# Patient Record
Sex: MALE | Race: WHITE | NOT HISPANIC OR LATINO | Employment: PART TIME | URBAN - METROPOLITAN AREA
[De-identification: names, ages, dates, MRNs, and addresses within clinical notes are randomized per-mention and may not be internally consistent; named-entity substitution may affect disease eponyms.]

---

## 2017-04-27 ENCOUNTER — OFFICE VISIT (OUTPATIENT)
Dept: FAMILY MEDICINE | Facility: CLINIC | Age: 27
End: 2017-04-27
Payer: COMMERCIAL

## 2017-04-27 VITALS
HEART RATE: 118 BPM | BODY MASS INDEX: 32.21 KG/M2 | HEIGHT: 74 IN | OXYGEN SATURATION: 95 % | SYSTOLIC BLOOD PRESSURE: 131 MMHG | WEIGHT: 251 LBS | DIASTOLIC BLOOD PRESSURE: 81 MMHG | TEMPERATURE: 97.7 F

## 2017-04-27 DIAGNOSIS — B35.4 TINEA CORPORIS: ICD-10-CM

## 2017-04-27 DIAGNOSIS — E66.811 OBESITY, CLASS I, BMI 30-34.9: ICD-10-CM

## 2017-04-27 DIAGNOSIS — B35.3 TINEA PEDIS OF BOTH FEET: Primary | ICD-10-CM

## 2017-04-27 PROCEDURE — 99213 OFFICE O/P EST LOW 20 MIN: CPT | Performed by: NURSE PRACTITIONER

## 2017-04-27 RX ORDER — CLOTRIMAZOLE 1 %
CREAM (GRAM) TOPICAL 2 TIMES DAILY
Qty: 30 G | Refills: 1 | Status: SHIPPED | OUTPATIENT
Start: 2017-04-27 | End: 2017-10-23

## 2017-04-27 ASSESSMENT — PAIN SCALES - GENERAL: PAINLEVEL: NO PAIN (0)

## 2017-04-27 NOTE — PATIENT INSTRUCTIONS
"  Ringworm of the Skin    Ringworm is a fungal infection of the skin. Even though it is called \"ringworm,\" a worm doesn't cause it. The cause of ringworm is a fungus that infects the outer layers of the skin. It is also not caused by bed bugs, scabies, or lice. These are totally different.  The medical term for ringworm is tinea. It can affect most parts of your body, although it seems to do better in moist areas of the body and around hair. It can be on almost any part of your body, including:    Arms, hands, legs, chest, feet, and back    Scalp    Beard    Groin    Between toes  Depending on where it is located, sometimes the name changes:    Tinea capitis (scalp)    Tinea cruris (groin)    Tinea corporis (body)    Tinea pedis (feet)  Causes  Ringworm is very common all over the world, including the [place] It can take less than 1 week up to 2 weeks before you develop the infection after being exposed. So, you may not figure out the exact cause.  It is spread through direct contact with:    An infected person or animal    Sometimes from infected soil, or contaminated objects such as towels, clothing, and ruby  Symptoms  At first you might not notice ringworm. Or you may just see a small, red, often raised itchy spot or pimple. Sometimes there may only be 1 spot. At other times there may be several. Ringworm can look slightly different on different parts of the body, but there are some things are always present:    Irregular, round, oval or ring-shaped, which is why it's called ringworm    Clearer or lighter color at the center, since it spreads from the center of the spot outward    Red or inflamed look    Raised    Itchy    Scaly, dry, or flaky  Home care  Follow these tips to help care for yourself at home:    Don't scratch at it and pick it. This can increase the chance of infection and scarring.    If you were prescribed a cream, apply it exactly as directed. Make sure to put the cream not just on the lesion, " but also at 1 or 2 inches on the skin around it. Some antifungal creams are available without a prescription. It may take a week before the fungus starts to go away and it can take 2 to 4 weeks to fully clear. To prevent it from coming back, keep using the medicine until the rash is all gone, or until your doctor tells you to stop.    Medicine by mouth is not always needed. Depending on the location of the infection, your medical situation and how severe the infection is, you may also be given a medicine to take by mouth. If you are given medicine, take it as directed and until your doctor says to stop.    Contagious period: Untreated ringworm of the SKIN is contagious by skin-to-skin contact. Your child may return to school 2 days after treatment has started.  Prevention  To some degree, prevention depends on what part of your body was affected. In general good hygiene helps:    Clean up after you get dirty or sweaty, or after using a locker room.    When possible, don't share ruby and brushes.    Avoid having your skin and feet wet or damp for long periods.    Wear clean, loose-fitting underwear.  Follow-up care  Follow up with your doctor as advised by our staff if the rash does not improve after 10 days of treatment or if the rash spreads to other areas of the body.  When to seek medical advice  Call your health care provider right away if any of these occur:    Redness around the rash gets worse    Fluid drains from the rash    Fever of 100.4 F (38 C) or higher, or as directed by your health care provider    7012-8347 The [employer]. [address] [place] [address], [place] [address]. All rights reserved. This information is not intended as a substitute for professional medical care. Always follow your healthcare professional's instructions.        Athlete s Foot    Athlete s foot (tinea pedis) is caused by a fungal infection in the skin. It affects the skin between the toes, causing fissures (cracks in the  skin). It can also affect the bottom of the foot where it causes dry white scales and peeling of the skin. This infection is more likely to occur when the foot is in hot, sweaty socks and shoes for long periods of time. You may feel itching and burning between your toes.  This infection is treated with skin creams or oral medicine.  Home care  The following are general care guidelines:    It is important to keep the feet dry. Use absorbent cotton socks and change them if they become sweaty. Or, you can wear an open-toe shoe or sandal. Wash the feet at least once a day with soap and water.    Apply the antifungal cream as prescribed. Some antifungal creams are available without a prescription.    It may take a week before the rash starts to improve. It can take about 3 to 4 weeks to completely clear. Continue the medicine until the rash is all gone.    Use over-the-counter antifungal powders or sprays on your feet after exposure to high-risk environments (public showers, gyms, and locker rooms). This can help prevent future infections. Wearing appropriate shoes in these situations can help.  Follow-up care  Follow up with your doctor as recommended by our staff if the rash does not improve after 10 days of treatment, or if the rash continues to spread.  Prevention  The following tips may help prevent athlete's foot:    Don't share shoes or socks with someone who has athlete's foot.    Don't walk barefoot in places where a fungal infection can spread quickly such as locker rooms, showers, and swimming pools.    Change socks regularly.    Alternate shoes to assist in drying.  When to seek medical care  Get prompt medical attention if any of the following occur:    Increasing redness or swelling of the foot    Pus draining from cracks in the skin    Fever of 100.4 F (38 C) or higher, or as directed by your health care provider    Infection comes back soon after treatment    5922-8067 [employer] [address]  [place] [address]. All rights reserved. This information is not intended as a substitute for professional medical care. Always follow your healthcare professional's instructions.

## 2017-04-27 NOTE — MR AVS SNAPSHOT
"              After Visit Summary   4/27/2017    Teddy Shah    MRN: [id number]           Patient Information     Date Of Birth          1990        Visit Information        Provider Department      4/27/2017 2:00 PM Ashanti Dee APRN [facility]        Today's Diagnoses     Tinea pedis of both feet    -  1    Tinea corporis          Care Instructions      Ringworm of the Skin    Ringworm is a fungal infection of the skin. Even though it is called \"ringworm,\" a worm doesn't cause it. The cause of ringworm is a fungus that infects the outer layers of the skin. It is also not caused by bed bugs, scabies, or lice. These are totally different.  The medical term for ringworm is tinea. It can affect most parts of your body, although it seems to do better in moist areas of the body and around hair. It can be on almost any part of your body, including:    Arms, hands, legs, chest, feet, and back    Scalp    Beard    Groin    Between toes  Depending on where it is located, sometimes the name changes:    Tinea capitis (scalp)    Tinea cruris (groin)    Tinea corporis (body)    Tinea pedis (feet)  Causes  Ringworm is very common all over the world, including the [place] It can take less than 1 week up to 2 weeks before you develop the infection after being exposed. So, you may not figure out the exact cause.  It is spread through direct contact with:    An infected person or animal    Sometimes from infected soil, or contaminated objects such as towels, clothing, and ruby  Symptoms  At first you might not notice ringworm. Or you may just see a small, red, often raised itchy spot or pimple. Sometimes there may only be 1 spot. At other times there may be several. Ringworm can look slightly different on different parts of the body, but there are some things are always present:    Irregular, round, oval or ring-shaped, which is why it's called ringworm    Clearer or lighter color at the center, " since it spreads from the center of the spot outward    Red or inflamed look    Raised    Itchy    Scaly, dry, or flaky  Home care  Follow these tips to help care for yourself at home:    Don't scratch at it and pick it. This can increase the chance of infection and scarring.    If you were prescribed a cream, apply it exactly as directed. Make sure to put the cream not just on the lesion, but also at 1 or 2 inches on the skin around it. Some antifungal creams are available without a prescription. It may take a week before the fungus starts to go away and it can take 2 to 4 weeks to fully clear. To prevent it from coming back, keep using the medicine until the rash is all gone, or until your doctor tells you to stop.    Medicine by mouth is not always needed. Depending on the location of the infection, your medical situation and how severe the infection is, you may also be given a medicine to take by mouth. If you are given medicine, take it as directed and until your doctor says to stop.    Contagious period: Untreated ringworm of the SKIN is contagious by skin-to-skin contact. Your child may return to school 2 days after treatment has started.  Prevention  To some degree, prevention depends on what part of your body was affected. In general good hygiene helps:    Clean up after you get dirty or sweaty, or after using a locker room.    When possible, don't share ruby and brushes.    Avoid having your skin and feet wet or damp for long periods.    Wear clean, loose-fitting underwear.  Follow-up care  Follow up with your doctor as advised by our staff if the rash does not improve after 10 days of treatment or if the rash spreads to other areas of the body.  When to seek medical advice  Call your health care provider right away if any of these occur:    Redness around the rash gets worse    Fluid drains from the rash    Fever of 100.4 F (38 C) or higher, or as directed by your health care provider    5879-2609 The  [employer]. [address] [place] [address]. All rights reserved. This information is not intended as a substitute for professional medical care. Always follow your healthcare professional's instructions.        Athlete s Foot    Athlete s foot (tinea pedis) is caused by a fungal infection in the skin. It affects the skin between the toes, causing fissures (cracks in the skin). It can also affect the bottom of the foot where it causes dry white scales and peeling of the skin. This infection is more likely to occur when the foot is in hot, sweaty socks and shoes for long periods of time. You may feel itching and burning between your toes.  This infection is treated with skin creams or oral medicine.  Home care  The following are general care guidelines:    It is important to keep the feet dry. Use absorbent cotton socks and change them if they become sweaty. Or, you can wear an open-toe shoe or sandal. Wash the feet at least once a day with soap and water.    Apply the antifungal cream as prescribed. Some antifungal creams are available without a prescription.    It may take a week before the rash starts to improve. It can take about 3 to 4 weeks to completely clear. Continue the medicine until the rash is all gone.    Use over-the-counter antifungal powders or sprays on your feet after exposure to high-risk environments (public showers, gyms, and locker rooms). This can help prevent future infections. Wearing appropriate shoes in these situations can help.  Follow-up care  Follow up with your doctor as recommended by our staff if the rash does not improve after 10 days of treatment, or if the rash continues to spread.  Prevention  The following tips may help prevent athlete's foot:    Don't share shoes or socks with someone who has athlete's foot.    Don't walk barefoot in places where a fungal infection can spread quickly such as locker rooms, showers, and swimming pools.    Change socks  "regularly.    Alternate shoes to assist in drying.  When to seek medical care  Get prompt medical attention if any of the following occur:    Increasing redness or swelling of the foot    Pus draining from cracks in the skin    Fever of 100.4 F (38 C) or higher, or as directed by your health care provider    Infection comes back soon after treatment    4670-8519 The [employer]. [address] [place] [address]. All rights reserved. This information is not intended as a substitute for professional medical care. Always follow your healthcare professional's instructions.              Follow-ups after your visit        Who to contact     If you have questions or need follow up information about today's clinic visit or your schedule please contact [facility] directly at [phone].  Normal or non-critical lab and imaging results will be communicated to you by MyChart, letter or phone within 4 business days after the clinic has received the results. If you do not hear from us within 7 days, please contact the clinic through MyChart or phone. If you have a critical or abnormal lab result, we will notify you by phone as soon as possible.  Submit refill requests through [employer] or call your pharmacy and they will forward the refill request to us. Please allow 3 business days for your refill to be completed.          Additional Information About Your Visit        [employer] Information     [employer] lets you send messages to your doctor, view your test results, renew your prescriptions, schedule appointments and more. To sign up, go to [email or web address] . Click on \"Log in\" on the left side of the screen, which will take you to the Welcome page. Then click on \"Sign up Now\" on the right side of the page.     You will be asked to enter the access code listed below, as well as some personal information. Please follow the directions to create your username and password.     Your access " "code is: [email or web address]  Expires: 2017  2:35 PM     Your access code will  in 90 days. If you need help or a new code, please call your [facility] or [phone].        Care EveryWhere ID     This is your Care EveryWhere ID. This could be used by other organizations to access your [place] medical records  [id number]        Your Vitals Were     Pulse Temperature Height Pulse Oximetry BMI (Body Mass Index)       118 97.7  F (36.5  C) (Oral) 6' 1.58\" (1.869 m) 95% 32.59 kg/m2        Blood Pressure from Last 3 Encounters:   17 131/81   16 126/80   06/24/15 124/73    Weight from Last 3 Encounters:   17 251 lb (113.9 kg)   16 245 lb (111.1 kg)   06/24/15 245 lb 6.4 oz (111.3 kg)              Today, you had the following     No orders found for display         Today's Medication Changes          These changes are accurate as of: 17  2:37 PM.  If you have any questions, ask your nurse or doctor.               Start taking these medicines.        Dose/Directions    clotrimazole 1 % cream   Commonly known as:  LOTRIMIN   Used for:  Tinea corporis, Tinea pedis of both feet   Started by:  Ashanti Dee APRN CNP        Apply topically 2 times daily   Quantity:  30 g   Refills:  1            Where to get your medicines      Some of these will need a paper prescription and others can be bought over the counter.  Ask your nurse if you have questions.     Bring a paper prescription for each of these medications     clotrimazole 1 % cream                Primary Care Provider Office Phone #    [facility] [phone]       No address on file        Thank you!     Thank you for choosing [facility]  for your care. Our goal is always to provide you with excellent care. Hearing back from our patients is one way we can continue to improve our services. Please take a few minutes to complete the written survey that you may receive in the mail after " your visit with us. Thank you!             Your Updated Medication List - Protect others around you: Learn how to safely use, store and throw away your medicines at www.disposemymeds.org.          This list is accurate as of: 4/27/17  2:37 PM.  Always use your most recent med list.                   Brand Name Dispense Instructions for use    clotrimazole 1 % cream    LOTRIMIN    30 g    Apply topically 2 times daily       hydrocortisone 0.2 % cream    WESTCORT    45 g    Apply twice daily to red areas on the face for 1 week, then weekends only.       IBUPROFEN PO          ketoconazole 2 % cream    NIZORAL    30 g    Apply topically 2 times daily       TYLENOL PO

## 2017-04-27 NOTE — PROGRESS NOTES
"  SUBJECTIVE:                                                    Teddy Shah is a 26 year old male who presents to clinic today for the following health issues:    Rash     Onset: 1-2 weeks ago     Description:   Location: Left arm and both legs   Character: round, blotchy, raised, burning, red  Itching (Pruritis): no     Progression of Symptoms:  worsening and constant    Accompanying Signs & Symptoms:  Fever: no   Body aches or joint pain: no   Sore throat symptoms: no   Recent cold symptoms: YES- 3-4 days ago   History:   Previous similar rash: no     Precipitating factors:   Exposure to similar rash: no   New exposures: None   Recent travel: no     Alleviating factors:  none     Therapies Tried and outcome: none  Patient has 3 dogs, no cats, sister has similar type lesions for the last 3-4 weeks.    Problem list and histories reviewed & adjusted, as indicated.  Additional history: as documented    BP Readings from Last 3 Encounters:   04/27/17 131/81   01/06/16 126/80   06/24/15 124/73    Wt Readings from Last 3 Encounters:   04/27/17 251 lb (113.9 kg)   01/06/16 245 lb (111.1 kg)   06/24/15 245 lb 6.4 oz (111.3 kg)                  Labs reviewed in EPIC    Reviewed and updated as needed this visit by clinical staff  Allergies  Meds       Reviewed and updated as needed this visit by Provider         ROS:  Constitutional, HEENT, cardiovascular, pulmonary, gi and gu systems are negative, except as otherwise noted.    OBJECTIVE:                                                    /81 (BP Location: Left arm, Patient Position: Chair, Cuff Size: Adult Large)  Pulse 118  Temp 97.7  F (36.5  C) (Oral)  Ht 6' 1.58\" (1.869 m)  Wt 251 lb (113.9 kg)  SpO2 95%  BMI 32.59 kg/m2  Body mass index is 32.59 kg/(m^2).  GENERAL: healthy, alert and no distress  NECK: no adenopathy, no asymmetry, masses, or scars and thyroid normal to palpation  RESP: lungs clear to auscultation - no rales, rhonchi or wheezes  CV: " "regular rate and rhythm, normal S1 S2, no S3 or S4, no murmur, click or rub, no peripheral edema and peripheral pulses strong  ABDOMEN: soft, nontender, no hepatosplenomegaly, no masses and bowel sounds normal  MS: no gross musculoskeletal defects noted, no edema  SKIN: multiple erythematous annular lesions with leading edge scaling and central clearing on left arm, both LE and on feet bilaterally consistent with tinea infection, otherwise, no suspicious lesions or rashes  PSYCH: mentation appears normal, affect normal/bright    Diagnostic Test Results:  none      ASSESSMENT/PLAN:                                                        BP Screening:   Last 3 BP Readings:    BP Readings from Last 3 Encounters:   04/27/17 131/81   01/06/16 126/80   06/24/15 124/73       The following was recommended to the patient:  Re-screen BP within a year and recommended lifestyle modifications  BMI:   Estimated body mass index is 32.59 kg/(m^2) as calculated from the following:    Height as of this encounter: 6' 1.58\" (1.869 m).    Weight as of this encounter: 251 lb (113.9 kg).   Weight management plan: Discussed healthy diet and exercise guidelines and patient will follow up in 12 months in clinic to re-evaluate.      1. Tinea pedis of both feet  I ended our visit today by discussing the patient's diagnoses and recommended treatment. Please refer to today's diagnoses and orders for further details. I briefly discussed the pathophysiology of these conditions and outlined their expected course. I discussed the warning symptoms and signs that indicate an atypical course that would need urgent or emergent care. I also discussed self care strategies for symptom relief.    Common side effects of medications prescribed at this visit were discussed with the patient. Severe side effects, including current applicable black box warnings, were discussed. We discussed options for dealing with these possible side effects and allergic " reactions, based on their severity  - clotrimazole (LOTRIMIN) 1 % cream; Apply topically 2 times daily  Dispense: 30 g; Refill: 1    2. Tinea corporis    - clotrimazole (LOTRIMIN) 1 % cream; Apply topically 2 times daily  Dispense: 30 g; Refill: 1    3. Obesity, Class I, BMI 30-34.9  Benefits of weight loss reviewed in detail, encouraged him to cut back on the carbohydrates in the diet, consume more fruits and vegetables, drink plenty of water, avoid fruit juices, sodas, get 150 min moderate exercise/week.  Recheck weight in 6 months.        See Patient Instructions    AB Hinds [facility]

## 2017-04-27 NOTE — NURSING NOTE
"Chief Complaint   Patient presents with     Rash       Initial /81 (BP Location: Left arm, Patient Position: Chair, Cuff Size: Adult Large)  Pulse 118  Temp 97.7  F (36.5  C) (Oral)  Ht 6' 1.58\" (1.869 m)  Wt 251 lb (113.9 kg)  SpO2 95%  BMI 32.59 kg/m2 Estimated body mass index is 32.59 kg/(m^2) as calculated from the following:    Height as of this encounter: 6' 1.58\" (1.869 m).    Weight as of this encounter: 251 lb (113.9 kg).  Medication Reconciliation: complete   Mell Fortune      "

## 2017-05-12 ENCOUNTER — OFFICE VISIT (OUTPATIENT)
Dept: FAMILY MEDICINE | Facility: CLINIC | Age: 27
End: 2017-05-12
Payer: COMMERCIAL

## 2017-05-12 VITALS
WEIGHT: 249.8 LBS | SYSTOLIC BLOOD PRESSURE: 137 MMHG | HEART RATE: 100 BPM | DIASTOLIC BLOOD PRESSURE: 83 MMHG | BODY MASS INDEX: 32.44 KG/M2 | OXYGEN SATURATION: 96 % | TEMPERATURE: 97.2 F

## 2017-05-12 DIAGNOSIS — R23.3 PETECHIAL RASH: Primary | ICD-10-CM

## 2017-05-12 LAB
CRP SERPL-MCNC: <2.9 MG/L (ref 0–8)
DEPRECATED S PYO AG THROAT QL EIA: NORMAL
ERYTHROCYTE [DISTWIDTH] IN BLOOD BY AUTOMATED COUNT: 13.4 % (ref 10–15)
ERYTHROCYTE [SEDIMENTATION RATE] IN BLOOD BY WESTERGREN METHOD: 2 MM/H (ref 0–15)
HCT VFR BLD AUTO: 49.5 % (ref 40–53)
HETEROPH AB SER QL: NEGATIVE
HGB BLD-MCNC: 17 G/DL (ref 13.3–17.7)
MCH RBC QN AUTO: 29.4 PG (ref 26.5–33)
MCHC RBC AUTO-ENTMCNC: 34.3 G/DL (ref 31.5–36.5)
MCV RBC AUTO: 86 FL (ref 78–100)
MICRO REPORT STATUS: NORMAL
PLATELET # BLD AUTO: 243 10E9/L (ref 150–450)
RBC # BLD AUTO: 5.79 10E12/L (ref 4.4–5.9)
SPECIMEN SOURCE: NORMAL
WBC # BLD AUTO: 7.6 10E9/L (ref 4–11)

## 2017-05-12 PROCEDURE — 85027 COMPLETE CBC AUTOMATED: CPT | Performed by: PHYSICIAN ASSISTANT

## 2017-05-12 PROCEDURE — 86308 HETEROPHILE ANTIBODY SCREEN: CPT | Performed by: PHYSICIAN ASSISTANT

## 2017-05-12 PROCEDURE — 36415 COLL VENOUS BLD VENIPUNCTURE: CPT | Performed by: PHYSICIAN ASSISTANT

## 2017-05-12 PROCEDURE — 87081 CULTURE SCREEN ONLY: CPT | Performed by: PHYSICIAN ASSISTANT

## 2017-05-12 PROCEDURE — 87880 STREP A ASSAY W/OPTIC: CPT | Performed by: PHYSICIAN ASSISTANT

## 2017-05-12 PROCEDURE — 99213 OFFICE O/P EST LOW 20 MIN: CPT | Performed by: PHYSICIAN ASSISTANT

## 2017-05-12 PROCEDURE — 86140 C-REACTIVE PROTEIN: CPT | Performed by: PHYSICIAN ASSISTANT

## 2017-05-12 PROCEDURE — 85652 RBC SED RATE AUTOMATED: CPT | Performed by: PHYSICIAN ASSISTANT

## 2017-05-12 RX ORDER — TRIAMCINOLONE ACETONIDE 1 MG/G
CREAM TOPICAL
Qty: 80 G | Refills: 0 | Status: SHIPPED | OUTPATIENT
Start: 2017-05-12 | End: 2020-09-23

## 2017-05-12 NOTE — PATIENT INSTRUCTIONS
Labs are pending, you will be called on Monday with results  Apply Triamcinolone cream twice a day to the itchy areas.

## 2017-05-12 NOTE — MR AVS SNAPSHOT
After Visit Summary   5/12/2017    Teddy Shah    MRN: [id number]           Patient Information     Date Of Birth          1990        Visit Information        Provider Department      5/12/2017 4:40 PM Coleen Worley PA-C [facility]        Today's Diagnoses     Petechial rash    -  1      Care Instructions    Labs are pending, you will be called on Monday with results  Apply Triamcinolone cream twice a day to the itchy areas.             Follow-ups after your visit        Additional Services     DERMATOLOGY REFERRAL       Your provider has referred you to: [facility]: Dermatology Clinic [facility] [phone]   [email or web address]/  [facility]: [facility] [phone]   [email or web address]  [facility]: [facility] [phone]  [email or web address]  N: [facility] [place] [phone]   [email or web address]    Please be aware that coverage of these services is subject to the terms and limitations of your health insurance plan.  Call member services at your health plan with any benefit or coverage questions.      Please bring the following with you to your appointment:    (1) Any X-Rays, CTs or MRIs which have been performed.  Contact the facility where they were done to arrange for  prior to your scheduled appointment.    (2) List of current medications  (3) This referral request   (4) Any documents/labs given to you for this referral                  Who to contact     If you have questions or need follow up information about today's clinic visit or your schedule please contact [facility] directly at [phone].  Normal or non-critical lab and imaging results will be communicated to you by MyChart, letter or phone within 4 business days after the  "clinic has received the results. If you do not hear from us within 7 days, please contact the clinic through [employer] or phone. If you have a critical or abnormal lab result, we will notify you by phone as soon as possible.  Submit refill requests through [employer] or call your pharmacy and they will forward the refill request to us. Please allow 3 business days for your refill to be completed.          Additional Information About Your Visit        AskemharAnatole Information     [employer] lets you send messages to your doctor, view your test results, renew your prescriptions, schedule appointments and more. To sign up, go to [email or web address] . Click on \"Log in\" on the left side of the screen, which will take you to the Welcome page. Then click on \"Sign up Now\" on the right side of the page.     You will be asked to enter the access code listed below, as well as some personal information. Please follow the directions to create your username and password.     Your access code is: [email or web address]  Expires: 2017  2:35 PM     Your access code will  in 90 days. If you need help or a new code, please call your [facility] clinic or [phone].        Care EveryWhere ID     This is your Care EveryWhere ID. This could be used by other organizations to access your [facility] medical records  [id number]        Your Vitals Were     Pulse Temperature Pulse Oximetry BMI (Body Mass Index)          100 97.2  F (36.2  C) (Oral) 96% 32.44 kg/m2         Blood Pressure from Last 3 Encounters:   17 137/83   17 131/81   16 126/80    Weight from Last 3 Encounters:   17 249 lb 12.8 oz (113.3 kg)   17 251 lb (113.9 kg)   16 245 lb (111.1 kg)              We Performed the Following     CBC with platelets     CRP, inflammation     DERMATOLOGY REFERRAL     ESR: Erythrocyte sedimentation rate     Mononucleosis screen     Strep, Rapid Screen          Today's Medication Changes          These changes are " accurate as of: 5/12/17  5:26 PM.  If you have any questions, ask your nurse or doctor.               Start taking these medicines.        Dose/Directions    triamcinolone 0.1 % cream   Commonly known as:  KENALOG   Used for:  Petechial rash   Started by:  Coleen Worley PA-C        Apply sparingly to affected area twice a day  as needed   Quantity:  80 g   Refills:  0            Where to get your medicines      These medications were sent to [id number] - [place], [place] - [address] [facility] [address], [facility] [address]     Phone:  [phone]     triamcinolone 0.1 % cream                Primary Care Provider Office Phone #    [facility] [phone]       No address on file        Thank you!     Thank you for choosing [facility]  for your care. Our goal is always to provide you with excellent care. Hearing back from our patients is one way we can continue to improve our services. Please take a few minutes to complete the written survey that you may receive in the mail after your visit with us. Thank you!             Your Updated Medication List - Protect others around you: Learn how to safely use, store and throw away your medicines at www.disposemymeds.org.          This list is accurate as of: 5/12/17  5:26 PM.  Always use your most recent med list.                   Brand Name Dispense Instructions for use    clotrimazole 1 % cream    LOTRIMIN    30 g    Apply topically 2 times daily       hydrocortisone 0.2 % cream    WESTCORT    45 g    Apply twice daily to red areas on the face for 1 week, then weekends only.       IBUPROFEN PO      Reported on 5/12/2017       ketoconazole 2 % cream    NIZORAL    30 g    Apply topically 2 times daily       triamcinolone 0.1 % cream    KENALOG    80 g    Apply sparingly to affected area twice a day  as needed       TYLENOL PO      Reported on 5/12/2017

## 2017-05-12 NOTE — NURSING NOTE
"Chief Complaint   Patient presents with     Derm Problem     is spreading/getting worse       Initial /83 (Cuff Size: Adult Large)  Pulse 100  Temp 97.2  F (36.2  C) (Oral)  Wt 113.3 kg (249 lb 12.8 oz)  SpO2 96%  BMI 32.44 kg/m2 Estimated body mass index is 32.44 kg/(m^2) as calculated from the following:    Height as of 4/27/17: 1.869 m (6' 1.58\").    Weight as of this encounter: 113.3 kg (249 lb 12.8 oz).  Medication Reconciliation:   Dorina Bond, [facility]  May 12, 2017 4:48 PM        "

## 2017-05-12 NOTE — PROGRESS NOTES
SUBJECTIVE:                                                    Teddy hSah is a 26 year old male who presents to clinic today for the following health issues:      Rash      Duration: about a month ago    Description  Location: arms, legs, feet, thighs, behind knees  Itching: no    Intensity:  mild    Accompanying signs and symptoms: None    History (similar episodes/previous evaluation): yes-was told athletes foot and that is what he is being treated for--rash has spread/gotten worse    Precipitating or alleviating factors:  New exposures:  None  Recent travel: no      Therapies tried and outcome: Lotrimin cream 1 %     Problem list and histories reviewed & adjusted, as indicated.  Additional history: as documented    Patient Active Problem List   Diagnosis     Nasal obstruction     Deviated nasal septum     CARDIOVASCULAR SCREENING; LDL GOAL LESS THAN 160     Obesity, Class I, BMI 30-34.9     Past Surgical History:   Procedure Laterality Date     ADENOIDECTOMY       ENDOSCOPY      EGD     EXTRACTION(S) DENTAL      wisdom     PE TUBES      multiple     SEPTOPLASTY, TURBINOPLASTY, COMBINED Bilateral 6/19/2015    Procedure: COMBINED SEPTOPLASTY, TURBINOPLASTY;  Surgeon: Ally Finley MD;  Location:  OR       Social History   Substance Use Topics     Smoking status: Never Smoker     Smokeless tobacco: Never Used     Alcohol use No     Family History   Problem Relation Age of Onset     Anesthesia Reaction Mother      Anesthesia Reaction Paternal Grandmother      CEREBROVASCULAR DISEASE Paternal Grandmother      Coronary Artery Disease Paternal Grandmother      heart attack?     CEREBROVASCULAR DISEASE Father      Hypertension Father      ?     Coronary Artery Disease Maternal Grandfather      heart attack     Prostate Cancer Maternal Grandfather      Hypertension Maternal Grandmother      Lymphoma Maternal Grandmother      Bleeding Disorder No family hx of          Current Outpatient Prescriptions   Medication  Sig Dispense Refill     triamcinolone (KENALOG) 0.1 % cream Apply sparingly to affected area twice a day  as needed 80 g 0     clotrimazole (LOTRIMIN) 1 % cream Apply topically 2 times daily 30 g 1     ketoconazole (NIZORAL) 2 % cream Apply topically 2 times daily (Patient not taking: Reported on 5/12/2017) 30 g 8     hydrocortisone (WESTCORT) 0.2 % cream Apply twice daily to red areas on the face for 1 week, then weekends only. (Patient not taking: Reported on 5/12/2017) 45 g 3     IBUPROFEN PO Reported on 5/12/2017       Acetaminophen (TYLENOL PO) Reported on 5/12/2017       Allergies   Allergen Reactions     Latex Itching     Erythromycin Rash       Reviewed and updated as needed this visit by clinical staff  Tobacco  Allergies  Meds  Med Hx  Surg Hx  Fam Hx  Soc Hx      Reviewed and updated as needed this visit by Provider         ROS:  Constitutional, HEENT, cardiovascular, pulmonary, gi and gu systems are negative, except as otherwise noted.    OBJECTIVE:                                                    /83 (Cuff Size: Adult Large)  Pulse 100  Temp 97.2  F (36.2  C) (Oral)  Wt 249 lb 12.8 oz (113.3 kg)  SpO2 96%  BMI 32.44 kg/m2  Body mass index is 32.44 kg/(m^2).  GENERAL: healthy, alert and no distress  NECK: no adenopathy, no asymmetry, masses, or scars and thyroid normal to palpation  MS: no gross musculoskeletal defects noted, no edema  SKIN: patches of slightly palpable petechial lesions on the lower leg, lower arms and feet.    Diagnostic Test Results:  No results found for this or any previous visit (from the past 24 hour(s)).     ASSESSMENT/PLAN:                                                        ICD-10-CM    1. Petechial rash R23.3 CBC with platelets     Strep, Rapid Screen     Mononucleosis screen     CRP, inflammation     ESR: Erythrocyte sedimentation rate     triamcinolone (KENALOG) 0.1 % cream     DERMATOLOGY REFERRAL     Possible due to viral or bacterial infection vs  idiopathic.  Labs are pending, see orders.   Patient will use Triamcinolone on the areas that are itchy.   Patient will be called on Monday with the results.  It was discussed that if labs are normal and rash does not get better with Triamcinolone in 2 weeks, patient will see dermatology for a biopsy and further evaluation.       Coleen Worley PA-C  [facility]

## 2017-05-13 LAB
BACTERIA SPEC CULT: NORMAL
MICRO REPORT STATUS: NORMAL
SPECIMEN SOURCE: NORMAL

## 2017-10-13 ENCOUNTER — OFFICE VISIT (OUTPATIENT)
Dept: FAMILY MEDICINE | Facility: CLINIC | Age: 27
End: 2017-10-13
Payer: COMMERCIAL

## 2017-10-13 VITALS
DIASTOLIC BLOOD PRESSURE: 87 MMHG | OXYGEN SATURATION: 94 % | BODY MASS INDEX: 33.26 KG/M2 | HEART RATE: 93 BPM | TEMPERATURE: 96.3 F | WEIGHT: 259.2 LBS | SYSTOLIC BLOOD PRESSURE: 138 MMHG | HEIGHT: 74 IN

## 2017-10-13 DIAGNOSIS — Z23 NEED FOR PROPHYLACTIC VACCINATION AND INOCULATION AGAINST INFLUENZA: Primary | ICD-10-CM

## 2017-10-13 DIAGNOSIS — E66.811 OBESITY, CLASS I, BMI 30-34.9: ICD-10-CM

## 2017-10-13 DIAGNOSIS — F41.9 ANXIETY: ICD-10-CM

## 2017-10-13 DIAGNOSIS — F84.9 PERVASIVE DEVELOPMENTAL DISORDER: ICD-10-CM

## 2017-10-13 DIAGNOSIS — Z13.6 CARDIOVASCULAR SCREENING; LDL GOAL LESS THAN 160: ICD-10-CM

## 2017-10-13 DIAGNOSIS — F33.1 MODERATE EPISODE OF RECURRENT MAJOR DEPRESSIVE DISORDER (H): ICD-10-CM

## 2017-10-13 LAB
GLUCOSE SERPL-MCNC: 137 MG/DL (ref 70–99)
LDLC SERPL DIRECT ASSAY-MCNC: 122 MG/DL
TSH SERPL DL<=0.005 MIU/L-ACNC: 0.83 MU/L (ref 0.4–4)

## 2017-10-13 PROCEDURE — 90686 IIV4 VACC NO PRSV 0.5 ML IM: CPT | Performed by: NURSE PRACTITIONER

## 2017-10-13 PROCEDURE — 83721 ASSAY OF BLOOD LIPOPROTEIN: CPT | Performed by: NURSE PRACTITIONER

## 2017-10-13 PROCEDURE — 36415 COLL VENOUS BLD VENIPUNCTURE: CPT | Performed by: NURSE PRACTITIONER

## 2017-10-13 PROCEDURE — 84443 ASSAY THYROID STIM HORMONE: CPT | Performed by: NURSE PRACTITIONER

## 2017-10-13 PROCEDURE — 90471 IMMUNIZATION ADMIN: CPT | Performed by: NURSE PRACTITIONER

## 2017-10-13 PROCEDURE — 99214 OFFICE O/P EST MOD 30 MIN: CPT | Mod: 25 | Performed by: NURSE PRACTITIONER

## 2017-10-13 PROCEDURE — 82947 ASSAY GLUCOSE BLOOD QUANT: CPT | Performed by: NURSE PRACTITIONER

## 2017-10-13 ASSESSMENT — ANXIETY QUESTIONNAIRES
2. NOT BEING ABLE TO STOP OR CONTROL WORRYING: NEARLY EVERY DAY
IF YOU CHECKED OFF ANY PROBLEMS ON THIS QUESTIONNAIRE, HOW DIFFICULT HAVE THESE PROBLEMS MADE IT FOR YOU TO DO YOUR WORK, TAKE CARE OF THINGS AT HOME, OR GET ALONG WITH OTHER PEOPLE: VERY DIFFICULT
1. FEELING NERVOUS, ANXIOUS, OR ON EDGE: NEARLY EVERY DAY
7. FEELING AFRAID AS IF SOMETHING AWFUL MIGHT HAPPEN: NEARLY EVERY DAY
3. WORRYING TOO MUCH ABOUT DIFFERENT THINGS: NEARLY EVERY DAY
5. BEING SO RESTLESS THAT IT IS HARD TO SIT STILL: MORE THAN HALF THE DAYS
6. BECOMING EASILY ANNOYED OR IRRITABLE: MORE THAN HALF THE DAYS
GAD7 TOTAL SCORE: 19

## 2017-10-13 ASSESSMENT — PATIENT HEALTH QUESTIONNAIRE - PHQ9
SUM OF ALL RESPONSES TO PHQ QUESTIONS 1-9: 12
5. POOR APPETITE OR OVEREATING: NEARLY EVERY DAY

## 2017-10-13 NOTE — PROGRESS NOTES
SUBJECTIVE:   Teddy Shah is a 26 year old male who presents to clinic today for the following health issues:      Abnormal Mood Symptoms  Onset: Entire life worsened within the last year    Description:   Depression: YES  Anxiety: YES    Accompanying Signs & Symptoms:  Still participating in activities that you used to enjoy: no  Fatigue: no  Irritability: YES  Difficulty concentrating: YES  Changes in appetite: no  Problems with sleep: no  Heart racing/beating fast : no  Thoughts of hurting yourself or others: none    History:   Recent stress: YES- political stress   Prior depression hospitalization: Yes- in [employer] (suicidal ideation)  Family history of depression: YES  Family history of anxiety: YES    Precipitating factors:   Alcohol/drug use: no    Alleviating factors:      Therapies Tried and outcome: None    Shana reports lifelong issues with depression and anxiety, states he was diagnosed with autism in the 3rd or 4th grade, had IEP all through school. He was admitted for suicidal ideation in [employer] but denies SI/HI now. His depression has worsened since 2016.  He has never been on SSRI but is currently in counseling and he comes in today at the request of his counselor for possible SSRI therapy.      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  He is easily overwhelmed, is more irritable than usual.    Problem list and histories reviewed & adjusted, as indicated.  Additional history: as documented    Patient Active Problem List   Diagnosis     Nasal obstruction     Deviated nasal septum     CARDIOVASCULAR SCREENING; LDL GOAL LESS THAN 160     Obesity, Class I, BMI 30-34.9     Pervasive developmental disorder     Moderate episode of recurrent major depressive disorder (H)     Anxiety     Past Surgical History:   Procedure Laterality Date     ADENOIDECTOMY       ENDOSCOPY      EGD     EXTRACTION(S) DENTAL      wisdom  "    PE TUBES      multiple     SEPTOPLASTY, TURBINOPLASTY, COMBINED Bilateral 6/19/2015    Procedure: COMBINED SEPTOPLASTY, TURBINOPLASTY;  Surgeon: Ally Finley MD;  Location:  OR       Social History   Substance Use Topics     Smoking status: Never Smoker     Smokeless tobacco: Never Used     Alcohol use No     Family History   Problem Relation Age of Onset     Anesthesia Reaction Mother      Anesthesia Reaction Paternal Grandmother      CEREBROVASCULAR DISEASE Paternal Grandmother      Coronary Artery Disease Paternal Grandmother      heart attack?     CEREBROVASCULAR DISEASE Father      Hypertension Father      ?     Coronary Artery Disease Maternal Grandfather      heart attack     Prostate Cancer Maternal Grandfather      Hypertension Maternal Grandmother      Lymphoma Maternal Grandmother      Bleeding Disorder No family hx of          BP Readings from Last 3 Encounters:   10/13/17 138/87   05/12/17 137/83   04/27/17 131/81    Wt Readings from Last 3 Encounters:   10/13/17 259 lb 3.2 oz (117.6 kg)   05/12/17 249 lb 12.8 oz (113.3 kg)   04/27/17 251 lb (113.9 kg)                  Labs reviewed in EPIC        Reviewed and updated as needed this visit by clinical staff  Tobacco  Allergies  Meds  Problems  Med Hx  Surg Hx  Fam Hx  Soc Hx        Reviewed and updated as needed this visit by Provider  Allergies  Meds  Problems         ROS:  Constitutional, HEENT, cardiovascular, pulmonary, gi and gu systems are negative, except as otherwise noted.      OBJECTIVE:   /87 (BP Location: Left arm, Patient Position: Sitting, Cuff Size: Adult Regular)  Pulse 93  Temp 96.3  F (35.7  C) (Oral)  Ht 6' 1.58\" (1.869 m)  Wt 259 lb 3.2 oz (117.6 kg)  SpO2 94%  BMI 33.66 kg/m2  Body mass index is 33.66 kg/(m^2).  GENERAL: healthy, alert and no distress  EYES: Eyes grossly normal to inspection, PERRL and conjunctivae and sclerae normal  HENT: ear canals and TM's normal, nose and mouth without ulcers or " "lesions  NECK: no adenopathy, no asymmetry, masses, or scars and thyroid normal to palpation  RESP: lungs clear to auscultation - no rales, rhonchi or wheezes  CV: regular rate and rhythm, normal S1 S2, no S3 or S4, no murmur, click or rub, no peripheral edema and peripheral pulses strong  ABDOMEN: soft, nontender, no hepatosplenomegaly, no masses and bowel sounds normal  MS: no gross musculoskeletal defects noted, no edema  SKIN: no suspicious lesions or rashes  NEURO: Normal strength and tone, mentation intact and speech normal  PSYCH: mentation appears normal, anxious, judgement and insight intact and appearance well groomed  LYMPH: normal ant/post cervical, supraclavicular nodes    Diagnostic Test Results:  No results found for this or any previous visit (from the past 24 hour(s)).    ASSESSMENT/PLAN:         BP Screening:   Last 3 BP Readings:    BP Readings from Last 3 Encounters:   10/13/17 138/87   05/12/17 137/83   04/27/17 131/81       The following was recommended to the patient:  Re-screen BP within a year and recommended lifestyle modifications  BMI:   Estimated body mass index is 33.66 kg/(m^2) as calculated from the following:    Height as of this encounter: 6' 1.58\" (1.869 m).    Weight as of this encounter: 259 lb 3.2 oz (117.6 kg).   Weight management plan: Discussed healthy diet and exercise guidelines and patient will follow up in 6 months in clinic to re-evaluate.        1. Pervasive developmental disorder  Patient to have his counselor send me updated psychiatric diagnoses list.  Patient may be eligible for assistance from [facility] as indicated.     2. Moderate episode of recurrent major depressive disorder (H)  Checking labs and starting zoloft.  Discussed need for gradual increase of SSRI dose over time, titrating to effect.  Reviewed potential for initial side effects (such as headache, GI symptoms, and dry mouth) that will likely subside after a week or so, but that therapeutic " effects will likely take 1-2 weeks - so it's important to stick with medication for at least a month to adequately gauge effect.  Notify me of any significant side effects.  Discussed that treatment with SSRI medications requires a minimum commitment of 9-12 months; shorter courses are associated with rebound symptoms.  Discussed potential long-term side effects including sexual side effects. Return to clinic 3 weeks for medication check.    - TSH with free T4 reflex  - Glucose  - sertraline (ZOLOFT) 50 MG tablet; Take 1 tablet (50 mg) by mouth daily  Dispense: 30 tablet; Refill: 1    3. Anxiety  Starting Zoloft but will likely need higher dose to get at his anxiety symptoms, continue with counseling.  - TSH with free T4 reflex  - Glucose  - sertraline (ZOLOFT) 50 MG tablet; Take 1 tablet (50 mg) by mouth daily  Dispense: 30 tablet; Refill: 1    4. CARDIOVASCULAR SCREENING; LDL GOAL LESS THAN 160    - LDL cholesterol direct    5. Obesity, Class I, BMI 30-34.9  Benefits of weight loss reviewed in detail, encouraged him to cut back on the carbohydrates in the diet, consume more fruits and vegetables, drink plenty of water, avoid fruit juices, sodas, get 150 min moderate exercise/week.  Recheck weight in 6 months.      6. Need for prophylactic vaccination and inoculation against influenza    - FLU VAC, SPLIT VIRUS IM > 3 YO (QUADRIVALENT) [07016]  - Vaccine Administration, Initial [43985]  Approximately 30 minutes were spent face-to-face with patient and greater than 50% of that time was spent on counseling and coordination of care.     See Patient Instructions    AB Hinds [facility]

## 2017-10-13 NOTE — PATIENT INSTRUCTIONS
Based on your medical history and these are the current health maintenance or preventive care services that you are due for (some may have been done at this visit)  Health Maintenance Due   Topic Date Due     INFLUENZA VACCINE (SYSTEM ASSIGNED)  09/01/2017         At [facility], we strive to deliver an exceptional experience to you, every time we see you.    If you receive a survey in the mail, please send us back your thoughts. We really do value your feedback.    Your care team's suggested websites for health information:  Www.[employer].org : Up to date and easily searchable information on multiple topics.  Www.medlineplus.gov : medication info, interactive tutorials, watch real surgeries online  Www.familydoctor.org : good info from the Academy of Family Physicians  Www.cdc.gov : public health info, travel advisories, epidemics (H1N1)  Www.aap.org : children's health info, normal development, vaccinations  Www.health.[facility].[place].us : [place] dept of health, public health issues in [place], N1N1    How to contact your care team:   Team Denise/Spirit [phone]         Pharmacy [phone]    Dr. Alva, Alta Worley PA-C, Dr. Cole, Mimi BERGER CNP, Sydni Daly PA-C, Dr. Bryant, and AB Borja CNP    Team RN: Radha      Clinic hours  M-Th 7 am-7 pm   Fri 7 am-5 pm.   Urgent care M-F 11 am-9 pm,   Sat/Sun 9 am-5 pm.  Pharmacy M-Th 8 am-8 pm Fri 8 am-6 pm  Sat/Sun 9 am-5 pm.     All password changes, disabled accounts, or ID changes in [employer]/MyHealth will be done by our Access Services Department.    If you need help with your account or password, call: [phone]. Clinic staff no longer has the ability to change passwords.     Depression: Tips to Help Yourself  As your healthcare providers help treat your depression, you can also help yourself. Keep in mind that your illness affects you emotionally, physically, mentally, and socially. So full recovery will take  time. Take care of your body and your soul, and be patient with yourself as you get better.    Self-care    Educate yourself. Read about treatment and medicine options. If you have the energy, attend local conferences or support groups. Keep a list of useful websites and helpful books and use them as needed. This illness is not your fault. Don t blame yourself for your depression.    Manage early symptoms. If you notice symptoms returning, experience triggers, or identify other factors that may lead to a depressive episode, get help as soon as possible. Ask trusted friends and family to monitor your behavior and let you know if they see anything of concern.    Work with your provider. Find a provider you can trust. Communicate honestly with that person and share information on your treatment for depression and your reaction to medicines.    Be prepared for a crisis. Know what to do if you experience a crisis. Keep the phone number of a crisis hotline and know the location of your community's urgent care centers and the closest emergency department.    Hold off on big decisions. Depression can cloud your judgment. So wait until you feel better before making major life decisions, such as changing jobs, moving, or getting  or .    Be patient. Recovering from depression is a process. Don t be discouraged if it takes some time to feel better.    Keep it simple. Depression saps your energy and concentration. So you won t be able to do all the things you used to do. Set small goals and do what you can.    Be with others. Don t isolate yourself you ll only feel worse. Try to be with other people. And take part in fun activities when you can. Go to a movie, ballgame, Hoah[employer] service, or social event. Talk openly with people you can trust. And accept help when it s offered.  Take care of your body  People with depression often lose the desire to take care of themselves. That only makes their problems worse.  During treatment and afterward, make a point to:    Exercise. It s a great way to take care of your body. And studies have shown that exercise helps fight depression.    Avoid drugs and alcohol. These may ease the pain in the short term. But they ll only make your problems worse in the long run.    Get relief from stress. Ask your healthcare provider for relaxation exercises and techniques to help relieve stress.    Eat right. A balanced and healthy diet helps keep your body healthy.  Date Last Reviewed: 1/1/2017 2000-2017 [employer]. [address] [place] [address], [place] [address]. All rights reserved. This information is not intended as a substitute for professional medical care. Always follow your healthcare professional's instructions.        Understanding Anxiety Disorders  Almost everyone gets nervous now and then. It s normal to have knots in your stomach before a test, or for your heart to race on a first date. But an anxiety disorder is much more than a case of nerves. In fact, its symptoms may be overwhelming. But treatment can relieve many of these symptoms. Talking to your healthcare provider is the first step.    What are anxiety disorders?  An anxiety disorder causes intense feelings of panic and fear. These feelings may arise for no apparent reason. And they tend to recur again and again. They may prevent you from coping with life and cause you great distress. As a result, you may avoid anything that triggers your fear. In extreme cases, you may never leave the house. Anxiety disorders may cause other symptoms, such as:    Obsessive thoughts you can t control    Constant nightmares or painful thoughts of the past    Nausea, sweating, and muscle tension    Trouble sleeping or concentrating  What causes anxiety disorders?  Anxiety disorders tend to run in families. For some people, childhood abuse or neglect may play a role. For others, stressful life events or trauma may trigger anxiety  disorders. Anxiety can trigger low self-esteem and poor coping skills.  Common anxiety disorders    Panic disorder. This causes an intense fear of being in danger.    Phobias. These are extreme fears of certain objects, places, or events.    Obsessive-compulsive disorder. This causes you to have unwanted thoughts and urges. You also may perform certain actions over and over.    Posttraumatic stress disorder. This occurs in people who have survived a terrible ordeal. It can cause nightmares and flashbacks about the event.    Generalized anxiety disorder. This causes constant worry that can greatly disrupt your life.   Getting better  You may believe that nothing can help you. Or, you might fear what others may think. But most anxiety symptoms can be eased. Having an anxiety disorder is nothing to be ashamed of. Most people do best with treatment that combines medicine and therapy. These aren t cures. But they can help you live a healthier life.  Date Last Reviewed: 2/1/2017 2000-2017 [employer]. [address] [place] [address], [place] [address]. All rights reserved. This information is not intended as a substitute for professional medical care. Always follow your healthcare professional's instructions.

## 2017-10-13 NOTE — PROGRESS NOTES
Injectable Influenza Immunization Documentation    1.  Is the person to be vaccinated sick today?   No    2. Does the person to be vaccinated have an allergy to a component   of the vaccine?   No    3. Has the person to be vaccinated ever had a serious reaction   to influenza vaccine in the past?   No    4. Has the person to be vaccinated ever had Guillain-Barré syndrome?   No    Form completed by Teri Lin MA

## 2017-10-13 NOTE — MR AVS SNAPSHOT
After Visit Summary   10/13/2017    Teddy Shah    MRN: [id number]           Patient Information     Date Of Birth          1990        Visit Information        Provider Department      10/13/2017 1:20 PM Ashanti Dee APRN CNP [facility]        Today's Diagnoses     Need for prophylactic vaccination and inoculation against influenza    -  1    Pervasive developmental disorder        Moderate episode of recurrent major depressive disorder (H)        Anxiety        CARDIOVASCULAR SCREENING; LDL GOAL LESS THAN 160        Obesity, Class I, BMI 30-34.9          Care Instructions    Based on your medical history and these are the current health maintenance or preventive care services that you are due for (some may have been done at this visit)  Health Maintenance Due   Topic Date Due     INFLUENZA VACCINE (SYSTEM ASSIGNED)  09/01/2017         At [facility], we strive to deliver an exceptional experience to you, every time we see you.    If you receive a survey in the mail, please send us back your thoughts. We really do value your feedback.    Your care team's suggested websites for health information:  Www.[facility].org : Up to date and easily searchable information on multiple topics.  Www.medlineplus.gov : medication info, interactive tutorials, watch real surgeries online  Www.familydoctor.org : good info from the Academy of Family Physicians  Www.cdc.gov : public health info, travel advisories, epidemics (H1N1)  Www.aap.org : children's health info, normal development, vaccinations  Www.health.state.[place].us : [place] dept of health, public health issues in [place], N1N1    How to contact your care team:   Team Denise/Spirit [phone]         Pharmacy [phone]    Dr. Alva, Alta Worley PA-C, Mimi Jurado CNP, Sydni Daly PA-C, Dr. Bryant, and AB Borja CNP    Team RN: Radha Perera hours  M-Th 7 am-7 pm    Fri 7 am-5 pm.   Urgent care M-F 11 am-9 pm,   Sat/Sun 9 am-5 pm.  Pharmacy M-Th 8 am-8 pm Fri 8 am-6 pm  Sat/Sun 9 am-5 pm.     All password changes, disabled accounts, or ID changes in [employer]/MyHealth will be done by our Access Services Department.    If you need help with your account or password, call: [phone]. Clinic staff no longer has the ability to change passwords.     Depression: Tips to Help Yourself  As your healthcare providers help treat your depression, you can also help yourself. Keep in mind that your illness affects you emotionally, physically, mentally, and socially. So full recovery will take time. Take care of your body and your soul, and be patient with yourself as you get better.    Self-care    Educate yourself. Read about treatment and medicine options. If you have the energy, attend local conferences or support groups. Keep a list of useful websites and helpful books and use them as needed. This illness is not your fault. Don t blame yourself for your depression.    Manage early symptoms. If you notice symptoms returning, experience triggers, or identify other factors that may lead to a depressive episode, get help as soon as possible. Ask trusted friends and family to monitor your behavior and let you know if they see anything of concern.    Work with your provider. Find a provider you can trust. Communicate honestly with that person and share information on your treatment for depression and your reaction to medicines.    Be prepared for a crisis. Know what to do if you experience a crisis. Keep the phone number of a crisis hotline and know the location of your community's urgent care centers and the closest emergency department.    Hold off on big decisions. Depression can cloud your judgment. So wait until you feel better before making major life decisions, such as changing jobs, moving, or getting  or .    Be patient. Recovering from depression is a process.  Don t be discouraged if it takes some time to feel better.    Keep it simple. Depression saps your energy and concentration. So you won t be able to do all the things you used to do. Set small goals and do what you can.    Be with others. Don t isolate yourself--you ll only feel worse. Try to be with other people. And take part in fun activities when you can. Go to a movie, ballgame, [employer] service, or social event. Talk openly with people you can trust. And accept help when it s offered.  Take care of your body  People with depression often lose the desire to take care of themselves. That only makes their problems worse. During treatment and afterward, make a point to:    Exercise. It s a great way to take care of your body. And studies have shown that exercise helps fight depression.    Avoid drugs and alcohol. These may ease the pain in the short term. But they ll only make your problems worse in the long run.    Get relief from stress. Ask your healthcare provider for relaxation exercises and techniques to help relieve stress.    Eat right. A balanced and healthy diet helps keep your body healthy.  Date Last Reviewed: 1/1/2017 2000-2017 [employer]. [address] [place] [address]. All rights reserved. This information is not intended as a substitute for professional medical care. Always follow your healthcare professional's instructions.        Understanding Anxiety Disorders  Almost everyone gets nervous now and then. It s normal to have knots in your stomach before a test, or for your heart to race on a first date. But an anxiety disorder is much more than a case of nerves. In fact, its symptoms may be overwhelming. But treatment can relieve many of these symptoms. Talking to your healthcare provider is the first step.    What are anxiety disorders?  An anxiety disorder causes intense feelings of panic and fear. These feelings may arise for no apparent reason. And they tend to  recur again and again. They may prevent you from coping with life and cause you great distress. As a result, you may avoid anything that triggers your fear. In extreme cases, you may never leave the house. Anxiety disorders may cause other symptoms, such as:    Obsessive thoughts you can t control    Constant nightmares or painful thoughts of the past    Nausea, sweating, and muscle tension    Trouble sleeping or concentrating  What causes anxiety disorders?  Anxiety disorders tend to run in families. For some people, childhood abuse or neglect may play a role. For others, stressful life events or trauma may trigger anxiety disorders. Anxiety can trigger low self-esteem and poor coping skills.  Common anxiety disorders    Panic disorder. This causes an intense fear of being in danger.    Phobias. These are extreme fears of certain objects, places, or events.    Obsessive-compulsive disorder. This causes you to have unwanted thoughts and urges. You also may perform certain actions over and over.    Posttraumatic stress disorder. This occurs in people who have survived a terrible ordeal. It can cause nightmares and flashbacks about the event.    Generalized anxiety disorder. This causes constant worry that can greatly disrupt your life.   Getting better  You may believe that nothing can help you. Or, you might fear what others may think. But most anxiety symptoms can be eased. Having an anxiety disorder is nothing to be ashamed of. Most people do best with treatment that combines medicine and therapy. These aren t cures. But they can help you live a healthier life.  Date Last Reviewed: 2/1/2017 2000-2017 [employer]. [address] [place] [address], [place] [address]. All rights reserved. This information is not intended as a substitute for professional medical care. Always follow your healthcare professional's instructions.                Follow-ups after your visit        Who to contact     If you have  "questions or need follow up information about today's clinic visit or your schedule please contact [facility]] [facility] directly at [phone].  Normal or non-critical lab and imaging results will be communicated to you by [employer], letter or phone within 4 business days after the clinic has received the results. If you do not hear from us within 7 days, please contact the clinic through [employer] or phone. If you have a critical or abnormal lab result, we will notify you by phone as soon as possible.  Submit refill requests through [employer] or call your pharmacy and they will forward the refill request to us. Please allow 3 business days for your refill to be completed.          Additional Information About Your Visit        u[employer]omehar[employer]tail Information     [employer] gives you secure access to your electronic health record. If you see a primary care provider, you can also send messages to your care team and make appointments. If you have questions, please call your primary care clinic.  If you do not have a primary care provider, please call [phone] and they will assist you.        Care EveryWhere ID     This is your Care EveryWhere ID. This could be used by other organizations to access your [place] medical records  [id number]        Your Vitals Were     Pulse Temperature Height Pulse Oximetry BMI (Body Mass Index)       93 96.3  F (35.7  C) (Oral) 6' 1.58\" (1.869 m) 94% 33.66 kg/m2        Blood Pressure from Last 3 Encounters:   10/13/17 138/87   05/12/17 137/83   04/27/17 131/81    Weight from Last 3 Encounters:   10/13/17 259 lb 3.2 oz (117.6 kg)   05/12/17 249 lb 12.8 oz (113.3 kg)   04/27/17 251 lb (113.9 kg)              We Performed the Following     Glucose     LDL cholesterol direct     TSH with free T4 reflex          Today's Medication Changes          These changes are accurate as of: 10/13/17  1:53 PM.  If you have any questions, ask your nurse or doctor.               Start taking these " medicines.        Dose/Directions    sertraline 50 MG tablet   Commonly known as:  ZOLOFT   Used for:  Moderate episode of recurrent major depressive disorder (H), Anxiety   Started by:  Ashanti Dee APRN CNP        Dose:  50 mg   Take 1 tablet (50 mg) by mouth daily   Quantity:  30 tablet   Refills:  1            Where to get your medicines      These medications were sent to [id number] - [facility], [place] - [address] AT [facility] [address] [facility] [address]     Phone:  [phone]     sertraline 50 MG tablet                Primary Care Provider Office Phone # Fax #    AB Sifuentes [phone]       [facility] [address]  [facility] [address]        Equal Access to Services     ABBEY [facility] HospitalBELINDA : Hadii magda ku hadasho Soomaali, waaxda luqadaha, qaybta kaalmada adeegyada, waxay idiin haymily raoaradenny lozano . So [facility] [phone].    ATENCIÓN: Si habla español, tiene a thomas disposición servicios gratuitos de asistencia lingüística. Llame al [phone].    We comply with applicable federal civil rights laws and [place] laws. We do not discriminate on the basis of race, color, national origin, age, disability, sex, sexual orientation, or gender identity.            Thank you!     Thank you for choosing [facility]  for your care. Our goal is always to provide you with excellent care. Hearing back from our patients is one way we can continue to improve our services. Please take a few minutes to complete the written survey that you may receive in the mail after your visit with us. Thank you!             Your Updated Medication List - Protect others around you: Learn how to safely use, store and throw away your medicines at www.disposemymeds.org.          This list is accurate as of: 10/13/17  1:53 PM.  Always use your most recent med list.                   Brand Name Dispense Instructions for use  Diagnosis    clotrimazole 1 % cream    LOTRIMIN    30 g    Apply topically 2 times daily    Tinea corporis, Tinea pedis of both feet       hydrocortisone 0.2 % cream    WESTCORT    45 g    Apply twice daily to red areas on the face for 1 week, then weekends only.    Dermatitis, seborrheic       IBUPROFEN PO      Reported on 5/12/2017        ketoconazole 2 % cream    NIZORAL    30 g    Apply topically 2 times daily    Dermatitis, seborrheic       sertraline 50 MG tablet    ZOLOFT    30 tablet    Take 1 tablet (50 mg) by mouth daily    Moderate episode of recurrent major depressive disorder (H), Anxiety       triamcinolone 0.1 % cream    KENALOG    80 g    Apply sparingly to affected area twice a day  as needed    Petechial rash       TYLENOL PO      Reported on 5/12/2017

## 2017-10-13 NOTE — NURSING NOTE
"Chief Complaint   Patient presents with     Anxiety     Depression       Initial /87 (BP Location: Left arm, Patient Position: Sitting, Cuff Size: Adult Regular)  Pulse 93  Temp 96.3  F (35.7  C) (Oral)  Ht 6' 1.58\" (1.869 m)  Wt 259 lb 3.2 oz (117.6 kg)  SpO2 94%  BMI 33.66 kg/m2 Estimated body mass index is 33.66 kg/(m^2) as calculated from the following:    Height as of this encounter: 6' 1.58\" (1.869 m).    Weight as of this encounter: 259 lb 3.2 oz (117.6 kg).  Medication Reconciliation: complete   Teri Lin MA      "

## 2017-10-14 ASSESSMENT — ANXIETY QUESTIONNAIRES: GAD7 TOTAL SCORE: 19

## 2017-10-18 ENCOUNTER — MYC MEDICAL ADVICE (OUTPATIENT)
Dept: FAMILY MEDICINE | Facility: CLINIC | Age: 27
End: 2017-10-18

## 2017-10-19 NOTE — TELEPHONE ENCOUNTER
Seen on 10/13/17- do you want an evisit or ok to try a different antidepressant.  Routing to provider to review and advise.  Tiffany Araiza RN.

## 2017-10-23 ENCOUNTER — OFFICE VISIT (OUTPATIENT)
Dept: FAMILY MEDICINE | Facility: CLINIC | Age: 27
End: 2017-10-23
Payer: COMMERCIAL

## 2017-10-23 VITALS
SYSTOLIC BLOOD PRESSURE: 132 MMHG | WEIGHT: 252.8 LBS | BODY MASS INDEX: 32.44 KG/M2 | HEIGHT: 74 IN | OXYGEN SATURATION: 96 % | TEMPERATURE: 97 F | DIASTOLIC BLOOD PRESSURE: 78 MMHG | HEART RATE: 82 BPM

## 2017-10-23 DIAGNOSIS — F33.1 MODERATE EPISODE OF RECURRENT MAJOR DEPRESSIVE DISORDER (H): Primary | ICD-10-CM

## 2017-10-23 DIAGNOSIS — E66.811 OBESITY, CLASS I, BMI 30-34.9: ICD-10-CM

## 2017-10-23 DIAGNOSIS — F41.9 ANXIETY: ICD-10-CM

## 2017-10-23 DIAGNOSIS — H92.02 EAR PAIN, LEFT: ICD-10-CM

## 2017-10-23 PROCEDURE — 99213 OFFICE O/P EST LOW 20 MIN: CPT | Performed by: NURSE PRACTITIONER

## 2017-10-23 NOTE — NURSING NOTE
"Chief Complaint   Patient presents with     Medication Change       Initial /78 (BP Location: Left arm, Patient Position: Sitting, Cuff Size: Adult Regular)  Pulse 82  Temp 97  F (36.1  C) (Oral)  Ht 6' 1.58\" (1.869 m)  Wt 252 lb 12.8 oz (114.7 kg)  SpO2 96%  BMI 32.83 kg/m2 Estimated body mass index is 32.83 kg/(m^2) as calculated from the following:    Height as of this encounter: 6' 1.58\" (1.869 m).    Weight as of this encounter: 252 lb 12.8 oz (114.7 kg).  Medication Reconciliation: complete   Teri Lin MA      "

## 2017-10-23 NOTE — PATIENT INSTRUCTIONS
Based on your medical history and these are the current health maintenance or preventive care services that you are due for (some may have been done at this visit)  Health Maintenance Due   Topic Date Due     DEPRESSION ACTION PLAN Q1 YR  12/20/2008         At [facility], we strive to deliver an exceptional experience to you, every time we see you.    If you receive a survey in the mail, please send us back your thoughts. We really do value your feedback.    Your care team's suggested websites for health information:  Www.[employer].org : Up to date and easily searchable information on multiple topics.  Www.medlineplus.gov : medication info, interactive tutorials, watch real surgeries online  Www.familydoctor.org : good info from the Academy of Family Physicians  Www.cdc.gov : public health info, travel advisories, epidemics (H1N1)  Www.aap.org : children's health info, normal development, vaccinations  Www.health.[facility].[place].[email or web address] : [place] dept of health, public health issues in [place], N1N1    How to contact your care team:   Team [facility] [phone]         Pharmacy [phone]    Dr. Alva, Alta Worley PA-C, Dr. Cole, Mimi BERGER CNP, Sydni Daly PA-C, Dr. Bryant, and AB Borja CNP    Team RN: Radha      Clinic hours  M-Th 7 am-7 pm   Fri 7 am-5 pm.   Urgent care M-F 11 am-9 pm,   Sat/Sun 9 am-5 pm.  Pharmacy M-Th 8 am-8 pm Fri 8 am-6 pm  Sat/Sun 9 am-5 pm.     All password changes, disabled accounts, or ID changes in [employer] will be done by our Access Services Department.    If you need help with your account or password, call: [phone]. Clinic staff no longer has the ability to change passwords.     Patient Education    Sertraline Hydrochloride Oral solution    Sertraline Hydrochloride Oral tablet  Sertraline Hydrochloride Oral tablet  What is this medicine?  SERTRALINE (SER tra gosia) is used to treat depression. It may also be used to treat  obsessive compulsive disorder, panic disorder, post-trauma stress, premenstrual dysphoric disorder (PMDD) or social anxiety.  This medicine may be used for other purposes; ask your health care provider or pharmacist if you have questions.  What should I tell my health care provider before I take this medicine?  They need to know if you have any of these conditions:    bipolar disorder or a family history of bipolar disorder    diabetes    glaucoma    heart disease    high blood pressure    history of irregular heartbeat    history of low levels of calcium, magnesium, or potassium in the blood    if you often drink alcohol    liver disease    receiving electroconvulsive therapy    seizures    suicidal thoughts, plans, or attempt; a previous suicide attempt by you or a family member    thyroid disease    an unusual or allergic reaction to sertraline, other medicines, foods, dyes, or preservatives    pregnant or trying to get pregnant    breast-feeding  How should I use this medicine?  Take this medicine by mouth with a glass of water. Follow the directions on the prescription label. You can take it with or without food. Take your medicine at regular intervals. Do not take your medicine more often than directed. Do not stop taking this medicine suddenly except upon the advice of your doctor. Stopping this medicine too quickly may cause serious side effects or your condition may worsen.  A special MedGuide will be given to you by the pharmacist with each prescription and refill. Be sure to read this information carefully each time.  Talk to your pediatrician regarding the use of this medicine in children. While this drug may be prescribed for children as young as 7 years for selected conditions, precautions do apply.  Overdosage: If you think you have taken too much of this medicine contact a poison control center or emergency room at once.  NOTE: This medicine is only for you. Do not share this medicine with  others.  What if I miss a dose?  If you miss a dose, take it as soon as you can. If it is almost time for your next dose, take only that dose. Do not take double or extra doses.  What may interact with this medicine?  Do not take this medicine with any of the following medications:    certain medicines for fungal infections like fluconazole, itraconazole, ketoconazole, posaconazole, voriconazole    cisapride    disulfiram    dofetilide    linezolid    MAOIs like Carbex, Eldepryl, Marplan, Nardil, and Parnate    metronidazole    methylene blue (injected into a vein)    pimozide    thioridazine    ziprasidone  This medicine may also interact with the following medications:    alcohol    aspirin and aspirin-like medicines    certain medicines for depression, anxiety, or psychotic disturbances    certain medicines for irregular heart beat like flecainide, propafenone    certain medicines for migraine headaches like almotriptan, eletriptan, frovatriptan, naratriptan, rizatriptan, sumatriptan, zolmitriptan    certain medicines for sleep    certain medicines for seizures like carbamazepine, valproic acid, phenytoin    certain medicines that treat or prevent blood clots like warfarin, enoxaparin, dalteparin    cimetidine    digoxin    diuretics    fentanyl    furazolidone    isoniazid    lithium    NSAIDs, medicines for pain and inflammation, like ibuprofen or naproxen    other medicines that prolong the QT interval (cause an abnormal heart rhythm)    procarbazine    rasagiline    supplements like Marceline's wort, kava kava, valerian    tolbutamide    tramadol    tryptophan  This list may not describe all possible interactions. Give your health care provider a list of all the medicines, herbs, non-prescription drugs, or dietary supplements you use. Also tell them if you smoke, drink alcohol, or use illegal drugs. Some items may interact with your medicine.  What should I watch for while using this medicine?  Tell your  doctor if your symptoms do not get better or if they get worse. Visit your doctor or health care professional for regular checks on your progress. Because it may take several weeks to see the full effects of this medicine, it is important to continue your treatment as prescribed by your doctor.  Patients and their families should watch out for new or worsening thoughts of suicide or depression. Also watch out for sudden changes in feelings such as feeling anxious, agitated, panicky, irritable, hostile, aggressive, impulsive, severely restless, overly excited and hyperactive, or not being able to sleep. If this happens, especially at the beginning of treatment or after a change in dose, call your health care professional.  You may get drowsy or dizzy. Do not drive, use machinery, or do anything that needs mental alertness until you know how this medicine affects you. Do not stand or sit up quickly, especially if you are an older patient. This reduces the risk of dizzy or fainting spells. Alcohol may interfere with the effect of this medicine. Avoid alcoholic drinks.  Your mouth may get dry. Chewing sugarless gum or sucking hard candy, and drinking plenty of water may help. Contact your doctor if the problem does not go away or is severe.  What side effects may I notice from receiving this medicine?  Side effects that you should report to your doctor or health care professional as soon as possible:    allergic reactions like skin rash, itching or hives, swelling of the face, lips, or tongue    black or bloody stools, blood in the urine or vomit    fast, irregular heartbeat    feeling faint or lightheaded, falls    hallucination, loss of contact with reality    seizures    suicidal thoughts or other mood changes    unusual bleeding or bruising    unusually weak or tired    vomiting  Side effects that usually do not require medical attention (report to your doctor or health care professional if they continue or are  bothersome):    change in appetite    change in sex drive or performance    diarrhea    increased sweating    indigestion, nausea    tremors  This list may not describe all possible side effects. Call your doctor for medical advice about side effects. You may report side effects to FDA at 7-745-TEK-7280.  Where should I keep my medicine?  Keep out of the reach of children.  Store at room temperature between 15 and 30 degrees C (59 and 86 degrees F). Throw away any unused medicine after the expiration date.  NOTE:This sheet is a summary. It may not cover all possible information. If you have questions about this medicine, talk to your doctor, pharmacist, or health care provider. Copyright  2016 [employer]

## 2017-10-23 NOTE — PROGRESS NOTES
SUBJECTIVE:   Teddy Shha is a 26 year old male who presents to clinic today for the following health issues:      Medication Followup of sertraline (ZOLOFT) 50 MG tablet    Taking Medication as prescribed: NO-took 1 tablet and discontinued     Side Effects:  Muscle weakness, vertigo, nausea, diarrhea, sexual side effects (difficulty achieving orgasm)     Medication Helping Symptoms:  yes   Patient continues with counseling but doesn't feel that this is enough. He is open to trying a lower dose of the Zoloft.      Patient also complains of left ear pain for the last 2 days, no URI symptoms, no recent travel(flying or snorkeling), no fever, chills, no ear discharge.    Problem list and histories reviewed & adjusted, as indicated.  Additional history: as documented    Patient Active Problem List   Diagnosis     Nasal obstruction     Deviated nasal septum     CARDIOVASCULAR SCREENING; LDL GOAL LESS THAN 160     Obesity, Class I, BMI 30-34.9     Pervasive developmental disorder     Moderate episode of recurrent major depressive disorder (H)     Anxiety     Past Surgical History:   Procedure Laterality Date     ADENOIDECTOMY       ENDOSCOPY      EGD     EXTRACTION(S) DENTAL      wisdom     PE TUBES      multiple     SEPTOPLASTY, TURBINOPLASTY, COMBINED Bilateral 6/19/2015    Procedure: COMBINED SEPTOPLASTY, TURBINOPLASTY;  Surgeon: Ally Finley MD;  Location:  OR       Social History   Substance Use Topics     Smoking status: Never Smoker     Smokeless tobacco: Never Used     Alcohol use No     Family History   Problem Relation Age of Onset     Anesthesia Reaction Mother      Anesthesia Reaction Paternal Grandmother      CEREBROVASCULAR DISEASE Paternal Grandmother      Coronary Artery Disease Paternal Grandmother      heart attack?     CEREBROVASCULAR DISEASE Father      Hypertension Father      ?     Coronary Artery Disease Maternal Grandfather      heart attack     Prostate Cancer Maternal Grandfather       "Hypertension Maternal Grandmother      Lymphoma Maternal Grandmother      Bleeding Disorder No family hx of          BP Readings from Last 3 Encounters:   10/23/17 132/78   10/13/17 138/87   05/12/17 137/83    Wt Readings from Last 3 Encounters:   10/23/17 252 lb 12.8 oz (114.7 kg)   10/13/17 259 lb 3.2 oz (117.6 kg)   05/12/17 249 lb 12.8 oz (113.3 kg)                  Labs reviewed in EPIC        Reviewed and updated as needed this visit by clinical staff       Reviewed and updated as needed this visit by Provider         ROS:  Constitutional, HEENT, cardiovascular, pulmonary, gi and gu systems are negative, except as otherwise noted.      OBJECTIVE:   /78 (BP Location: Left arm, Patient Position: Sitting, Cuff Size: Adult Regular)  Pulse 82  Temp 97  F (36.1  C) (Oral)  Ht 6' 1.58\" (1.869 m)  Wt 252 lb 12.8 oz (114.7 kg)  SpO2 96%  BMI 32.83 kg/m2  Body mass index is 32.83 kg/(m^2).  GENERAL: healthy, alert and no distress  EYES: Eyes grossly normal to inspection, PERRL and conjunctivae and sclerae normal  HENT: ear canals and TM's normal, nose and mouth without ulcers or lesions  NECK: no adenopathy, no asymmetry, masses, or scars and thyroid normal to palpation  RESP: lungs clear to auscultation - no rales, rhonchi or wheezes  CV: regular rate and rhythm, normal S1 S2, no S3 or S4, no murmur, click or rub, no peripheral edema and peripheral pulses strong  MS: no gross musculoskeletal defects noted, no edema  PSYCH: mentation appears normal, affect normal/bright  LYMPH: normal ant/post cervical, supraclavicular nodes    Diagnostic Test Results:  none     ASSESSMENT/PLAN:       BP Screening:   Last 3 BP Readings:    BP Readings from Last 3 Encounters:   10/23/17 132/78   10/13/17 138/87   05/12/17 137/83       The following was recommended to the patient:  Re-screen BP within a year and recommended lifestyle modifications      1. Moderate episode of recurrent major depressive disorder (H)  Will " decrease dose to 25 mg daily and he is to return to clinic 3 weeks for medication check, reviewed possible side effects/benefits of medication management for depression and anxiety.Continue with counseling.  - sertraline (ZOLOFT) 50 MG tablet; Take 0.5 tablets (25 mg) by mouth daily  Dispense: 30 tablet; Refill: 1    2. Anxiety  Continue with counseling, decreased zoloft to 25 mg daily.  - sertraline (ZOLOFT) 50 MG tablet; Take 0.5 tablets (25 mg) by mouth daily  Dispense: 30 tablet; Refill: 1    3. Obesity, Class I, BMI 30-34.9  Benefits of weight loss reviewed in detail, encouraged him to cut back on the carbohydrates in the diet, consume more fruits and vegetables, drink plenty of water, avoid fruit juices, sodas, get 150 min moderate exercise/week.  Recheck weight in 6 months.Referring to Nutrition for help with portion sizes, weight loss, heart healthy diet.  - NUTRITION REFERRAL    4. Ear pain, left  Normal TM/canal. Return to clinic if not improved, new, or worsening symptoms.       See Patient Instructions    AB Hinds [facility]

## 2017-10-23 NOTE — MR AVS SNAPSHOT
After Visit Summary   10/23/2017    Teddy Shah    MRN: [id number]           Patient Information     Date Of Birth          1990        Visit Information        Provider Department      10/23/2017 12:20 PM Ashanti Dee APRN CNP [facility]        Today's Diagnoses     Moderate episode of recurrent major depressive disorder (H)    -  1    Anxiety        Obesity, Class I, BMI 30-34.9        Ear pain, left          Care Instructions    Based on your medical history and these are the current health maintenance or preventive care services that you are due for (some may have been done at this visit)  Health Maintenance Due   Topic Date Due     DEPRESSION ACTION PLAN Q1 YR  12/20/2008         At [facility], we strive to deliver an exceptional experience to you, every time we see you.    If you receive a survey in the mail, please send us back your thoughts. We really do value your feedback.    Your care team's suggested websites for health information:  Www.Gunosy.Taggable : Up to date and easily searchable information on multiple topics.  Www.medlineplus.gov : medication info, interactive tutorials, watch real surgeries online  Www.familydoctor.org : good info from the Academy of Family Physicians  Www.cdc.gov : public health info, travel advisories, epidemics (H1N1)  Www.aap.org : children's health info, normal development, vaccinations  Www.health.[facility].[place].us : [place] dept of health, public health issues in [place], N1N1    How to contact your care team:   Team Denise/Spirit [phone]         Pharmacy [phone]    Dr. Alva, Alta Worley PA-C, Dr. Cole, Mimi BERGER CNP, Sydni Daly PA-C, Dr. Bryant, and AB Borja CNP    Team RN: Radha      Clinic hours  M-Th 7 am-7 pm   Fri 7 am-5 pm.   Urgent care M-F 11 am-9 pm,   Sat/Sun 9 am-5 pm.  Pharmacy M-Th 8 am-8 pm Fri 8 am-6 pm  Sat/Sun 9 am-5 pm.     All password changes,  disabled accounts, or ID changes in [facility]/MyHealth will be done by our Access Services Department.    If you need help with your account or password, call: [phone]. Clinic staff no longer has the ability to change passwords.     Patient Education    Sertraline Hydrochloride Oral solution    Sertraline Hydrochloride Oral tablet  Sertraline Hydrochloride Oral tablet  What is this medicine?  SERTRALINE (SER tra gosia) is used to treat depression. It may also be used to treat obsessive compulsive disorder, panic disorder, post-trauma stress, premenstrual dysphoric disorder (PMDD) or social anxiety.  This medicine may be used for other purposes; ask your health care provider or pharmacist if you have questions.  What should I tell my health care provider before I take this medicine?  They need to know if you have any of these conditions:    bipolar disorder or a family history of bipolar disorder    diabetes    glaucoma    heart disease    high blood pressure    history of irregular heartbeat    history of low levels of calcium, magnesium, or potassium in the blood    if you often drink alcohol    liver disease    receiving electroconvulsive therapy    seizures    suicidal thoughts, plans, or attempt; a previous suicide attempt by you or a family member    thyroid disease    an unusual or allergic reaction to sertraline, other medicines, foods, dyes, or preservatives    pregnant or trying to get pregnant    breast-feeding  How should I use this medicine?  Take this medicine by mouth with a glass of water. Follow the directions on the prescription label. You can take it with or without food. Take your medicine at regular intervals. Do not take your medicine more often than directed. Do not stop taking this medicine suddenly except upon the advice of your doctor. Stopping this medicine too quickly may cause serious side effects or your condition may worsen.  A special MedGuide will be given to you by the pharmacist  with each prescription and refill. Be sure to read this information carefully each time.  Talk to your pediatrician regarding the use of this medicine in children. While this drug may be prescribed for children as young as 7 years for selected conditions, precautions do apply.  Overdosage: If you think you have taken too much of this medicine contact a poison control center or emergency room at once.  NOTE: This medicine is only for you. Do not share this medicine with others.  What if I miss a dose?  If you miss a dose, take it as soon as you can. If it is almost time for your next dose, take only that dose. Do not take double or extra doses.  What may interact with this medicine?  Do not take this medicine with any of the following medications:    certain medicines for fungal infections like fluconazole, itraconazole, ketoconazole, posaconazole, voriconazole    cisapride    disulfiram    dofetilide    linezolid    MAOIs like Carbex, Eldepryl, Marplan, Nardil, and Parnate    metronidazole    methylene blue (injected into a vein)    pimozide    thioridazine    ziprasidone  This medicine may also interact with the following medications:    alcohol    aspirin and aspirin-like medicines    certain medicines for depression, anxiety, or psychotic disturbances    certain medicines for irregular heart beat like flecainide, propafenone    certain medicines for migraine headaches like almotriptan, eletriptan, frovatriptan, naratriptan, rizatriptan, sumatriptan, zolmitriptan    certain medicines for sleep    certain medicines for seizures like carbamazepine, valproic acid, phenytoin    certain medicines that treat or prevent blood clots like warfarin, enoxaparin, dalteparin    cimetidine    digoxin    diuretics    fentanyl    furazolidone    isoniazid    lithium    NSAIDs, medicines for pain and inflammation, like ibuprofen or naproxen    other medicines that prolong the QT interval (cause an abnormal heart  rhythm)    procarbazine    rasagiline    supplements like Landon's wort, kava kava, valerian    tolbutamide    tramadol    tryptophan  This list may not describe all possible interactions. Give your health care provider a list of all the medicines, herbs, non-prescription drugs, or dietary supplements you use. Also tell them if you smoke, drink alcohol, or use illegal drugs. Some items may interact with your medicine.  What should I watch for while using this medicine?  Tell your doctor if your symptoms do not get better or if they get worse. Visit your doctor or health care professional for regular checks on your progress. Because it may take several weeks to see the full effects of this medicine, it is important to continue your treatment as prescribed by your doctor.  Patients and their families should watch out for new or worsening thoughts of suicide or depression. Also watch out for sudden changes in feelings such as feeling anxious, agitated, panicky, irritable, hostile, aggressive, impulsive, severely restless, overly excited and hyperactive, or not being able to sleep. If this happens, especially at the beginning of treatment or after a change in dose, call your health care professional.  You may get drowsy or dizzy. Do not drive, use machinery, or do anything that needs mental alertness until you know how this medicine affects you. Do not stand or sit up quickly, especially if you are an older patient. This reduces the risk of dizzy or fainting spells. Alcohol may interfere with the effect of this medicine. Avoid alcoholic drinks.  Your mouth may get dry. Chewing sugarless gum or sucking hard candy, and drinking plenty of water may help. Contact your doctor if the problem does not go away or is severe.  What side effects may I notice from receiving this medicine?  Side effects that you should report to your doctor or health care professional as soon as possible:    allergic reactions like skin rash,  itching or hives, swelling of the face, lips, or tongue    black or bloody stools, blood in the urine or vomit    fast, irregular heartbeat    feeling faint or lightheaded, falls    hallucination, loss of contact with reality    seizures    suicidal thoughts or other mood changes    unusual bleeding or bruising    unusually weak or tired    vomiting  Side effects that usually do not require medical attention (report to your doctor or health care professional if they continue or are bothersome):    change in appetite    change in sex drive or performance    diarrhea    increased sweating    indigestion, nausea    tremors  This list may not describe all possible side effects. Call your doctor for medical advice about side effects. You may report side effects to FDA at 2-505-BLD-2403.  Where should I keep my medicine?  Keep out of the reach of children.  Store at room temperature between 15 and 30 degrees C (59 and 86 degrees F). Throw away any unused medicine after the expiration date.  NOTE:This sheet is a summary. It may not cover all possible information. If you have questions about this medicine, talk to your doctor, pharmacist, or health care provider. Copyright  2016 Gold Standard                Follow-ups after your visit        Additional Services     NUTRITION REFERRAL       Your provider has referred you to: [facility]: [facility] [phone]   [email or web address]    Please be aware that coverage of these services is subject to the terms and limitations of your health insurance plan.  Call member services at your health plan with any benefit or coverage questions.      Please bring the following with you to your appointment:    (1) This referral request  (2) Any documents given to you regarding this referral  (3) Any specific questions you have about diet and/or food choices                  Who to contact     If you have questions or need follow up  "information about today's clinic visit or your schedule please contact [facility] directly at [phone].  Normal or non-critical lab and imaging results will be communicated to you by Mogihart, letter or phone within 4 business days after the clinic has received the results. If you do not hear from us within 7 days, please contact the clinic through uAfricat or phone. If you have a critical or abnormal lab result, we will notify you by phone as soon as possible.  Submit refill requests through Zula or call your pharmacy and they will forward the refill request to us. Please allow 3 business days for your refill to be completed.          Additional Information About Your Visit        MogiharBloomspot Information     Zula gives you secure access to your electronic health record. If you see a primary care provider, you can also send messages to your care team and make appointments. If you have questions, please call your primary care clinic.  If you do not have a primary care provider, please call [phone] and they will assist you.        Care EveryWhere ID     This is your Care EveryWhere ID. This could be used by other organizations to access your [facility] medical records  [id number]        Your Vitals Were     Pulse Temperature Height Pulse Oximetry BMI (Body Mass Index)       82 97  F (36.1  C) (Oral) 6' 1.58\" (1.869 m) 96% 32.83 kg/m2        Blood Pressure from Last 3 Encounters:   10/23/17 132/78   10/13/17 138/87   05/12/17 137/83    Weight from Last 3 Encounters:   10/23/17 252 lb 12.8 oz (114.7 kg)   10/13/17 259 lb 3.2 oz (117.6 kg)   05/12/17 249 lb 12.8 oz (113.3 kg)              We Performed the Following     NUTRITION REFERRAL          Today's Medication Changes          These changes are accurate as of: 10/23/17 12:53 PM.  If you have any questions, ask your nurse or doctor.               These medicines have changed or have updated prescriptions.        Dose/Directions    " sertraline 50 MG tablet   Commonly known as:  ZOLOFT   This may have changed:  how much to take   Used for:  Moderate episode of recurrent major depressive disorder (H), Anxiety   Changed by:  Ashanti Dee APRN CNP        Dose:  25 mg   Take 0.5 tablets (25 mg) by mouth daily   Quantity:  30 tablet   Refills:  1            Where to get your medicines      These medications were sent to [id number] - [facility], [place] - [address] AT [facility] [address], [facility] [address]     Phone:  [phone]     sertraline 50 MG tablet                Primary Care Provider Office Phone # Fax #    AB Sifuentes [phone]       [facility] [address]  [facility] [address]        Equal Access to Services     [facility]: Hadii aad ku hadasho Soomaali, waaxda luqadaha, qaybta kaalmada adeegyada, waxay idiin hayaarosemary raoaradenny lozano . So [facility] [phone].    ATENCIÓN: Si habla español, tiene a thomas disposición servicios gratuitos de asistencia lingüística. Wen al [phone].    We comply with applicable federal civil rights laws and [place] laws. We do not discriminate on the basis of race, color, national origin, age, disability, sex, sexual orientation, or gender identity.            Thank you!     Thank you for choosing [facility]  for your care. Our goal is always to provide you with excellent care. Hearing back from our patients is one way we can continue to improve our services. Please take a few minutes to complete the written survey that you may receive in the mail after your visit with us. Thank you!             Your Updated Medication List - Protect others around you: Learn how to safely use, store and throw away your medicines at www.disposemymeds.org.          This list is accurate as of: 10/23/17 12:53 PM.  Always use your most recent med list.                   Brand Name Dispense Instructions  for use Diagnosis    hydrocortisone 0.2 % cream    WESTCORT    45 g    Apply twice daily to red areas on the face for 1 week, then weekends only.    Dermatitis, seborrheic       IBUPROFEN PO      Reported on 5/12/2017        sertraline 50 MG tablet    ZOLOFT    30 tablet    Take 0.5 tablets (25 mg) by mouth daily    Moderate episode of recurrent major depressive disorder (H), Anxiety       triamcinolone 0.1 % cream    KENALOG    80 g    Apply sparingly to affected area twice a day  as needed    Petechial rash       TYLENOL PO      Reported on 5/12/2017

## 2017-10-23 NOTE — LETTER
My Depression Action Plan  Name: Teddy Shah   Date of Birth 1990  Date: 10/23/2017    My doctor: Ashanti Dee   My clinic: [address] [id number] [phone]          GREEN    ZONE   Good Control    What it looks like:     Things are going generally well. You have normal up s and down s. You may even feel depressed from time to time, but bad moods usually last less than a day.   What you need to do:  1. Continue to care for yourself (see self care plan)  2. Check your depression survival kit and update it as needed  3. Follow your physician s recommendations including any medication.  4. Do not stop taking medication unless you consult with your physician first.           YELLOW         ZONE Getting Worse    What it looks like:     Depression is starting to interfere with your life.     It may be hard to get out of bed; you may be starting to isolate yourself from others.    Symptoms of depression are starting to last most all day and this has happened for several days.     You may have suicidal thoughts but they are not constant.   What you need to do:     1. Call your care team, your response to treatment will improve if you keep your care team informed of your progress. Yellow periods are signs an adjustment may need to be made.     2. Continue your self-care, even if you have to fake it!    3. Talk to someone in your support network    4. Open up your depression survival kit           RED    ZONE Medical Alert - Get Help    What it looks like:     Depression is seriously interfering with your life.     You may experience these or other symptoms: You can t get out of bed most days, can t work or engage in other necessary activities, you have trouble taking care of basic hygiene, or basic responsibilities, thoughts of suicide or death that will not go away, self-injurious behavior.     What you need to do:  1. Call your care  team and request a same-day appointment. If they are not available (weekends or after hours) call your local crisis line, emergency room or 911.      Electronically signed by: Ashanti Dee, October 23, 2017    Depression Self Care Plan / Survival Kit    Self-Care for Depression  Here s the deal. Your body and mind are really not as separate as most people think.  What you do and think affects how you feel and how you feel influences what you do and think. This means if you do things that people who feel good do, it will help you feel better.  Sometimes this is all it takes.  There is also a place for medication and therapy depending on how severe your depression is, so be sure to consult with your medical provider and/ or Behavioral Health Consultant if your symptoms are worsening or not improving.     In order to better manage my stress, I will:    Exercise  Get some form of exercise, every day. This will help reduce pain and release endorphins, the  feel good  chemicals in your brain. This is almost as good as taking antidepressants!  This is not the same as joining a gym and then never going! (they count on that by the way ) It can be as simple as just going for a walk or doing some gardening, anything that will get you moving.      Hygiene   Maintain good hygiene (Get out of bed in the morning, Make your bed, Brush your teeth, Take a shower, and Get dressed like you were going to work, even if you are unemployed).  If your clothes don't fit try to get ones that do.    Diet  I will strive to eat foods that are good for me, drink plenty of water, and avoid excessive sugar, caffeine, alcohol, and other mood-altering substances.  Some foods that are helpful in depression are: complex carbohydrates, B vitamins, flaxseed, fish or fish oil, fresh fruits and vegetables.    Psychotherapy  I agree to participate in Individual Therapy (if recommended).    Medication  If prescribed medications, I agree to take them.   Missing doses can result in serious side effects.  I understand that drinking alcohol, or other illicit drug use, may cause potential side effects.  I will not stop my medication abruptly without first discussing it with my provider.    Staying Connected With Others  I will stay in touch with my friends, family members, and my primary care provider/team.    Use your imagination  Be creative.  We all have a creative side; it doesn t matter if it s oil painting, sand castles, or mud pies! This will also kick up the endorphins.    Witness Beauty  (AKA stop and smell the roses) Take a look outside, even in mid-winter. Notice colors, textures. Watch the squirrels and birds.     Service to others  Be of service to others.  There is always someone else in need.  By helping others we can  get out of ourselves  and remember the really important things.  This also provides opportunities for practicing all the other parts of the program.    Humor  Laugh and be silly!  Adjust your TV habits for less news and crime-drama and more comedy.    Control your stress  Try breathing deep, massage therapy, biofeedback, and meditation. Find time to relax each day.     My support system    Clinic Contact:  Phone number:    Contact 1:  Phone number:    Contact 2:  Phone number:    Synagogue/:  Phone number:    Therapist:  Phone number:    Local crisis center:    Phone number:    Other community support:  Phone number:

## 2017-12-11 ENCOUNTER — OFFICE VISIT (OUTPATIENT)
Dept: FAMILY MEDICINE | Facility: CLINIC | Age: 27
End: 2017-12-11
Payer: COMMERCIAL

## 2017-12-11 VITALS
BODY MASS INDEX: 31.08 KG/M2 | TEMPERATURE: 97.1 F | DIASTOLIC BLOOD PRESSURE: 76 MMHG | OXYGEN SATURATION: 96 % | WEIGHT: 242.2 LBS | HEIGHT: 74 IN | SYSTOLIC BLOOD PRESSURE: 129 MMHG | HEART RATE: 82 BPM

## 2017-12-11 DIAGNOSIS — F41.9 ANXIETY: ICD-10-CM

## 2017-12-11 DIAGNOSIS — F33.1 MODERATE EPISODE OF RECURRENT MAJOR DEPRESSIVE DISORDER (H): Primary | ICD-10-CM

## 2017-12-11 DIAGNOSIS — R73.09 ELEVATED GLUCOSE: ICD-10-CM

## 2017-12-11 DIAGNOSIS — E66.811 OBESITY, CLASS I, BMI 30-34.9: ICD-10-CM

## 2017-12-11 PROCEDURE — 99213 OFFICE O/P EST LOW 20 MIN: CPT | Performed by: NURSE PRACTITIONER

## 2017-12-11 NOTE — PATIENT INSTRUCTIONS
At [facility], we strive to deliver an exceptional experience to you, every time we see you.  If you receive a survey in the mail, please send us back your thoughts. We really do value your feedback.    Based on your medical history, these are the current health maintenance/preventive care services that you are due for (some may have been done at this visit.)  There are no preventive care reminders to display for this patient.      Suggested websites for health information:  Www.[facility].org : Up to date and easily searchable information on multiple topics.  Www.medlineplus.gov : medication info, interactive tutorials, watch real surgeries online  Www.familydoctor.org : good info from the Academy of Family Physicians  Www.cdc.gov : public health info, travel advisories, epidemics (H1N1)  Www.aap.org : children's health info, normal development, vaccinations  Www.health.[facility].[place].us : [place] dept of health, public health issues in [place], N1N1    Your care team:                            Family Medicine Internal Medicine   MD Rolando Salvador MD Shantel Branch-Fleming, MD Katya Georgiev PA-C Nam Ho, MD Pediatrics   SHAHANA Smith, BUBBA Giraldo APRSAAD CNP   MD Kalie Wilkins MD Deborah Mielke, MD Kim Thein, APRN [facility]      Clinic hours: Monday - Thursday 7 am-7 pm; Fridays 7 am-5 pm.   Urgent care: Monday - Friday 11 am-9 pm; Saturday and Sunday 9 am-5 pm.  Pharmacy : Monday -Thursday 8 am-8 pm; Friday 8 am-6 pm; Saturday and Sunday 9 am-5 pm.     Clinic: [phone]   Pharmacy: [phone]        Treating Anxiety Disorders with Medicine  An anxiety disorder can make you feel nervous or apprehensive, even without a clear reason. In people age 65 and older, generalized anxiety disorder is one of the most commonly diagnosed anxiety disorders. Many times it occurs with depression. Certain anxiety disorders can cause intense feelings of  fear or panic. You may even have physical symptoms such as a racing heartbeat, sweating, or dizziness. If you have these feelings, you don t have to suffer anymore. Treatment to help you overcome your fears will likely include therapy (also called counseling). Medicine may also be prescribed to help control your symptoms.    Medicines  Certain medicines may be prescribed to help control your symptoms. So you may feel less anxious. You may also feel able to move forward with therapy. At first, medicines and dosages may need to be adjusted to find what works best for you. Try to be patient. Tell your healthcare provider how a medicine makes you feel. This way, you can work together to find the treatment that s best for you. Keep in mind that medicines can have side effects. Talk with your provider about any side effects that are bothering you. Changing the dose or type of medicine may help. Don t stop taking medicine on your own. That can cause symptoms to come back.    Anti-anxiety medicine. This medicine eases symptoms and helps you relax. Your healthcare provider will explain when and how to use it. It may be prescribed for use before situations that make you anxious. You may also be told to take medicine on a regular schedule. Anti-anxiety medicine may make you feel a little sleepy or  out of it.  Don t drive a car or operate machinery while on this medicine, until you know how it affects you.  Caution  Never use alcohol or other drugs with anti-anxiety medicines. This could result in loss of muscular control, sedation, coma, or death. Also, use only the amount of medicine prescribed for you. If you think you may have taken too much, get emergency care right away.     Antidepressant medicine. This kind of medicine is often used to treat anxiety, even if you aren t depressed. An antidepressant helps balance out brain chemicals. This helps keep anxiety under control. This medicine is taken on a schedule. It takes a  few weeks to start working. If you don t notice a change at first, you may just need more time. But if you don t notice results after the first few weeks, tell your provider.  Keep taking medicines as prescribed  Never change your dosage, share or use another person's medicine, or stop taking your medicines without talking to your healthcare provider first. Keep the following in mind:    Some medicines must be taken on a schedule. Make this part of your daily routine. For instance, always take your pill before brushing your teeth. A pillbox can help you remember if you ve taken your medicine each day.    Medicines are often taken for 6 to 12 months. Your healthcare provider will then evaluate whether you need to stay on them. Many people who have also had therapy may no longer need medicine to manage anxiety.    You may need to stop taking medicine slowly to give your body time to adjust. When it s time to stop, your healthcare provider will tell you more. Remember: Never stop taking your medicine without talking to your provider first.    If symptoms return, you may need to start taking medicines again. This isn t your fault. It s just the nature of your anxiety disorder.  Special concerns    Side effects. Medicines may cause side effects. Ask your healthcare provider or pharmacist what you can expect. They may have ideas for avoiding some side effects.    Sexual problems. Some antidepressants can affect your desire for sex or your ability to have an orgasm. A change in dosage or medicine often solves the problem. If you have a sexual side effect that concerns you, tell your healthcare provider.    Addiction. If you ve never had a problem with drugs or alcohol, you may not have a problem with medicines used to treat anxiety disorders. But always discuss the medicines with your healthcare provider before taking them. If you have a history of addiction, you may not be able to use certain medicines used to treat  anxiety disorders.    Medicine interactions. Always check with your pharmacist before using any over-the-counter medicines, including herbal supplements.   Date Last Reviewed: 5/1/2017 2000-2017 [employer]. [address] [place] [address], [place] [address]. All rights reserved. This information is not intended as a substitute for professional medical care. Always follow your healthcare professional's instructions.

## 2017-12-11 NOTE — MR AVS SNAPSHOT
After Visit Summary   12/11/2017    Teddy Shah    MRN: [id number]           Patient Information     Date Of Birth          1990        Visit Information        Provider Department      12/11/2017 12:00 PM Ashanti Dee APRN CNP [facility]        Today's Diagnoses     Moderate episode of recurrent major depressive disorder (H)    -  1    Anxiety        Elevated glucose        Obesity, Class I, BMI 30-34.9          Care Instructions    At [facility], we strive to deliver an exceptional experience to you, every time we see you.  If you receive a survey in the mail, please send us back your thoughts. We really do value your feedback.    Based on your medical history, these are the current health maintenance/preventive care services that you are due for (some may have been done at this visit.)  There are no preventive care reminders to display for this patient.      Suggested websites for health information:  Www.Compufirst : Up to date and easily searchable information on multiple topics.  Www.medlineplus.gov : medication info, interactive tutorials, watch real surgeries online  Www.familydoctor.org : good info from the Academy of Family Physicians  Www.cdc.gov : public health info, travel advisories, epidemics (H1N1)  Www.aap.org : children's health info, normal development, vaccinations  Www.health.state.[place].us : [place] dept of health, public health issues in [place], N1N1    Your care team:                            Family Medicine Internal Medicine   MD Rolando Salvador MD Shantel Branch-Fleming, MD Katya Georgiev PA-C Nam Ho, MD Pediatrics   SHAHANA Smith CNP Amelia Massimini APRN CNP Shaista Malik, MD Bethany Templen, MD Deborah Mielke, MD Kim Thein, APRN CNP      Clinic hours: Monday - Thursday 7 am-7 pm; Fridays 7 am-5 pm.   Urgent care: Monday - Friday 11 am-9 pm; Saturday and Sunday 9 am-5  pm.  Pharmacy : Monday -Thursday 8 am-8 pm; Friday 8 am-6 pm; Saturday and Sunday 9 am-5 pm.     Clinic: [phone]   Pharmacy: [phone]        Treating Anxiety Disorders with Medicine  An anxiety disorder can make you feel nervous or apprehensive, even without a clear reason. In people age 65 and older, generalized anxiety disorder is one of the most commonly diagnosed anxiety disorders. Many times it occurs with depression. Certain anxiety disorders can cause intense feelings of fear or panic. You may even have physical symptoms such as a racing heartbeat, sweating, or dizziness. If you have these feelings, you don t have to suffer anymore. Treatment to help you overcome your fears will likely include therapy (also called counseling). Medicine may also be prescribed to help control your symptoms.    Medicines  Certain medicines may be prescribed to help control your symptoms. So you may feel less anxious. You may also feel able to move forward with therapy. At first, medicines and dosages may need to be adjusted to find what works best for you. Try to be patient. Tell your healthcare provider how a medicine makes you feel. This way, you can work together to find the treatment that s best for you. Keep in mind that medicines can have side effects. Talk with your provider about any side effects that are bothering you. Changing the dose or type of medicine may help. Don t stop taking medicine on your own. That can cause symptoms to come back.    Anti-anxiety medicine. This medicine eases symptoms and helps you relax. Your healthcare provider will explain when and how to use it. It may be prescribed for use before situations that make you anxious. You may also be told to take medicine on a regular schedule. Anti-anxiety medicine may make you feel a little sleepy or  out of it.  Don t drive a car or operate machinery while on this medicine, until you know how it affects you.  Caution  Never use alcohol or  other drugs with anti-anxiety medicines. This could result in loss of muscular control, sedation, coma, or death. Also, use only the amount of medicine prescribed for you. If you think you may have taken too much, get emergency care right away.     Antidepressant medicine. This kind of medicine is often used to treat anxiety, even if you aren t depressed. An antidepressant helps balance out brain chemicals. This helps keep anxiety under control. This medicine is taken on a schedule. It takes a few weeks to start working. If you don t notice a change at first, you may just need more time. But if you don t notice results after the first few weeks, tell your provider.  Keep taking medicines as prescribed  Never change your dosage, share or use another person's medicine, or stop taking your medicines without talking to your healthcare provider first. Keep the following in mind:    Some medicines must be taken on a schedule. Make this part of your daily routine. For instance, always take your pill before brushing your teeth. A pillbox can help you remember if you ve taken your medicine each day.    Medicines are often taken for 6 to 12 months. Your healthcare provider will then evaluate whether you need to stay on them. Many people who have also had therapy may no longer need medicine to manage anxiety.    You may need to stop taking medicine slowly to give your body time to adjust. When it s time to stop, your healthcare provider will tell you more. Remember: Never stop taking your medicine without talking to your provider first.    If symptoms return, you may need to start taking medicines again. This isn t your fault. It s just the nature of your anxiety disorder.  Special concerns    Side effects. Medicines may cause side effects. Ask your healthcare provider or pharmacist what you can expect. They may have ideas for avoiding some side effects.    Sexual problems. Some antidepressants can affect your desire for sex  or your ability to have an orgasm. A change in dosage or medicine often solves the problem. If you have a sexual side effect that concerns you, tell your healthcare provider.    Addiction. If you ve never had a problem with drugs or alcohol, you may not have a problem with medicines used to treat anxiety disorders. But always discuss the medicines with your healthcare provider before taking them. If you have a history of addiction, you may not be able to use certain medicines used to treat anxiety disorders.    Medicine interactions. Always check with your pharmacist before using any over-the-counter medicines, including herbal supplements.   Date Last Reviewed: 5/1/2017 2000-2017 [employer]. [address] [place] [address], [place] [address]. All rights reserved. This information is not intended as a substitute for professional medical care. Always follow your healthcare professional's instructions.                Follow-ups after your visit        Future tests that were ordered for you today     Open Future Orders        Priority Expected Expires Ordered    Glucose Routine  12/11/2018 12/11/2017    JUST IN CASE Routine  12/11/2018 12/11/2017            Who to contact     If you have questions or need follow up information about today's clinic visit or your schedule please contact [facility] directly at [phone].  Normal or non-critical lab and imaging results will be communicated to you by MyChart, letter or phone within 4 business days after the clinic has received the results. If you do not hear from us within 7 days, please contact the clinic through MyChart or phone. If you have a critical or abnormal lab result, we will notify you by phone as soon as possible.  Submit refill requests through [facility] or call your pharmacy and they will forward the refill request to us. Please allow 3 business days for your refill to be completed.          Additional Information About Your  "Visit        [facility] Information     [facility] gives you secure access to your electronic health record. If you see a primary care provider, you can also send messages to your care team and make appointments. If you have questions, please call your primary care clinic.  If you do not have a primary care provider, please call [phone] and they will assist you.        Care EveryWhere ID     This is your Care EveryWhere ID. This could be used by other organizations to access your [facility] medical records  [id number]        Your Vitals Were     Pulse Temperature Height Pulse Oximetry BMI (Body Mass Index)       82 97.1  F (36.2  C) (Oral) 6' 1.58\" (1.869 m) 96% 31.45 kg/m2        Blood Pressure from Last 3 Encounters:   12/11/17 129/76   10/23/17 132/78   10/13/17 138/87    Weight from Last 3 Encounters:   12/11/17 242 lb 3.2 oz (109.9 kg)   10/23/17 252 lb 12.8 oz (114.7 kg)   10/13/17 259 lb 3.2 oz (117.6 kg)                 Today's Medication Changes          These changes are accurate as of: 12/11/17 12:42 PM.  If you have any questions, ask your nurse or doctor.               These medicines have changed or have updated prescriptions.        Dose/Directions    sertraline 50 MG tablet   Commonly known as:  ZOLOFT   This may have changed:  how much to take   Used for:  Moderate episode of recurrent major depressive disorder (H), Anxiety   Changed by:  Ashanti Dee APRN CNP        Dose:  50 mg   Take 1 tablet (50 mg) by mouth daily   Quantity:  30 tablet   Refills:  1            Where to get your medicines      These medications were sent to [id number] - [place] - [address] [facility] [address] [place] [address]     Phone:  [phone]     sertraline 50 MG tablet                Primary Care Provider Office Phone # Fax #    AB Sifuentes -[phone]       [facility] [address]  [place] [address]      "   Equal Access to Services     [facility] : Hadii aad ku hadgilbertogermán Ayeantwon, waaxda luqadaha, qaybta kamaevecarl khan. So [facility] [phone].    ATENCIÓN: Si habla español, tiene a thomas disposición servicios gratuitos de asistencia lingüística. Llame al [phone].    We comply with applicable federal civil rights laws and [place] laws. We do not discriminate on the basis of race, color, national origin, age, disability, sex, sexual orientation, or gender identity.            Thank you!     Thank you for choosing [facility]  for your care. Our goal is always to provide you with excellent care. Hearing back from our patients is one way we can continue to improve our services. Please take a few minutes to complete the written survey that you may receive in the mail after your visit with us. Thank you!             Your Updated Medication List - Protect others around you: Learn how to safely use, store and throw away your medicines at www.disposemymeds.org.          This list is accurate as of: 12/11/17 12:42 PM.  Always use your most recent med list.                   Brand Name Dispense Instructions for use Diagnosis    hydrocortisone 0.2 % cream    WESTCORT    45 g    Apply twice daily to red areas on the face for 1 week, then weekends only.    Dermatitis, seborrheic       IBUPROFEN PO      Reported on 5/12/2017        sertraline 50 MG tablet    ZOLOFT    30 tablet    Take 1 tablet (50 mg) by mouth daily    Moderate episode of recurrent major depressive disorder (H), Anxiety       triamcinolone 0.1 % cream    KENALOG    80 g    Apply sparingly to affected area twice a day  as needed    Petechial rash       TYLENOL PO      Reported on 5/12/2017

## 2017-12-11 NOTE — NURSING NOTE
"Chief Complaint   Patient presents with     Recheck Medication     Depression     Anxiety     Refill Request       Initial /76 (BP Location: Left arm, Patient Position: Sitting, Cuff Size: Adult Regular)  Pulse 82  Temp 97.1  F (36.2  C) (Oral)  Ht 6' 1.58\" (1.869 m)  Wt 242 lb 3.2 oz (109.9 kg)  SpO2 96%  BMI 31.45 kg/m2 Estimated body mass index is 31.45 kg/(m^2) as calculated from the following:    Height as of this encounter: 6' 1.58\" (1.869 m).    Weight as of this encounter: 242 lb 3.2 oz (109.9 kg).  Medication Reconciliation: complete   Teri Lin MA      "

## 2017-12-11 NOTE — PROGRESS NOTES
SUBJECTIVE:   Teddy Shah is a 26 year old male who presents to clinic today for the following health issues:      Depression and Anxiety Follow-Up    Status since last visit: Improved     Other associated symptoms:None    Complicating factors:     Significant life event: Yes-  Holiday stress at work      Current substance abuse: None    PHQ-9 Score and MyChart F/U Questions 10/13/2017   Total Score 12   Q9: Suicide Ideation Not at all     KASSIE-7 SCORE 10/13/2017   Total Score 19     In the past two weeks have you had thoughts of suicide or self-harm?  No.    Do you have concerns about your personal safety or the safety of others?   No    PHQ-9  English  PHQ-9   Any Language  GAD7  Suicide Assessment Five-step Evaluation and Treatment (SAFE-T)      Amount of exercise or physical activity: 4-5 days/week for an average of greater than 60 minutes    Problems taking medications regularly: No    Medication side effects: none    Diet: carbohydrate counting      Problem list and histories reviewed & adjusted, as indicated.  Additional history: as documented    Patient Active Problem List   Diagnosis     Nasal obstruction     Deviated nasal septum     CARDIOVASCULAR SCREENING; LDL GOAL LESS THAN 160     Obesity, Class I, BMI 30-34.9     Pervasive developmental disorder     Moderate episode of recurrent major depressive disorder (H)     Anxiety     Past Surgical History:   Procedure Laterality Date     ADENOIDECTOMY       ENDOSCOPY      EGD     EXTRACTION(S) DENTAL      wisdom     PE TUBES      multiple     SEPTOPLASTY, TURBINOPLASTY, COMBINED Bilateral 6/19/2015    Procedure: COMBINED SEPTOPLASTY, TURBINOPLASTY;  Surgeon: Ally Finley MD;  Location:  OR       Social History   Substance Use Topics     Smoking status: Never Smoker     Smokeless tobacco: Never Used     Alcohol use No     Family History   Problem Relation Age of Onset     Anesthesia Reaction Mother      Anesthesia Reaction Paternal Grandmother       "CEREBROVASCULAR DISEASE Paternal Grandmother      Coronary Artery Disease Paternal Grandmother      heart attack?     CEREBROVASCULAR DISEASE Father      Hypertension Father      ?     Coronary Artery Disease Maternal Grandfather      heart attack     Prostate Cancer Maternal Grandfather      Hypertension Maternal Grandmother      Lymphoma Maternal Grandmother      Bleeding Disorder No family hx of          BP Readings from Last 3 Encounters:   12/11/17 129/76   10/23/17 132/78   10/13/17 138/87    Wt Readings from Last 3 Encounters:   12/11/17 242 lb 3.2 oz (109.9 kg)   10/23/17 252 lb 12.8 oz (114.7 kg)   10/13/17 259 lb 3.2 oz (117.6 kg)                  Labs reviewed in EPIC        Reviewed and updated as needed this visit by clinical staff       Reviewed and updated as needed this visit by Provider         ROS:  Constitutional, HEENT, cardiovascular, pulmonary, gi and gu systems are negative, except as otherwise noted.      OBJECTIVE:   /76 (BP Location: Left arm, Patient Position: Sitting, Cuff Size: Adult Regular)  Pulse 82  Temp 97.1  F (36.2  C) (Oral)  Ht 6' 1.58\" (1.869 m)  Wt 242 lb 3.2 oz (109.9 kg)  SpO2 96%  BMI 31.45 kg/m2  Body mass index is 31.45 kg/(m^2).  GENERAL: healthy, alert and no distress  NECK: no adenopathy, no asymmetry, masses, or scars and thyroid normal to palpation  RESP: lungs clear to auscultation - no rales, rhonchi or wheezes  CV: regular rate and rhythm, normal S1 S2, no S3 or S4, no murmur, click or rub, no peripheral edema and peripheral pulses strong  ABDOMEN: soft, nontender, no hepatosplenomegaly, no masses and bowel sounds normal  MS: no gross musculoskeletal defects noted, no edema  SKIN: no suspicious lesions or rashes  PSYCH: mentation appears normal, affect normal/bright    Diagnostic Test Results:  none     ASSESSMENT/PLAN:       BP Screening:   Last 3 BP Readings:    BP Readings from Last 3 Encounters:   12/11/17 129/76   10/23/17 132/78   10/13/17 138/87 " "      The following was recommended to the patient:  Re-screen BP within a year and recommended lifestyle modifications  BMI:   Estimated body mass index is 31.45 kg/(m^2) as calculated from the following:    Height as of this encounter: 6' 1.58\" (1.869 m).    Weight as of this encounter: 242 lb 3.2 oz (109.9 kg).   Weight management plan: Discussed healthy diet and exercise guidelines and patient will follow up in 12 months in clinic to re-evaluate.        1. Moderate episode of recurrent major depressive disorder (H)  PHQ-9=12/KASSIE-7=13.  Patient is interested in starting zoloft.  Discussed need for gradual increase of SSRI dose over time, titrating to effect.  Reviewed potential for initial side effects (such as headache, GI symptoms, and dry mouth) that will likely subside after a week or so, but that therapeutic effects will likely take 1-2 weeks - so it's important to stick with medication for at least a month to adequately gauge effect.  Notify me of any significant side effects.  Discussed that treatment with SSRI medications requires a minimum commitment of 9-12 months; shorter courses are associated with rebound symptoms.  Discussed potential long-term side effects including sexual side effects. Return to clinic 3 weeks for medication check    - sertraline (ZOLOFT) 50 MG tablet; Take 1 tablet (50 mg) by mouth daily  Dispense: 30 tablet; Refill: 1    2. Anxiety  Patient declines counseling at this time, starting Zoloft.  He understands that we may need to use a higher dose of the Zoloft to get at the anxiety symptoms but we'll increase the dose slowly, return to clinic 3 weeks for medication check  - sertraline (ZOLOFT) 50 MG tablet; Take 1 tablet (50 mg) by mouth daily  Dispense: 30 tablet; Refill: 1    3. Elevated glucose  Rechecking his sugar as his last sugar was 137.  - Glucose; Future  - JUST IN CASE; Future    4. Obesity, Class I, BMI 30-34.9  Benefits of weight loss reviewed in detail, encouraged him " to cut back on the carbohydrates in the diet, consume more fruits and vegetables, drink plenty of water, avoid fruit juices, sodas, get 150 min moderate exercise/week.  Recheck weight in 6 months.        See Patient Instructions    AB Hinds [facility]

## 2017-12-13 ASSESSMENT — PATIENT HEALTH QUESTIONNAIRE - PHQ9
5. POOR APPETITE OR OVEREATING: SEVERAL DAYS
SUM OF ALL RESPONSES TO PHQ QUESTIONS 1-9: 12

## 2017-12-13 ASSESSMENT — ANXIETY QUESTIONNAIRES
5. BEING SO RESTLESS THAT IT IS HARD TO SIT STILL: SEVERAL DAYS
3. WORRYING TOO MUCH ABOUT DIFFERENT THINGS: MORE THAN HALF THE DAYS
GAD7 TOTAL SCORE: 13
2. NOT BEING ABLE TO STOP OR CONTROL WORRYING: NEARLY EVERY DAY
6. BECOMING EASILY ANNOYED OR IRRITABLE: SEVERAL DAYS
7. FEELING AFRAID AS IF SOMETHING AWFUL MIGHT HAPPEN: MORE THAN HALF THE DAYS
1. FEELING NERVOUS, ANXIOUS, OR ON EDGE: NEARLY EVERY DAY
IF YOU CHECKED OFF ANY PROBLEMS ON THIS QUESTIONNAIRE, HOW DIFFICULT HAVE THESE PROBLEMS MADE IT FOR YOU TO DO YOUR WORK, TAKE CARE OF THINGS AT HOME, OR GET ALONG WITH OTHER PEOPLE: SOMEWHAT DIFFICULT

## 2017-12-14 ASSESSMENT — ANXIETY QUESTIONNAIRES: GAD7 TOTAL SCORE: 13

## 2018-01-01 ENCOUNTER — MYC MEDICAL ADVICE (OUTPATIENT)
Dept: FAMILY MEDICINE | Facility: CLINIC | Age: 28
End: 2018-01-01

## 2018-01-03 ENCOUNTER — E-VISIT (OUTPATIENT)
Dept: FAMILY MEDICINE | Facility: CLINIC | Age: 28
End: 2018-01-03
Payer: COMMERCIAL

## 2018-01-03 ENCOUNTER — MYC MEDICAL ADVICE (OUTPATIENT)
Dept: FAMILY MEDICINE | Facility: CLINIC | Age: 28
End: 2018-01-03

## 2018-01-03 DIAGNOSIS — F41.9 ANXIETY: ICD-10-CM

## 2018-01-03 DIAGNOSIS — F33.1 MODERATE EPISODE OF RECURRENT MAJOR DEPRESSIVE DISORDER (H): Primary | ICD-10-CM

## 2018-01-03 DIAGNOSIS — F33.1 MODERATE EPISODE OF RECURRENT MAJOR DEPRESSIVE DISORDER (H): ICD-10-CM

## 2018-01-03 PROCEDURE — 98969 ZZC NONPHYSICIAN ONLINE ASSESSMENT AND MANAGEMENT: CPT | Performed by: NURSE PRACTITIONER

## 2018-01-03 ASSESSMENT — ANXIETY QUESTIONNAIRES
3. WORRYING TOO MUCH ABOUT DIFFERENT THINGS: MORE THAN HALF THE DAYS
GAD7 TOTAL SCORE: 13
GAD7 TOTAL SCORE: 13
4. TROUBLE RELAXING: MORE THAN HALF THE DAYS
7. FEELING AFRAID AS IF SOMETHING AWFUL MIGHT HAPPEN: MORE THAN HALF THE DAYS
7. FEELING AFRAID AS IF SOMETHING AWFUL MIGHT HAPPEN: MORE THAN HALF THE DAYS
GAD7 TOTAL SCORE: 13
6. BECOMING EASILY ANNOYED OR IRRITABLE: MORE THAN HALF THE DAYS
5. BEING SO RESTLESS THAT IT IS HARD TO SIT STILL: SEVERAL DAYS
1. FEELING NERVOUS, ANXIOUS, OR ON EDGE: MORE THAN HALF THE DAYS
2. NOT BEING ABLE TO STOP OR CONTROL WORRYING: MORE THAN HALF THE DAYS

## 2018-01-03 ASSESSMENT — PATIENT HEALTH QUESTIONNAIRE - PHQ9
SUM OF ALL RESPONSES TO PHQ QUESTIONS 1-9: 12
SUM OF ALL RESPONSES TO PHQ QUESTIONS 1-9: 12
10. IF YOU CHECKED OFF ANY PROBLEMS, HOW DIFFICULT HAVE THESE PROBLEMS MADE IT FOR YOU TO DO YOUR WORK, TAKE CARE OF THINGS AT HOME, OR GET ALONG WITH OTHER PEOPLE: SOMEWHAT DIFFICULT

## 2018-01-03 NOTE — TELEPHONE ENCOUNTER
Pls have him schedule E or phone visit as he just started on the zoloft. He was to return to clinic in 2 weeks (now) for refill.  Ashanti BERGER, CNP

## 2018-01-04 ENCOUNTER — MYC REFILL (OUTPATIENT)
Dept: FAMILY MEDICINE | Facility: CLINIC | Age: 28
End: 2018-01-04

## 2018-01-04 DIAGNOSIS — F33.1 MODERATE EPISODE OF RECURRENT MAJOR DEPRESSIVE DISORDER (H): ICD-10-CM

## 2018-01-04 DIAGNOSIS — F41.9 ANXIETY: ICD-10-CM

## 2018-01-04 ASSESSMENT — PATIENT HEALTH QUESTIONNAIRE - PHQ9: SUM OF ALL RESPONSES TO PHQ QUESTIONS 1-9: 12

## 2018-01-04 ASSESSMENT — ANXIETY QUESTIONNAIRES: GAD7 TOTAL SCORE: 13

## 2018-01-04 NOTE — TELEPHONE ENCOUNTER
Called and spoke to the patient and explained that this has been refilled  But an appointment is due. Patient understands. I offered to help schedule  This, patient declined, and states that he will call back to schedule the appointment  After he receives his work schedule for next week.  Tika Covarrubias MA/  [employer] [place]

## 2018-01-04 NOTE — TELEPHONE ENCOUNTER
Patient has staerted this process since 12/29  He answered your questions with the evisit  He is very concerned about a break in medication   And is asking this be filled as soon as possible    Call to venu [place]    Thank you

## 2018-01-04 NOTE — TELEPHONE ENCOUNTER
Rx refilled.  Patient to schedule back within 1 month for recheck.  (He was actually due to follow back now for an appt).  Ashanti BERGER, CNP

## 2018-01-04 NOTE — TELEPHONE ENCOUNTER
Message from MyChart:  Original authorizing provider: AB Hinds CNP would like a refill of the following medications:  sertraline (ZOLOFT) 50 MG tablet [AB Hinds CNP]    Preferred pharmacy: [facility] [id number] [place] [address] - [address] AT [facility] OF [facility] [address]    Comment:

## 2018-01-09 DIAGNOSIS — R73.09 ELEVATED GLUCOSE: ICD-10-CM

## 2018-01-09 LAB — GLUCOSE SERPL-MCNC: 74 MG/DL (ref 70–99)

## 2018-01-09 PROCEDURE — 36415 COLL VENOUS BLD VENIPUNCTURE: CPT | Performed by: NURSE PRACTITIONER

## 2018-01-09 PROCEDURE — 82947 ASSAY GLUCOSE BLOOD QUANT: CPT | Performed by: NURSE PRACTITIONER

## 2018-01-11 ENCOUNTER — OFFICE VISIT (OUTPATIENT)
Dept: FAMILY MEDICINE | Facility: CLINIC | Age: 28
End: 2018-01-11
Payer: COMMERCIAL

## 2018-01-11 VITALS
BODY MASS INDEX: 30.72 KG/M2 | HEIGHT: 74 IN | DIASTOLIC BLOOD PRESSURE: 72 MMHG | TEMPERATURE: 97.2 F | HEART RATE: 75 BPM | SYSTOLIC BLOOD PRESSURE: 122 MMHG | WEIGHT: 239.4 LBS | OXYGEN SATURATION: 96 %

## 2018-01-11 DIAGNOSIS — F33.1 MODERATE EPISODE OF RECURRENT MAJOR DEPRESSIVE DISORDER (H): Primary | ICD-10-CM

## 2018-01-11 DIAGNOSIS — E66.811 OBESITY, CLASS I, BMI 30-34.9: ICD-10-CM

## 2018-01-11 PROCEDURE — 99213 OFFICE O/P EST LOW 20 MIN: CPT | Performed by: NURSE PRACTITIONER

## 2018-01-11 RX ORDER — SERTRALINE HYDROCHLORIDE 100 MG/1
100 TABLET, FILM COATED ORAL DAILY
Qty: 90 TABLET | Refills: 1 | Status: SHIPPED | OUTPATIENT
Start: 2018-01-11 | End: 2018-06-22

## 2018-01-11 ASSESSMENT — PAIN SCALES - GENERAL: PAINLEVEL: NO PAIN (0)

## 2018-01-11 NOTE — PROGRESS NOTES
SUBJECTIVE:   Teddy Shah is a 27 year old male who presents to clinic today for the following health issues:    Depression and Anxiety Follow-Up    Status since last visit: Improved     Other associated symptoms:Dry mouth from the medications    Complicating factors:     Significant life event: No     Current substance abuse: None    PHQ-9 Score and MyChart F/U Questions 10/13/2017 12/13/2017 1/3/2018   Total Score 12 12 12   Q9: Suicide Ideation Not at all Not at all Not at all     KASSIE-7 SCORE 10/13/2017 12/13/2017 1/3/2018   Total Score - - 13 (moderate anxiety)   Total Score 19 13 13     In the past two weeks have you had thoughts of suicide or self-harm?  No.    Do you have concerns about your personal safety or the safety of others?   No3    PHQ-9  English  PHQ-9   Any Language  GAD7  Suicide Assessment Five-step Evaluation and Treatment (SAFE-T)  Patient feels as if he'd benefit from an increase dose of the Zoloft which he's been on since October.  He started school at [employer] this week, is studying Anthropology.      Amount of exercise or physical activity: None    Problems taking medications regularly: No    Medication side effects: none    Diet: regular (no restrictions)    Concern - Moles   Onset: years     Description:   Left arm, back     Intensity: severe    Progression of Symptoms:  constant    Accompanying Signs & Symptoms:  non    Previous history of similar problem:   Yes     Precipitating factors:   Worsened by: none    Alleviating factors:  Improved by: none    Therapies Tried and outcome: none      Problem list and histories reviewed & adjusted, as indicated.  Additional history: as documented    Patient Active Problem List   Diagnosis     Nasal obstruction     Deviated nasal septum     CARDIOVASCULAR SCREENING; LDL GOAL LESS THAN 160     Obesity, Class I, BMI 30-34.9     Pervasive developmental disorder     Moderate episode of recurrent major depressive disorder (H)     Anxiety      Past Surgical History:   Procedure Laterality Date     ADENOIDECTOMY       ENDOSCOPY      EGD     EXTRACTION(S) DENTAL      wisdom     PE TUBES      multiple     SEPTOPLASTY, TURBINOPLASTY, COMBINED Bilateral 6/19/2015    Procedure: COMBINED SEPTOPLASTY, TURBINOPLASTY;  Surgeon: Ally Finley MD;  Location:  OR       Social History   Substance Use Topics     Smoking status: Never Smoker     Smokeless tobacco: Never Used     Alcohol use No     Family History   Problem Relation Age of Onset     Anesthesia Reaction Mother      Anesthesia Reaction Paternal Grandmother      CEREBROVASCULAR DISEASE Paternal Grandmother      Coronary Artery Disease Paternal Grandmother      heart attack?     CEREBROVASCULAR DISEASE Father      Hypertension Father      ?     Coronary Artery Disease Maternal Grandfather      heart attack     Prostate Cancer Maternal Grandfather      Hypertension Maternal Grandmother      Lymphoma Maternal Grandmother      Bleeding Disorder No family hx of          Current Outpatient Prescriptions   Medication Sig Dispense Refill     sertraline (ZOLOFT) 100 MG tablet Take 1 tablet (100 mg) by mouth daily 90 tablet 1     sertraline (ZOLOFT) 50 MG tablet Take 1 tablet (50 mg) by mouth daily 90 tablet 0     triamcinolone (KENALOG) 0.1 % cream Apply sparingly to affected area twice a day  as needed 80 g 0     hydrocortisone (WESTCORT) 0.2 % cream Apply twice daily to red areas on the face for 1 week, then weekends only. 45 g 3     IBUPROFEN PO Reported on 5/12/2017       Acetaminophen (TYLENOL PO) Reported on 5/12/2017       BP Readings from Last 3 Encounters:   01/11/18 122/72   12/11/17 129/76   10/23/17 132/78    Wt Readings from Last 3 Encounters:   01/11/18 239 lb 6.4 oz (108.6 kg)   12/11/17 242 lb 3.2 oz (109.9 kg)   10/23/17 252 lb 12.8 oz (114.7 kg)                  Labs reviewed in EPIC        Reviewed and updated as needed this visit by clinical staff       Reviewed and updated as needed this visit  "by Provider         ROS:  Constitutional, HEENT, cardiovascular, pulmonary, gi and gu systems are negative, except as otherwise noted.      OBJECTIVE:   /72 (BP Location: Left arm, Patient Position: Chair, Cuff Size: Adult Large)  Pulse 75  Temp 97.2  F (36.2  C) (Oral)  Ht 6' 1.58\" (1.869 m)  Wt 239 lb 6.4 oz (108.6 kg)  SpO2 96%  BMI 31.09 kg/m2  Body mass index is 31.09 kg/(m^2).  GENERAL: healthy, alert and no distress  EYES: Eyes grossly normal to inspection, PERRL and conjunctivae and sclerae normal  HENT: ear canals and TM's normal, nose and mouth without ulcers or lesions  NECK: no adenopathy, no asymmetry, masses, or scars and thyroid normal to palpation  RESP: lungs clear to auscultation - no rales, rhonchi or wheezes  CV: regular rate and rhythm, normal S1 S2, no S3 or S4, no murmur, click or rub, no peripheral edema and peripheral pulses strong  ABDOMEN: soft, nontender, no hepatosplenomegaly, no masses and bowel sounds normal  MS: no gross musculoskeletal defects noted, no edema  SKIN: multiple symmetric hyperpigmented macules on arms, back, upper chest, none of which appear concerning for a skin cancer (ABCD's reviewed), no suspicious lesions or rashes  PSYCH: mentation appears normal, affect normal/bright    Diagnostic Test Results:  none     ASSESSMENT/PLAN:       BP Screening:   Last 3 BP Readings:    BP Readings from Last 3 Encounters:   01/11/18 122/72   12/11/17 129/76   10/23/17 132/78       The following was recommended to the patient:  Re-screen BP within a year and recommended lifestyle modifications  BMI:   Estimated body mass index is 31.09 kg/(m^2) as calculated from the following:    Height as of this encounter: 6' 1.58\" (1.869 m).    Weight as of this encounter: 239 lb 6.4 oz (108.6 kg).   Weight management plan: Discussed healthy diet and exercise guidelines and patient will follow up in 12 months in clinic to re-evaluate.        1. Moderate episode of recurrent major " depressive disorder (H)  Increase Zoloft to 100 mg daily, phone or e visit in 3 weeks for medication check, call if concerns.  - sertraline (ZOLOFT) 100 MG tablet; Take 1 tablet (100 mg) by mouth daily  Dispense: 90 tablet; Refill: 1    2. Obesity, Class I, BMI 30-34.9  Benefits of weight loss reviewed in detail, encouraged him to cut back on the carbohydrates in the diet, consume more fruits and vegetables, drink plenty of water, avoid fruit juices, sodas, get 150 min moderate exercise/week.  Recheck weight in 6 months.        See Patient Instructions    AB Hinds [facility]

## 2018-01-11 NOTE — MR AVS SNAPSHOT
After Visit Summary   1/11/2018    Teddy Shah    MRN: [id number]           Patient Information     Date Of Birth          1990        Visit Information        Provider Department      1/11/2018 11:40 AM Ashanti Dee APRN CNP [facility]        Today's Diagnoses     Moderate episode of recurrent major depressive disorder (H)    -  1    Obesity, Class I, BMI 30-34.9           Follow-ups after your visit        Follow-up notes from your care team     Return in about 3 weeks (around 2/1/2018), or if symptoms worsen or fail to improve.      Who to contact     If you have questions or need follow up information about today's clinic visit or your schedule please contact [facility] directly at [phone].  Normal or non-critical lab and imaging results will be communicated to you by MyChart, letter or phone within 4 business days after the clinic has received the results. If you do not hear from us within 7 days, please contact the clinic through MyChart or phone. If you have a critical or abnormal lab result, we will notify you by phone as soon as possible.  Submit refill requests through [employer] or call your pharmacy and they will forward the refill request to us. Please allow 3 business days for your refill to be completed.          Additional Information About Your Visit        MyChart Information     [employer] gives you secure access to your electronic health record. If you see a primary care provider, you can also send messages to your care team and make appointments. If you have questions, please call your primary care clinic.  If you do not have a primary care provider, please call [phone] and they will assist you.        Care EveryWhere ID     This is your Care EveryWhere ID. This could be used by other organizations to access your [facility] medical records  [id number]        Your Vitals Were     Pulse Temperature Height Pulse Oximetry BMI  "(Body Mass Index)       75 97.2  F (36.2  C) (Oral) 6' 1.58\" (1.869 m) 96% 31.09 kg/m2        Blood Pressure from Last 3 Encounters:   01/11/18 122/72   12/11/17 129/76   10/23/17 132/78    Weight from Last 3 Encounters:   01/11/18 239 lb 6.4 oz (108.6 kg)   12/11/17 242 lb 3.2 oz (109.9 kg)   10/23/17 252 lb 12.8 oz (114.7 kg)              Today, you had the following     No orders found for display         Today's Medication Changes          These changes are accurate as of: 1/11/18 12:47 PM.  If you have any questions, ask your nurse or doctor.               These medicines have changed or have updated prescriptions.        Dose/Directions    * sertraline 50 MG tablet   Commonly known as:  ZOLOFT   This may have changed:  Another medication with the same name was added. Make sure you understand how and when to take each.   Used for:  Moderate episode of recurrent major depressive disorder (H), Anxiety   Changed by:  Ashanti Dee APRN CNP        Dose:  50 mg   Take 1 tablet (50 mg) by mouth daily   Quantity:  90 tablet   Refills:  0       * sertraline 100 MG tablet   Commonly known as:  ZOLOFT   This may have changed:  You were already taking a medication with the same name, and this prescription was added. Make sure you understand how and when to take each.   Used for:  Moderate episode of recurrent major depressive disorder (H)   Changed by:  Ashanti Dee APRN CNP        Dose:  100 mg   Take 1 tablet (100 mg) by mouth daily   Quantity:  90 tablet   Refills:  1       * Notice:  This list has 2 medication(s) that are the same as other medications prescribed for you. Read the directions carefully, and ask your doctor or other care provider to review them with you.         Where to get your medicines      These medications were sent to [id number] - [place] - [address] AT [facility] [address] [place] [address]     Phone:  [phone]     " sertraline 100 MG tablet                Primary Care Provider Office Phone # Fax #    AB Sifuentes -[phone]       [facility] [address]  [facility] [address]        Equal Access to Services     CHER MCLEOD : Hadii magda ku hadasho Soomaali, waaxda luqadaha, qaybta kaalmada adeegyada, waxay idiin hayaan adejanette kharash marta luz. So [facility] [phone].    ATENCIÓN: Si habla español, tiene a thomas disposición servicios gratuitos de asistencia lingüística. Llame al [phone].    We comply with applicable federal civil rights laws and [place] laws. We do not discriminate on the basis of race, color, national origin, age, disability, sex, sexual orientation, or gender identity.            Thank you!     Thank you for choosing [facility]  for your care. Our goal is always to provide you with excellent care. Hearing back from our patients is one way we can continue to improve our services. Please take a few minutes to complete the written survey that you may receive in the mail after your visit with us. Thank you!             Your Updated Medication List - Protect others around you: Learn how to safely use, store and throw away your medicines at www.disposemymeds.org.          This list is accurate as of: 1/11/18 12:47 PM.  Always use your most recent med list.                   Brand Name Dispense Instructions for use Diagnosis    hydrocortisone 0.2 % cream    WESTCORT    45 g    Apply twice daily to red areas on the face for 1 week, then weekends only.    Dermatitis, seborrheic       IBUPROFEN PO      Reported on 5/12/2017        * sertraline 50 MG tablet    ZOLOFT    90 tablet    Take 1 tablet (50 mg) by mouth daily    Moderate episode of recurrent major depressive disorder (H), Anxiety       * sertraline 100 MG tablet    ZOLOFT    90 tablet    Take 1 tablet (100 mg) by mouth daily    Moderate episode of recurrent major depressive disorder (H)        triamcinolone 0.1 % cream    KENALOG    80 g    Apply sparingly to affected area twice a day  as needed    Petechial rash       TYLENOL PO      Reported on 5/12/2017        * Notice:  This list has 2 medication(s) that are the same as other medications prescribed for you. Read the directions carefully, and ask your doctor or other care provider to review them with you.

## 2018-03-16 ENCOUNTER — OFFICE VISIT (OUTPATIENT)
Dept: URGENT CARE | Facility: URGENT CARE | Age: 28
End: 2018-03-16
Payer: COMMERCIAL

## 2018-03-16 ENCOUNTER — TELEPHONE (OUTPATIENT)
Dept: URGENT CARE | Facility: URGENT CARE | Age: 28
End: 2018-03-16

## 2018-03-16 VITALS
HEART RATE: 110 BPM | DIASTOLIC BLOOD PRESSURE: 90 MMHG | OXYGEN SATURATION: 95 % | BODY MASS INDEX: 31.3 KG/M2 | RESPIRATION RATE: 20 BRPM | SYSTOLIC BLOOD PRESSURE: 128 MMHG | WEIGHT: 241 LBS

## 2018-03-16 DIAGNOSIS — F41.9 ANXIETY: ICD-10-CM

## 2018-03-16 DIAGNOSIS — R07.9 ACUTE CHEST PAIN: Primary | ICD-10-CM

## 2018-03-16 PROCEDURE — 93000 ELECTROCARDIOGRAM COMPLETE: CPT | Performed by: NURSE PRACTITIONER

## 2018-03-16 PROCEDURE — 99213 OFFICE O/P EST LOW 20 MIN: CPT | Performed by: NURSE PRACTITIONER

## 2018-03-16 ASSESSMENT — ENCOUNTER SYMPTOMS
DIARRHEA: 0
DIAPHORESIS: 0
COUGH: 0
RHINORRHEA: 0
EYES NEGATIVE: 1
CHILLS: 0
FEVER: 0
MUSCULOSKELETAL NEGATIVE: 1
NAUSEA: 0
SORE THROAT: 0
NERVOUS/ANXIOUS: 1
SHORTNESS OF BREATH: 0
NEUROLOGICAL NEGATIVE: 1
VOMITING: 0

## 2018-03-16 NOTE — NURSING NOTE
"RN Triage:     Chief Complaint   Patient presents with     Breathing Problem     hard to breath felt constricted to 50% now down to 7% per patient       Initial /90 (BP Location: Left arm, Patient Position: Chair, Cuff Size: Adult Large)  Pulse 110  Resp 20  Wt 241 lb (109.3 kg)  SpO2 95%  BMI 31.3 kg/m2 Estimated body mass index is 31.3 kg/(m^2) as calculated from the following:    Height as of 1/11/18: 6' 1.58\" (1.869 m).    Weight as of this encounter: 241 lb (109.3 kg).  BP completed using cuff size: large    Assessment:  stable    Triage Dispo: in lab waiting area    Intervention: none    Radha Sheridan, RN    "

## 2018-03-16 NOTE — PROGRESS NOTES
SUBJECTIVE:   Teddy Shah is a 27 year old male presenting with a chief complaint of   Chief Complaint   Patient presents with     Breathing Problem     hard to breath felt constricted to 50% now down to 7% per patient       He is an established patient of [facility].    Symptom Onset: started 2 hours ago.1  ago.  Timing of illness: sudden onset.  Current and Associated symptoms: chest pain and anxiety.  Character: achey.  Severity moderate.  Location: left chest.  Radiation: none.  Associated Symptoms: stress/anxiety.  Exacerbated by: nothing.  Relieved by: nothing.  Cardiac risk factors: none.      Review of Systems   Constitutional: Negative for chills, diaphoresis and fever.   HENT: Negative for congestion, ear pain, rhinorrhea and sore throat.    Eyes: Negative.    Respiratory: Negative for cough and shortness of breath.    Cardiovascular: Positive for chest pain.   Gastrointestinal: Negative for diarrhea, nausea and vomiting.   Genitourinary: Negative.    Musculoskeletal: Negative.    Neurological: Negative.    Psychiatric/Behavioral: The patient is nervous/anxious.        Past Medical History:   Diagnosis Date     Gastro-oesophageal reflux disease      History of anesthesia reaction     Severe N/V     PONV (postoperative nausea and vomiting)      Family History   Problem Relation Age of Onset     Anesthesia Reaction Mother      Anesthesia Reaction Paternal Grandmother      CEREBROVASCULAR DISEASE Paternal Grandmother      Coronary Artery Disease Paternal Grandmother      heart attack?     CEREBROVASCULAR DISEASE Father      Hypertension Father      ?     Coronary Artery Disease Maternal Grandfather      heart attack     Prostate Cancer Maternal Grandfather      Hypertension Maternal Grandmother      Lymphoma Maternal Grandmother      Bleeding Disorder No family hx of      Current Outpatient Prescriptions   Medication Sig Dispense Refill     Acetaminophen (TYLENOL PO) Reported on 5/12/2017       sertraline  "(ZOLOFT) 100 MG tablet Take 1 tablet (100 mg) by mouth daily 90 tablet 1     sertraline (ZOLOFT) 50 MG tablet Take 1 tablet (50 mg) by mouth daily 90 tablet 0     triamcinolone (KENALOG) 0.1 % cream Apply sparingly to affected area twice a day  as needed 80 g 0     hydrocortisone (WESTCORT) 0.2 % cream Apply twice daily to red areas on the face for 1 week, then weekends only. (Patient not taking: Reported on 3/16/2018) 45 g 3     IBUPROFEN PO Reported on 5/12/2017       Social History   Substance Use Topics     Smoking status: Never Smoker     Smokeless tobacco: Never Used     Alcohol use No       OBJECTIVE  /90 (BP Location: Left arm, Patient Position: Chair, Cuff Size: Adult Large)  Pulse 110  Resp 20  Wt 241 lb (109.3 kg)  SpO2 95%  BMI 31.3 kg/m2    Physical Exam   Constitutional: He appears well-developed and well-nourished.   HENT:   Head: Normocephalic and atraumatic.   Right Ear: Tympanic membrane and external ear normal.   Left Ear: Tympanic membrane and external ear normal.   Mouth/Throat: Oropharynx is clear and moist.   Eyes: EOM are normal. Pupils are equal, round, and reactive to light.   Neck: Normal range of motion. Neck supple.   Pulmonary/Chest: Effort normal and breath sounds normal. No respiratory distress.   Lymphadenopathy:     He has no cervical adenopathy.   Neurological: He is alert. No cranial nerve deficit.   Skin: Skin is warm and dry.   Psychiatric: He has a normal mood and affect.   Appears anxious   Nursing note and vitals reviewed.      Labs:  No results found for this or any previous visit (from the past 24 hour(s)).      ASSESSMENT:      ICD-10-CM    1. Acute chest pain R07.9 EKG 12-lead complete w/read - Clinics   2. Anxiety F41.9           PLAN:  I discussed EKG results with the patient, and that it is normal. He denies chest pain.   Teddy then stood up and stated \"we need world peace\".  And I asked why he was saying that, he continued to say we need world peace the " "second time. He then went ahead to tell me that he had an argument this morning with somebody about guns. He says that the other person proposed that we don't need guns in school and Teddy felt that we need teachers to be harmed to protect the children.   He then mentioned  For the third time \" we need world peace\".  I asked eTddy if he had access to guns and he replied \"No no. I asked him if he had feelings of hurting himself or others and he said no.  I then asked him if he had a question for me and he replied  no. 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     7.30 pm I have called Teddy 3 times and could not reach him.   Karen Clark  [facility]  Family Nurse Practitoner    "

## 2018-03-16 NOTE — MR AVS SNAPSHOT
After Visit Summary   3/16/2018    Teddy Shah    MRN: [id number]           Patient Information     Date Of Birth          1990        Visit Information        Provider Department      3/16/2018 11:50 AM Karen Clark NP [facility]        Today's Diagnoses     Acute chest pain    -  1    Anxiety           Follow-ups after your visit        Follow-up notes from your care team     Return if symptoms worsen or fail to improve.      Who to contact     If you have questions or need follow up information about today's clinic visit or your schedule please contact [facility] directly at [phone].  Normal or non-critical lab and imaging results will be communicated to you by [employer], letter or phone within 4 business days after the clinic has received the results. If you do not hear from us within 7 days, please contact the clinic through [employer] or phone. If you have a critical or abnormal lab result, we will notify you by phone as soon as possible.  Submit refill requests through iLost or call your pharmacy and they will forward the refill request to us. Please allow 3 business days for your refill to be completed.          Additional Information About Your Visit        MyChart Information     iLost gives you secure access to your electronic health record. If you see a primary care provider, you can also send messages to your care team and make appointments. If you have questions, please call your primary care clinic.  If you do not have a primary care provider, please call [phone] and they will assist you.        Care EveryWhere ID     This is your Care EveryWhere ID. This could be used by other organizations to access your [place] medical records  [id number]        Your Vitals Were     Pulse Respirations Pulse Oximetry BMI (Body Mass Index)          110 20 95% 31.3 kg/m2         Blood Pressure from Last 3 Encounters:   03/16/18 128/90   01/11/18  122/72   12/11/17 129/76    Weight from Last 3 Encounters:   03/16/18 241 lb (109.3 kg)   01/11/18 239 lb 6.4 oz (108.6 kg)   12/11/17 242 lb 3.2 oz (109.9 kg)              We Performed the Following     EKG 12-lead complete w/read - Clinics        Primary Care Provider Office Phone # Fax #    AB Sifuentes -[phone]       [facility] [address]  [facility] [address]        Equal Access to Services     [facility] HospitalBELINDA : Hadii aad ku hadasho Soomaali, waaxda luqadaha, qaybta kaalmada adeegyada, waxay idiin haymily adejanette lozano . So [facility] [phone].    ATENCIÓN: Si habla español, tiene a thomas disposición servicios gratuitos de asistencia lingüística. Llame al [phone].    We comply with applicable federal civil rights laws and [place] laws. We do not discriminate on the basis of race, color, national origin, age, disability, sex, sexual orientation, or gender identity.            Thank you!     Thank you for choosing [facility]  for your care. Our goal is always to provide you with excellent care. Hearing back from our patients is one way we can continue to improve our services. Please take a few minutes to complete the written survey that you may receive in the mail after your visit with us. Thank you!             Your Updated Medication List - Protect others around you: Learn how to safely use, store and throw away your medicines at www.disposemymeds.org.          This list is accurate as of 3/16/18  7:25 PM.  Always use your most recent med list.                   Brand Name Dispense Instructions for use Diagnosis    hydrocortisone 0.2 % cream    WESTCORT    45 g    Apply twice daily to red areas on the face for 1 week, then weekends only.    Dermatitis, seborrheic       IBUPROFEN PO      Reported on 5/12/2017        * sertraline 50 MG tablet    ZOLOFT    90 tablet    Take 1 tablet (50 mg) by mouth daily    Moderate episode of recurrent major  depressive disorder (H), Anxiety       * sertraline 100 MG tablet    ZOLOFT    90 tablet    Take 1 tablet (100 mg) by mouth daily    Moderate episode of recurrent major depressive disorder (H)       triamcinolone 0.1 % cream    KENALOG    80 g    Apply sparingly to affected area twice a day  as needed    Petechial rash       TYLENOL PO      Reported on 5/12/2017        * Notice:  This list has 2 medication(s) that are the same as other medications prescribed for you. Read the directions carefully, and ask your doctor or other care provider to review them with you.

## 2018-03-17 NOTE — TELEPHONE ENCOUNTER
I saw Teddy today, please read my note about the visit. Can you please call and check how he is doing? Thanks so much  Karen Clark  [facility]-BC  Family Nurse Practitoner

## 2018-03-19 NOTE — TELEPHONE ENCOUNTER
I spoke with Teddy on behalf of Provider's request, Karen Clark. He has no chest pain. He says he is felling much better now. He admits he was quite frazzled at the appointment. He says he has no thoughts of harming himself or others. He seemed up beat and cheerful and a little embarrassed. He reassured me every thing is fine.    Ashanti Haynes PA-C

## 2018-03-21 ENCOUNTER — TELEPHONE (OUTPATIENT)
Dept: FAMILY MEDICINE | Facility: CLINIC | Age: 28
End: 2018-03-21

## 2018-03-21 NOTE — TELEPHONE ENCOUNTER
Panel Management Review      BP Readings from Last 1 Encounters:   03/16/18 128/90    , No results found for: A1C, 1/11/2018  Last Office Visit with this department: 1/11/2018    Fail List measure:     Depression / Dysthymia review    Measure:  Needs PHQ-9 score of 4 or less during index window.  Administer PHQ-9 and if score is 5 or more, send encounter to provider for next steps.    5 - 7 month window range: 3/13/18-5/13/18    PHQ-9 SCORE 10/13/2017 12/13/2017 1/3/2018   Total Score MyChart - - 12 (Moderate depression)   Total Score 12 12 12       If PHQ-9 recheck is 5 or more, route to provider for next steps.    Patient is due for:  PHQ9      Patient is due/failing the following:   PHQ9    Action needed:   Patient needs to do PHQ9.    Type of outreach:    Phone, left message for patient to call back.     Questions for provider review:    None                                                                                                                                    Liz Pickett MA      Chart routed to Care Team .

## 2018-03-26 ENCOUNTER — OFFICE VISIT (OUTPATIENT)
Dept: URGENT CARE | Facility: URGENT CARE | Age: 28
End: 2018-03-26
Payer: COMMERCIAL

## 2018-03-26 VITALS
BODY MASS INDEX: 32 KG/M2 | WEIGHT: 246.4 LBS | HEART RATE: 95 BPM | DIASTOLIC BLOOD PRESSURE: 83 MMHG | TEMPERATURE: 98.4 F | SYSTOLIC BLOOD PRESSURE: 145 MMHG | OXYGEN SATURATION: 95 %

## 2018-03-26 DIAGNOSIS — K59.00 CONSTIPATION, UNSPECIFIED CONSTIPATION TYPE: Primary | ICD-10-CM

## 2018-03-26 PROCEDURE — 99214 OFFICE O/P EST MOD 30 MIN: CPT | Performed by: PHYSICIAN ASSISTANT

## 2018-03-26 RX ORDER — POLYETHYLENE GLYCOL 3350 17 G/17G
1 POWDER, FOR SOLUTION ORAL DAILY
Qty: 510 G | Refills: 1 | Status: SHIPPED | OUTPATIENT
Start: 2018-03-26 | End: 2019-02-11

## 2018-03-26 NOTE — PROGRESS NOTES
SUBJECTIVE:   Teddy Shah is a 27 year old male who presents to clinic today for the following health issues:      Abdominal pain       Duration: 1 day     Description (location/character/radiation): abdominal pain     Intensity:  moderate    Accompanying signs and symptoms: abdominal pain     History (similar episodes/previous evaluation): None    Precipitating or alleviating factors: None    Therapies tried and outcome: None     No BM since Saturday. No fever, nausea, vomiting. No URI symptoms.   No dysuria.    No diarrhea.    Pain 3.5/10    Allergies   Allergen Reactions     Latex Itching     Erythromycin Rash       Past Medical History:   Diagnosis Date     Gastro-oesophageal reflux disease      History of anesthesia reaction     Severe N/V     PONV (postoperative nausea and vomiting)          Current Outpatient Prescriptions on File Prior to Visit:  sertraline (ZOLOFT) 100 MG tablet Take 1 tablet (100 mg) by mouth daily   sertraline (ZOLOFT) 50 MG tablet Take 1 tablet (50 mg) by mouth daily   triamcinolone (KENALOG) 0.1 % cream Apply sparingly to affected area twice a day  as needed   hydrocortisone (WESTCORT) 0.2 % cream Apply twice daily to red areas on the face for 1 week, then weekends only.   IBUPROFEN PO Reported on 5/12/2017   Acetaminophen (TYLENOL PO) Reported on 5/12/2017     No current facility-administered medications on file prior to visit.     Social History   Substance Use Topics     Smoking status: Never Smoker     Smokeless tobacco: Never Used     Alcohol use No       ROS:  General: negative for fever  Resp: negative for chest pain   CV: negative for chest pain  ABD: as above  : negative for dysuria  Neurologic:negative for Headache    OBJECTIVE:  /83 (BP Location: Left arm, Patient Position: Chair, Cuff Size: Adult Regular)  Pulse 95  Temp 98.4  F (36.9  C) (Oral)  Wt 246 lb 6.4 oz (111.8 kg)  SpO2 95%  BMI 32 kg/m2   General:   awake, alert, and cooperative.  NAD.   Head:  Normocephalic, atraumatic.  Eyes: Conjunctiva clear, non icteric.   Heart: Regular rate and rhythm. No murmur.  Lungs: Chest is clear; no wheezes or rales.  ABD: soft, very mild tenderness to palpation left sided abdomen, mostly LLQ. No HSM. No rigidity, guarding or rebound. Bowel sounds intact  Neuro: Alert and oriented - normal speech.     ASSESSMENT:    ICD-10-CM    1. Constipation, unspecified constipation type K59.00 polyethylene glycol (MIRALAX) powder       PLAN: I cannot rule out diverticulitis although unlikely as no fever or diarrhea. He would have to go to the ER for this. That being said pain and tenderness are minimal, could be constipation. Declines abdominal xray. Try 1 cup  Warm prune juice a day and Miralax daily. To ER if pain increases, fever, distension.     He was worried Miralax would interact with his ZOloft, I asked the Pharmacist here and they said it would not be a problem. Advised about symptoms which might herald more serious problems.        Ashanti Haynes PA-C

## 2018-03-26 NOTE — NURSING NOTE
"Chief Complaint   Patient presents with     Abdominal Pain     Patient complains of abdominal pain        Initial /83 (BP Location: Left arm, Patient Position: Chair, Cuff Size: Adult Regular)  Pulse 95  Temp 98.4  F (36.9  C) (Oral)  Wt 246 lb 6.4 oz (111.8 kg)  SpO2 95%  BMI 32 kg/m2 Estimated body mass index is 32 kg/(m^2) as calculated from the following:    Height as of 1/11/18: 6' 1.58\" (1.869 m).    Weight as of this encounter: 246 lb 6.4 oz (111.8 kg).  Medication Reconciliation: complete       Aurora Pickett    "

## 2018-03-26 NOTE — MR AVS SNAPSHOT
After Visit Summary   3/26/2018    Teddy Shah    MRN: [id number]           Patient Information     Date Of Birth          1990        Visit Information        Provider Department      3/26/2018 4:30 PM [facility]        Today's Diagnoses     Constipation, unspecified constipation type    -  1       Follow-ups after your visit        Who to contact     If you have questions or need follow up information about today's clinic visit or your schedule please contact [facility] directly at [phone].  Normal or non-critical lab and imaging results will be communicated to you by eSecure Systemshart, letter or phone within 4 business days after the clinic has received the results. If you do not hear from us within 7 days, please contact the clinic through Sequence Designt or phone. If you have a critical or abnormal lab result, we will notify you by phone as soon as possible.  Submit refill requests through siOPTICA or call your pharmacy and they will forward the refill request to us. Please allow 3 business days for your refill to be completed.          Additional Information About Your Visit        MyChart Information     siOPTICA gives you secure access to your electronic health record. If you see a primary care provider, you can also send messages to your care team and make appointments. If you have questions, please call your primary care clinic.  If you do not have a primary care provider, please call [phone] and they will assist you.        Care EveryWhere ID     This is your Care EveryWhere ID. This could be used by other organizations to access your [place] medical records  [id number]        Your Vitals Were     Pulse Temperature Pulse Oximetry BMI (Body Mass Index)          95 98.4  F (36.9  C) (Oral) 95% 32 kg/m2         Blood Pressure from Last 3 Encounters:   03/26/18 145/83   03/16/18 128/90   01/11/18 122/72    Weight from Last 3  Encounters:   03/26/18 246 lb 6.4 oz (111.8 kg)   03/16/18 241 lb (109.3 kg)   01/11/18 239 lb 6.4 oz (108.6 kg)              Today, you had the following     No orders found for display         Today's Medication Changes          These changes are accurate as of 3/26/18  5:01 PM.  If you have any questions, ask your nurse or doctor.               Start taking these medicines.        Dose/Directions    polyethylene glycol powder   Commonly known as:  MIRALAX   Used for:  Constipation, unspecified constipation type   Started by:  Ashanti Haynes PA-C        Dose:  1 capful   Take 17 g (1 capful) by mouth daily   Quantity:  510 g   Refills:  1            Where to get your medicines      These medications were sent to [id number] - [facility], [place] - [address] AT [facility] [address], [facility] [address]     Phone:  [phone]     polyethylene glycol powder                Primary Care Provider Office Phone # Fax #    AB Sifuentes -[phone]       [facility] [address]  [facility] [address]        Equal Access to Services     CHER MCLEOD AH: Hadii aad ku hadasho Soomaali, waaxda luqadaha, qaybta kaalmada adeegyada, waxay idiin haygarrettn adejanette luz. So [facility] [phone].    ATENCIÓN: Si habla español, tiene a thomas disposición servicios gratuitos de asistencia lingüística. Llame al [phone].    We comply with applicable federal civil rights laws and [place] laws. We do not discriminate on the basis of race, color, national origin, age, disability, sex, sexual orientation, or gender identity.            Thank you!     Thank you for choosing [facility]  for your care. Our goal is always to provide you with excellent care. Hearing back from our patients is one way we can continue to improve our services. Please take a few minutes to complete the written survey that you may receive in the mail  after your visit with us. Thank you!             Your Updated Medication List - Protect others around you: Learn how to safely use, store and throw away your medicines at www.disposemymeds.org.          This list is accurate as of 3/26/18  5:01 PM.  Always use your most recent med list.                   Brand Name Dispense Instructions for use Diagnosis    hydrocortisone 0.2 % cream    WESTCORT    45 g    Apply twice daily to red areas on the face for 1 week, then weekends only.    Dermatitis, seborrheic       IBUPROFEN PO      Reported on 5/12/2017        polyethylene glycol powder    MIRALAX    510 g    Take 17 g (1 capful) by mouth daily    Constipation, unspecified constipation type       * sertraline 50 MG tablet    ZOLOFT    90 tablet    Take 1 tablet (50 mg) by mouth daily    Moderate episode of recurrent major depressive disorder (H), Anxiety       * sertraline 100 MG tablet    ZOLOFT    90 tablet    Take 1 tablet (100 mg) by mouth daily    Moderate episode of recurrent major depressive disorder (H)       triamcinolone 0.1 % cream    KENALOG    80 g    Apply sparingly to affected area twice a day  as needed    Petechial rash       TYLENOL PO      Reported on 5/12/2017        * Notice:  This list has 2 medication(s) that are the same as other medications prescribed for you. Read the directions carefully, and ask your doctor or other care provider to review them with you.

## 2018-03-29 NOTE — TELEPHONE ENCOUNTER
This writer attempted to contact patient on 03/29/18      Reason for call PHQ-9 and left message.      If patient calls back:  Patient contacted by 2nd floor [place] Care Team (MA/TC). Inform patient that someone from the team will contact them, document that pt called and route to care team.         Alfreda Padron MA

## 2018-03-30 NOTE — TELEPHONE ENCOUNTER
Reason for Call:  Other     Detailed comments: Returned call will do panel management on line. No need to call him back.    Call taken on 3/30/2018 at 12:28 PM by Regina Marquis

## 2018-04-12 ENCOUNTER — TELEPHONE (OUTPATIENT)
Dept: FAMILY MEDICINE | Facility: CLINIC | Age: 28
End: 2018-04-12

## 2018-05-30 ENCOUNTER — E-VISIT (OUTPATIENT)
Dept: FAMILY MEDICINE | Facility: CLINIC | Age: 28
End: 2018-05-30
Payer: COMMERCIAL

## 2018-05-30 DIAGNOSIS — H93.13 TINNITUS, BILATERAL: Primary | ICD-10-CM

## 2018-05-30 DIAGNOSIS — R42 VERTIGO: ICD-10-CM

## 2018-05-30 PROCEDURE — 98969 ZZC NONPHYSICIAN ONLINE ASSESSMENT AND MANAGEMENT: CPT | Performed by: NURSE PRACTITIONER

## 2018-06-06 ENCOUNTER — TELEPHONE (OUTPATIENT)
Dept: FAMILY MEDICINE | Facility: CLINIC | Age: 28
End: 2018-06-06

## 2018-06-06 NOTE — TELEPHONE ENCOUNTER
Pls have patient schedule phone or e visit to discuss his depression control.  His symptoms are still not controlled.  Ashanti BERGER, CNP

## 2018-06-06 NOTE — TELEPHONE ENCOUNTER
Panel Management Review      BP Readings from Last 1 Encounters:   03/26/18 145/83    , No results found for: A1C, 5/30/2018  Last Office Visit with this department: 5/30/2018    Fail List measure:     Depression / Dysthymia review    Measure:  Needs PHQ-9 score of 4 or less during index window.  Administer PHQ-9 and if score is 5 or more, send encounter to provider for next steps.    5   7 month window range: 2/13/18-6/13/18    PHQ-9 SCORE 12/13/2017 1/3/2018 6/6/2018   Total Score MyChart - 12 (Moderate depression) -   Total Score 12 12 8       If PHQ-9 recheck is 5 or more, route to provider for next steps.    Patient is due for:  PHQ9      Patient is due/failing the following:   PHQ9    Action needed:   Patient needs to do PHQ9.    Type of outreach:    Phone, spoke to patient.  score was an eight    Questions for provider review:    None                                                                                                                                    Liz Pickett MA      Chart routed to Provider .

## 2018-06-06 NOTE — TELEPHONE ENCOUNTER
This writer attempted to contact patient on 06/06/18      Reason for call phq9 OV and left message.      If patient calls back:   Schedule Office Visit appointment within 2 weeks with PCP, document that pt called and close encounter         Liz Pickett MA

## 2018-06-07 ASSESSMENT — PATIENT HEALTH QUESTIONNAIRE - PHQ9: SUM OF ALL RESPONSES TO PHQ QUESTIONS 1-9: 8

## 2018-06-22 ENCOUNTER — OFFICE VISIT (OUTPATIENT)
Dept: FAMILY MEDICINE | Facility: CLINIC | Age: 28
End: 2018-06-22
Payer: COMMERCIAL

## 2018-06-22 VITALS
SYSTOLIC BLOOD PRESSURE: 109 MMHG | WEIGHT: 247.4 LBS | OXYGEN SATURATION: 97 % | HEART RATE: 81 BPM | DIASTOLIC BLOOD PRESSURE: 72 MMHG | TEMPERATURE: 98.3 F | BODY MASS INDEX: 31.75 KG/M2 | HEIGHT: 74 IN | RESPIRATION RATE: 20 BRPM

## 2018-06-22 DIAGNOSIS — Z11.4 SCREENING FOR HIV (HUMAN IMMUNODEFICIENCY VIRUS): ICD-10-CM

## 2018-06-22 DIAGNOSIS — H91.93 HEARING PROBLEM OF BOTH EARS: ICD-10-CM

## 2018-06-22 DIAGNOSIS — H93.13 TINNITUS, BILATERAL: ICD-10-CM

## 2018-06-22 DIAGNOSIS — Z23 NEED FOR PROPHYLACTIC VACCINATION WITH TETANUS-DIPHTHERIA (TD): ICD-10-CM

## 2018-06-22 DIAGNOSIS — F33.1 MODERATE EPISODE OF RECURRENT MAJOR DEPRESSIVE DISORDER (H): Primary | ICD-10-CM

## 2018-06-22 PROCEDURE — 99213 OFFICE O/P EST LOW 20 MIN: CPT | Performed by: NURSE PRACTITIONER

## 2018-06-22 RX ORDER — SERTRALINE HYDROCHLORIDE 100 MG/1
100 TABLET, FILM COATED ORAL DAILY
Qty: 90 TABLET | Refills: 1 | Status: SHIPPED | OUTPATIENT
Start: 2018-06-22 | End: 2019-01-22

## 2018-06-22 ASSESSMENT — ANXIETY QUESTIONNAIRES
2. NOT BEING ABLE TO STOP OR CONTROL WORRYING: NEARLY EVERY DAY
GAD7 TOTAL SCORE: 17
5. BEING SO RESTLESS THAT IT IS HARD TO SIT STILL: SEVERAL DAYS
7. FEELING AFRAID AS IF SOMETHING AWFUL MIGHT HAPPEN: NEARLY EVERY DAY
3. WORRYING TOO MUCH ABOUT DIFFERENT THINGS: NEARLY EVERY DAY
IF YOU CHECKED OFF ANY PROBLEMS ON THIS QUESTIONNAIRE, HOW DIFFICULT HAVE THESE PROBLEMS MADE IT FOR YOU TO DO YOUR WORK, TAKE CARE OF THINGS AT HOME, OR GET ALONG WITH OTHER PEOPLE: SOMEWHAT DIFFICULT
6. BECOMING EASILY ANNOYED OR IRRITABLE: SEVERAL DAYS
1. FEELING NERVOUS, ANXIOUS, OR ON EDGE: NEARLY EVERY DAY

## 2018-06-22 ASSESSMENT — PATIENT HEALTH QUESTIONNAIRE - PHQ9: 5. POOR APPETITE OR OVEREATING: NEARLY EVERY DAY

## 2018-06-22 ASSESSMENT — PAIN SCALES - GENERAL: PAINLEVEL: NO PAIN (0)

## 2018-06-22 NOTE — MR AVS SNAPSHOT
After Visit Summary   6/22/2018    Teddy Shah    MRN: [id number]           Patient Information     Date Of Birth          1990        Visit Information        Provider Department      6/22/2018 1:20 PM Ashanti Dee APRN [facility]        Today's Diagnoses     Moderate episode of recurrent major depressive disorder (H)    -  1    Hearing problem of both ears        Screening for HIV (human immunodeficiency virus)        Need for prophylactic vaccination with tetanus-diphtheria (TD)          Care Instructions      Your Hearing Evaluation    Your hearing must be tested to find the nature and extent of your hearing loss. Hearing tests show if hearing aids are needed. They also show what sounds you can and can t hear, so hearing aids can be customized for your personal needs. You will likely also be examined to find out if a medical problem has caused your hearing loss.  Testing your hearing  To evaluate your hearing loss, the following tests may be done:    A hearing test shows which tones, sounds, and words you can and can t hear. You wear earphones that are attached to an audiometer (computer) in another room. You will be asked to respond when you hear tones and sounds that come through the earphones.    Word recognition tests show if you can tell the difference between certain words. This helps identify which tones or sounds you are having trouble hearing.    Other tests may be done to learn more about your hearing loss, such as measuring how well your eardrums are working.  Your medical exam  An exam must be done to find out if your hearing loss is caused by a medical problem. During the exam, your ears, nose, and throat are examined. Also, you ll be asked about your health, your hearing, and any family history of hearing loss. Your answers will help the healthcare provider understand the problem.        Your healthcare team  The following healthcare providers may  be involved in the evaluation and treatment of your hearing loss:    An audiologist evaluates your hearing. He or she then uses the results of this evaluation to help select the best hearing aids for you.    An otolaryngologist (ear, nose, and throat healthcare provider) examines you to find out if there is a medical reason for your hearing loss. If there is, he or she may recommend treatment in addition to, or instead of, hearing aids.    A hearing aid specialist can also help you select the hearing aids best suited to your hearing loss.    Your family healthcare provider may also do a medical exam.   Date Last Reviewed: 7/1/2016 2000-2017 [employer]. [address] [place] [address]. All rights reserved. This information is not intended as a substitute for professional medical care. Always follow your healthcare professional's instructions.        Depression: Tips to Help Yourself    As your healthcare providers help treat your depression, you can also help yourself. Keep in mind that your illness affects you emotionally, physically, mentally, and socially. So full recovery will take time. Take care of your body and your soul, and be patient with yourself as you get better.  Self-care    Educate yourself. Read about treatment and medicine options. If you have the energy, attend local conferences or support groups. Keep a list of useful websites and helpful books and use them as needed. This illness is not your fault. Don t blame yourself for your depression.    Manage early symptoms. If you notice symptoms returning, experience triggers, or identify other factors that may lead to a depressive episode, get help as soon as possible. Ask trusted friends and family to monitor your behavior and let you know if they see anything of concern.    Work with your provider. Find a provider you can trust. Communicate honestly with that person and share information on your treatment for depression and  your reaction to medicines.    Be prepared for a crisis. Know what to do if you experience a crisis. Keep the phone number of a crisis hotline and know the location of your community's urgent care centers and the closest emergency department.    Hold off on big decisions. Depression can cloud your judgment. So wait until you feel better before making major life decisions, such as changing jobs, moving, or getting  or .    Be patient. Recovering from depression is a process. Don t be discouraged if it takes some time to feel better.    Keep it simple. Depression saps your energy and concentration. So you won t be able to do all the things you used to do. Set small goals and do what you can.    Be with others. Don t isolate yourself--you ll only feel worse. Try to be with other people. And take part in fun activities when you can. Go to a movie, ballgame, Jainism service, or social event. Talk openly with people you can trust. And accept help when it s offered.  Take care of your body  People with depression often lose the desire to take care of themselves. That only makes their problems worse. During treatment and afterward, make a point to:    Exercise. It s a great way to take care of your body. And studies have shown that exercise helps fight depression.    Avoid drugs and alcohol. These may ease the pain in the short term. But they ll only make your problems worse in the long run.    Get relief from stress. Ask your healthcare provider for relaxation exercises and techniques to help relieve stress.    Eat right. A balanced and healthy diet helps keep your body healthy.  Date Last Reviewed: 1/1/2017 2000-2017 [employer]. [address] [place] [address], [place] [address]. All rights reserved. This information is not intended as a substitute for professional medical care. Always follow your healthcare professional's instructions.                Follow-ups after your visit        Additional  Services     AUDIOLOGY ADULT REFERRAL       Your provider [place] you [place] [facility] [place] [facility] [phone]   [email or web address]    Specialty Testing:  Audiogram w/Tymps and Reflexes (Comprehensive Audiology Evaluation)            OTOLARYNGOLOGY REFERRAL       Your provider has referred you to: [facility]: [facility] [phone]   [email or web address]    Please be aware that coverage of these services is subject to the terms and limitations of your health insurance plan.  Call member services at your health plan with any benefit or coverage questions.      Please bring the following with you to your appointment:    (1) Any X-Rays, CTs or MRIs which have been performed.  Contact the facility where they were done to arrange for  prior to your scheduled appointment.   (2) List of current medications  (3) This referral request   (4) Any documents/labs given to you for this referral                  Future tests that were ordered for you today     Open Future Orders        Priority Expected Expires Ordered    HIV Screening Routine  6/22/2019 6/22/2018            Who to contact     If you have questions or need follow up information about today's clinic visit or your schedule please contact [facility] directly at [phone].  Normal or non-critical lab and imaging results will be communicated to you by MyChart, letter or phone within 4 business days after the clinic has received the results. If you do not hear from us within 7 days, please contact the clinic through MyChart or phone. If you have a critical or abnormal lab result, we will notify you by phone as soon as possible.  Submit refill requests through [employer] or call your pharmacy and they will forward the refill request to us. Please allow 3 business days for your refill to be completed.          Additional  "Information About Your Visit        MyChart Information     [employer] gives you secure access to your electronic health record. If you see a primary care provider, you can also send messages to your care team and make appointments. If you have questions, please call your primary care clinic.  If you do not have a primary care provider, please call [phone] and they will assist you.        Care EveryWhere ID     This is your Care EveryWhere ID. This could be used by other organizations to access your [place] medical records  [id number]        Your Vitals Were     Pulse Temperature Respirations Height Pulse Oximetry BMI (Body Mass Index)    81 98.3  F (36.8  C) (Oral) 20 6' 1.58\" (1.869 m) 97% 32.13 kg/m2       Blood Pressure from Last 3 Encounters:   06/22/18 109/72   03/26/18 145/83   03/16/18 128/90    Weight from Last 3 Encounters:   06/22/18 247 lb 6.4 oz (112.2 kg)   03/26/18 246 lb 6.4 oz (111.8 kg)   03/16/18 241 lb (109.3 kg)              We Performed the Following     AUDIOLOGY ADULT REFERRAL     OTOLARYNGOLOGY REFERRAL          Where to get your medicines      These medications were sent to [facility] - [place], [place] - [address] AT [facility] [address], [facility] [address]     Phone:  [phone]     sertraline 100 MG tablet          Primary Care Provider Office Phone # Fax #    AB Sifuentes -[phone]       [address] [place] [address]        Equal Access to Services     ABBEY MCLEOD AH: Hadii aad ku hadasho Soomaali, waaxda luqadaha, qaybta kaalmada adeegyada, carl luz. So [facility] [phone].    ATENCIÓN: Si habla español, tiene a thomas disposición servicios gratuitos de asistencia lingüística. Llame al [phone].    We comply with applicable federal civil rights laws and [place] laws. We do not discriminate on the basis of race, color, national origin, age, " disability, sex, sexual orientation, or gender identity.            Thank you!     Thank you for choosing [facility]  for your care. Our goal is always to provide you with excellent care. Hearing back from our patients is one way we can continue to improve our services. Please take a few minutes to complete the written survey that you may receive in the mail after your visit with us. Thank you!             Your Updated Medication List - Protect others around you: Learn how to safely use, store and throw away your medicines at www.disposemymeds.org.          This list is accurate as of 6/22/18  2:14 PM.  Always use your most recent med list.                   Brand Name Dispense Instructions for use Diagnosis    hydrocortisone 0.2 % cream    WESTCORT    45 g    Apply twice daily to red areas on the face for 1 week, then weekends only.    Dermatitis, seborrheic       IBUPROFEN PO      Reported on 5/12/2017        polyethylene glycol powder    MIRALAX    510 g    Take 17 g (1 capful) by mouth daily    Constipation, unspecified constipation type       * sertraline 50 MG tablet    ZOLOFT    90 tablet    Take 1 tablet (50 mg) by mouth daily    Moderate episode of recurrent major depressive disorder (H), Anxiety       * sertraline 100 MG tablet    ZOLOFT    90 tablet    Take 1 tablet (100 mg) by mouth daily    Moderate episode of recurrent major depressive disorder (H)       triamcinolone 0.1 % cream    KENALOG    80 g    Apply sparingly to affected area twice a day  as needed    Petechial rash       TYLENOL PO      Reported on 5/12/2017        * Notice:  This list has 2 medication(s) that are the same as other medications prescribed for you. Read the directions carefully, and ask your doctor or other care provider to review them with you.

## 2018-06-22 NOTE — PATIENT INSTRUCTIONS
Your Hearing Evaluation    Your hearing must be tested to find the nature and extent of your hearing loss. Hearing tests show if hearing aids are needed. They also show what sounds you can and can t hear, so hearing aids can be customized for your personal needs. You will likely also be examined to find out if a medical problem has caused your hearing loss.  Testing your hearing  To evaluate your hearing loss, the following tests may be done:    A hearing test shows which tones, sounds, and words you can and can t hear. You wear earphones that are attached to an audiometer (computer) in another room. You will be asked to respond when you hear tones and sounds that come through the earphones.    Word recognition tests show if you can tell the difference between certain words. This helps identify which tones or sounds you are having trouble hearing.    Other tests may be done to learn more about your hearing loss, such as measuring how well your eardrums are working.  Your medical exam  An exam must be done to find out if your hearing loss is caused by a medical problem. During the exam, your ears, nose, and throat are examined. Also, you ll be asked about your health, your hearing, and any family history of hearing loss. Your answers will help the healthcare provider understand the problem.        Your healthcare team  The following healthcare providers may be involved in the evaluation and treatment of your hearing loss:    An audiologist evaluates your hearing. He or she then uses the results of this evaluation to help select the best hearing aids for you.    An otolaryngologist (ear, nose, and throat healthcare provider) examines you to find out if there is a medical reason for your hearing loss. If there is, he or she may recommend treatment in addition to, or instead of, hearing aids.    A hearing aid specialist can also help you select the hearing aids best suited to your hearing loss.    Your family  healthcare provider may also do a medical exam.   Date Last Reviewed: 7/1/2016 2000-2017 The [employer] [address] [facility], [place] [address]. All rights reserved. This information is not intended as a substitute for professional medical care. Always follow your healthcare professional's instructions.        Depression: Tips to Help Yourself    As your healthcare providers help treat your depression, you can also help yourself. Keep in mind that your illness affects you emotionally, physically, mentally, and socially. So full recovery will take time. Take care of your body and your soul, and be patient with yourself as you get better.  Self-care    Educate yourself. Read about treatment and medicine options. If you have the energy, attend local conferences or support groups. Keep a list of useful websites and helpful books and use them as needed. This illness is not your fault. Don t blame yourself for your depression.    Manage early symptoms. If you notice symptoms returning, experience triggers, or identify other factors that may lead to a depressive episode, get help as soon as possible. Ask trusted friends and family to monitor your behavior and let you know if they see anything of concern.    Work with your provider. Find a provider you can trust. Communicate honestly with that person and share information on your treatment for depression and your reaction to medicines.    Be prepared for a crisis. Know what to do if you experience a crisis. Keep the phone number of a crisis hotline and know the location of your community's urgent care centers and the closest emergency department.    Hold off on big decisions. Depression can cloud your judgment. So wait until you feel better before making major life decisions, such as changing jobs, moving, or getting  or .    Be patient. Recovering from depression is a process. Don t be discouraged if it takes some time to feel better.     Keep it simple. Depression saps your energy and concentration. So you won t be able to do all the things you used to do. Set small goals and do what you can.    Be with others. Don t isolate yourself you ll only feel worse. Try to be with other people. And take part in fun activities when you can. Go to a movie, ballgame, Adventism service, or social event. Talk openly with people you can trust. And accept help when it s offered.  Take care of your body  People with depression often lose the desire to take care of themselves. That only makes their problems worse. During treatment and afterward, make a point to:    Exercise. It s a great way to take care of your body. And studies have shown that exercise helps fight depression.    Avoid drugs and alcohol. These may ease the pain in the short term. But they ll only make your problems worse in the long run.    Get relief from stress. Ask your healthcare provider for relaxation exercises and techniques to help relieve stress.    Eat right. A balanced and healthy diet helps keep your body healthy.  Date Last Reviewed: 1/1/2017 2000-2017 [employer]. [address] [place] [address]. All rights reserved. This information is not intended as a substitute for professional medical care. Always follow your healthcare professional's instructions.

## 2018-06-22 NOTE — PROGRESS NOTES
SUBJECTIVE:   Teddy Shah is a 27 year old male who presents to clinic today for the following health issues:      Depression and Anxiety Follow-Up    Status since last visit: Worsened  Work and political stress     Other associated symptoms:Fatigue ( thinking's it's work schedule)    Complicating factors:     Significant life event: No     Current substance abuse: None    PHQ-9 12/13/2017 1/3/2018 6/6/2018   Total Score 12 12 8   Q9: Suicide Ideation Not at all Not at all Not at all     KASSIE-7 SCORE 10/13/2017 12/13/2017 1/3/2018   Total Score - - 13 (moderate anxiety)   Total Score 19 13 13     In the past two weeks have you had thoughts of suicide or self-harm?  No.    Do you have concerns about your personal safety or the safety of others?   No  PHQ-9  English  PHQ-9   Any Language  KASSIE-7  Suicide Assessment Five-step Evaluation and Treatment (SAFE-T)    Amount of exercise or physical activity: None    Problems taking medications regularly: No    Medication side effects: none    Diet: carbohydrate counting, Regular no restrictions      HEARING FREQUENCY    Right Ear:      1000 Hz: RESPONSE- on Level:   20 db    2000 Hz: RESPONSE- on Level:   20 db    4000 Hz: RESPONSE- on Level:   20 db     Left Ear:      4000 Hz: RESPONSE- on Level:   20 db    2000 Hz: RESPONSE- on Level:   20 db    1000 Hz: RESPONSE- on Level:   20 db     500 Hz: RESPONSE- on Level: 25 db    Right Ear:    500 Hz: RESPONSE- on Level: 25 db    Hearing Acuity: REFER    Hearing Assessment: abnormal--refer to audiology      Additional: Referral to Audiology- noticed hearing is going worse. He spends at least 1 hour daily in a cooler where it is quite loud. HX of mother having hearing issues/Meniere's,  Dad has tinnitus.  Patient is concerned that he may have Meniere's although he is asymptomatic presently.  States he had a bad episode of tinnitus (which continues today) and dizziness last year.      Problem list and histories reviewed &  adjusted, as indicated.  Additional history: as documented    Patient Active Problem List   Diagnosis     Nasal obstruction     Deviated nasal septum     CARDIOVASCULAR SCREENING; LDL GOAL LESS THAN 160     Obesity, Class I, BMI 30-34.9     Pervasive developmental disorder     Moderate episode of recurrent major depressive disorder (H)     Anxiety     Past Surgical History:   Procedure Laterality Date     ADENOIDECTOMY       ENDOSCOPY      EGD     EXTRACTION(S) DENTAL      wisdom     PE TUBES      multiple     SEPTOPLASTY, TURBINOPLASTY, COMBINED Bilateral 6/19/2015    Procedure: COMBINED SEPTOPLASTY, TURBINOPLASTY;  Surgeon: Ally Finley MD;  Location:  OR       Social History   Substance Use Topics     Smoking status: Never Smoker     Smokeless tobacco: Never Used     Alcohol use No     Family History   Problem Relation Age of Onset     Anesthesia Reaction Mother      Anesthesia Reaction Paternal Grandmother      Cerebrovascular Disease Paternal Grandmother      Coronary Artery Disease Paternal Grandmother      heart attack?     Cerebrovascular Disease Father      Hypertension Father      ?     Coronary Artery Disease Maternal Grandfather      heart attack     Prostate Cancer Maternal Grandfather      Hypertension Maternal Grandmother      Lymphoma Maternal Grandmother      Bleeding Disorder No family hx of          BP Readings from Last 3 Encounters:   06/22/18 109/72   03/26/18 145/83   03/16/18 128/90    Wt Readings from Last 3 Encounters:   06/22/18 247 lb 6.4 oz (112.2 kg)   03/26/18 246 lb 6.4 oz (111.8 kg)   03/16/18 241 lb (109.3 kg)                  Labs reviewed in EPIC    Reviewed and updated as needed this visit by clinical staff  Allergies       Reviewed and updated as needed this visit by Provider         ROS:  Constitutional, HEENT, cardiovascular, pulmonary, gi and gu systems are negative, except as otherwise noted.    OBJECTIVE:     /72 (BP Location: Left arm, Patient Position: Chair,  "Cuff Size: Adult Large)  Pulse 81  Temp 98.3  F (36.8  C) (Oral)  Resp 20  Ht 6' 1.58\" (1.869 m)  Wt 247 lb 6.4 oz (112.2 kg)  SpO2 97%  BMI 32.13 kg/m2  Body mass index is 32.13 kg/(m^2).  GENERAL: healthy, alert and no distress  EYES: Eyes grossly normal to inspection, PERRL and conjunctivae and sclerae normal, no nystagmus  HENT: ear canals and TM's normal, nose and mouth without ulcers or lesions  NECK: no adenopathy, no asymmetry, masses, or scars and thyroid normal to palpation  RESP: lungs clear to auscultation - no rales, rhonchi or wheezes  CV: regular rate and rhythm, normal S1 S2, no S3 or S4, no murmur, click or rub, no peripheral edema and peripheral pulses strong  ABDOMEN: soft, nontender, no hepatosplenomegaly, no masses and bowel sounds normal  MS: no gross musculoskeletal defects noted, no edema  SKIN: no suspicious lesions or rashes  NEURO: Normal strength and tone, mentation intact and speech normal  PSYCH: mentation appears normal, affect normal/bright  LYMPH: no cervical adenopathy    Diagnostic Test Results:  none     ASSESSMENT/PLAN:         BMI:   Estimated body mass index is 32.13 kg/(m^2) as calculated from the following:    Height as of this encounter: 6' 1.58\" (1.869 m).    Weight as of this encounter: 247 lb 6.4 oz (112.2 kg).   Weight management plan: Discussed healthy diet and exercise guidelines and patient will follow up in 6 months in clinic to re-evaluate.      1. Moderate episode of recurrent major depressive disorder (H)  Continue with 100 mg Zoloft daily.  His PHQ and KASSIE scores have actually worsened but he attributes this to work stress and does not want to change his dose.  He is referred for counseling and will consider this.  - sertraline (ZOLOFT) 100 MG tablet; Take 1 tablet (100 mg) by mouth daily  Dispense: 90 tablet; Refill: 1    2. Hearing problem of both ears  Patient has concern for Meniere's.  Referring for audiogram and ENT evaluation at patient request.  - " AUDIOLOGY ADULT REFERRAL  - OTOLARYNGOLOGY REFERRAL    3. Screening for HIV (human immunodeficiency virus)    - HIV Screening; Future    4. Need for prophylactic vaccination with tetanus-diphtheria (TD)  Patient declined TD today but will get it at next visit.    5.  Tinnitus  Referring to Audiology and ENT.    See Patient Instructions    AB [facility]

## 2018-06-23 ASSESSMENT — ANXIETY QUESTIONNAIRES: GAD7 TOTAL SCORE: 17

## 2018-06-23 ASSESSMENT — PATIENT HEALTH QUESTIONNAIRE - PHQ9: SUM OF ALL RESPONSES TO PHQ QUESTIONS 1-9: 12

## 2018-07-17 ENCOUNTER — OFFICE VISIT (OUTPATIENT)
Dept: OTOLARYNGOLOGY | Facility: CLINIC | Age: 28
End: 2018-07-17
Payer: COMMERCIAL

## 2018-07-17 ENCOUNTER — OFFICE VISIT (OUTPATIENT)
Dept: AUDIOLOGY | Facility: CLINIC | Age: 28
End: 2018-07-17
Payer: COMMERCIAL

## 2018-07-17 VITALS
HEART RATE: 65 BPM | WEIGHT: 247 LBS | DIASTOLIC BLOOD PRESSURE: 71 MMHG | RESPIRATION RATE: 12 BRPM | HEIGHT: 73 IN | OXYGEN SATURATION: 95 % | BODY MASS INDEX: 32.74 KG/M2 | SYSTOLIC BLOOD PRESSURE: 121 MMHG

## 2018-07-17 DIAGNOSIS — R42 VERTIGO: ICD-10-CM

## 2018-07-17 DIAGNOSIS — H93.13 TINNITUS, BILATERAL: Primary | ICD-10-CM

## 2018-07-17 DIAGNOSIS — H93.13 TINNITUS OF BOTH EARS: Primary | ICD-10-CM

## 2018-07-17 PROCEDURE — 92567 TYMPANOMETRY: CPT | Performed by: AUDIOLOGIST

## 2018-07-17 PROCEDURE — 99244 OFF/OP CNSLTJ NEW/EST MOD 40: CPT | Performed by: OTOLARYNGOLOGY

## 2018-07-17 PROCEDURE — 92557 COMPREHENSIVE HEARING TEST: CPT | Performed by: AUDIOLOGIST

## 2018-07-17 PROCEDURE — 99207 ZZC NO CHARGE LOS: CPT | Performed by: AUDIOLOGIST

## 2018-07-17 NOTE — PATIENT INSTRUCTIONS
Scheduling Information  To schedule your CT/MRI scan, please contact [facility] at [phone] OR [facility] at [phone]    To schedule your Surgery, please contact our Specialty Schedulers at [phone]      ENT Clinic Locations Clinic Hours Telephone Number     Sel[facility] Espinoza  [address] [place] [address]   Monday:           1:00pm -- 5:00pm    Friday:              8:00am - 12:00pm   To schedule/reschedule an appointment with   Dr. Salinas,   please contact our   Specialty Scheduling Department at:     [phone]       Selena Mclaughlin  [address] [place] [address] Tuesday:          8:00am -- 2:00pm         Urgent Care Locations Clinic Hours Telephone Numbers     Selena Mclaughlin  [address] [place] [address]     Monday-Friday:     11:00am - 9:00pm    Saturday-Sunday:  9:00am - 5:00pm   [phone]     [facility]  [address] [place] [address]     Monday-Friday:      5:00pm - 9:00pm     Saturday-Sunday:  9:00am - 5:00pm   [phone]

## 2018-07-17 NOTE — LETTER
"    7/17/2018         RE: Teddy Shah  [address]  [facility] [address]        Dear Colleague,    Thank you for referring your patient, Teddy Shah, to the [facility]. Please see a copy of my visit note below.    History of Present Illness - Teddy Shah is a 27 year old male here to see me for the first time due to concerns with hearing loss.  He has seen Dr. Finley previously, and is status post septoplasty by him in June of 2015.  He is here at the consultation of Lindsey Dee.    He tells me that for many years he has had tinnitus in the ears.  Also, he mentions that both his mother and grandmother have been diagnosed with Meniere's.  He had a lot of ear infections as a small child, and three sets of tubes.      He has only had a handful of episodes of vertigo in his life, with the feeling of slight imbalance, and the \"world seems to tilt and shift.\"  He would have a hard time reading or paying attention due to the nausea.  They can last almost an hour.  After that, he has had very brief intermittent episodes.    He does feel that in the last year it is slightly harder to understand people.  He describes it as being able to hear words, but it is not clear and recognizable.  It is especially notable with background noise.    Past Medical History -   Patient Active Problem List   Diagnosis     Nasal obstruction     Deviated nasal septum     CARDIOVASCULAR SCREENING; LDL GOAL LESS THAN 160     Obesity, Class I, BMI 30-34.9     Pervasive developmental disorder     Moderate episode of recurrent major depressive disorder (H)     Anxiety     Tinnitus, bilateral       Current Medications -   Current Outpatient Prescriptions:      Acetaminophen (TYLENOL PO), Reported on 5/12/2017, Disp: , Rfl:      hydrocortisone (WESTCORT) 0.2 % cream, Apply twice daily to red areas on the face for 1 week, then weekends only. (Patient not taking: Reported on 6/22/2018), Disp: 45 g, Rfl: 3     IBUPROFEN PO, " "Reported on 5/12/2017, Disp: , Rfl:      polyethylene glycol (MIRALAX) powder, Take 17 g (1 capful) by mouth daily (Patient not taking: Reported on 6/22/2018), Disp: 510 g, Rfl: 1     sertraline (ZOLOFT) 100 MG tablet, Take 1 tablet (100 mg) by mouth daily, Disp: 90 tablet, Rfl: 1     sertraline (ZOLOFT) 50 MG tablet, Take 1 tablet (50 mg) by mouth daily (Patient not taking: Reported on 6/22/2018), Disp: 90 tablet, Rfl: 0     triamcinolone (KENALOG) 0.1 % cream, Apply sparingly to affected area twice a day  as needed (Patient not taking: Reported on 6/22/2018), Disp: 80 g, Rfl: 0    Allergies -   Allergies   Allergen Reactions     Latex Itching     Erythromycin Rash       Social History -   Social History     Social History     Marital status: Single     Spouse name: N/A     Number of children: N/A     Years of education: N/A     Social History Main Topics     Smoking status: Never Smoker     Smokeless tobacco: Never Used     Alcohol use No     Drug use: No     Sexual activity: No     Other Topics Concern     Not on file     Social History Narrative       Family History -   Family History   Problem Relation Age of Onset     Anesthesia Reaction Mother      Anesthesia Reaction Paternal Grandmother      Cerebrovascular Disease Paternal Grandmother      Coronary Artery Disease Paternal Grandmother      heart attack?     Cerebrovascular Disease Father      Hypertension Father      ?     Coronary Artery Disease Maternal Grandfather      heart attack     Prostate Cancer Maternal Grandfather      Hypertension Maternal Grandmother      Lymphoma Maternal Grandmother      Bleeding Disorder No family hx of        Review of Systems - As per HPI and PMHx, otherwise 10+ system review of the head and neck, and general constitution is negative.    Physical Exam  /71  Pulse 65  Resp 12  Ht 1.854 m (6' 1\")  Wt 112 kg (247 lb)  SpO2 95%  BMI 32.59 kg/m2    General - The patient is well nourished and well developed, and " appears to have good nutritional status.  Alert and oriented to person and place, answers questions and cooperates with examination appropriately.   Head and Face - Normocephalic and atraumatic, with no gross asymmetry noted of the contour of the facial features.  The facial nerve is intact, with strong symmetric movements.  Voice and Breathing - The patient was breathing comfortably without the use of accessory muscles. There was no wheezing, stridor, or stertor.  The patients voice was clear and strong, and had appropriate pitch and quality.  Ears - The tympanic membranes are normal in appearance, bony landmarks are intact.  No retraction, perforation, or masses.  No fluid or purulence was seen in the external canal or the middle ear. No evidence of infection of the middle ear or external canal, cerumen was normal in appearance.  Eyes - Extraocular movements intact, and the pupils were reactive to light.  Sclera were not icteric or injected, conjunctiva were pink and moist.  Mouth - Examination of the oral cavity showed pink, healthy oral mucosa. No lesions or ulcerations noted.  The tongue was mobile and midline, and the dentition were in good condition.    Throat - The walls of the oropharynx were smooth, pink, moist, symmetric, and had no lesions or ulcerations.  The tonsillar pillars and soft palate were symmetric.  The uvula was midline on elevation.    Neck - Normal midline excursion of the laryngotracheal complex during swallowing.  Full range of motion on passive movement.  Palpation of the occipital, submental, submandibular, internal jugular chain, and supraclavicular nodes did not demonstrate any abnormal lymph nodes or masses.  The carotid pulse was palpable bilaterally.  Palpation of the thyroid was soft and smooth, with no nodules or goiter appreciated.  The trachea was mobile and midline.  Nose - External contour is symmetric, no gross deflection or scars.  Nasal mucosa is pink and moist with no  abnormal mucus.  The septum was midline and non-obstructive, turbinates of normal size and position.  No polyps, masses, or purulence noted on examination.  Balance evaluation - The patient was able to stand independently in the exam room without support or assistance.  With eyes closed and feet together with hands at sides, the patient was also stable and did not sway in either direction.  Standing with heel to toe and arms extended was stable and steady.  Rapid head shaking did not elicit dizziness or nystagmus.      Audiologic Studies - An audiogram and tympanogram were performed today as part of the evaluation and personally reviewed. The tympanogram shows a normal Type A curve, with normal canal volume and middle ear pressure.  There is no sign of eustachian tube dysfunction or middle ear effusion.  The audiogram was also normal.  The sensorineural hearing was age-appropriate, with no evidence of conductive hearing loss or significant asymmetry.      A/P - Teddy Shah is a 27 year old male  (H93.13) Tinnitus, bilateral  (primary encounter diagnosis)  (R42) Vertigo    I spent the remainder of today's visit educating the patient about the current theory on the cause of Meniere's Disease and the reasoning behind its treatment.  We discussed the CATS (Caffeine, Alcohol, Tobacco, Salt/Stress) Avoidance Diet, as well as safety measures to be done at home to avoid injury.  Finally, the variability, potential permanent hearing loss, and natural course of Meniere's disease, including 30% incidence of eventual bilateral involvement, was also discussed.    The use of Dyazide as well as Potassium replacement was also discussed as the next step in treatment.  The patient expressed understanding of today's discussion, and will follow up in 1 month.  I plan on doing audiograms every 6 months for the first 2 years, then yearly assuming they remain stable.    The last item discussed was the possible indications for an MRI  scan of the brain and internal auditory canals.  Although the chances of unilateral hearing loss and vertigo eventually being diagnosed as a vestibular schwannoma or other intracranial pathology was quoted as being approximately 1 in 400, consideration of an MRI in the situation of persistent or worsening symptoms is still possible necessary.    However, given the intermittent nature of the vertigo, and relatively normal hearing, I would not recommend medications, and recommend dietary modifications.  I would want to see him in 6 months for a follow up audiogram and to check on his balance.    Again, thank you for allowing me to participate in the care of your patient.        Sincerely,        Brady Salinas MD

## 2018-07-17 NOTE — PROGRESS NOTES
AUDIOLOGY REPORT:    Patient was referred to Audiology from ENT by Brady Salinas MD for a hearing examination.  Patient complains of occasional vertigo, tinnitus, and hearing fluctuations. His mother has Meniere's Disease and he has concerns about whether he might also.    Testing:    Otoscopy:   Otoscopic exam indicates partial obstruction with cerumen bilaterally     Tympanograms:    RIGHT: normal eardrum mobility     LEFT:   normal eardrum mobility    Thresholds:   Pure Tone Thresholds assessed using conventional audiometry with good  reliability from 250-8000 Hz bilaterally using circumaural headphones     RIGHT:  normal hearing sensitivity      LEFT:    normal hearing sensitivity    Speech Reception Threshold:    RIGHT: 10 dB HL    LEFT:   5 dB HL    Word Recognition Score:     RIGHT: 100% at 50 dB HL using NU-6 recorded word list.    LEFT:   100% at 50 dB HL using NU-6 recorded word list.    Discussed results with the patient.     Patient was returned to ENT for follow up.     Brad Licea MA, CCC-A  [place] Licensed Audiologist #[id number]  7/17/2018

## 2018-07-17 NOTE — MR AVS SNAPSHOT
After Visit Summary   7/17/2018    Teddy Shah    MRN: [id number]           Patient Information     Date Of Birth          1990        Visit Information        Provider Department      7/17/2018 10:30 AM Inocencio Licea AuD [facility]        Today's Diagnoses     Tinnitus of both ears    -  1       Follow-ups after your visit        Your next 10 appointments already scheduled     Jul 17, 2018 11:00 AM CDT   New Visit with Brady Salinas MD   [facility] ([facility])    [address] [place] [id number] [phone]              Who to contact     If you have questions or need follow up information about today's clinic visit or your schedule please contact [facility] directly at [phone].  Normal or non-critical lab and imaging results will be communicated to you by MyChart, letter or phone within 4 business days after the clinic has received the results. If you do not hear from us within 7 days, please contact the clinic through MyChart or phone. If you have a critical or abnormal lab result, we will notify you by phone as soon as possible.  Submit refill requests through [employer] or call your pharmacy and they will forward the refill request to us. Please allow 3 business days for your refill to be completed.          Additional Information About Your Visit        MyChart Information     [employer] gives you secure access to your electronic health record. If you see a primary care provider, you can also send messages to your care team and make appointments. If you have questions, please call your primary care clinic.  If you do not have a primary care provider, please call [phone] and they will assist you.        Care EveryWhere ID     This is your Care EveryWhere ID. This could be used by other organizations to access your [facility] medical records  [id number]         Blood Pressure  from Last 3 Encounters:   06/22/18 109/72   03/26/18 145/83   03/16/18 128/90    Weight from Last 3 Encounters:   06/22/18 247 lb 6.4 oz (112.2 kg)   03/26/18 246 lb 6.4 oz (111.8 kg)   03/16/18 241 lb (109.3 kg)              We Performed the Following     AUDIOGRAM/TYMPANOGRAM - INTERFACE     COMPREHENSIVE HEARING TEST     TYMPANOMETRY        Primary Care Provider Office Phone # Fax #    Ashanti Dee AB [phone]       [address] [place] [address]        Equal Access to Services     ABBEY MCLEOD : Hadii aad ku hadasho Soomaali, waaxda luqadaha, qaybta kaalmada adeegyada, waxay nasimain haymily lozano . So [facility] [phone].    ATENCIÓN: Si habla español, tiene a thomas disposición servicios gratuitos de asistencia lingüística. [facility] [phone].    We comply with applicable federal civil rights laws and [place] laws. We do not discriminate on the basis of race, color, national origin, age, disability, sex, sexual orientation, or gender identity.            Thank you!     Thank you for choosing [facility]  for your care. Our goal is always to provide you with excellent care. Hearing back from our patients is one way we can continue to improve our services. Please take a few minutes to complete the written survey that you may receive in the mail after your visit with us. Thank you!             Your Updated Medication List - Protect others around you: Learn how to safely use, store and throw away your medicines at www.disposemymeds.org.          This list is accurate as of 7/17/18 10:37 AM.  Always use your most recent med list.                   Brand Name Dispense Instructions for use Diagnosis    hydrocortisone 0.2 % cream    WESTCORT    45 g    Apply twice daily to red areas on the face for 1 week, then weekends only.    Dermatitis, seborrheic       IBUPROFEN PO      Reported on 5/12/2017        polyethylene glycol powder    MIRALAX     510 g    Take 17 g (1 capful) by mouth daily    Constipation, unspecified constipation type       * sertraline 50 MG tablet    ZOLOFT    90 tablet    Take 1 tablet (50 mg) by mouth daily    Moderate episode of recurrent major depressive disorder (H), Anxiety       * sertraline 100 MG tablet    ZOLOFT    90 tablet    Take 1 tablet (100 mg) by mouth daily    Moderate episode of recurrent major depressive disorder (H)       triamcinolone 0.1 % cream    KENALOG    80 g    Apply sparingly to affected area twice a day  as needed    Petechial rash       TYLENOL PO      Reported on 5/12/2017        * Notice:  This list has 2 medication(s) that are the same as other medications prescribed for you. Read the directions carefully, and ask your doctor or other care provider to review them with you.

## 2018-07-17 NOTE — MR AVS SNAPSHOT
After Visit Summary   7/17/2018    Teddy Shah    MRN: [id number]           Patient Information     Date Of Birth          1990        Visit Information        Provider Department      7/17/2018 11:00 AM Brady Salinas MD [facility]        Today's Diagnoses     Tinnitus, bilateral    -  1    Vertigo          Care Instructions    Scheduling Information  To schedule your CT/MRI scan, please contact [facility] at [phone] OR [facility] at [phone]    To schedule your Surgery, please contact our Specialty Schedulers at [phone]      ENT Clinic Locations Clinic Hours Telephone Number     [facility] Letts  [address] [facility]  [place] [address]   Monday:           1:00pm -- 5:00pm    Friday:              8:00am - 12:00pm   To schedule/reschedule an appointment with   Dr. Salinas,   please contact our   Specialty Scheduling Department at:     [phone]       [facility]  [address] [address] Tuesday:          8:00am -- 2:00pm         Urgent Care Locations Clinic Hours Telephone Numbers     [facility]  [address] [place] [address]     Monday-Friday:     11:00am - 9:00pm    Saturday-Sunday:  9:00am - 5:00pm   [phone]     [facility]  [address] [place] [address]. [place] [address]     Monday-Friday:      5:00pm - 9:00pm     Saturday-Sunday:  9:00am - 5:00pm   [phone]                 Follow-ups after your visit        Your next 10 appointments already scheduled     Jul 17, 2018 11:00 AM CDT   New Visit with Brady Salinas MD   [facility]    [address] [facility] [id number] [phone]              Who to contact     If you have questions or need follow up information about today's clinic visit or your schedule please contact [facility] directly at [phone].  Normal or  "non-critical lab and imaging results will be communicated to you by MyChart, letter or phone within 4 business days after the clinic has received the results. If you do not hear from us within 7 days, please contact the clinic through [employer] or phone. If you have a critical or abnormal lab result, we will notify you by phone as soon as possible.  Submit refill requests through Damballa or call your pharmacy and they will forward the refill request to us. Please allow 3 business days for your refill to be completed.          Additional Information About Your Visit        Damballa Information     [employer] gives you secure access to your electronic health record. If you see a primary care provider, you can also send messages to your care team and make appointments. If you have questions, please call your primary care clinic.  If you do not have a primary care provider, please call [phone] and they will assist you.        Care EveryWhere ID     This is your Care EveryWhere ID. This could be used by other organizations to access your [place] medical records  [id number]        Your Vitals Were     Pulse Respirations Height Pulse Oximetry BMI (Body Mass Index)       65 12 1.854 m (6' 1\") 95% 32.59 kg/m2        Blood Pressure from Last 3 Encounters:   07/17/18 121/71   06/22/18 109/72   03/26/18 145/83    Weight from Last 3 Encounters:   07/17/18 112 kg (247 lb)   06/22/18 112.2 kg (247 lb 6.4 oz)   03/26/18 111.8 kg (246 lb 6.4 oz)              Today, you had the following     No orders found for display       Primary Care Provider Office Phone # Fax #    AB Sifuentes -[phone]       [address] [place], [place] [address]        Equal Access to Services     CHER MCLEOD : Carmelina Montano, keisha zapien, carl garcía. So [facility] [phone].    ATENCIÓN: Si habla español, tiene a thomas disposición servicios " john de asistencia lingüística. [facility] [phone].    We comply with applicable federal civil rights laws and [place] laws. We do not discriminate on the basis of race, color, national origin, age, disability, sex, sexual orientation, or gender identity.            Thank you!     Thank you for choosing [facility]  for your care. Our goal is always to provide you with excellent care. Hearing back from our patients is one way we can continue to improve our services. Please take a few minutes to complete the written survey that you may receive in the mail after your visit with us. Thank you!             Your Updated Medication List - Protect others around you: Learn how to safely use, store and throw away your medicines at www.disposemymeds.org.          This list is accurate as of 7/17/18 10:56 AM.  Always use your most recent med list.                   Brand Name Dispense Instructions for use Diagnosis    hydrocortisone 0.2 % cream    WESTCORT    45 g    Apply twice daily to red areas on the face for 1 week, then weekends only.    Dermatitis, seborrheic       IBUPROFEN PO      Reported on 5/12/2017        polyethylene glycol powder    MIRALAX    510 g    Take 17 g (1 capful) by mouth daily    Constipation, unspecified constipation type       * sertraline 50 MG tablet    ZOLOFT    90 tablet    Take 1 tablet (50 mg) by mouth daily    Moderate episode of recurrent major depressive disorder (H), Anxiety       * sertraline 100 MG tablet    ZOLOFT    90 tablet    Take 1 tablet (100 mg) by mouth daily    Moderate episode of recurrent major depressive disorder (H)       triamcinolone 0.1 % cream    KENALOG    80 g    Apply sparingly to affected area twice a day  as needed    Petechial rash       TYLENOL PO      Reported on 5/12/2017        * Notice:  This list has 2 medication(s) that are the same as other medications prescribed for you. Read the directions carefully, and ask your doctor or  other care provider to review them with you.

## 2018-07-17 NOTE — PROGRESS NOTES
"History of Present Illness - Teddy Shah is a 27 year old male here to see me for the first time due to concerns with hearing loss.  He has seen Dr. Finley previously, and is status post septoplasty by him in June of 2015.  He is here at the consultation of Lindsey Dee.    He tells me that for many years he has had tinnitus in the ears.  Also, he mentions that both his mother and grandmother have been diagnosed with Meniere's.  He had a lot of ear infections as a small child, and three sets of tubes.      He has only had a handful of episodes of vertigo in his life, with the feeling of slight imbalance, and the \"world seems to tilt and shift.\"  He would have a hard time reading or paying attention due to the nausea.  They can last almost an hour.  After that, he has had very brief intermittent episodes.    He does feel that in the last year it is slightly harder to understand people.  He describes it as being able to hear words, but it is not clear and recognizable.  It is especially notable with background noise.    Past Medical History -   Patient Active Problem List   Diagnosis     Nasal obstruction     Deviated nasal septum     CARDIOVASCULAR SCREENING; LDL GOAL LESS THAN 160     Obesity, Class I, BMI 30-34.9     Pervasive developmental disorder     Moderate episode of recurrent major depressive disorder (H)     Anxiety     Tinnitus, bilateral       Current Medications -   Current Outpatient Prescriptions:      Acetaminophen (TYLENOL PO), Reported on 5/12/2017, Disp: , Rfl:      hydrocortisone (WESTCORT) 0.2 % cream, Apply twice daily to red areas on the face for 1 week, then weekends only. (Patient not taking: Reported on 6/22/2018), Disp: 45 g, Rfl: 3     IBUPROFEN PO, Reported on 5/12/2017, Disp: , Rfl:      polyethylene glycol (MIRALAX) powder, Take 17 g (1 capful) by mouth daily (Patient not taking: Reported on 6/22/2018), Disp: 510 g, Rfl: 1     sertraline (ZOLOFT) 100 MG tablet, Take 1 tablet (100 mg) by " "mouth daily, Disp: 90 tablet, Rfl: 1     sertraline (ZOLOFT) 50 MG tablet, Take 1 tablet (50 mg) by mouth daily (Patient not taking: Reported on 6/22/2018), Disp: 90 tablet, Rfl: 0     triamcinolone (KENALOG) 0.1 % cream, Apply sparingly to affected area twice a day  as needed (Patient not taking: Reported on 6/22/2018), Disp: 80 g, Rfl: 0    Allergies -   Allergies   Allergen Reactions     Latex Itching     Erythromycin Rash       Social History -   Social History     Social History     Marital status: Single     Spouse name: N/A     Number of children: N/A     Years of education: N/A     Social History Main Topics     Smoking status: Never Smoker     Smokeless tobacco: Never Used     Alcohol use No     Drug use: No     Sexual activity: No     Other Topics Concern     Not on file     Social History Narrative       Family History -   Family History   Problem Relation Age of Onset     Anesthesia Reaction Mother      Anesthesia Reaction Paternal Grandmother      Cerebrovascular Disease Paternal Grandmother      Coronary Artery Disease Paternal Grandmother      heart attack?     Cerebrovascular Disease Father      Hypertension Father      ?     Coronary Artery Disease Maternal Grandfather      heart attack     Prostate Cancer Maternal Grandfather      Hypertension Maternal Grandmother      Lymphoma Maternal Grandmother      Bleeding Disorder No family hx of        Review of Systems - As per HPI and PMHx, otherwise 10+ system review of the head and neck, and general constitution is negative.    Physical Exam  /71  Pulse 65  Resp 12  Ht 1.854 m (6' 1\")  Wt 112 kg (247 lb)  SpO2 95%  BMI 32.59 kg/m2    General - The patient is well nourished and well developed, and appears to have good nutritional status.  Alert and oriented to person and place, answers questions and cooperates with examination appropriately.   Head and Face - Normocephalic and atraumatic, with no gross asymmetry noted of the contour of the " facial features.  The facial nerve is intact, with strong symmetric movements.  Voice and Breathing - The patient was breathing comfortably without the use of accessory muscles. There was no wheezing, stridor, or stertor.  The patients voice was clear and strong, and had appropriate pitch and quality.  Ears - The tympanic membranes are normal in appearance, bony landmarks are intact.  No retraction, perforation, or masses.  No fluid or purulence was seen in the external canal or the middle ear. No evidence of infection of the middle ear or external canal, cerumen was normal in appearance.  Eyes - Extraocular movements intact, and the pupils were reactive to light.  Sclera were not icteric or injected, conjunctiva were pink and moist.  Mouth - Examination of the oral cavity showed pink, healthy oral mucosa. No lesions or ulcerations noted.  The tongue was mobile and midline, and the dentition were in good condition.    Throat - The walls of the oropharynx were smooth, pink, moist, symmetric, and had no lesions or ulcerations.  The tonsillar pillars and soft palate were symmetric.  The uvula was midline on elevation.    Neck - Normal midline excursion of the laryngotracheal complex during swallowing.  Full range of motion on passive movement.  Palpation of the occipital, submental, submandibular, internal jugular chain, and supraclavicular nodes did not demonstrate any abnormal lymph nodes or masses.  The carotid pulse was palpable bilaterally.  Palpation of the thyroid was soft and smooth, with no nodules or goiter appreciated.  The trachea was mobile and midline.  Nose - External contour is symmetric, no gross deflection or scars.  Nasal mucosa is pink and moist with no abnormal mucus.  The septum was midline and non-obstructive, turbinates of normal size and position.  No polyps, masses, or purulence noted on examination.  Balance evaluation - The patient was able to stand independently in the exam room without  support or assistance.  With eyes closed and feet together with hands at sides, the patient was also stable and did not sway in either direction.  Standing with heel to toe and arms extended was stable and steady.  Rapid head shaking did not elicit dizziness or nystagmus.      Audiologic Studies - An audiogram and tympanogram were performed today as part of the evaluation and personally reviewed. The tympanogram shows a normal Type A curve, with normal canal volume and middle ear pressure.  There is no sign of eustachian tube dysfunction or middle ear effusion.  The audiogram was also normal.  The sensorineural hearing was age-appropriate, with no evidence of conductive hearing loss or significant asymmetry.      A/P - Teddy Shah is a 27 year old male  (H93.13) Tinnitus, bilateral  (primary encounter diagnosis)  (R42) Vertigo    I spent the remainder of today's visit educating the patient about the current theory on the cause of Meniere's Disease and the reasoning behind its treatment.  We discussed the CATS (Caffeine, Alcohol, Tobacco, Salt/Stress) Avoidance Diet, as well as safety measures to be done at home to avoid injury.  Finally, the variability, potential permanent hearing loss, and natural course of Meniere's disease, including 30% incidence of eventual bilateral involvement, was also discussed.    The use of Dyazide as well as Potassium replacement was also discussed as the next step in treatment.  The patient expressed understanding of today's discussion, and will follow up in 1 month.  I plan on doing audiograms every 6 months for the first 2 years, then yearly assuming they remain stable.    The last item discussed was the possible indications for an MRI scan of the brain and internal auditory canals.  Although the chances of unilateral hearing loss and vertigo eventually being diagnosed as a vestibular schwannoma or other intracranial pathology was quoted as being approximately 1 in 400,  consideration of an MRI in the situation of persistent or worsening symptoms is still possible necessary.    However, given the intermittent nature of the vertigo, and relatively normal hearing, I would not recommend medications, and recommend dietary modifications.  I would want to see him in 6 months for a follow up audiogram and to check on his balance.

## 2018-08-06 ENCOUNTER — OFFICE VISIT (OUTPATIENT)
Dept: FAMILY MEDICINE | Facility: CLINIC | Age: 28
End: 2018-08-06
Payer: COMMERCIAL

## 2018-08-06 VITALS
TEMPERATURE: 96.9 F | HEART RATE: 88 BPM | WEIGHT: 250.4 LBS | DIASTOLIC BLOOD PRESSURE: 75 MMHG | SYSTOLIC BLOOD PRESSURE: 111 MMHG | OXYGEN SATURATION: 95 % | BODY MASS INDEX: 33.19 KG/M2 | HEIGHT: 73 IN

## 2018-08-06 DIAGNOSIS — F84.9 PERVASIVE DEVELOPMENTAL DISORDER: ICD-10-CM

## 2018-08-06 DIAGNOSIS — Z00.00 ROUTINE PHYSICAL EXAMINATION: Primary | ICD-10-CM

## 2018-08-06 DIAGNOSIS — Z23 NEED FOR PROPHYLACTIC VACCINATION WITH TETANUS-DIPHTHERIA (TD): ICD-10-CM

## 2018-08-06 DIAGNOSIS — E66.811 OBESITY, CLASS I, BMI 30-34.9: ICD-10-CM

## 2018-08-06 DIAGNOSIS — Z11.4 SCREENING FOR HIV (HUMAN IMMUNODEFICIENCY VIRUS): ICD-10-CM

## 2018-08-06 DIAGNOSIS — F33.1 MODERATE EPISODE OF RECURRENT MAJOR DEPRESSIVE DISORDER (H): ICD-10-CM

## 2018-08-06 DIAGNOSIS — T14.8XXA BONE BRUISE: ICD-10-CM

## 2018-08-06 LAB — GLUCOSE SERPL-MCNC: 95 MG/DL (ref 70–99)

## 2018-08-06 PROCEDURE — 99212 OFFICE O/P EST SF 10 MIN: CPT | Mod: 25 | Performed by: NURSE PRACTITIONER

## 2018-08-06 PROCEDURE — 87389 HIV-1 AG W/HIV-1&-2 AB AG IA: CPT | Performed by: NURSE PRACTITIONER

## 2018-08-06 PROCEDURE — 90714 TD VACC NO PRESV 7 YRS+ IM: CPT | Performed by: NURSE PRACTITIONER

## 2018-08-06 PROCEDURE — 82947 ASSAY GLUCOSE BLOOD QUANT: CPT | Performed by: NURSE PRACTITIONER

## 2018-08-06 PROCEDURE — 90471 IMMUNIZATION ADMIN: CPT | Performed by: NURSE PRACTITIONER

## 2018-08-06 PROCEDURE — 99395 PREV VISIT EST AGE 18-39: CPT | Mod: 25 | Performed by: NURSE PRACTITIONER

## 2018-08-06 PROCEDURE — 36415 COLL VENOUS BLD VENIPUNCTURE: CPT | Performed by: NURSE PRACTITIONER

## 2018-08-06 ASSESSMENT — ANXIETY QUESTIONNAIRES
5. BEING SO RESTLESS THAT IT IS HARD TO SIT STILL: MORE THAN HALF THE DAYS
3. WORRYING TOO MUCH ABOUT DIFFERENT THINGS: NEARLY EVERY DAY
6. BECOMING EASILY ANNOYED OR IRRITABLE: MORE THAN HALF THE DAYS
GAD7 TOTAL SCORE: 19
2. NOT BEING ABLE TO STOP OR CONTROL WORRYING: NEARLY EVERY DAY
1. FEELING NERVOUS, ANXIOUS, OR ON EDGE: NEARLY EVERY DAY
7. FEELING AFRAID AS IF SOMETHING AWFUL MIGHT HAPPEN: NEARLY EVERY DAY

## 2018-08-06 ASSESSMENT — PATIENT HEALTH QUESTIONNAIRE - PHQ9: 5. POOR APPETITE OR OVEREATING: NEARLY EVERY DAY

## 2018-08-06 NOTE — PATIENT INSTRUCTIONS
At [facility], we strive to deliver an exceptional experience to you, every time we see you.  If you receive a survey in the mail, please send us back your thoughts. We really do value your feedback.    Based on your medical history, these are the current health maintenance/preventive care services that you are due for (some may have been done at this visit.)  Health Maintenance Due   Topic Date Due     HIV SCREEN (SYSTEM ASSIGNED)  12/20/2008     TETANUS IMMUNIZATION (SYSTEM ASSIGNED)  03/28/2018       Suggested websites for health information:  Www.[facility].[email or web address] : Up to date and easily searchable information on multiple topics.  Www.medlineplus.gov : medication info, interactive tutorials, watch real surgeries online  Www.familydoctor.org : good info from the Academy of Family Physicians  Www.cdc.gov : public health info, travel advisories, epidemics (H1N1)  Www.aap.org : children's health info, normal development, vaccinations  Www.health.[facility].[place].us : [place] dept of health, public health issues in [place], N1N1    Your care team:                            Family Medicine Internal Medicine   MD Rolando Salvador MD Shantel Branch-Fleming, MD Katya Georgiev PA-C Megan Hill, APRN CNP    Yobani Camacho MD Pediatrics   Jcaob Caicedo, PADANIELA Vizcarra, CNP MD Mimi Guerra APRN CNP   MD Kalie Wilkins MD Deborah Mielke, MD Kim Thein, APRN [employer]      Clinic hours: Monday - Thursday 7 am-7 pm; Fridays 7 am-5 pm.   Urgent care: Monday - Friday 11 am-9 pm; Saturday and Sunday 9 am-5 pm.  Pharmacy : Monday -Thursday 8 am-8 pm; Friday 8 am-6 pm; Saturday and Sunday 9 am-5 pm.     Clinic: [phone]   Pharmacy: [phone]      Preventive Health Recommendations  Male Ages 26 - 39    Yearly exam:             See your health care provider every year in order to  o   Review health changes.   o   Discuss preventive care.    o   Review your medicines if your  doctor has prescribed any.    You should be tested each year for STDs (sexually transmitted diseases), if you re at risk.     After age 35, talk to your provider about cholesterol testing. If you are at risk for heart disease, have your cholesterol tested at least every 5 years.     If you are at risk for diabetes, you should have a diabetes test (fasting glucose).  Shots: Get a flu shot each year. Get a tetanus shot every 10 years.     Nutrition:    Eat at least 5 servings of fruits and vegetables daily.     Eat whole-grain bread, whole-wheat pasta and brown rice instead of white grains and rice.     Get adequate Calcium and Vitamin D.     Lifestyle    Exercise for at least 150 minutes a week (30 minutes a day, 5 days a week). This will help you control your weight and prevent disease.     Limit alcohol to one drink per day.     No smoking.     Wear sunscreen to prevent skin cancer.     See your dentist every six months for an exam and cleaning.

## 2018-08-06 NOTE — NURSING NOTE
Screening Questionnaire for Adult Immunization    Are you sick today?   Yes   Do you have allergies to medications, food, a vaccine component or latex?   Yes   Have you ever had a serious reaction after receiving a vaccination?   Don't Know   Do you have a long-term health problem with heart disease, lung disease, asthma, kidney disease, metabolic disease (e.g. diabetes), anemia, or other blood disorder?   Don't Know   Do you have cancer, leukemia, HIV/AIDS, or any other immune system problem?   No   In the past 3 months, have you taken medications that affect  your immune system, such as prednisone, other steroids, or anticancer drugs; drugs for the treatment of rheumatoid arthritis, Crohn s disease, or psoriasis; or have you had radiation treatments?   No   Have you had a seizure, or a brain or other nervous system problem?   No   During the past year, have you received a transfusion of blood or blood     products, or been given immune (gamma) globulin or antiviral drug?   No   For women: Are you pregnant or is there a chance you could become        pregnant during the next month?   No   Have you received any vaccinations in the past 4 weeks?   No     Immunization questionnaire was positive for at least one answer.  Notified Lindsey Dee.        Per orders of Dr. Dee, injection of TD given by Liz Pickett. Patient instructed to remain in clinic for 15 minutes afterwards, and to report any adverse reaction to me immediately.       Screening performed by Liz Pickett on 8/6/2018 at 12:23 PM.

## 2018-08-06 NOTE — PROGRESS NOTES
SUBJECTIVE:   CC: Teddy Shah is an 27 year old male who presents for preventative health visit.     Healthy Habits:    Do you get at least three servings of calcium containing foods daily (dairy, green leafy vegetables, etc.)? yes    Amount of exercise or daily activities, outside of work: 5-6 day(s) per week    Problems taking medications regularly No    Medication side effects: No    Have you had an eye exam in the past two years? no    Do you see a dentist twice per year? yes    Do you have sleep apnea, excessive snoring or daytime drowsiness?yes     Lump in left  Leg- patient noticed it  After an accident at work- he noted the lump after 2 mild crates banged in to his lower left leg, medial aspect about a month ago.  No visible bruising, no pain.  Depression Followup    Status since last visit: Stable     See PHQ-9 for current symptoms.  Other associated symptoms: None    Complicating factors:   Significant life event:  No   Current substance abuse:  None  Anxiety or Panic symptoms:  No    PHQ-9 1/3/2018 6/6/2018 6/22/2018   Total Score 12 8 12   Q9: Suicide Ideation Not at all Not at all Not at all     In the past two weeks have you had thoughts of suicide or self-harm?  No.    Do you have concerns about your personal safety or the safety of others?   No     PHQ-9=13, KASSIE-7=19 today but patient feels his symptoms are well controlled on current 100 mg Zoloft daily.   PHQ-9  English  PHQ-9   Any Language  Suicide Assessment Five-step Evaluation and Treatment (SAFE-T)    Today's PHQ-2 Score:   PHQ-2 ( 1999 Pfizer) 6/22/2018 4/27/2017   Q1: Little interest or pleasure in doing things 2 0   Q2: Feeling down, depressed or hopeless 2 0   PHQ-2 Score 4 0   work is going well, his relationship with his immediate supervisor is improved    Abuse: Current or Past(Physical, Sexual or Emotional)- No  Do you feel safe in your environment - Yes    Social History   Substance Use Topics     Smoking status: Never Smoker      Smokeless tobacco: Never Used     Alcohol use No      If you drink alcohol do you typically have >3 drinks per day or >7 drinks per week? No                      Last PSA: No results found for: PSA    Reviewed orders with patient. Reviewed health maintenance and updated orders accordingly - Yes  Labs reviewed in EPIC  BP Readings from Last 3 Encounters:   08/06/18 111/75   07/17/18 121/71   06/22/18 109/72    Wt Readings from Last 3 Encounters:   08/06/18 250 lb 6.4 oz (113.6 kg)   07/17/18 247 lb (112 kg)   06/22/18 247 lb 6.4 oz (112.2 kg)                  Patient Active Problem List   Diagnosis     Nasal obstruction     Deviated nasal septum     CARDIOVASCULAR SCREENING; LDL GOAL LESS THAN 160     Obesity, Class I, BMI 30-34.9     Pervasive developmental disorder     Moderate episode of recurrent major depressive disorder (H)     Anxiety     Tinnitus, bilateral     Past Surgical History:   Procedure Laterality Date     ADENOIDECTOMY       ENDOSCOPY      EGD     EXTRACTION(S) DENTAL      wisdom     PE TUBES      multiple     SEPTOPLASTY, TURBINOPLASTY, COMBINED Bilateral 6/19/2015    Procedure: COMBINED SEPTOPLASTY, TURBINOPLASTY;  Surgeon: Ally Finley MD;  Location:  OR       Social History   Substance Use Topics     Smoking status: Never Smoker     Smokeless tobacco: Never Used     Alcohol use No     Family History   Problem Relation Age of Onset     Anesthesia Reaction Mother      Anesthesia Reaction Paternal Grandmother      Cerebrovascular Disease Paternal Grandmother      Coronary Artery Disease Paternal Grandmother      heart attack?     Cerebrovascular Disease Father      Hypertension Father      ?     Coronary Artery Disease Maternal Grandfather      heart attack     Prostate Cancer Maternal Grandfather      Hypertension Maternal Grandmother      Lymphoma Maternal Grandmother      Bleeding Disorder No family hx of            Reviewed and updated as needed this visit by clinical staff  Tobacco   "Allergies  Meds  Med Hx  Surg Hx  Fam Hx  Soc Hx        Reviewed and updated as needed this visit by Provider        Past Medical History:   Diagnosis Date     Gastro-oesophageal reflux disease      History of anesthesia reaction     Severe N/V     PONV (postoperative nausea and vomiting)       Past Surgical History:   Procedure Laterality Date     ADENOIDECTOMY       ENDOSCOPY      EGD     EXTRACTION(S) DENTAL      wisdom     PE TUBES      multiple     SEPTOPLASTY, TURBINOPLASTY, COMBINED Bilateral 6/19/2015    Procedure: COMBINED SEPTOPLASTY, TURBINOPLASTY;  Surgeon: Ally Finley MD;  Location: [facility] OR       ROS:  CONSTITUTIONAL: NEGATIVE for fever, chills, change in weight  INTEGUMENTARY/SKIN: NEGATIVE for worrisome rashes, moles or lesions  EYES: NEGATIVE for vision changes or irritation  ENT: NEGATIVE for ear, mouth and throat problems  RESP: NEGATIVE for significant cough or SOB  CV: NEGATIVE for chest pain, palpitations or peripheral edema  GI: NEGATIVE for nausea, abdominal pain, heartburn, or change in bowel habits   male: negative for dysuria, hematuria, decreased urinary stream, erectile dysfunction, urethral discharge  MUSCULOSKELETAL: NEGATIVE for significant arthralgias or myalgia  NEURO: NEGATIVE for weakness, dizziness or paresthesias  ENDOCRINE: NEGATIVE for temperature intolerance, skin/hair changes  PSYCHIATRIC: NEGATIVE for changes in mood or affect    OBJECTIVE:   /75 (BP Location: Left arm, Patient Position: Chair, Cuff Size: Adult Large)  Pulse 88  Temp 96.9  F (36.1  C) (Tympanic)  Ht 6' 1\" (1.854 m)  Wt 250 lb 6.4 oz (113.6 kg)  SpO2 95%  BMI 33.04 kg/m2  EXAM:  GENERAL: healthy, alert and no distress  EYES: Eyes grossly normal to inspection, PERRL and conjunctivae and sclerae normal  HENT: ear canals and TM's normal, nose and mouth without ulcers or lesions  NECK: no adenopathy, no asymmetry, masses, or scars and thyroid normal to palpation  RESP: lungs clear to " auscultation - no rales, rhonchi or wheezes  CV: regular rate and rhythm, normal S1 S2, no S3 or S4, no murmur, click or rub, no peripheral edema and peripheral pulses strong  ABDOMEN: soft, nontender, no hepatosplenomegaly, no masses and bowel sounds normal   (male): normal male genitalia without lesions or urethral discharge, no hernia  MS: no gross musculoskeletal defects noted, no edema  SKIN: 3mm lump on lower medial left tibia consistent withi bone bruise., otherwise, no suspicious lesions or rashes  NEURO: Normal strength and tone, mentation intact and speech normal  PSYCH: mentation appears normal, affect normal/bright  LYMPH: no cervical, supraclavicular, axillary, or inguinal adenopathy    Diagnostic Test Results:  No results found for this or any previous visit (from the past 24 hour(s)).    ASSESSMENT/PLAN:   1. Routine physical examination    - Glucose    2. Obesity, Class I, BMI 30-34.9  Benefits of weight loss reviewed in detail, encouraged him to cut back on the carbohydrates in the diet, consume more fruits and vegetables, drink plenty of water, avoid fruit juices, sodas, get 150 min moderate exercise/week.  Recheck weight in 6 months.    3. Moderate episode of recurrent major depressive disorder (H)   PHQ-9=13, KASSIE-7=19 today He continues on 100 mg Zoloft daily. Work is going better which helps his outlook. He feels he is doing well on current dose of Zoloft, is not interested in increasing his dose, is not interested in counseling at this time.  No SI/HI.  Reviewed indicatioins for return to clinic/UC visit.    4. Pervasive developmental disorder  Stable.     5. Bone bruise  Reassured patient that this will resolve over time, reviewed indications for return to clinic.    6. Need for prophylactic vaccination with tetanus-diphtheria (TD)    -      ADMIN VACCINE, FIRST  - TD PRESERV FREE, IM (7+ YRS)    6. Screening for HIV (human immunodeficiency virus)    - HIV Screening    COUNSELING:  Reviewed  "preventive health counseling, as reflected in patient instructions       Regular exercise       Healthy diet/nutrition       Vision screening       Immunizations    Vaccinated for: Td             Safe sex practices/STD prevention    BP Readings from Last 1 Encounters:   08/06/18 111/75     Estimated body mass index is 33.04 kg/(m^2) as calculated from the following:    Height as of this encounter: 6' 1\" (1.854 m).    Weight as of this encounter: 250 lb 6.4 oz (113.6 kg).      Weight management plan: Discussed healthy diet and exercise guidelines and patient will follow up in 12 months in clinic to re-evaluate.     reports that he has never smoked. He has never used smokeless tobacco.      Counseling Resources:  ATP IV Guidelines  Pooled Cohorts Equation Calculator  FRAX Risk Assessment  ICSI Preventive Guidelines  Dietary Guidelines for Americans, 2010  USDA's MyPlate  ASA Prophylaxis  Lung CA Screening    [facility]  "

## 2018-08-06 NOTE — MR AVS SNAPSHOT
After Visit Summary   8/6/2018    Teddy Shah    MRN: [id number]           Patient Information     Date Of Birth          1990        Visit Information        Provider Department      8/6/2018 11:20 AM Ashanti Dee APRN CNP [facility]        Today's Diagnoses     Obesity, Class I, BMI 30-34.9    -  1    Routine physical examination        Moderate episode of recurrent major depressive disorder (H)        Pervasive developmental disorder        Need for prophylactic vaccination with tetanus-diphtheria (TD)        Screening for HIV (human immunodeficiency virus)          Care Instructions    At [facility], we strive to deliver an exceptional experience to you, every time we see you.  If you receive a survey in the mail, please send us back your thoughts. We really do value your feedback.    Based on your medical history, these are the current health maintenance/preventive care services that you are due for (some may have been done at this visit.)  Health Maintenance Due   Topic Date Due     HIV SCREEN (SYSTEM ASSIGNED)  12/20/2008     TETANUS IMMUNIZATION (SYSTEM ASSIGNED)  03/28/2018       Suggested websites for health information:  Www.Activation Life.zuuka! : Up to date and easily searchable information on multiple topics.  Www.medlineplus.gov : medication info, interactive tutorials, watch real surgeries online  Www.familydoctor.org : good info from the Academy of Family Physicians  Www.cdc.gov : public health info, travel advisories, epidemics (H1N1)  Www.aap.org : children's health info, normal development, vaccinations  Www.health.state.[place].us : [place] dept of health, public health issues in [place], N1N1    Your care team:                            Family Medicine Internal Medicine   MD Rolando Salvador MD Shantel Branch-Fleming, MD Katya Georgiev PA-C Megan Hill, APRN CNP Nam Ho, MD Pediatrics   SHAHANA Smith CNP  MD Mimi Guerra APRN CNP   MD Kalie Wilkins MD Deborah Mielke, MD Kim Thein, APRN [facility]      Clinic hours: Monday - Thursday 7 am-7 pm; Fridays 7 am-5 pm.   Urgent care: Monday - Friday 11 am-9 pm; Saturday and Sunday 9 am-5 pm.  Pharmacy : Monday -Thursday 8 am-8 pm; Friday 8 am-6 pm; Saturday and Sunday 9 am-5 pm.     Clinic: [phone]   Pharmacy: [phone]      Preventive Health Recommendations  Male Ages 26 - 39    Yearly exam:             See your health care provider every year in order to  o   Review health changes.   o   Discuss preventive care.    o   Review your medicines if your doctor has prescribed any.    You should be tested each year for STDs (sexually transmitted diseases), if you re at risk.     After age 35, talk to your provider about cholesterol testing. If you are at risk for heart disease, have your cholesterol tested at least every 5 years.     If you are at risk for diabetes, you should have a diabetes test (fasting glucose).  Shots: Get a flu shot each year. Get a tetanus shot every 10 years.     Nutrition:    Eat at least 5 servings of fruits and vegetables daily.     Eat whole-grain bread, whole-wheat pasta and brown rice instead of white grains and rice.     Get adequate Calcium and Vitamin D.     Lifestyle    Exercise for at least 150 minutes a week (30 minutes a day, 5 days a week). This will help you control your weight and prevent disease.     Limit alcohol to one drink per day.     No smoking.     Wear sunscreen to prevent skin cancer.     See your dentist every six months for an exam and cleaning.             Follow-ups after your visit        Who to contact     If you have questions or need follow up information about today's clinic visit or your schedule please contact [facility] directly at [phone].  Normal or non-critical lab and imaging results will be communicated to you by MyChart, letter or phone  "within 4 business days after the clinic has received the results. If you do not hear from us within 7 days, please contact the clinic through Camp[employer] or phone. If you have a critical or abnormal lab result, we will notify you by phone as soon as possible.  Submit refill requests through Campanda or call your pharmacy and they will forward the refill request to us. Please allow 3 business days for your refill to be completed.          Additional Information About Your Visit        PharmaINharSkype Information     Campanda gives you secure access to your electronic health record. If you see a primary care provider, you can also send messages to your care team and make appointments. If you have questions, please call your primary care clinic.  If you do not have a primary care provider, please call [phone] and they will assist you.        Care EveryWhere ID     This is your Care EveryWhere ID. This could be used by other organizations to access your [place] medical records  [id number]        Your Vitals Were     Pulse Temperature Height Pulse Oximetry BMI (Body Mass Index)       88 96.9  F (36.1  C) (Tympanic) 6' 1\" (1.854 m) 95% 33.04 kg/m2        Blood Pressure from Last 3 Encounters:   08/06/18 111/75   07/17/18 121/71   06/22/18 109/72    Weight from Last 3 Encounters:   08/06/18 250 lb 6.4 oz (113.6 kg)   07/17/18 247 lb (112 kg)   06/22/18 247 lb 6.4 oz (112.2 kg)              We Performed the Following          ADMIN VACCINE, FIRST     Glucose     HIV Screening     TD PRESERV FREE, IM (7+ YRS)          Today's Medication Changes          These changes are accurate as of 8/6/18 11:45 AM.  If you have any questions, ask your nurse or doctor.               These medicines have changed or have updated prescriptions.        Dose/Directions    sertraline 100 MG tablet   Commonly known as:  ZOLOFT   This may have changed:  Another medication with the same name was removed. Continue taking this medication, and follow the " directions you see here.   Used for:  Moderate episode of recurrent major depressive disorder (H)   Changed by:  Ashanti Dee APRN CNP        Dose:  100 mg   Take 1 tablet (100 mg) by mouth daily   Quantity:  90 tablet   Refills:  1                Primary Care Provider Office Phone # Fax #    AB Sifuentes -[phone]       [address] [place] [address]        Equal Access to Services     [facility] : Hadii aad ku hadasho Soomaali, waaxda luqadaha, qaybta kaalmada adeegyada, waxay idiin haygarrettn adeeg kharadenny lozano . So [facility] [phone].    ATENCIÓN: Si habla español, tiene a thomas disposición servicios gratuitos de asistencia lingüística. [facility] [phone].    We comply with applicable federal civil rights laws and [place] laws. We do not discriminate on the basis of race, color, national origin, age, disability, sex, sexual orientation, or gender identity.            Thank you!     Thank you for choosing [facility]  for your care. Our goal is always to provide you with excellent care. Hearing back from our patients is one way we can continue to improve our services. Please take a few minutes to complete the written survey that you may receive in the mail after your visit with us. Thank you!             Your Updated Medication List - Protect others around you: Learn how to safely use, store and throw away your medicines at www.disposemymeds.org.          This list is accurate as of 8/6/18 11:45 AM.  Always use your most recent med list.                   Brand Name Dispense Instructions for use Diagnosis    hydrocortisone 0.2 % cream    WESTCORT    45 g    Apply twice daily to red areas on the face for 1 week, then weekends only.    Dermatitis, seborrheic       IBUPROFEN PO      Reported on 5/12/2017        polyethylene glycol powder    MIRALAX    510 g    Take 17 g (1 capful) by mouth daily    Constipation, unspecified constipation type        sertraline 100 MG tablet    ZOLOFT    90 tablet    Take 1 tablet (100 mg) by mouth daily    Moderate episode of recurrent major depressive disorder (H)       triamcinolone 0.1 % cream    KENALOG    80 g    Apply sparingly to affected area twice a day  as needed    Petechial rash       TYLENOL PO      Reported on 5/12/2017

## 2018-08-07 ASSESSMENT — ANXIETY QUESTIONNAIRES: GAD7 TOTAL SCORE: 19

## 2018-08-07 ASSESSMENT — PATIENT HEALTH QUESTIONNAIRE - PHQ9: SUM OF ALL RESPONSES TO PHQ QUESTIONS 1-9: 13

## 2018-08-08 LAB — HIV 1+2 AB+HIV1 P24 AG SERPL QL IA: NONREACTIVE

## 2019-01-22 DIAGNOSIS — F33.1 MODERATE EPISODE OF RECURRENT MAJOR DEPRESSIVE DISORDER (H): ICD-10-CM

## 2019-01-22 NOTE — TELEPHONE ENCOUNTER
"Requested Prescriptions   Pending Prescriptions Disp Refills     sertraline (ZOLOFT) 100 MG tablet [Pharmacy Med Name: SERTRALINE 100MG TABLETS] 90 tablet 0      Last Written Prescription Date:  06/22/18  Last Fill Quantity: 90,  # refills: 1   Last Office Visit with [facility], [facility] or [facility] prescribing provider:  08/06/18--Thein   Future Office Visit:    Sig: TAKE 1 TABLET(100 MG) BY MOUTH DAILY    SSRIs Protocol Failed - 1/22/2019  1:47 PM       Failed - PHQ-9 score less than 5 in past 6 months    Please review last PHQ-9 score.          Passed - Medication is active on med list       Passed - Patient is age 18 or older       Passed - Recent (6 mo) or future (30 days) visit within the authorizing provider's specialty    Patient had office visit in the last 6 months or has a visit in the next 30 days with authorizing provider or within the authorizing provider's specialty.  See \"Patient Info\" tab in inbasket, or \"Choose Columns\" in Meds & Orders section of the refill encounter.              "

## 2019-01-28 NOTE — TELEPHONE ENCOUNTER
Routing refill request to provider for review/approval because:  PHQ-9 SCORE 6/6/2018 6/22/2018 8/6/2018   PHQ-9 Total Score Gaudencio - - -   PHQ-9 Total Score 8 12 13

## 2019-01-30 RX ORDER — SERTRALINE HYDROCHLORIDE 100 MG/1
TABLET, FILM COATED ORAL
Qty: 90 TABLET | Refills: 0 | Status: SHIPPED | OUTPATIENT
Start: 2019-01-30 | End: 2019-04-09 | Stop reason: DRUGHIGH

## 2019-01-30 NOTE — TELEPHONE ENCOUNTER
Due for OV in February. (Routing comment)    An appointment has been scheduled for 2/11/19 with shaka Dee.  Tika Covarrubias MA/  For [employer] and Denise

## 2019-02-11 ENCOUNTER — OFFICE VISIT (OUTPATIENT)
Dept: FAMILY MEDICINE | Facility: CLINIC | Age: 29
End: 2019-02-11
Payer: COMMERCIAL

## 2019-02-11 VITALS
DIASTOLIC BLOOD PRESSURE: 85 MMHG | SYSTOLIC BLOOD PRESSURE: 131 MMHG | HEART RATE: 97 BPM | BODY MASS INDEX: 37.14 KG/M2 | OXYGEN SATURATION: 96 % | HEIGHT: 73 IN | TEMPERATURE: 96.2 F | WEIGHT: 280.2 LBS

## 2019-02-11 DIAGNOSIS — F84.9 PERVASIVE DEVELOPMENTAL DISORDER: ICD-10-CM

## 2019-02-11 DIAGNOSIS — E66.811 OBESITY, CLASS I, BMI 30-34.9: ICD-10-CM

## 2019-02-11 DIAGNOSIS — F33.1 MODERATE EPISODE OF RECURRENT MAJOR DEPRESSIVE DISORDER (H): Primary | ICD-10-CM

## 2019-02-11 DIAGNOSIS — Z23 NEED FOR PROPHYLACTIC VACCINATION AND INOCULATION AGAINST INFLUENZA: ICD-10-CM

## 2019-02-11 PROCEDURE — 90686 IIV4 VACC NO PRSV 0.5 ML IM: CPT | Performed by: NURSE PRACTITIONER

## 2019-02-11 PROCEDURE — 90471 IMMUNIZATION ADMIN: CPT | Performed by: NURSE PRACTITIONER

## 2019-02-11 PROCEDURE — 99214 OFFICE O/P EST MOD 30 MIN: CPT | Mod: 25 | Performed by: NURSE PRACTITIONER

## 2019-02-11 RX ORDER — SERTRALINE HYDROCHLORIDE 100 MG/1
150 TABLET, FILM COATED ORAL DAILY
Qty: 135 TABLET | Refills: 1 | Status: SHIPPED | OUTPATIENT
Start: 2019-02-11 | End: 2019-04-07

## 2019-02-11 ASSESSMENT — ANXIETY QUESTIONNAIRES
1. FEELING NERVOUS, ANXIOUS, OR ON EDGE: NEARLY EVERY DAY
IF YOU CHECKED OFF ANY PROBLEMS ON THIS QUESTIONNAIRE, HOW DIFFICULT HAVE THESE PROBLEMS MADE IT FOR YOU TO DO YOUR WORK, TAKE CARE OF THINGS AT HOME, OR GET ALONG WITH OTHER PEOPLE: SOMEWHAT DIFFICULT
GAD7 TOTAL SCORE: 18
3. WORRYING TOO MUCH ABOUT DIFFERENT THINGS: NEARLY EVERY DAY
5. BEING SO RESTLESS THAT IT IS HARD TO SIT STILL: MORE THAN HALF THE DAYS
7. FEELING AFRAID AS IF SOMETHING AWFUL MIGHT HAPPEN: NEARLY EVERY DAY
2. NOT BEING ABLE TO STOP OR CONTROL WORRYING: MORE THAN HALF THE DAYS
6. BECOMING EASILY ANNOYED OR IRRITABLE: MORE THAN HALF THE DAYS

## 2019-02-11 ASSESSMENT — PATIENT HEALTH QUESTIONNAIRE - PHQ9
SUM OF ALL RESPONSES TO PHQ QUESTIONS 1-9: 15
5. POOR APPETITE OR OVEREATING: NEARLY EVERY DAY

## 2019-02-11 ASSESSMENT — MIFFLIN-ST. JEOR: SCORE: 2294.86

## 2019-02-11 NOTE — PATIENT INSTRUCTIONS
At [facility], we strive to deliver an exceptional experience to you, every time we see you.  If you receive a survey in the mail, please send us back your thoughts. We really do value your feedback.    Your care team:                            Family Medicine Internal Medicine   MD Rolando Salvador MD Shantel Branch-Fleming, MD Katya Georgiev PA-C Megan Hill, APRN CNP    Yobani Camacho MD Pediatrics   Jacob Caicedo, SHAHANA Vizcarra, MD Mimi Camp APRN CNP   MD Kalie Wilkins MD Deborah Mielke, MD Lindsey Dee, APRN [facility]      Clinic hours: Monday - Thursday 7 am-7 pm; Fridays 7 am-5 pm.   Urgent care: Monday - Friday 11 am-9 pm; Saturday and Sunday 9 am-5 pm.  Pharmacy : Monday -Thursday 8 am-8 pm; Friday 8 am-6 pm; Saturday and Sunday 9 am-5 pm.     Clinic: [phone]   Pharmacy: [phone]        Patient Education     Depression: Tips to Help Yourself    As your healthcare providers help treat your depression, you can also help yourself. Keep in mind that your illness affects you emotionally, physically, mentally, and socially. So full recovery will take time. Take care of your body and your soul, and be patient with yourself as you get better.  Self-care    Educate yourself. Read about treatment and medicine options. If you have the energy, attend local conferences or support groups. Keep a list of useful websites and helpful books and use them as needed. This illness is not your fault. Don t blame yourself for your depression.    Manage early symptoms. If you notice symptoms returning, experience triggers, or identify other factors that may lead to a depressive episode, get help as soon as possible. Ask trusted friends and family to monitor your behavior and let you know if they see anything of concern.    Work with your provider. Find a provider you can trust. Communicate honestly with that person and share information on  your treatment for depression and your reaction to medicines.    Be prepared for a crisis. Know what to do if you experience a crisis. Keep the phone number of a crisis hotline and know the location of your community's urgent care centers and the closest emergency department.    Hold off on big decisions. Depression can cloud your judgment. So wait until you feel better before making major life decisions, such as changing jobs, moving, or getting  or .    Be patient. Recovering from depression is a process. Don t be discouraged if it takes some time to feel better.    Keep it simple. Depression saps your energy and concentration. So you won t be able to do all the things you used to do. Set small goals and do what you can.    Be with others. Don t isolate yourself--you ll only feel worse. Try to be with other people. And take part in fun activities when you can. Go to a movie, ballgame, Yazidi service, or social event. Talk openly with people you can trust. And accept help when it s offered.  Take care of your body  People with depression often lose the desire to take care of themselves. That only makes their problems worse. During treatment and afterward, make a point to:    Exercise. It s a great way to take care of your body. And studies have shown that exercise helps fight depression.    Avoid drugs and alcohol. These may ease the pain in the short term. But they ll only make your problems worse in the long run.    Get relief from stress. Ask your healthcare provider for relaxation exercises and techniques to help relieve stress.    Eat right. A balanced and healthy diet helps keep your body healthy.  Date Last Reviewed: 1/1/2017 2000-2018 [employer]. [address] [place] [address], [place] [address]. All rights reserved. This information is not intended as a substitute for professional medical care. Always follow your healthcare professional's instructions.

## 2019-02-11 NOTE — PROGRESS NOTES
SUBJECTIVE:   Teddy Shah is a 28 year old male who presents to clinic today for the following health issues:      Depression and Anxiety Follow-Up    Status since last visit: Worsened     Other associated symptoms:None    Complicating factors:     Significant life event: No     Current substance abuse: None    PHQ 6/6/2018 6/22/2018 8/6/2018   PHQ-9 Total Score 8 12 13   Q9: Suicide Ideation Not at all Not at all Not at all     KASSIE-7 SCORE 1/3/2018 6/22/2018 8/6/2018   Total Score 13 (moderate anxiety) - -   Total Score 13 17 19     In the past two weeks have you had thoughts of suicide or self-harm?  No.    Do you have concerns about your personal safety or the safety of others?   No  PHQ-9  English  PHQ-9   Any Language  KASSIE-7  Suicide Assessment Five-step Evaluation and Treatment (SAFE-T)    Amount of exercise or physical activity: 4-5 days/week for an average of greater than 60 minutes    Problems taking medications regularly: No    Medication side effects: fatigue, depression symptoms are getting worse    Diet: regular (no restrictions)          Problem list and histories reviewed & adjusted, as indicated.  Additional history: as documented    Patient Active Problem List   Diagnosis     Nasal obstruction     Deviated nasal septum     CARDIOVASCULAR SCREENING; LDL GOAL LESS THAN 160     Obesity, Class I, BMI 30-34.9     Pervasive developmental disorder     Moderate episode of recurrent major depressive disorder (H)     Anxiety     Tinnitus, bilateral     Past Surgical History:   Procedure Laterality Date     ADENOIDECTOMY       ENDOSCOPY      EGD     EXTRACTION(S) DENTAL      wisdom     PE TUBES      multiple     SEPTOPLASTY, TURBINOPLASTY, COMBINED Bilateral 6/19/2015    Procedure: COMBINED SEPTOPLASTY, TURBINOPLASTY;  Surgeon: Ally Finley MD;  Location:  OR       Social History     Tobacco Use     Smoking status: Never Smoker     Smokeless tobacco: Never Used   Substance Use Topics     Alcohol use:  No     Family History   Problem Relation Age of Onset     Anesthesia Reaction Mother      Anesthesia Reaction Paternal Grandmother      Cerebrovascular Disease Paternal Grandmother      Coronary Artery Disease Paternal Grandmother         heart attack?     Cerebrovascular Disease Father      Hypertension Father         ?     Coronary Artery Disease Maternal Grandfather         heart attack     Prostate Cancer Maternal Grandfather      Hypertension Maternal Grandmother      Lymphoma Maternal Grandmother      Bleeding Disorder No family hx of          Current Outpatient Medications   Medication Sig Dispense Refill     Acetaminophen (TYLENOL PO) Reported on 5/12/2017       hydrocortisone (WESTCORT) 0.2 % cream Apply twice daily to red areas on the face for 1 week, then weekends only. 45 g 3     IBUPROFEN PO Reported on 5/12/2017       sertraline (ZOLOFT) 100 MG tablet Take 1.5 tablets (150 mg) by mouth daily 135 tablet 1     sertraline (ZOLOFT) 100 MG tablet TAKE 1 TABLET(100 MG) BY MOUTH DAILY 90 tablet 0     triamcinolone (KENALOG) 0.1 % cream Apply sparingly to affected area twice a day  as needed 80 g 0     BP Readings from Last 3 Encounters:   02/11/19 131/85   08/06/18 111/75   07/17/18 121/71    Wt Readings from Last 3 Encounters:   02/11/19 127.1 kg (280 lb 3.2 oz)   08/06/18 113.6 kg (250 lb 6.4 oz)   07/17/18 112 kg (247 lb)          Patient is also requesting ADHD testing- states he's had more trouble concentrating and attending to details at school and ant work.  His employer has commented on his forgetfulness/decreased productivity.  He states he was put on Ritalin as a child but had severe stomach upset and never took it for more than a few days.            Reviewed and updated as needed this visit by clinical staff  Tobacco  Allergies  Meds  Med Hx  Surg Hx  Fam Hx  Soc Hx      Reviewed and updated as needed this visit by Provider         ROS:  Constitutional, HEENT, cardiovascular, pulmonary, gi  "and gu systems are negative, except as otherwise noted.    OBJECTIVE:     /85   Pulse 97   Temp 96.2  F (35.7  C) (Tympanic)   Ht 1.854 m (6' 1\")   Wt 127.1 kg (280 lb 3.2 oz)   SpO2 96%   BMI 36.97 kg/m    Body mass index is 36.97 kg/m .  GENERAL: healthy, alert and no distress  EYES: Eyes grossly normal to inspection, PERRL and conjunctivae and sclerae normal  HENT: ear canals and TM's normal, nose and mouth without ulcers or lesions  NECK: no adenopathy, no asymmetry, masses, or scars and thyroid normal to palpation  RESP: lungs clear to auscultation - no rales, rhonchi or wheezes  CV: regular rate and rhythm, normal S1 S2, no S3 or S4, no murmur, click or rub, no peripheral edema and peripheral pulses strong  ABDOMEN: soft, nontender, no hepatosplenomegaly, no masses and bowel sounds normal  MS: no gross musculoskeletal defects noted, no edema  SKIN: no suspicious lesions or rashes  NEURO: Normal strength and tone, mentation intact and speech normal  BACK: no CVA tenderness, no paralumbar tenderness  PSYCH: mentation appears normal, affect normal/bright  LYMPH: normal ant/post cervical, supraclavicular nodes    Diagnostic Test Results:  none     ASSESSMENT/PLAN:       BP Screening:   Last 3 BP Readings:    BP Readings from Last 3 Encounters:   02/11/19 131/85   08/06/18 111/75   07/17/18 121/71       The following was recommended to the patient:  Re-screen BP within a year and recommended lifestyle modifications  BMI:   Estimated body mass index is 36.97 kg/m  as calculated from the following:    Height as of this encounter: 1.854 m (6' 1\").    Weight as of this encounter: 127.1 kg (280 lb 3.2 oz).   Weight management plan: Discussed healthy diet and exercise guidelines      1. Moderate episode of recurrent major depressive disorder (H)  Referring for evaluation for ADHD but explained that his concentratioin issues could also be due to poorly controlled depression and anxiety.  PHQ-9=15.  Increase " Zoloft to 150 mg daily, return to clinic 1 month for recheck, sooner if concerns.  - MENTAL HEALTH REFERRAL  - Adult; Assessments and Testing; ADHD; [facility] [phone]; We will contact you to schedule the appointment or please call with any questions  - sertraline (ZOLOFT) 100 MG tablet; Take 1.5 tablets (150 mg) by mouth daily  Dispense: 135 tablet; Refill: 1    2. Obesity, Class I, BMI 30-34.9  Benefits of weight loss reviewed in detail, encouraged him to cut back on the carbohydrates in the diet, consume more fruits and vegetables, drink plenty of water, avoid fruit juices, sodas, get 150 min moderate exercise/week.  He is currently walking about 5000 steps/day, recommended he shoot for 10,000 steps/day. Recheck weight in 6 months.      3. Pervasive developmental disorder  Referring to Psych for evaluation for ADHD.  KASSIE-7=18 today.   - MENTAL HEALTH REFERRAL  - Adult; Assessments and Testing; ADHD; [facility] [phone]; We will contact you to schedule the appointment or please call with any questions    4. Need for prophylactic vaccination and inoculation against influenza        Work on weight loss  Regular exercise  See Patient Instructions    AB Hinds [facility]

## 2019-02-11 NOTE — PROGRESS NOTES

## 2019-02-12 ASSESSMENT — ANXIETY QUESTIONNAIRES: GAD7 TOTAL SCORE: 18

## 2019-03-27 ENCOUNTER — FCC EXTENDED DOCUMENTATION (OUTPATIENT)
Dept: PSYCHOLOGY | Facility: CLINIC | Age: 29
End: 2019-03-27

## 2019-03-27 ENCOUNTER — OFFICE VISIT (OUTPATIENT)
Dept: PSYCHOLOGY | Facility: CLINIC | Age: 29
End: 2019-03-27
Attending: NURSE PRACTITIONER
Payer: COMMERCIAL

## 2019-03-27 DIAGNOSIS — F33.1 MAJOR DEPRESSIVE DISORDER, RECURRENT EPISODE, MODERATE (H): ICD-10-CM

## 2019-03-27 DIAGNOSIS — F41.1 GENERALIZED ANXIETY DISORDER: Primary | ICD-10-CM

## 2019-03-27 PROCEDURE — 90834 PSYTX W PT 45 MINUTES: CPT | Performed by: PSYCHOLOGIST

## 2019-03-27 ASSESSMENT — ANXIETY QUESTIONNAIRES
GAD7 TOTAL SCORE: 16
1. FEELING NERVOUS, ANXIOUS, OR ON EDGE: NEARLY EVERY DAY
7. FEELING AFRAID AS IF SOMETHING AWFUL MIGHT HAPPEN: NEARLY EVERY DAY
2. NOT BEING ABLE TO STOP OR CONTROL WORRYING: NEARLY EVERY DAY
5. BEING SO RESTLESS THAT IT IS HARD TO SIT STILL: NOT AT ALL
3. WORRYING TOO MUCH ABOUT DIFFERENT THINGS: NEARLY EVERY DAY
6. BECOMING EASILY ANNOYED OR IRRITABLE: MORE THAN HALF THE DAYS
IF YOU CHECKED OFF ANY PROBLEMS ON THIS QUESTIONNAIRE, HOW DIFFICULT HAVE THESE PROBLEMS MADE IT FOR YOU TO DO YOUR WORK, TAKE CARE OF THINGS AT HOME, OR GET ALONG WITH OTHER PEOPLE: VERY DIFFICULT

## 2019-03-27 ASSESSMENT — PATIENT HEALTH QUESTIONNAIRE - PHQ9
SUM OF ALL RESPONSES TO PHQ QUESTIONS 1-9: 16
5. POOR APPETITE OR OVEREATING: MORE THAN HALF THE DAYS

## 2019-03-27 NOTE — PROGRESS NOTES
Progress Note - Initial Session    Client Name:  Teddy Shah Date: 3/27/2019         Service Type: Individual/ADHD Eval Intake  Video Visit: No     Session Start Time: 9:45  Session End Time: 10:30      Session Length: 45 minutes     Session #: 1    Attendees: Client attended alone     DATA:  Diagnostic Assessment in progress.  Unable to complete documentation at the conclusion of the first session due to  gathering extensive information regarding symptom presentation, history, and impact on functioning. Client reported significant history of anxiety, Asperger's and depression. No risk issues reported.  Interactive Complexity: No  Crisis: No    Intervention:    Reviewed psychosocial history; established rapport and reviewed Client's presenting concerns regarding ADHD. Completed risk assessment.    ASSESSMENT:  Mental Status Assessment:  Appearance:   Appropriate   Eye Contact:   Good   Psychomotor Behavior: Normal   Attitude:   Cooperative   Orientation:   All  Speech   Rate / Production: Normal    Volume:  Normal   Mood:    Normal  Affect:    Appropriate   Thought Content:  Clear   Thought Form:  Coherent  Logical   Insight:    Good       Safety Issues and Plan for Safety and Risk Management:  Client denies current fears or concerns for personal safety.  Client denies current or recent suicidal ideation or behaviors.  Client denies current or recent homicidal ideation or behaviors.  Client denies current or recent self injurious behavior or ideation.  Client denies other safety concerns.  A safety and risk management plan has not been developed at this time, however client was given the after-hours number / 911 should there be a change in any of these risk factors.  Client reports there are firearms in the house. The firearms are secured in a locked space.      Diagnostic Criteria:  A. Excessive anxiety and worry about a number of events or activities (such as work or school performance).   B. The  person finds it difficult to control the worry.  C. Select 3 or more symptoms (required for diagnosis). Only one item is required in children.   - Restlessness or feeling keyed up or on edge.    - Being easily fatigued.    - Difficulty concentrating or mind going blank.    - Irritability.    - Muscle tension.   D. The focus of the anxiety and worry is not confined to features of an Axis I disorder.  E. The anxiety, worry, or physical symptoms cause clinically significant distress or impairment in social, occupational, or other important areas of functioning.   F. The disturbance is not due to the direct physiological effects of a substance (e.g., a drug of abuse, a medication) or a general medical condition (e.g., hyperthyroidism) and does not occur exclusively during a Mood Disorder, a Psychotic Disorder, or a Pervasive Developmental Disorder.  A) Recurrent episode(s) - symptoms have been present during the same 2-week period and represent a change from previous functioning 5 or more symptoms (required for diagnosis)   - Depressed mood. Note: In children and adolescents, can be irritable mood.     - Diminished interest or pleasure in all, or almost all, activities.    - Psychomotor activity agitation.    - Fatigue or loss of energy.    - Feelings of worthlessness or inappropriate guilt.    - Diminished ability to think or concentrate, or indecisiveness.   B) The symptoms cause clinically significant distress or impairment in social, occupational, or other important areas of functioning  C) The episode is not attributable to the physiological effects of a substance or to another medical condition  D) The occurence of major depressive episode is not better explained by other thought / psychotic disorders  E) There has never been a manic episode or hypomanic episode      DSM5 Diagnoses: (Sustained by DSM5 Criteria Listed Above)  Diagnoses: 296.32 (F33.1) Major Depressive Disorder, Recurrent Episode, Moderate _  300.02  (F41.1) Generalized Anxiety Disorder   ASD (by history)  RULE OUT: ADHD  Psychosocial & Contextual Factors: work stress, academic stress, history of trauma; strained family dynamics  WHODAS 2.0 (12 item)            This questionnaire asks about difficulties due to health conditions. Health conditions  include  disease or illnesses, other health problems that may be short or long lasting,  injuries, mental health or emotional problems, and problems with alcohol or drugs.                     Think back over the past 30 days and answer these questions, thinking about how much  difficulty you had doing the following activities. For each question, please Yocha Dehe only  one response.    S1 Standing for long periods such as 30 minutes? None =         1   S2 Taking care of household responsibilities? Moderate =   3   S3 Learning a new task, for example, learning how to get to a new place? Severe =       4   S4 How much of a problem do you have joining community activities (for example, festivals, Christianity or other activities) in the same way as anyone else can? Severe =       4   S5 How much have you been emotionally affected by your health problems? Moderate =   3     In the past 30 days, how much difficulty did you have in:   S6 Concentrating on doing something for ten minutes? Moderate =   3   S7 Walking a long distance such as a kilometer (or equivalent)? None =         1   S8 Washing your whole body? None =         1   S9 Getting dressed? None =         1   S10 Dealing with people you do not know? Severe =       4   S11 Maintaining a friendship? Severe =       4   S12 Your day to day work? Moderate =   3     H1 Overall, in the past 30 days, how many days were these difficulties present? Record number of days 30   H2 In the past 30 days, for how many days were you totally unable to carry out your usual activities or work because of any health condition? Record number of days  0   H3 In the past 30 days, not counting the  days that you were totally unable, for how many days did you cut back or reduce your usual activities or work because of any health condition? Record number of days 0       Collateral Reports Completed:  Not Applicable      PLAN: (Homework, other):  Client RTC to complete ADHD DA. He was given self-report and collateral-report measures to complete for our next session.      Kandi De Leon, PhD, LP

## 2019-03-28 ASSESSMENT — ANXIETY QUESTIONNAIRES: GAD7 TOTAL SCORE: 16

## 2019-04-03 ENCOUNTER — OFFICE VISIT (OUTPATIENT)
Dept: PSYCHOLOGY | Facility: CLINIC | Age: 29
End: 2019-04-03
Attending: NURSE PRACTITIONER
Payer: COMMERCIAL

## 2019-04-03 ENCOUNTER — DOCUMENTATION ONLY (OUTPATIENT)
Dept: PSYCHOLOGY | Facility: CLINIC | Age: 29
End: 2019-04-03
Payer: COMMERCIAL

## 2019-04-03 DIAGNOSIS — F41.1 GENERALIZED ANXIETY DISORDER: Primary | ICD-10-CM

## 2019-04-03 DIAGNOSIS — F33.1 MAJOR DEPRESSIVE DISORDER, RECURRENT EPISODE, MODERATE (H): ICD-10-CM

## 2019-04-03 PROCEDURE — 96130 PSYCL TST EVAL PHYS/QHP 1ST: CPT | Mod: 59 | Performed by: PSYCHOLOGIST

## 2019-04-03 PROCEDURE — 90791 PSYCH DIAGNOSTIC EVALUATION: CPT | Performed by: PSYCHOLOGIST
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
## 2019-04-03 NOTE — PROGRESS NOTES
Client: Teddy Shah  MRN: [id number]  : 1990    Client completed the [place] Multiphasic Personality Inventory-2 (MMPI-2), a self-report personality inventory, as part of his evaluation. Validity scales indicate that the client responded in an open and consistent manner, resulting in a valid profile. The following results are likely to be an accurate reflection of client's current functioning. Client s responses reflect high levels of general emotional distress. Individuals with similar profiles experience moderate to severe depression with tension, anxiety, worry, intrusive thoughts, insecurity, and apprehensiveness. Depression is manifested in dysphoria and sad affect, feelings of helplessness, hopelessness, worthlessness, pessimism and inadequacy. They may experience withdrawal and a lack of drive, energy, interest, and motivation. Guilt and themes of failure, uselessness, and being overwhelmed are common. Individuals with similar profiles may ruminate about personal shortcomings, over-anticipate negative outcomes, and overreact to minor problems and mistakes. They may be introverted, feel awkward and self-conscious, and be easily embarrassed around others. They may experience self-doubt, reduced occupational performance, problems with concentration, memory, judgment, and decision making. They may also experience vegetative symptoms of depression such as anhedonia, sleep disturbance, and loss of interest, energy and motivation. They may experience a sense of mental failure or decline and the depletion of energy needed to accomplish mental work. Thinking and problem-solving are experienced as effortful and as subject to going off course even when significant effort is made. Thinking may be viewed as impaired or unreliable; they may have a sense that  I can t seem to get my mind right.

## 2019-04-04 ENCOUNTER — DOCUMENTATION ONLY (OUTPATIENT)
Dept: PSYCHOLOGY | Facility: CLINIC | Age: 29
End: 2019-04-04
Payer: COMMERCIAL

## 2019-04-04 DIAGNOSIS — F41.1 GENERALIZED ANXIETY DISORDER: Primary | ICD-10-CM

## 2019-04-04 DIAGNOSIS — F33.1 MAJOR DEPRESSIVE DISORDER, RECURRENT EPISODE, MODERATE (H): ICD-10-CM

## 2019-04-04 PROCEDURE — 96130 PSYCL TST EVAL PHYS/QHP 1ST: CPT | Performed by: PSYCHOLOGIST

## 2019-04-04 NOTE — PROGRESS NOTES
"Client Name: Teddy Shah  MRN: [id number]  : 1990    Tara Adult ADHD Rating Scale-IV: Self and Other Reports (BAARS-IV)  The BAARS-IV assesses for symptoms of ADHD that are experienced in one's daily life. This assessment measure includes self and collateral rating scales designed to provide information regarding current and childhood symptoms of ADHD including inattention, hyperactivity, and impulsivity. Self-report scores are reported as percentiles. Scores at the 76th-83rd percentile are considered marginal, scores at the 84th-92nd percentile are considered borderline, scores at the 93rd-95th percentile are considered mild, scores at the 96th-98th percentile are considered moderate, and those at the 99th percentile are considered severe. Collateral or \"other\" rating scales are reported as number of symptoms observed in comparison to those reported by the client. Norms and percentile scores are not available for collateral reports.      Current Symptoms Scale--Self Report:   Client completed the self-report inventory of current symptoms. The results indicate that the client's Total ADHD Score was 45 which places him in the 96th percentile for overall ADHD symptoms. In addition, the client endorsed the following occur \"often\" or \"very often\": 6/9 (97th percentile) Inattention symptoms, 2/9 (88th percentile) Hyperactivity/Impulsivity symptoms, and 5/9 (94th percentile) Sluggish Cognitive Tempo symptoms. Client indicated that the reported symptoms have resulted in impaired functioning in school, home, work and social relationships. Overall, the results suggest the client is reporting moderate symptoms of inattention at this time.      Current Symptoms Scale--Other Report:  Client was unable to find someone to complete the collateral report measure.     Childhood Symptoms Scale--Self-Report:  Client completed the self-report inventory of childhood symptoms. The results indicate that the client's Total ADHD " "Score was 54 which places him in the 97th percentile for overall ADHD symptoms in childhood. In addition, the client endorsed having experienced the following \"often\" or \"very often\": 9/9 (99th percentile) Inattention symptoms and 5/9 (93rd percentile) Hyperactivity-Impulsivity symptoms. Client indicated that the Client reported symptoms resulted in impaired functioning in school, home, and social relationships. Overall, the results suggest the client reported experiencing severe symptoms of inattention and mild symptoms of hyperactivity/impulsivity as a child.     Childhood Symptoms Scale--Other Report:  Client was unable to find someone to complete the collateral report measure.      Tara Functional Impairment Scale: Self and Other Reports (BFIS)  The BFIS is used to assess an individuals' psychosocial impairment in major life/daily activities that may be due to a mental health disorder. This assessment measure includes self and collateral rating scales. Self-report scores are reported as percentiles. Scores at the 76th-83rd percentile are considered marginal, scores at the 84th-92nd percentile are considered borderline, scores at the 93rd-95th percentile are considered mild, scores at the 96th-98th percentile are considered moderate, and those at the 99th percentile are considered severe. Collateral or \"other\" rating scales are reported as number of symptoms observed in comparison to those reported by the client. Norms and percentile scores are not available for collateral reports.      Results indicate the client identified impairment (scores at or greater than 93rd percentile) in the following areas: money management and driving. The client's Mean Impairment Score was 4.81 (85th percentile) indicating the client is reporting borderline impairment in functioning across domains. Client did not submit a completed collateral report for analysis.     Tara Deficits in Executive Functioning Scale (BDEFS)  The " "BDEFS is a measure used for evaluating dimensions of adult executive functioning in daily life. This assessment measure includes self and collateral rating scales. Self-report scores are reported as percentiles. Scores at the 76th-83rd percentile are considered marginal, scores at the 84th-92nd percentile are considered borderline, scores at the 93rd-95th percentile are considered mild, scores at the 96th-98th percentile are considered moderate, and those at the 99th percentile are considered severe. Collateral or \"other\" rating scales are reported as number of symptoms observed in comparison to those reported by the client. Norms and percentile scores are not available for collateral reports.      Results indicate the client's Total Executive Functioning Score was 185 (86th percentile). The ADHD-Executive Functioning Index score was 20 (51-75th percentile). These scores suggest the client has borderline deficits in executive functioning. These deficits may be due to ADHD or another mental health disorder. Results indicate the client identified significant deficits in the following area: self-organization/problem-solving (moderate). Client did not submit a completed collateral report for analysis.    Generalized Anxiety Disorder Questionnaire (KASSIE-7)  This questionnaire is designed to screen for anxiety in adults. Based on the client's score of 16, he is reporting severe symptoms of anxiety. Client identified the following symptoms of anxiety: feeling nervous, anxious, or on edge; not being able to stop or control worrying; worrying too much about many different things; trouble relaxing; being so restless it s hard to sit still; becoming easily annoyed or irritable, and feeling afraid as if something awful might happen.    Patient Health Questionnaire- 9 (PHQ-9)   This questionnaire is designed to screen for depression in adults. Based on the client's score of 16, he is reporting moderately severe symptoms of " depression. Client identified the following symptoms of depression: little interest or pleasure in doing things; feeling down, depressed, or hopeless; feeling tired or having little energy; poor appetite or overeating; feeling bad about yourself; trouble concentrating; and fidgeting/restlessness.

## 2019-04-05 ENCOUNTER — DOCUMENTATION ONLY (OUTPATIENT)
Dept: PSYCHOLOGY | Facility: CLINIC | Age: 29
End: 2019-04-05
Payer: COMMERCIAL

## 2019-04-05 DIAGNOSIS — F84.0 AUTISM SPECTRUM DISORDER: ICD-10-CM

## 2019-04-05 DIAGNOSIS — F90.0 ATTENTION DEFICIT HYPERACTIVITY DISORDER, INATTENTIVE TYPE: Primary | ICD-10-CM

## 2019-04-05 DIAGNOSIS — F41.1 GENERALIZED ANXIETY DISORDER: ICD-10-CM

## 2019-04-05 DIAGNOSIS — F33.1 MAJOR DEPRESSIVE DISORDER, RECURRENT EPISODE, MODERATE (H): ICD-10-CM

## 2019-04-05 PROCEDURE — 96130 PSYCL TST EVAL PHYS/QHP 1ST: CPT | Performed by: PSYCHOLOGIST
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
## 2019-04-07 ENCOUNTER — MYC REFILL (OUTPATIENT)
Dept: FAMILY MEDICINE | Facility: CLINIC | Age: 29
End: 2019-04-07

## 2019-04-07 DIAGNOSIS — F33.1 MODERATE EPISODE OF RECURRENT MAJOR DEPRESSIVE DISORDER (H): ICD-10-CM

## 2019-04-08 NOTE — TELEPHONE ENCOUNTER
"Requested Prescriptions   Pending Prescriptions Disp Refills     sertraline (ZOLOFT) 100 MG tablet  Last Written Prescription Date:  02/11/19  Last Fill Quantity: 135,  # refills: 1   Last Office Visit with [facility], P or [facility] prescribing provider:  02/11/19-Thein   Future Office Visit:    Next 5 appointments (look out 90 days)    Apr 15, 2019 10:00 AM CDT  Return Visit with Kandi De Leon, PhD  [facility] ([facility]) [address] [place] [id number] [phone]        135 tablet 1     Sig: Take 1.5 tablets (150 mg) by mouth daily       SSRIs Protocol Failed - 4/7/2019  7:53 PM        Failed - PHQ-9 score less than 5 in past 6 months     Please review last PHQ-9 score.           Passed - Medication is active on med list        Passed - Patient is age 18 or older        Passed - Recent (6 mo) or future (30 days) visit within the authorizing provider's specialty     Patient had office visit in the last 6 months or has a visit in the next 30 days with authorizing provider or within the authorizing provider's specialty.  See \"Patient Info\" tab in inbasket, or \"Choose Columns\" in Meds & Orders section of the refill encounter.              "

## 2019-04-09 NOTE — TELEPHONE ENCOUNTER
PHQ-9 SCORE 8/6/2018 2/11/2019 3/27/2019   PHQ-9 Total Score MyChart - - -   PHQ-9 Total Score 13 15 16     Routing refill request to provider for review/approval because:  PHQ-9 is 5 or more. Per protocol, RN cannot refill if PHQ-9 is over 4.        Robby Lindsey RN, BSN

## 2019-04-11 ENCOUNTER — MYC REFILL (OUTPATIENT)
Dept: FAMILY MEDICINE | Facility: CLINIC | Age: 29
End: 2019-04-11

## 2019-04-11 DIAGNOSIS — F33.1 MODERATE EPISODE OF RECURRENT MAJOR DEPRESSIVE DISORDER (H): ICD-10-CM

## 2019-04-11 RX ORDER — SERTRALINE HYDROCHLORIDE 100 MG/1
150 TABLET, FILM COATED ORAL DAILY
Qty: 135 TABLET | Refills: 1 | Status: SHIPPED | OUTPATIENT
Start: 2019-04-11 | End: 2020-03-03 | Stop reason: DRUGHIGH

## 2019-04-11 RX ORDER — SERTRALINE HYDROCHLORIDE 100 MG/1
150 TABLET, FILM COATED ORAL DAILY
Qty: 135 TABLET | Refills: 1 | Status: CANCELLED | OUTPATIENT
Start: 2019-04-11

## 2019-04-15 ENCOUNTER — OFFICE VISIT (OUTPATIENT)
Dept: PSYCHOLOGY | Facility: CLINIC | Age: 29
End: 2019-04-15
Payer: COMMERCIAL

## 2019-04-15 DIAGNOSIS — F90.0 ATTENTION DEFICIT HYPERACTIVITY DISORDER, INATTENTIVE TYPE: Primary | ICD-10-CM

## 2019-04-15 DIAGNOSIS — F84.0 AUTISM SPECTRUM DISORDER: ICD-10-CM

## 2019-04-15 DIAGNOSIS — F33.1 MAJOR DEPRESSIVE DISORDER, RECURRENT EPISODE, MODERATE (H): ICD-10-CM

## 2019-04-15 DIAGNOSIS — F41.1 GENERALIZED ANXIETY DISORDER: ICD-10-CM

## 2019-04-15 PROCEDURE — 90832 PSYTX W PT 30 MINUTES: CPT | Performed by: PSYCHOLOGIST

## 2019-04-15 NOTE — PROGRESS NOTES
Client Name: Teddy Shah     Date: 4/15/2019      Service Type: Individual (ADHD Evaluation feedback session)     Session Start Time: 10:00  Session End Time: 10:20       Session Length: 20 minutes      Session #: (feedback)     Attendees: Client attended alone        DATA        Treatment Objective(s) Addressed in This Session:   Provided feedback on ADHD evaluation. Reviewed test results in depth and answered client's questions. Client is diagnosed with Attention-Deficit/Hyperactivity Disorder, Predominantly Inattentive Presentation; Generalized Anxiety Disorder;  Major Depressive Disorder, Recurrent, Moderate; and Autism Spectrum Disorder (by history). Reviewed ADHD Coping Handout. This provider also completed full written report of evaluation including integration of testing data, summary, and recommendations. Please see Documentation Only dated 4/5/19.     Progress on / Status of Treatment Objective(s) / Homework:   Completed      Intervention:  ADHD Evaluation feedback; Reviewed report (can be found in Documentation Only encounter dated 4/5/19); Client was appreciative of the feedback and expressed understanding of the diagnoses.          ASSESSMENT: Current Emotional / Mental Status (status of significant symptoms):  Risk status (Self / Other harm or suicidal ideation)  Client denies current fears or concerns for personal safety.  Client denies current or recent suicidal ideation or behaviors.  Client denies current or recent homicidal ideation or behaviors.  Client denies current or recent self-injurious behavior or ideation.  Client denies other safety concerns.  A safety and risk management plan has not been developed at this time, however client was given the after-hours number / 911 should there be a change in any of these risk factors.     Appearance: Appropriate   Eye Contact: Good   Psychomotor Behavior: Normal   Attitude: Cooperative   Orientation: All  Speech  Rate / Production: Normal   Volume:  Normal   Mood: Normal  Affect: Appropriate   Thought Content: Clear   Thought Form: Coherent Logical   Insight: Good      Medication Review:  Client is prescribed Zoloft by PCP    Medication Compliance:  Yes     Changes in Health Issues:  None reported     Chemical Use Review:  Substance Use: Chemical use reviewed, no active concerns identified      Tobacco Use: No current tobacco use.      Collateral Reports Completed:  Routed note to Care Team Member(s)     PLAN: (Homework, other)     Recommendations:    1. Schedule a medication evaluation with your physician. Medications are often beneficial in treating anxiety and depression symptoms, as well as ADHD. It will be important that each condition is treated, as treatment for one without the other may lead to an increase in symptoms (e.g., treating ADHD, but not anxiety, can lead to increased anxiety).    2. Access resources through websites, books, and articles such as those provided in the Adult ADHD Symptom Management handout.      3. Consider working with an ADHD  or individual therapist to learn skills to assist with symptom management, as well as ways to improve relationships, etc. that may have been impacted by your symptoms.    4. Individual therapy is recommended. Therapies focused on identifying and challenging problematic thought and behavior patterns while increasing the use of healthy coping skills has been found to be effective in treating anxiety and depression. It will be important to set goals in this therapy and work actively toward achieving short-term successes that lead to the completion of each goal. Action-oriented therapies, such as CBT and ACT are particularly recommended for the treatment of chronic anxiety and depression.    5. The use of behavioral strategies such as diaphragmatic breathing, guided imagery, and mindfulness is often helpful in the management of anxiety symptoms.    6. Schedule a follow-up appointment with me in about  six weeks to review symptoms, treatment involvement, and struggles and/or successes.       Kandi De Leon, Ph.D.,   Licensed Psychologist

## 2019-04-22 ENCOUNTER — OFFICE VISIT (OUTPATIENT)
Dept: FAMILY MEDICINE | Facility: CLINIC | Age: 29
End: 2019-04-22
Payer: COMMERCIAL

## 2019-04-22 VITALS
BODY MASS INDEX: 36.68 KG/M2 | OXYGEN SATURATION: 96 % | TEMPERATURE: 96.1 F | DIASTOLIC BLOOD PRESSURE: 80 MMHG | HEIGHT: 73 IN | HEART RATE: 84 BPM | SYSTOLIC BLOOD PRESSURE: 122 MMHG | WEIGHT: 276.8 LBS

## 2019-04-22 DIAGNOSIS — E66.811 OBESITY, CLASS I, BMI 30-34.9: ICD-10-CM

## 2019-04-22 DIAGNOSIS — F33.1 MODERATE EPISODE OF RECURRENT MAJOR DEPRESSIVE DISORDER (H): ICD-10-CM

## 2019-04-22 DIAGNOSIS — F90.0 ADHD (ATTENTION DEFICIT HYPERACTIVITY DISORDER), INATTENTIVE TYPE: Primary | ICD-10-CM

## 2019-04-22 PROCEDURE — 99214 OFFICE O/P EST MOD 30 MIN: CPT | Performed by: NURSE PRACTITIONER

## 2019-04-22 RX ORDER — METHYLPHENIDATE HYDROCHLORIDE 36 MG/1
36 TABLET ORAL EVERY MORNING
Qty: 30 TABLET | Refills: 0 | Status: SHIPPED | OUTPATIENT
Start: 2019-04-22 | End: 2019-06-03

## 2019-04-22 ASSESSMENT — PATIENT HEALTH QUESTIONNAIRE - PHQ9
SUM OF ALL RESPONSES TO PHQ QUESTIONS 1-9: 19
5. POOR APPETITE OR OVEREATING: NEARLY EVERY DAY

## 2019-04-22 ASSESSMENT — ANXIETY QUESTIONNAIRES
GAD7 TOTAL SCORE: 18
2. NOT BEING ABLE TO STOP OR CONTROL WORRYING: NEARLY EVERY DAY
3. WORRYING TOO MUCH ABOUT DIFFERENT THINGS: NEARLY EVERY DAY
IF YOU CHECKED OFF ANY PROBLEMS ON THIS QUESTIONNAIRE, HOW DIFFICULT HAVE THESE PROBLEMS MADE IT FOR YOU TO DO YOUR WORK, TAKE CARE OF THINGS AT HOME, OR GET ALONG WITH OTHER PEOPLE: SOMEWHAT DIFFICULT
5. BEING SO RESTLESS THAT IT IS HARD TO SIT STILL: SEVERAL DAYS
6. BECOMING EASILY ANNOYED OR IRRITABLE: MORE THAN HALF THE DAYS
7. FEELING AFRAID AS IF SOMETHING AWFUL MIGHT HAPPEN: NEARLY EVERY DAY
1. FEELING NERVOUS, ANXIOUS, OR ON EDGE: NEARLY EVERY DAY

## 2019-04-22 ASSESSMENT — MIFFLIN-ST. JEOR: SCORE: 2279.44

## 2019-04-22 NOTE — PATIENT INSTRUCTIONS
At [facility], we strive to deliver an exceptional experience to you, every time we see you.  If you receive a survey in the mail, please send us back your thoughts. We really do value your feedback.    Your care team:                            Family Medicine Internal Medicine   MD Rolando Salvador MD Shantel Branch-Fleming, MD Katya Georgiev PA-C Megan Hill, APRN CNP    Yobani Camacho MD Pediatrics   Jacob Caicedo, SHAHANA Vizcarra, MD Mimi Camp APRN CNP   MD Kalie Wilkins MD Deborah Mielke, MD Lindsey Dee, APRN [facility]      Clinic hours: Monday - Thursday 7 am-7 pm; Fridays 7 am-5 pm.   Urgent care: Monday - Friday 11 am-9 pm; Saturday and Sunday 9 am-5 pm.  Pharmacy : Monday -Thursday 8 am-8 pm; Friday 8 am-6 pm; Saturday and Sunday 9 am-5 pm.     Clinic: [phone]   Pharmacy: [phone]        Patient Education     Treating Attention-Deficit/Hyperactivity Disorder (ADHD) in Adults  Attention-deficit/hyperactivity disorder (ADHD) starts in childhood. It may continue throughout your life. When it does, it may affect your job and even your relationships. Fortunately, with help, you can manage ADHD.     Treatment for ADHD can help you achieve your goals.   Therapy  Your therapist can help you learn healthy ways to cope with ADHD. Sometimes, your partner or family may attend your sessions with you. This helps them understand more about your disorder.  Coaching  An ADHD  works with you to achieve your goals. You ll learn the best ways to manage your time. You ll also learn to avoid clutter and noise that may distract you. In time, your life will have more order and structure. And your  will provide support and feedback on your progress.  Work  Look for jobs where you can be free and creative. Stay away from those that are dull or centered on details. You may still need to make a special effort. The following tips may  help:    Try to work at home, at least part time.    Ask for a private office.    Use headphones to muffle noise.    Work on more than one project at the same time. When you get bored with one, switch to the other.    Work on boring tasks when you feel most alert.    Have a schedule for each day.    Ask your  or  to help with details.    Use a day planner and to-do lists. Write yourself notes.    Reward yourself when you finish a task.  Medicines  In some cases, medicines can help control symptoms of ADHD. Most often, you'll use medicine along with therapy, coaching, and lifestyle changes. Your healthcare provider may prescribe a stimulant to help you stay focused. Or you may take a type of antidepressant. It may take some time to find what works best for you. Keep in mind that medicines don t cure ADHD. And they may cause side effects such as headaches, trouble sleeping, or stomach problems. Take your medicine as prescribed. If you re bothered by side effects, be sure to tell your healthcare provider.  Always talk with your healthcare provider before making any changes to your medicine.  Date Last Reviewed: 1/1/2017 2000-2018 [employer] [address] [place] [address], [place] [address]. All rights reserved. This information is not intended as a substitute for professional medical care. Always follow your healthcare professional's instructions.

## 2019-04-22 NOTE — PROGRESS NOTES
SUBJECTIVE:   Teddy Shah is a 28 year old male who presents to clinic today for the following   health issues:      Depression and Anxiety Follow-Up    Status since last visit: Worsened     Other associated symptoms:None    Complicating factors:     Significant life event: No     Current substance abuse: None    PHQ 8/6/2018 2/11/2019 3/27/2019   PHQ-9 Total Score 13 15 16   Q9: Thoughts of better off dead/self-harm past 2 weeks Not at all Not at all Not at all     KASSIE-7 SCORE 8/6/2018 2/11/2019 3/27/2019   Total Score - - -   Total Score 19 18 16     In the past two weeks have you had thoughts of suicide or self-harm?  No.    Do you have concerns about your personal safety or the safety of others?   No  PHQ-9  English  PHQ-9   Any Language  KASSIE-7  Suicide Assessment Five-step Evaluation and Treatment (SAFE-T)    Amount of exercise or physical activity: None    Problems taking medications regularly: No    Medication side effects: appetite changes    Diet: regular (no restrictions)        Depression and Anxiety Follow-Up    Status since last visit: No change    Other associated symptoms:None    Complicating factors:     Significant life event: No     Current substance abuse: None    PHQ 8/6/2018 2/11/2019 3/27/2019   PHQ-9 Total Score 13 15 16   Q9: Thoughts of better off dead/self-harm past 2 weeks Not at all Not at all Not at all     KASSIE-7 SCORE 8/6/2018 2/11/2019 3/27/2019   Total Score - - -   Total Score 19 18 16     In the past two weeks have you had thoughts of suicide or self-harm?  No.    Do you have concerns about your personal safety or the safety of others?   No  PHQ-9  English  PHQ-9   Any Language  KASSIE-7  Suicide Assessment Five-step Evaluation and Treatment (SAFE-T)    Additional history: as documented    Reviewed  and updated as needed this visit by clinical staff  Tobacco  Allergies  Meds  Med Hx  Surg Hx  Fam Hx  Soc Hx        Reviewed and updated as needed this visit by Provider          Patient Active Problem List   Diagnosis     Nasal obstruction     Deviated nasal septum     CARDIOVASCULAR SCREENING; LDL GOAL LESS THAN 160     Obesity, Class I, BMI 30-34.9     Pervasive developmental disorder     Moderate episode of recurrent major depressive disorder (H)     Anxiety     Tinnitus, bilateral     ADHD (attention deficit hyperactivity disorder), inattentive type     Past Surgical History:   Procedure Laterality Date     ADENOIDECTOMY       ENDOSCOPY      EGD     EXTRACTION(S) DENTAL      wisdom     PE TUBES      multiple     SEPTOPLASTY, TURBINOPLASTY, COMBINED Bilateral 6/19/2015    Procedure: COMBINED SEPTOPLASTY, TURBINOPLASTY;  Surgeon: Ally Finley MD;  Location:  OR       Social History     Tobacco Use     Smoking status: Never Smoker     Smokeless tobacco: Never Used   Substance Use Topics     Alcohol use: No     Family History   Problem Relation Age of Onset     Anesthesia Reaction Mother      Anesthesia Reaction Paternal Grandmother      Cerebrovascular Disease Paternal Grandmother      Coronary Artery Disease Paternal Grandmother         heart attack?     Depression Paternal Grandmother      Cerebrovascular Disease Father      Hypertension Father         ?     Personality Disorder Father      Substance Abuse Father      Coronary Artery Disease Maternal Grandfather         heart attack     Prostate Cancer Maternal Grandfather      Hypertension Maternal Grandmother      Lymphoma Maternal Grandmother      Bleeding Disorder No family hx of          Current Outpatient Medications   Medication Sig Dispense Refill     Acetaminophen (TYLENOL PO) Reported on 5/12/2017       hydrocortisone (WESTCORT) 0.2 % cream Apply twice daily to red areas on the face for 1 week, then weekends only. 45 g 3     IBUPROFEN PO Reported on 5/12/2017       methylphenidate (CONCERTA) 36 MG CR tablet Take 1 tablet (36 mg) by mouth every morning 30 tablet 0     sertraline (ZOLOFT) 100 MG tablet Take 1.5 tablets  "(150 mg) by mouth daily 135 tablet 1     triamcinolone (KENALOG) 0.1 % cream Apply sparingly to affected area twice a day  as needed 80 g 0     Recent Labs   Lab Test 10/13/17  1400 11/21/16  1450 03/09/15  1042   *  --   --    ALT  --  67  --    CR  --  0.92  --    GFRESTIMATED  --  >90  Non  GFR Calc    --    GFRESTBLACK  --  >90   GFR Calc    --    POTASSIUM  --  3.8 4.1   TSH 0.83  --   --         ROS:  Constitutional, HEENT, cardiovascular, pulmonary, gi and gu systems are negative, except as otherwise noted.    OBJECTIVE:     /80   Pulse 84   Temp 96.1  F (35.6  C) (Oral)   Ht 1.854 m (6' 1\")   Wt 125.6 kg (276 lb 12.8 oz)   SpO2 96%   BMI 36.52 kg/m    Body mass index is 36.52 kg/m .  GENERAL: healthy, alert and no distress  EYES: Eyes grossly normal to inspection, PERRL and conjunctivae and sclerae normal  HENT: ear canals and TM's normal, nose and mouth without ulcers or lesions  NECK: no adenopathy, no asymmetry, masses, or scars and thyroid normal to palpation  RESP: lungs clear to auscultation - no rales, rhonchi or wheezes  CV: regular rate and rhythm, normal S1 S2, no S3 or S4, no murmur, click or rub, no peripheral edema and peripheral pulses strong  ABDOMEN: soft, nontender, no hepatosplenomegaly, no masses and bowel sounds normal  MS: no gross musculoskeletal defects noted, no edema  SKIN: no suspicious lesions or rashes  NEURO: Normal strength and tone, mentation intact and speech normal  PSYCH: mentation appears normal, affect normal/bright  LYMPH: normal ant/post cervical, supraclavicular nodes    Diagnostic Test Results:  none     ASSESSMENT/PLAN:       BP Screening:   Last 3 BP Readings:    BP Readings from Last 3 Encounters:   04/22/19 122/80   02/11/19 131/85   08/06/18 111/75       The following was recommended to the patient:  Re-screen BP within a year and recommended lifestyle modifications  BMI:   Estimated body mass index is 36.52 kg/m  " "as calculated from the following:    Height as of this encounter: 1.854 m (6' 1\").    Weight as of this encounter: 125.6 kg (276 lb 12.8 oz).   Weight management plan: Discussed healthy diet and exercise guidelines      1. ADHD (attention deficit hyperactivity disorder), inattentive type  Starting Concerta.  I ended our visit today by discussing the patient's diagnoses and recommended treatment. Please refer to today's diagnoses and orders for further details. I briefly discussed the pathophysiology of these conditions and outlined their expected course. I discussed the warning symptoms and signs that indicate an atypical course that would need urgent or emergent care. I also discussed self care strategies for symptom relief.    Common side effects of medications prescribed at this visit were discussed with the patient. Severe side effects, including current applicable black box warnings, were discussed. We discussed options for dealing with these possible side effects and allergic reactions, based on their severity. Return to clinic 3 weeks for medication check.  - methylphenidate (CONCERTA) 36 MG CR tablet; Take 1 tablet (36 mg) by mouth every morning  Dispense: 30 tablet; Refill: 0    2. Moderate episode of recurrent major depressive disorder (H)  Elevated scores today, thinks it is due to new diagnosis, starting medication. He is getting a puppy at the end of the month to have as a support animal which he thinks will help. No SI/HI.    3. Obesity, Class I, BMI 30-34.9  Benefits of weight loss reviewed in detail, encouraged him to cut back on the carbohydrates in the diet, consume more fruits and vegetables, drink plenty of water, avoid fruit juices, sodas, get 150 min moderate exercise/week.  Recheck weight in 6 months.        Work on weight loss  Regular exercise  See Patient Instructions    AB [facility]      "

## 2019-04-22 NOTE — LETTER
My Depression Action Plan  Name: Teddy Shah   Date of Birth 1990  Date: 4/22/2019    My doctor: Ashanti Dee   My clinic: [address] [id number] [phone]          GREEN    ZONE   Good Control    What it looks like:     Things are going generally well. You have normal up s and down s. You may even feel depressed from time to time, but bad moods usually last less than a day.   What you need to do:  1. Continue to care for yourself (see self care plan)  2. Check your depression survival kit and update it as needed  3. Follow your physician s recommendations including any medication.  4. Do not stop taking medication unless you consult with your physician first.           YELLOW         ZONE Getting Worse    What it looks like:     Depression is starting to interfere with your life.     It may be hard to get out of bed; you may be starting to isolate yourself from others.    Symptoms of depression are starting to last most all day and this has happened for several days.     You may have suicidal thoughts but they are not constant.   What you need to do:     1. Call your care team, your response to treatment will improve if you keep your care team informed of your progress. Yellow periods are signs an adjustment may need to be made.     2. Continue your self-care, even if you have to fake it!    3. Talk to someone in your support network    4. Open up your depression survival kit           RED    ZONE Medical Alert - Get Help    What it looks like:     Depression is seriously interfering with your life.     You may experience these or other symptoms: You can t get out of bed most days, can t work or engage in other necessary activities, you have trouble taking care of basic hygiene, or basic responsibilities, thoughts of suicide or death that will not go away, self-injurious behavior.     What you need to do:  1. Call your care  team and request a same-day appointment. If they are not available (weekends or after hours) call your local crisis line, emergency room or 911.            Depression Self Care Plan / Survival Kit    Self-Care for Depression  Here s the deal. Your body and mind are really not as separate as most people think.  What you do and think affects how you feel and how you feel influences what you do and think. This means if you do things that people who feel good do, it will help you feel better.  Sometimes this is all it takes.  There is also a place for medication and therapy depending on how severe your depression is, so be sure to consult with your medical provider and/ or Behavioral Health Consultant if your symptoms are worsening or not improving.     In order to better manage my stress, I will:    Exercise  Get some form of exercise, every day. This will help reduce pain and release endorphins, the  feel good  chemicals in your brain. This is almost as good as taking antidepressants!  This is not the same as joining a gym and then never going! (they count on that by the way ) It can be as simple as just going for a walk or doing some gardening, anything that will get you moving.      Hygiene   Maintain good hygiene (Get out of bed in the morning, Make your bed, Brush your teeth, Take a shower, and Get dressed like you were going to work, even if you are unemployed).  If your clothes don't fit try to get ones that do.    Diet  I will strive to eat foods that are good for me, drink plenty of water, and avoid excessive sugar, caffeine, alcohol, and other mood-altering substances.  Some foods that are helpful in depression are: complex carbohydrates, B vitamins, flaxseed, fish or fish oil, fresh fruits and vegetables.    Psychotherapy  I agree to participate in Individual Therapy (if recommended).    Medication  If prescribed medications, I agree to take them.  Missing doses can result in serious side effects.  I  understand that drinking alcohol, or other illicit drug use, may cause potential side effects.  I will not stop my medication abruptly without first discussing it with my provider.    Staying Connected With Others  I will stay in touch with my friends, family members, and my primary care provider/team.    Use your imagination  Be creative.  We all have a creative side; it doesn t matter if it s oil painting, sand castles, or mud pies! This will also kick up the endorphins.    Witness Beauty  (AKA stop and smell the roses) Take a look outside, even in mid-winter. Notice colors, textures. Watch the squirrels and birds.     Service to others  Be of service to others.  There is always someone else in need.  By helping others we can  get out of ourselves  and remember the really important things.  This also provides opportunities for practicing all the other parts of the program.    Humor  Laugh and be silly!  Adjust your TV habits for less news and crime-drama and more comedy.    Control your stress  Try breathing deep, massage therapy, biofeedback, and meditation. Find time to relax each day.     My support system    Clinic Contact:  Phone number:    Contact 1:  Phone number:    Contact 2:  Phone number:    [facility]/:  Phone number:    Therapist:  Phone number:    Local crisis center:    Phone number:    Other community support:  Phone number:

## 2019-04-23 ASSESSMENT — ANXIETY QUESTIONNAIRES: GAD7 TOTAL SCORE: 18

## 2019-05-25 ENCOUNTER — MYC REFILL (OUTPATIENT)
Dept: FAMILY MEDICINE | Facility: CLINIC | Age: 29
End: 2019-05-25

## 2019-05-25 DIAGNOSIS — F90.0 ADHD (ATTENTION DEFICIT HYPERACTIVITY DISORDER), INATTENTIVE TYPE: ICD-10-CM

## 2019-05-28 ENCOUNTER — MYC REFILL (OUTPATIENT)
Dept: FAMILY MEDICINE | Facility: CLINIC | Age: 29
End: 2019-05-28

## 2019-05-28 DIAGNOSIS — F90.0 ADHD (ATTENTION DEFICIT HYPERACTIVITY DISORDER), INATTENTIVE TYPE: ICD-10-CM

## 2019-05-28 RX ORDER — METHYLPHENIDATE HYDROCHLORIDE 36 MG/1
36 TABLET ORAL EVERY MORNING
Qty: 30 TABLET | Refills: 0 | OUTPATIENT
Start: 2019-05-28

## 2019-05-28 NOTE — TELEPHONE ENCOUNTER
Requested Prescriptions   Pending Prescriptions Disp Refills     methylphenidate (CONCERTA) 36 MG CR tablet        Last Written Prescription Date:  04/22/19  Last Fill Quantity: 30,   # refills: 0  Last Office Visit: 04/22/19-thein  Future Office visit:       Routing refill request to provider for review/approval because:  Drug not on the [facility], P or [facility] refill protocol or controlled substance 30 tablet 0     Sig: Take 1 tablet (36 mg) by mouth every morning       There is no refill protocol information for this order

## 2019-05-30 RX ORDER — METHYLPHENIDATE HYDROCHLORIDE 36 MG/1
36 TABLET ORAL EVERY MORNING
Qty: 30 TABLET | Refills: 0 | OUTPATIENT
Start: 2019-05-30

## 2019-05-30 NOTE — TELEPHONE ENCOUNTER
Pls have patient schedule E visit or phone visit to discuss his use of Concerta.  Ashanti BERGER, CNP

## 2019-05-31 NOTE — TELEPHONE ENCOUNTER
This writer attempted to contact patient on 05/31/19      Reason for call refill and left message.      If patient calls back:   2nd floor [place] Care Team (MA/TC) called. Inform patient that someone from the team will contact them, document that pt called and route to care team.         Liz Pickett MA

## 2019-06-01 ENCOUNTER — MYC MEDICAL ADVICE (OUTPATIENT)
Dept: FAMILY MEDICINE | Facility: CLINIC | Age: 29
End: 2019-06-01

## 2019-06-03 ENCOUNTER — E-VISIT (OUTPATIENT)
Dept: FAMILY MEDICINE | Facility: CLINIC | Age: 29
End: 2019-06-03
Payer: COMMERCIAL

## 2019-06-03 DIAGNOSIS — F90.0 ADHD (ATTENTION DEFICIT HYPERACTIVITY DISORDER), INATTENTIVE TYPE: Primary | ICD-10-CM

## 2019-06-03 PROCEDURE — 99444 ZZC PHYSICIAN ONLINE EVALUATION & MANAGEMENT SERVICE: CPT | Performed by: NURSE PRACTITIONER

## 2019-06-03 RX ORDER — METHYLPHENIDATE HYDROCHLORIDE 36 MG/1
36 TABLET ORAL DAILY
Qty: 30 TABLET | Refills: 0 | Status: SHIPPED | OUTPATIENT
Start: 2019-06-03 | End: 2019-08-29

## 2019-06-03 RX ORDER — METHYLPHENIDATE HYDROCHLORIDE 36 MG/1
36 TABLET ORAL DAILY
Qty: 30 TABLET | Refills: 0 | Status: SHIPPED | OUTPATIENT
Start: 2019-07-04 | End: 2019-08-29

## 2019-06-03 RX ORDER — METHYLPHENIDATE HYDROCHLORIDE 36 MG/1
36 TABLET ORAL DAILY
Qty: 30 TABLET | Refills: 0 | Status: SHIPPED | OUTPATIENT
Start: 2019-08-04 | End: 2019-08-29

## 2019-06-03 NOTE — TELEPHONE ENCOUNTER
Pls have him set up phone or E visit ad I can address his ADHD medications.  Ashanti BERGER, CNP

## 2019-06-03 NOTE — TELEPHONE ENCOUNTER
Bringing written Rxs for the Concerta 36 mg with start dates of: 6/3/19, 7/4/19 and 8/4/19 to the  by 5:00 pm today, 6/3/19. Sent patient a My Chart message regarding this.  Tika Covarrubias MA/  For Teams [place]

## 2019-06-09 ENCOUNTER — MYC MEDICAL ADVICE (OUTPATIENT)
Dept: FAMILY MEDICINE | Facility: CLINIC | Age: 29
End: 2019-06-09

## 2019-06-24 ENCOUNTER — MYC MEDICAL ADVICE (OUTPATIENT)
Dept: FAMILY MEDICINE | Facility: CLINIC | Age: 29
End: 2019-06-24

## 2019-08-29 ENCOUNTER — OFFICE VISIT (OUTPATIENT)
Dept: FAMILY MEDICINE | Facility: CLINIC | Age: 29
End: 2019-08-29
Payer: COMMERCIAL

## 2019-08-29 VITALS
WEIGHT: 276 LBS | TEMPERATURE: 97.7 F | HEIGHT: 73 IN | SYSTOLIC BLOOD PRESSURE: 123 MMHG | DIASTOLIC BLOOD PRESSURE: 78 MMHG | HEART RATE: 83 BPM | OXYGEN SATURATION: 94 % | BODY MASS INDEX: 36.58 KG/M2

## 2019-08-29 DIAGNOSIS — F33.1 MODERATE EPISODE OF RECURRENT MAJOR DEPRESSIVE DISORDER (H): ICD-10-CM

## 2019-08-29 DIAGNOSIS — F90.0 ADHD (ATTENTION DEFICIT HYPERACTIVITY DISORDER), INATTENTIVE TYPE: Primary | ICD-10-CM

## 2019-08-29 DIAGNOSIS — F84.9 PERVASIVE DEVELOPMENTAL DISORDER: ICD-10-CM

## 2019-08-29 PROCEDURE — 99214 OFFICE O/P EST MOD 30 MIN: CPT | Performed by: NURSE PRACTITIONER

## 2019-08-29 RX ORDER — DEXTROAMPHETAMINE SACCHARATE, AMPHETAMINE ASPARTATE MONOHYDRATE, DEXTROAMPHETAMINE SULFATE AND AMPHETAMINE SULFATE 5; 5; 5; 5 MG/1; MG/1; MG/1; MG/1
20 CAPSULE, EXTENDED RELEASE ORAL DAILY
Qty: 30 CAPSULE | Refills: 0 | Status: SHIPPED | OUTPATIENT
Start: 2019-08-29 | End: 2019-10-30

## 2019-08-29 ASSESSMENT — ANXIETY QUESTIONNAIRES
IF YOU CHECKED OFF ANY PROBLEMS ON THIS QUESTIONNAIRE, HOW DIFFICULT HAVE THESE PROBLEMS MADE IT FOR YOU TO DO YOUR WORK, TAKE CARE OF THINGS AT HOME, OR GET ALONG WITH OTHER PEOPLE: VERY DIFFICULT
3. WORRYING TOO MUCH ABOUT DIFFERENT THINGS: NEARLY EVERY DAY
7. FEELING AFRAID AS IF SOMETHING AWFUL MIGHT HAPPEN: NEARLY EVERY DAY
1. FEELING NERVOUS, ANXIOUS, OR ON EDGE: NEARLY EVERY DAY
6. BECOMING EASILY ANNOYED OR IRRITABLE: MORE THAN HALF THE DAYS
2. NOT BEING ABLE TO STOP OR CONTROL WORRYING: NEARLY EVERY DAY
5. BEING SO RESTLESS THAT IT IS HARD TO SIT STILL: SEVERAL DAYS
GAD7 TOTAL SCORE: 18

## 2019-08-29 ASSESSMENT — MIFFLIN-ST. JEOR: SCORE: 2275.81

## 2019-08-29 ASSESSMENT — PAIN SCALES - GENERAL: PAINLEVEL: NO PAIN (0)

## 2019-08-29 ASSESSMENT — PATIENT HEALTH QUESTIONNAIRE - PHQ9
5. POOR APPETITE OR OVEREATING: NEARLY EVERY DAY
SUM OF ALL RESPONSES TO PHQ QUESTIONS 1-9: 16

## 2019-08-29 NOTE — PATIENT INSTRUCTIONS
At [facility], we strive to deliver an exceptional experience to you, every time we see you.  If you receive a survey in the mail, please send us back your thoughts. We really do value your feedback.    Your care team:                            Family Medicine Internal Medicine   MD Rolando Salvador MD Shantel Branch-Fleming, MD Katya Georgiev PA-C Megan Hill, APRN CNP    Yobani Camacho, MD Pediatrics   Jacob Caicedo, SHAHAAN Vizcarra, MD Mimi Camp APRN CNP   MD Kalie Wilkins MD Deborah Mielke, MD Lindsey Dee, APRN [facility]      Clinic hours: Monday - Thursday 7 am-7 pm; Fridays 7 am-5 pm.   Urgent care: Monday - Friday 11 am-9 pm; Saturday and Sunday 9 am-5 pm.  Pharmacy : Monday -Thursday 8 am-8 pm; Friday 8 am-6 pm; Saturday and Sunday 9 am-5 pm.     Clinic: [phone]   Pharmacy: [phone]        Patient Education     Amphetamine; Dextroamphetamine extended-release capsules  Brand Names: Adderall XR, Mydayis  What is this medicine?  AMPHETAMINE; DEXTROAMPHETAMINE (am FET a meen; dex troe am FET a meen) is used to treat attention-deficit hyperactivity disorder (ADHD). Federal law prohibits giving this medicine to any person other than the person for whom it was prescribed. Do not share this medicine with anyone else.  How should I use this medicine?  Take this medicine by mouth with a glass of water. Follow the directions on the prescription label. This medicine is taken just one time per day, usually in the morning after waking up. Take with or without food. Do not chew or crush this medicine. You may open the capsules and sprinkle the medicine on a spoonful of applesauce. If sprinkled on applesauce, take the dose immediately and do not crush or chew. Do not take your medicine more often than directed.  A special MedGuide will be given to you by the pharmacist with each prescription and refill. Be sure to read this information  carefully each time.  Talk to your pediatrician regarding the use of this medicine in children. While this drug may be prescribed for children as young as 6 years for selected conditions, precautions do apply. Some extended-release capsules are recommended for use only in children 13 years of age and older.  What side effects may I notice from receiving this medicine?  Side effects that you should report to your doctor or health care professional as soon as possible:    allergic reactions like skin rash, itching or hives, swelling of the face, lips, or tongue    anxious    breathing problems    changes in emotions or moods    changes in vision    chest pain or chest tightness    fast, irregular heartbeat    fingers or toes feel numb, cool, painful    hallucination, loss of contact with reality    high blood pressure    males: prolonged or painful erection    seizures    signs and symptoms of serotonin syndrome like confusion, increased sweating, fever, tremor, stiff muscles, diarrhea    signs and symptoms of a stroke like changes in vision; confusion; trouble speaking or understanding; severe headaches; sudden numbness or weakness of the face, arm or leg; trouble walking; dizziness; loss of balance or coordination    suicidal thoughts or other mood changes    uncontrollable head, mouth, neck, arm, or leg movements  Side effects that usually do not require medical attention (report to your doctor or health care professional if they continue or are bothersome):    dry mouth    headache    irritability    loss of appetite    nausea    trouble sleeping    weight loss  What may interact with this medicine?  Do not take this medicine with any of the following medications:    MAOIs like Carbex, Eldepryl, Marplan, Nardil, and Parnate    other stimulant medicines for attention disorders  This medicine may also interact with the following medications:    acetazolamide    alcohol    ammonium chloride    antacids    ascorbic  acid    atomoxetine    caffeine    certain medicines for blood pressure    certain medicines for depression, anxiety, or psychotic disturbances    certain medicines for seizures like carbamazepine, phenobarbital, phenytoin    certain medicines for stomach problems like cimetidine, ranitidine, famotidine, esomeprazole, omeprazole, lansoprazole, pantoprazole    lithium    medicines for colds and breathing difficulties    medicines for diabetes    medicines or dietary supplements for weight loss or to stay awake    methenamine    narcotic medicines for pain    quinidine    ritonavir    sodium bicarbonate    [place]'s wort  What if I miss a dose?  If you miss a dose, take it as soon as you can in the morning, but do not take it later in the day because it can cause trouble sleeping. If it is almost time for your next dose, take only that dose. Do not take double or extra doses.  Where should I keep my medicine?  Keep out of the reach of children. This medicine can be abused. Keep your medicine in a safe place to protect it from theft. Do not share this medicine with anyone. Selling or giving away this medicine is dangerous and against the law.  Store at room temperature between 15 and 30 degrees C (59 and 86 degrees F). Keep container tightly closed. Protect from light. Throw away any unused medicine after the expiration date.  What should I tell my health care provider before I take this medicine?  They need to know if you have any of these conditions:    anxiety or panic attacks    circulation problems in fingers and toes    glaucoma    hardening or blockages of the arteries or heart blood vessels    heart disease or a heart defect    high blood pressure    history of a drug or alcohol abuse problem    history of stroke    kidney disease    liver disease    mental illness    seizures    suicidal thoughts, plans, or attempt; a previous suicide attempt by you or a family member    thyroid disease    Tourette's  syndrome    an unusual or allergic reaction to dextroamphetamine, other amphetamines, other medicines, foods, dyes, or preservatives    pregnant or trying to get pregnant    breast-feeding  What should I watch for while using this medicine?  Visit your doctor or health care professional for regular checks on your progress. This prescription requires that you follow special procedures with your doctor and pharmacy. You will need to have a new written prescription from your doctor every time you need a refill.  This medicine may affect your concentration, or hide signs of tiredness. Until you know how this medicine affects you, do not drive, ride a bicycle, use machinery, or do anything that needs mental alertness. Alcohol should be avoided with some brands of this medicine. Talk to your doctor or health care professional if you have questions.  Tell your doctor or health care professional if this medicine loses its effects, or if you feel you need to take more than the prescribed amount. Do not change the dosage without talking to your doctor or health care professional.  Decreased appetite is a common side effect when starting this medicine. Eating small, frequent meals or snacks can help. Talk to your doctor if you continue to have poor eating habits. Height and weight growth of a child taking this medicine will be monitored closely.  Do not take this medicine close to bedtime. It may prevent you from sleeping.  If you are going to need surgery, an MRI, a CT scan, or other procedure, tell your doctor that you are taking this medicine. You may need to stop taking this medicine before the procedure.  Tell your doctor or healthcare professional right away if you notice unexplained wounds on your fingers and toes while taking this medicine. You should also tell your healthcare provider if you experience numbness or pain, changes in the skin color, or sensitivity to temperature in your fingers or toes.  NOTE:This sheet  is a summary. It may not cover all possible information. If you have questions about this medicine, talk to your doctor, pharmacist, or health care provider. Copyright  2019 [employer]           Patient Education     Depression: Tips to Help Yourself    As your healthcare providers help treat your depression, you can also help yourself. Keep in mind that your illness affects you emotionally, physically, mentally, and socially. So full recovery will take time. Take care of your body and your soul, and be patient with yourself as you get better.  Self-care    Educate yourself. Read about treatment and medicine options. If you have the energy, attend local conferences or support groups. Keep a list of useful websites and helpful books and use them as needed. This illness is not your fault. Don t blame yourself for your depression.    Manage early symptoms. If you notice symptoms returning, experience triggers, or identify other factors that may lead to a depressive episode, get help as soon as possible. Ask trusted friends and family to monitor your behavior and let you know if they see anything of concern.    Work with your provider. Find a provider you can trust. Communicate honestly with that person and share information on your treatment for depression and your reaction to medicines.    Be prepared for a crisis. Know what to do if you experience a crisis. Keep the phone number of a crisis hotline and know the location of your community's urgent care centers and the closest emergency department.    Hold off on big decisions. Depression can cloud your judgment. So wait until you feel better before making major life decisions, such as changing jobs, moving, or getting  or .    Be patient. Recovering from depression is a process. Don t be discouraged if it takes some time to feel better.    Keep it simple. Depression saps your energy and concentration. So you won t be able to do all the things you used to  do. Set small goals and do what you can.    Be with others. Don t isolate yourself--you ll only feel worse. Try to be with other people. And take part in fun activities when you can. Go to a movie, ballgame, Faith service, or social event. Talk openly with people you can trust. And accept help when it s offered.  Take care of your body  People with depression often lose the desire to take care of themselves. That only makes their problems worse. During treatment and afterward, make a point to:    Exercise. It s a great way to take care of your body. And studies have shown that exercise helps fight depression.    Avoid drugs and alcohol. These may ease the pain in the short term. But they ll only make your problems worse in the long run.    Get relief from stress. Ask your healthcare provider for relaxation exercises and techniques to help relieve stress.    Eat right. A balanced and healthy diet helps keep your body healthy.  Date Last Reviewed: 1/1/2017 2000-2018 [employer]. [address] [place] [address], [place] [address]. All rights reserved. This information is not intended as a substitute for professional medical care. Always follow your healthcare professional's instructions.

## 2019-08-29 NOTE — PROGRESS NOTES
"Subjective     Teddy Shah is a 28 year old male who presents to clinic today for the following health issues:    HPI     Teddy Shah is a 28 year old  Male her for ADHD follow up.  Discuss going back on ADHD medication     CURRENT CONCERNS:  Not taking Concerta and wanting to change to a different medication- he had difficulty with a new supplier/states the medication made his\" executive function go haywire.\"  He couldn't concentrate, he was feeling  \"almost manic, freaked out/terrified\" on it. He's taken Ritalin as a child, can't remember how he did on it.     Review of past medical history:     ADHD (attention deficit hyperactivity disorder), inattentive type  Moderate episode of recurrent major depressive disorder (H)     CURRENT PRESCRIPTIONS:    None     MEDICATION BENEFITS:  Controlled symptoms:  Accepting limits and Peer relations  Uncontrolled symptoms:  Attention span, Distractability, Finishing tasks and Frustration tolerance     MEDICATION SIDE EFFECTS:   Has:  none  Denies:  none       He is being considered for a promotion at work, needs to concentrate better at work.   ROS:  Constitutional, HEENT, cardiovascular, pulmonary, gi and gu systems are negative, except as otherwise noted    OBJECTIVE:  /78   Pulse 83   Temp 97.7  F (36.5  C) (Oral)   Ht 1.854 m (6' 1\")   Wt 125.2 kg (276 lb)   SpO2 94%   BMI 36.41 kg/m    EXAM:GENERAL:  Alert and interactive., EYES:  Normal extra-ocular movements.  PERRLA, LUNGS:  Clear, HEART:  Normal rate and rhythm.  Normal S1 and S2.  No murmurs., ABDOMEN:  Soft, non-tender, no organomegaly. and NEURO:  No tremor.  He does have a tic-self PEEP type breathing, Normal tone and strength. Normal gait and balance.      ASSESSMENT/Plan:  ADHD--inattentive only   Medication:  Adderall XR 20 mg in the morning  Patient to follow back 3 weeks for medication check (OK to do phone or E visit).  I ended our visit today by discussing the patient's diagnoses and " recommended treatment. Please refer to today's diagnoses and orders for further details. I briefly discussed the pathophysiology of these conditions and outlined their expected course. I discussed the warning symptoms and signs that indicate an atypical course that would need urgent or emergent care. I also discussed self care strategies for symptom relief.    Common side effects of medications prescribed at this visit were discussed with the patient. Severe side effects, including current applicable black box warnings, were discussed. We discussed options for dealing with these possible side effects and allergic reactions, based on their severity    MODERATE EPISODE OF RECURRENT MAJOR DEPRESSIVE DISORDER (H)  Patient continues on 150 mg Zoloft daily and opts to continue same dose until his ADHD is stabilized.  He doesn't feel that his depressive symptoms have worsened appreciably  Despite his elevated scores today.No SI/HI.  Will re evaluate in 3 weeks, sooner if new/worsening symptoms.    PERVASIVE DEVELOPMENTAL DISORDER  Patient is doing well at work, has a good support system. Continue to monitor at each visit.

## 2019-08-30 ASSESSMENT — ANXIETY QUESTIONNAIRES: GAD7 TOTAL SCORE: 18

## 2019-09-27 ENCOUNTER — VIRTUAL VISIT (OUTPATIENT)
Dept: FAMILY MEDICINE | Facility: CLINIC | Age: 29
End: 2019-09-27
Payer: COMMERCIAL

## 2019-09-27 DIAGNOSIS — F90.0 ADHD (ATTENTION DEFICIT HYPERACTIVITY DISORDER), INATTENTIVE TYPE: Primary | ICD-10-CM

## 2019-09-27 PROCEDURE — 99441 ZZC PHYSICIAN TELEPHONE EVALUATION 5-10 MIN: CPT | Performed by: NURSE PRACTITIONER

## 2019-09-27 RX ORDER — DEXTROAMPHETAMINE SACCHARATE, AMPHETAMINE ASPARTATE MONOHYDRATE, DEXTROAMPHETAMINE SULFATE AND AMPHETAMINE SULFATE 7.5; 7.5; 7.5; 7.5 MG/1; MG/1; MG/1; MG/1
30 CAPSULE, EXTENDED RELEASE ORAL DAILY
Qty: 30 CAPSULE | Refills: 0 | Status: SHIPPED | OUTPATIENT
Start: 2019-09-27 | End: 2020-06-07

## 2019-09-27 ASSESSMENT — PAIN SCALES - GENERAL: PAINLEVEL: NO PAIN (0)

## 2019-09-27 NOTE — PATIENT INSTRUCTIONS
Patient Education     Amphetamine; Dextroamphetamine extended-release capsules  Brand Names: Adderall XR, Mydayis  What is this medicine?  AMPHETAMINE; DEXTROAMPHETAMINE (am FET a meen; dex troe am FET a meen) is used to treat attention-deficit hyperactivity disorder (ADHD). Federal law prohibits giving this medicine to any person other than the person for whom it was prescribed. Do not share this medicine with anyone else.  How should I use this medicine?  Take this medicine by mouth with a glass of water. Follow the directions on the prescription label. This medicine is taken just one time per day, usually in the morning after waking up. Take with or without food. Do not chew or crush this medicine. You may open the capsules and sprinkle the medicine on a spoonful of applesauce. If sprinkled on applesauce, take the dose immediately and do not crush or chew. Do not take your medicine more often than directed.  A special MedGuide will be given to you by the pharmacist with each prescription and refill. Be sure to read this information carefully each time.  Talk to your pediatrician regarding the use of this medicine in children. While this drug may be prescribed for children as young as 6 years for selected conditions, precautions do apply. Some extended-release capsules are recommended for use only in children 13 years of age and older.  What side effects may I notice from receiving this medicine?  Side effects that you should report to your doctor or health care professional as soon as possible:    allergic reactions like skin rash, itching or hives, swelling of the face, lips, or tongue    anxious    breathing problems    changes in emotions or moods    changes in vision    chest pain or chest tightness    fast, irregular heartbeat    fingers or toes feel numb, cool, painful    hallucination, loss of contact with reality    high blood pressure    males: prolonged or painful erection    seizures    signs and  symptoms of serotonin syndrome like confusion, increased sweating, fever, tremor, stiff muscles, diarrhea    signs and symptoms of a stroke like changes in vision; confusion; trouble speaking or understanding; severe headaches; sudden numbness or weakness of the face, arm or leg; trouble walking; dizziness; loss of balance or coordination    suicidal thoughts or other mood changes    uncontrollable head, mouth, neck, arm, or leg movements  Side effects that usually do not require medical attention (report to your doctor or health care professional if they continue or are bothersome):    dry mouth    headache    irritability    loss of appetite    nausea    trouble sleeping    weight loss  What may interact with this medicine?  Do not take this medicine with any of the following medications:    MAOIs like Carbex, Eldepryl, Marplan, Nardil, and Parnate    other stimulant medicines for attention disorders  This medicine may also interact with the following medications:    acetazolamide    alcohol    ammonium chloride    antacids    ascorbic acid    atomoxetine    caffeine    certain medicines for blood pressure    certain medicines for depression, anxiety, or psychotic disturbances    certain medicines for seizures like carbamazepine, phenobarbital, phenytoin    certain medicines for stomach problems like cimetidine, ranitidine, famotidine, esomeprazole, omeprazole, lansoprazole, pantoprazole    lithium    medicines for colds and breathing difficulties    medicines for diabetes    medicines or dietary supplements for weight loss or to stay awake    methenamine    narcotic medicines for pain    quinidine    ritonavir    sodium bicarbonate    [place]'s wort  What if I miss a dose?  If you miss a dose, take it as soon as you can in the morning, but do not take it later in the day because it can cause trouble sleeping. If it is almost time for your next dose, take only that dose. Do not take double or extra doses.  Where  should I keep my medicine?  Keep out of the reach of children. This medicine can be abused. Keep your medicine in a safe place to protect it from theft. Do not share this medicine with anyone. Selling or giving away this medicine is dangerous and against the law.  Store at room temperature between 15 and 30 degrees C (59 and 86 degrees F). Keep container tightly closed. Protect from light. Throw away any unused medicine after the expiration date.  What should I tell my health care provider before I take this medicine?  They need to know if you have any of these conditions:    anxiety or panic attacks    circulation problems in fingers and toes    glaucoma    hardening or blockages of the arteries or heart blood vessels    heart disease or a heart defect    high blood pressure    history of a drug or alcohol abuse problem    history of stroke    kidney disease    liver disease    mental illness    seizures    suicidal thoughts, plans, or attempt; a previous suicide attempt by you or a family member    thyroid disease    Tourette's syndrome    an unusual or allergic reaction to dextroamphetamine, other amphetamines, other medicines, foods, dyes, or preservatives    pregnant or trying to get pregnant    breast-feeding  What should I watch for while using this medicine?  Visit your doctor or health care professional for regular checks on your progress. This prescription requires that you follow special procedures with your doctor and pharmacy. You will need to have a new written prescription from your doctor every time you need a refill.  This medicine may affect your concentration, or hide signs of tiredness. Until you know how this medicine affects you, do not drive, ride a bicycle, use machinery, or do anything that needs mental alertness. Alcohol should be avoided with some brands of this medicine. Talk to your doctor or health care professional if you have questions.  Tell your doctor or health care professional if  this medicine loses its effects, or if you feel you need to take more than the prescribed amount. Do not change the dosage without talking to your doctor or health care professional.  Decreased appetite is a common side effect when starting this medicine. Eating small, frequent meals or snacks can help. Talk to your doctor if you continue to have poor eating habits. Height and weight growth of a child taking this medicine will be monitored closely.  Do not take this medicine close to bedtime. It may prevent you from sleeping.  If you are going to need surgery, an MRI, a CT scan, or other procedure, tell your doctor that you are taking this medicine. You may need to stop taking this medicine before the procedure.  Tell your doctor or healthcare professional right away if you notice unexplained wounds on your fingers and toes while taking this medicine. You should also tell your healthcare provider if you experience numbness or pain, changes in the skin color, or sensitivity to temperature in your fingers or toes.  NOTE:This sheet is a summary. It may not cover all possible information. If you have questions about this medicine, talk to your doctor, pharmacist, or health care provider. Copyright  2019 [employer]

## 2019-09-27 NOTE — PROGRESS NOTES
Subjective     Teddy Shah is a 28 year old male who presents to clinic today for the following health issues:    HPI   Medication Followup of Adhd    Taking Medication as prescribed: yes    Side Effects:  None    Medication Helping Symptoms:  yes     Patient feels the Adderall XR is a better drug for him- he is concentrating better but thinks there is still room for improvement, would like to increase dose. No adverse drug effects- appetite remains good, no abdominal pain, weight loss, irritability, tremor, tics.      Patient Active Problem List   Diagnosis     Nasal obstruction     Deviated nasal septum     CARDIOVASCULAR SCREENING; LDL GOAL LESS THAN 160     Obesity, Class I, BMI 30-34.9     Pervasive developmental disorder     Moderate episode of recurrent major depressive disorder (H)     Anxiety     Tinnitus, bilateral     ADHD (attention deficit hyperactivity disorder), inattentive type     Past Surgical History:   Procedure Laterality Date     ADENOIDECTOMY       ENDOSCOPY      EGD     EXTRACTION(S) DENTAL      wisdom     PE TUBES      multiple     SEPTOPLASTY, TURBINOPLASTY, COMBINED Bilateral 6/19/2015    Procedure: COMBINED SEPTOPLASTY, TURBINOPLASTY;  Surgeon: Ally Finley MD;  Location:  OR       Social History     Tobacco Use     Smoking status: Never Smoker     Smokeless tobacco: Never Used   Substance Use Topics     Alcohol use: No     Family History   Problem Relation Age of Onset     Anesthesia Reaction Mother      Anesthesia Reaction Paternal Grandmother      Cerebrovascular Disease Paternal Grandmother      Coronary Artery Disease Paternal Grandmother         heart attack?     Depression Paternal Grandmother      Cerebrovascular Disease Father      Hypertension Father         ?     Personality Disorder Father      Substance Abuse Father      Coronary Artery Disease Maternal Grandfather         heart attack     Prostate Cancer Maternal Grandfather      Hypertension Maternal Grandmother       Lymphoma Maternal Grandmother      Bleeding Disorder No family hx of          Current Outpatient Medications   Medication Sig Dispense Refill     Acetaminophen (TYLENOL PO) Reported on 5/12/2017       amphetamine-dextroamphetamine (ADDERALL XR) 20 MG 24 hr capsule Take 1 capsule (20 mg) by mouth daily 30 capsule 0     amphetamine-dextroamphetamine (ADDERALL XR) 30 MG 24 hr capsule Take 1 capsule (30 mg) by mouth daily 30 capsule 0     hydrocortisone (WESTCORT) 0.2 % cream Apply twice daily to red areas on the face for 1 week, then weekends only. 45 g 3     IBUPROFEN PO Reported on 5/12/2017       sertraline (ZOLOFT) 100 MG tablet Take 1.5 tablets (150 mg) by mouth daily 135 tablet 1     triamcinolone (KENALOG) 0.1 % cream Apply sparingly to affected area twice a day  as needed 80 g 0     BP Readings from Last 3 Encounters:   08/29/19 123/78   04/22/19 122/80   02/11/19 131/85    Wt Readings from Last 3 Encounters:   08/29/19 125.2 kg (276 lb)   04/22/19 125.6 kg (276 lb 12.8 oz)   02/11/19 127.1 kg (280 lb 3.2 oz)                      Reviewed and updated as needed this visit by Provider         Review of Systems   ROS COMP: Constitutional, HEENT, cardiovascular, pulmonary, gi and gu systems are negative, except as otherwise noted.      Objective    There were no vitals taken for this visit.  There is no height or weight on file to calculate BMI.  Physical Exam   Phone visit    Diagnostic Test Results:  Labs reviewed in Epic  none         Assessment & Plan     1. ADHD (attention deficit hyperactivity disorder), inattentive type  Increasing Adderall XR to 30 mg daily, return to clinic 1 month for recheck. If improved at 30 mg dose, will write for 3 months supply and see him back in 6 months.  - amphetamine-dextroamphetamine (ADDERALL XR) 30 MG 24 hr capsule; Take 1 capsule (30 mg) by mouth daily  Dispense: 30 capsule; Refill: 0     BMI:   Estimated body mass index is 36.41 kg/m  as calculated from the following:     "Height as of 8/29/19: 1.854 m (6' 1\").    Weight as of 8/29/19: 125.2 kg (276 lb).   Weight management plan: Discussed healthy diet and exercise guidelines    Phone visit:  5:50 min    See Patient Instructions    No follow-ups on file.    AB Hinds [facility]        " Need for an AD TBD closer to D/C

## 2019-09-30 NOTE — PROGRESS NOTES
Bringing written Rx for the Adderall XR to the  by 5:00 pm today, 9/30/19. Called and left a voicemail message regarding Rx .  Tika Covarrubias MA   For [place]  2nd Floor [facility]

## 2019-10-29 ENCOUNTER — E-VISIT (OUTPATIENT)
Dept: FAMILY MEDICINE | Facility: CLINIC | Age: 29
End: 2019-10-29
Payer: COMMERCIAL

## 2019-10-29 ENCOUNTER — MYC REFILL (OUTPATIENT)
Dept: FAMILY MEDICINE | Facility: CLINIC | Age: 29
End: 2019-10-29

## 2019-10-29 DIAGNOSIS — F90.0 ADHD (ATTENTION DEFICIT HYPERACTIVITY DISORDER), INATTENTIVE TYPE: Primary | ICD-10-CM

## 2019-10-29 DIAGNOSIS — F90.0 ADHD (ATTENTION DEFICIT HYPERACTIVITY DISORDER), INATTENTIVE TYPE: ICD-10-CM

## 2019-10-29 PROCEDURE — 99444 ZZC PHYSICIAN ONLINE EVALUATION & MANAGEMENT SERVICE: CPT | Performed by: NURSE PRACTITIONER

## 2019-10-29 RX ORDER — DEXTROAMPHETAMINE SACCHARATE, AMPHETAMINE ASPARTATE MONOHYDRATE, DEXTROAMPHETAMINE SULFATE AND AMPHETAMINE SULFATE 7.5; 7.5; 7.5; 7.5 MG/1; MG/1; MG/1; MG/1
30 CAPSULE, EXTENDED RELEASE ORAL DAILY
Qty: 30 CAPSULE | Refills: 0 | Status: CANCELLED | OUTPATIENT
Start: 2019-10-29

## 2019-10-30 ENCOUNTER — HEALTH MAINTENANCE LETTER (OUTPATIENT)
Age: 29
End: 2019-10-30

## 2019-10-30 RX ORDER — DEXTROAMPHETAMINE SACCHARATE, AMPHETAMINE ASPARTATE MONOHYDRATE, DEXTROAMPHETAMINE SULFATE AND AMPHETAMINE SULFATE 7.5; 7.5; 7.5; 7.5 MG/1; MG/1; MG/1; MG/1
30 CAPSULE, EXTENDED RELEASE ORAL DAILY
Qty: 30 CAPSULE | Refills: 0 | Status: SHIPPED | OUTPATIENT
Start: 2019-11-30 | End: 2019-12-04

## 2019-10-30 RX ORDER — DEXTROAMPHETAMINE SACCHARATE, AMPHETAMINE ASPARTATE MONOHYDRATE, DEXTROAMPHETAMINE SULFATE AND AMPHETAMINE SULFATE 7.5; 7.5; 7.5; 7.5 MG/1; MG/1; MG/1; MG/1
30 CAPSULE, EXTENDED RELEASE ORAL DAILY
Qty: 30 CAPSULE | Refills: 0 | Status: SHIPPED | OUTPATIENT
Start: 2019-10-30 | End: 2019-11-29

## 2019-10-30 RX ORDER — DEXTROAMPHETAMINE SACCHARATE, AMPHETAMINE ASPARTATE MONOHYDRATE, DEXTROAMPHETAMINE SULFATE AND AMPHETAMINE SULFATE 7.5; 7.5; 7.5; 7.5 MG/1; MG/1; MG/1; MG/1
30 CAPSULE, EXTENDED RELEASE ORAL DAILY
Qty: 30 CAPSULE | Refills: 0 | Status: SHIPPED | OUTPATIENT
Start: 2019-12-31 | End: 2020-01-30

## 2019-10-30 NOTE — TELEPHONE ENCOUNTER
Requested Prescriptions   Pending Prescriptions Disp Refills     amphetamine-dextroamphetamine (ADDERALL XR) 30 MG 24 hr capsule        Last Written Prescription Date:  12/31/19  Last Fill Quantity: 30,   # refills: 0  Last Office Visit: 08/29/19-[facility]  Future Office visit:       Routing refill request to provider for review/approval because:  Drug not on the G, [facility] or [facility] refill protocol or controlled substance 30 capsule 0     Sig: Take 1 capsule (30 mg) by mouth daily       There is no refill protocol information for this order

## 2019-10-31 NOTE — TELEPHONE ENCOUNTER
Refilled on 10/30/19 by PCP. 3 month supply given, see current med list and E-visit from 10/29/19 in Chart Review.     Laura Chopra RN  [facility]

## 2019-12-04 ENCOUNTER — MYC REFILL (OUTPATIENT)
Dept: FAMILY MEDICINE | Facility: CLINIC | Age: 29
End: 2019-12-04

## 2019-12-04 DIAGNOSIS — F90.0 ADHD (ATTENTION DEFICIT HYPERACTIVITY DISORDER), INATTENTIVE TYPE: ICD-10-CM

## 2019-12-05 NOTE — TELEPHONE ENCOUNTER
amphetamine-dextroamphetamine (ADDERALL XR) 30 MG 24 hr capsule 30 capsule 0 12/31/2019     amphetamine-dextroamphetamine (ADDERALL XR) 30 MG 24 hr capsule 30 capsule 0 12/31/2019

## 2019-12-05 NOTE — TELEPHONE ENCOUNTER
Routing refill request to provider for review/approval because:  Drug not on the [facility] refill protocol   Susan Kuhn RN

## 2019-12-06 RX ORDER — DEXTROAMPHETAMINE SACCHARATE, AMPHETAMINE ASPARTATE MONOHYDRATE, DEXTROAMPHETAMINE SULFATE AND AMPHETAMINE SULFATE 7.5; 7.5; 7.5; 7.5 MG/1; MG/1; MG/1; MG/1
30 CAPSULE, EXTENDED RELEASE ORAL DAILY
Qty: 30 CAPSULE | Refills: 0 | Status: SHIPPED | OUTPATIENT
Start: 2019-12-06 | End: 2020-02-04

## 2020-01-03 ENCOUNTER — MYC REFILL (OUTPATIENT)
Dept: FAMILY MEDICINE | Facility: CLINIC | Age: 30
End: 2020-01-03

## 2020-01-03 DIAGNOSIS — F90.0 ADHD (ATTENTION DEFICIT HYPERACTIVITY DISORDER), INATTENTIVE TYPE: ICD-10-CM

## 2020-01-03 RX ORDER — DEXTROAMPHETAMINE SACCHARATE, AMPHETAMINE ASPARTATE MONOHYDRATE, DEXTROAMPHETAMINE SULFATE AND AMPHETAMINE SULFATE 7.5; 7.5; 7.5; 7.5 MG/1; MG/1; MG/1; MG/1
30 CAPSULE, EXTENDED RELEASE ORAL DAILY
Qty: 30 CAPSULE | Refills: 0 | Status: CANCELLED | OUTPATIENT
Start: 2020-01-03

## 2020-01-03 NOTE — TELEPHONE ENCOUNTER
Requested Prescriptions   Pending Prescriptions Disp Refills     amphetamine-dextroamphetamine (ADDERALL XR) 30 MG 24 hr capsule        Last Written Prescription Date:  12/31/19  Last Fill Quantity: 30,   # refills: 0  Last Office Visit: 08/29/19-[facility]in  Future Office visit:       Routing refill request to provider for review/approval because:  Drug not on the G, [facility] or [facility] refill protocol or controlled substance 30 capsule 0     Sig: Take 1 capsule (30 mg) by mouth daily       There is no refill protocol information for this order

## 2020-01-10 ENCOUNTER — OFFICE VISIT (OUTPATIENT)
Dept: URGENT CARE | Facility: URGENT CARE | Age: 30
End: 2020-01-10
Payer: COMMERCIAL

## 2020-01-10 VITALS
RESPIRATION RATE: 26 BRPM | HEART RATE: 95 BPM | BODY MASS INDEX: 36.18 KG/M2 | OXYGEN SATURATION: 97 % | TEMPERATURE: 98.6 F | DIASTOLIC BLOOD PRESSURE: 79 MMHG | SYSTOLIC BLOOD PRESSURE: 123 MMHG | WEIGHT: 274.2 LBS

## 2020-01-10 DIAGNOSIS — L01.00 IMPETIGO: Primary | ICD-10-CM

## 2020-01-10 PROCEDURE — 99213 OFFICE O/P EST LOW 20 MIN: CPT | Performed by: PHYSICIAN ASSISTANT

## 2020-01-10 RX ORDER — MUPIROCIN 20 MG/G
OINTMENT TOPICAL 3 TIMES DAILY
Qty: 30 G | Refills: 0 | Status: SHIPPED | OUTPATIENT
Start: 2020-01-10 | End: 2020-01-20

## 2020-01-10 RX ORDER — DOXYCYCLINE HYCLATE 100 MG
100 TABLET ORAL 2 TIMES DAILY
Qty: 20 TABLET | Refills: 0 | Status: SHIPPED | OUTPATIENT
Start: 2020-01-10 | End: 2020-01-20

## 2020-01-11 NOTE — PROGRESS NOTES
S: 29-year-old male presents for left perioral rash for 1.5 weeks.  Has tried some over-the-counter topical antibiotics without resolution.  His dog does get periodic staph infections of his skin.  His dog does sleep with him.  No fever.  No cough or sore throat.  No nasal congestion.  Denies any  Nasal sores.      Allergies   Allergen Reactions     Latex Itching     Erythromycin Rash       Past Medical History:   Diagnosis Date     Gastro-oesophageal reflux disease      History of anesthesia reaction     Severe N/V     PONV (postoperative nausea and vomiting)        Acetaminophen (TYLENOL PO), Reported on 2017  amphetamine-dextroamphetamine (ADDERALL XR) 30 MG 24 hr capsule, Take 1 capsule (30 mg) by mouth daily  hydrocortisone (WESTCORT) 0.2 % cream, Apply twice daily to red areas on the face for 1 week, then weekends only.  IBUPROFEN PO, Reported on 2017  sertraline (ZOLOFT) 100 MG tablet, Take 1.5 tablets (150 mg) by mouth daily  triamcinolone (KENALOG) 0.1 % cream, Apply sparingly to affected area twice a day  as needed  amphetamine-dextroamphetamine (ADDERALL XR) 30 MG 24 hr capsule, Take 1 capsule (30 mg) by mouth daily (Patient not taking: Reported on 1/10/2020)  [] amphetamine-dextroamphetamine (ADDERALL XR) 30 MG 24 hr capsule, Take 1 capsule (30 mg) by mouth daily  amphetamine-dextroamphetamine (ADDERALL XR) 30 MG 24 hr capsule, Take 1 capsule (30 mg) by mouth daily (Patient not taking: Reported on 1/10/2020)    No current facility-administered medications on file prior to visit.       Social History     Tobacco Use     Smoking status: Never Smoker     Smokeless tobacco: Never Used   Substance Use Topics     Alcohol use: No     Drug use: No       ROS:  General: negative for fever  SKIN: + as above    Physcial Exam:  /79   Pulse 95   Temp 98.6  F (37  C) (Oral)   Resp 26   Wt 124.4 kg (274 lb 3.2 oz)   SpO2 97%   BMI 36.18 kg/m      GENERAL: alert, no acute distress  EYES:  conjunctival clear  RESP: Regular breathing rate  NEURO: awake .  SKIN: Left corner of mouth area is with crusted yellow lesions.  Total area covers about the size of a anton.    ASSESSMENT:    ICD-10-CM    1. Impetigo L01.00 mupirocin (BACTROBAN) 2 % external ointment     doxycycline hyclate (VIBRA-TABS) 100 MG tablet       PLAN: Try topical Bactroban.  If no improvement after 5 days then fill prescription for doxycycline.  Recheck with primary 2 weeks if not improved  See today's orders.  Follow-up with primary clinic if not improving.  Advised about symptoms which might herald more serious problems.      Patient Instructions       Patient Education     Understanding Impetigo  Impetigo is a common bacterial infection of the skin. It most often affects the face, arms, and legs. But it can appear on any part of the body. Anyone can have it, regardless of age. But it is most common in children. Impetigo is very contagious. This means it spreads easily to other people.  How to say it  [id number]   What causes impetigo?  Many types of bacteria live on normal, healthy skin. The bacteria usually don t cause problems. Impetigo happens when bacteria enter the skin through a scratch, break, sore, bite, or irritated spot. They then begin to grow out of control, leading to infection. There are two types of staphylococcus bacteria that cause impetigo. In certain cases, impetigo appears on skin that has no visible break. It may be more likely to occur on skin that has another skin problem, such as eczema. It may also be more common after a cold or other virus.  Symptoms of impetigo  Symptoms of this problem include:    Small, fluid-filled blisters on the skin that may itch, ooze, or crust    A yellow, honey-colored crust on the infected skin    Skin sores that spread with scratching    An itchy rash that spreads with scratching    Swollen lymph nodes  Treatment for impetigo  The goal is to treat the infection and prevent it  from spreading to others.    You will likely be given an antibiotic to treat the infection. This may be a cream or ointment called muporicin to put on your skin. If the infection is severe or spreading, you may be given antibiotic medicine to take by mouth. Be sure to use this medicine as directed. Do not stop using it until you are told to stop, even if your skin gets better. If you stop too soon, the infection may come back and be harder to treat.    Avoid scratching or picking at your sores. It may help to cover affected areas with a bandage.    To prevent spreading the infection, wash your hands often. Avoid sharing personal items, towels, clothes, pillows, and sheets with others. After each use, wash these items in hot water.    Clean the affected skin several times a day. Don t scrub. Instead, soak the area in warm, soapy water. This will help remove the crust that forms. For places that you can't soak, such as the face, place a clean, warm (not hot) washcloth on the affected area. Use a new washcloth and towel each time.  When to call your healthcare provider  Call your healthcare provider right away if you have any of these:    Fever of 100.4 F (38 C) or higher, or as directed    Increasing number of sores or spreading areas of redness after 2 days of treatment with antibiotics    Increasing swelling or pain    Increased amounts of fluid or pus coming from the sores    Unusual drowsiness, weakness, or change in behavior    Loss of appetite or vomiting   Date Last Reviewed: 5/1/2016 2000-2019 [employer]. [address] [place] [address], [place] [address]. All rights reserved. This information is not intended as a substitute for professional medical care. Always follow your healthcare professional's instructions.           Patient Education     Understanding Impetigo  Impetigo is a common bacterial infection of the skin. It most often affects the face, arms, and legs. But it can appear on any part of  the body. Anyone can have it, regardless of age. But it is most common in children. Impetigo is very contagious. This means it spreads easily to other people.  How to say it  [id number]   What causes impetigo?  Many types of bacteria live on normal, healthy skin. The bacteria usually don t cause problems. Impetigo happens when bacteria enter the skin through a scratch, break, sore, bite, or irritated spot. They then begin to grow out of control, leading to infection. There are two types of staphylococcus bacteria that cause impetigo. In certain cases, impetigo appears on skin that has no visible break. It may be more likely to occur on skin that has another skin problem, such as eczema. It may also be more common after a cold or other virus.  Symptoms of impetigo  Symptoms of this problem include:    Small, fluid-filled blisters on the skin that may itch, ooze, or crust    A yellow, honey-colored crust on the infected skin    Skin sores that spread with scratching    An itchy rash that spreads with scratching    Swollen lymph nodes  Treatment for impetigo  The goal is to treat the infection and prevent it from spreading to others.    You will likely be given an antibiotic to treat the infection. This may be a cream or ointment called muporicin to put on your skin. If the infection is severe or spreading, you may be given antibiotic medicine to take by mouth. Be sure to use this medicine as directed. Do not stop using it until you are told to stop, even if your skin gets better. If you stop too soon, the infection may come back and be harder to treat.    Avoid scratching or picking at your sores. It may help to cover affected areas with a bandage.    To prevent spreading the infection, wash your hands often. Avoid sharing personal items, towels, clothes, pillows, and sheets with others. After each use, wash these items in hot water.    Clean the affected skin several times a day. Don t scrub. Instead, soak the area  in warm, soapy water. This will help remove the crust that forms. For places that you can't soak, such as the face, place a clean, warm (not hot) washcloth on the affected area. Use a new washcloth and towel each time.  When to call your healthcare provider  Call your healthcare provider right away if you have any of these:    Fever of 100.4 F (38 C) or higher, or as directed    Increasing number of sores or spreading areas of redness after 2 days of treatment with antibiotics    Increasing swelling or pain    Increased amounts of fluid or pus coming from the sores    Unusual drowsiness, weakness, or change in behavior    Loss of appetite or vomiting   Date Last Reviewed: 5/1/2016 2000-2019 [employer]. [address] [place] [address], [place] [address]. All rights reserved. This information is not intended as a substitute for professional medical care. Always follow your healthcare professional's instructions.               Ashanti Haynes PA-C

## 2020-01-11 NOTE — PATIENT INSTRUCTIONS
Patient Education     Understanding Impetigo  Impetigo is a common bacterial infection of the skin. It most often affects the face, arms, and legs. But it can appear on any part of the body. Anyone can have it, regardless of age. But it is most common in children. Impetigo is very contagious. This means it spreads easily to other people.  How to say it  [id number]   What causes impetigo?  Many types of bacteria live on normal, healthy skin. The bacteria usually don t cause problems. Impetigo happens when bacteria enter the skin through a scratch, break, sore, bite, or irritated spot. They then begin to grow out of control, leading to infection. There are two types of staphylococcus bacteria that cause impetigo. In certain cases, impetigo appears on skin that has no visible break. It may be more likely to occur on skin that has another skin problem, such as eczema. It may also be more common after a cold or other virus.  Symptoms of impetigo  Symptoms of this problem include:    Small, fluid-filled blisters on the skin that may itch, ooze, or crust    A yellow, honey-colored crust on the infected skin    Skin sores that spread with scratching    An itchy rash that spreads with scratching    Swollen lymph nodes  Treatment for impetigo  The goal is to treat the infection and prevent it from spreading to others.    You will likely be given an antibiotic to treat the infection. This may be a cream or ointment called muporicin to put on your skin. If the infection is severe or spreading, you may be given antibiotic medicine to take by mouth. Be sure to use this medicine as directed. Do not stop using it until you are told to stop, even if your skin gets better. If you stop too soon, the infection may come back and be harder to treat.    Avoid scratching or picking at your sores. It may help to cover affected areas with a bandage.    To prevent spreading the infection, wash your hands often. Avoid sharing personal  items, towels, clothes, pillows, and sheets with others. After each use, wash these items in hot water.    Clean the affected skin several times a day. Don t scrub. Instead, soak the area in warm, soapy water. This will help remove the crust that forms. For places that you can't soak, such as the face, place a clean, warm (not hot) washcloth on the affected area. Use a new washcloth and towel each time.  When to call your healthcare provider  Call your healthcare provider right away if you have any of these:    Fever of 100.4 F (38 C) or higher, or as directed    Increasing number of sores or spreading areas of redness after 2 days of treatment with antibiotics    Increasing swelling or pain    Increased amounts of fluid or pus coming from the sores    Unusual drowsiness, weakness, or change in behavior    Loss of appetite or vomiting   Date Last Reviewed: 5/1/2016 2000-2019 [employer]. [address] [place] [address]. All rights reserved. This information is not intended as a substitute for professional medical care. Always follow your healthcare professional's instructions.           Patient Education     Understanding Impetigo  Impetigo is a common bacterial infection of the skin. It most often affects the face, arms, and legs. But it can appear on any part of the body. Anyone can have it, regardless of age. But it is most common in children. Impetigo is very contagious. This means it spreads easily to other people.  How to say it  [id number]   What causes impetigo?  Many types of bacteria live on normal, healthy skin. The bacteria usually don t cause problems. Impetigo happens when bacteria enter the skin through a scratch, break, sore, bite, or irritated spot. They then begin to grow out of control, leading to infection. There are two types of staphylococcus bacteria that cause impetigo. In certain cases, impetigo appears on skin that has no visible break. It may be more likely to  occur on skin that has another skin problem, such as eczema. It may also be more common after a cold or other virus.  Symptoms of impetigo  Symptoms of this problem include:    Small, fluid-filled blisters on the skin that may itch, ooze, or crust    A yellow, honey-colored crust on the infected skin    Skin sores that spread with scratching    An itchy rash that spreads with scratching    Swollen lymph nodes  Treatment for impetigo  The goal is to treat the infection and prevent it from spreading to others.    You will likely be given an antibiotic to treat the infection. This may be a cream or ointment called muporicin to put on your skin. If the infection is severe or spreading, you may be given antibiotic medicine to take by mouth. Be sure to use this medicine as directed. Do not stop using it until you are told to stop, even if your skin gets better. If you stop too soon, the infection may come back and be harder to treat.    Avoid scratching or picking at your sores. It may help to cover affected areas with a bandage.    To prevent spreading the infection, wash your hands often. Avoid sharing personal items, towels, clothes, pillows, and sheets with others. After each use, wash these items in hot water.    Clean the affected skin several times a day. Don t scrub. Instead, soak the area in warm, soapy water. This will help remove the crust that forms. For places that you can't soak, such as the face, place a clean, warm (not hot) washcloth on the affected area. Use a new washcloth and towel each time.  When to call your healthcare provider  Call your healthcare provider right away if you have any of these:    Fever of 100.4 F (38 C) or higher, or as directed    Increasing number of sores or spreading areas of redness after 2 days of treatment with antibiotics    Increasing swelling or pain    Increased amounts of fluid or pus coming from the sores    Unusual drowsiness, weakness, or change in behavior    Loss  of appetite or vomiting   Date Last Reviewed: 5/1/2016 2000-2019 [employer]. [address] [place] [address], [place] [address]. All rights reserved. This information is not intended as a substitute for professional medical care. Always follow your healthcare professional's instructions.

## 2020-02-04 ENCOUNTER — MYC REFILL (OUTPATIENT)
Dept: FAMILY MEDICINE | Facility: CLINIC | Age: 30
End: 2020-02-04

## 2020-02-04 DIAGNOSIS — F90.0 ADHD (ATTENTION DEFICIT HYPERACTIVITY DISORDER), INATTENTIVE TYPE: ICD-10-CM

## 2020-02-04 NOTE — TELEPHONE ENCOUNTER
Requested Prescriptions   Pending Prescriptions Disp Refills     amphetamine-dextroamphetamine (ADDERALL XR) 30 MG 24 hr capsule        Last Written Prescription Date:  12/31/19  Last Fill Quantity: 30,   # refills: 0  Last Office Visit: 08/29/19-[facility]in  Future Office visit:       Routing refill request to provider for review/approval because:  Drug not on the [facility], [facility] or [facility] refill protocol or controlled substance 30 capsule 0     Sig: Take 1 capsule (30 mg) by mouth daily       There is no refill protocol information for this order

## 2020-02-05 RX ORDER — DEXTROAMPHETAMINE SACCHARATE, AMPHETAMINE ASPARTATE MONOHYDRATE, DEXTROAMPHETAMINE SULFATE AND AMPHETAMINE SULFATE 7.5; 7.5; 7.5; 7.5 MG/1; MG/1; MG/1; MG/1
30 CAPSULE, EXTENDED RELEASE ORAL DAILY
Qty: 30 CAPSULE | Refills: 0 | Status: SHIPPED | OUTPATIENT
Start: 2020-02-05 | End: 2020-06-07

## 2020-02-05 NOTE — TELEPHONE ENCOUNTER
Routing refill request to provider for review/approval because:  Drug not on the [facility] refill protocol     Robby Lindsey RN, BSN, PHN

## 2020-02-27 ENCOUNTER — OFFICE VISIT (OUTPATIENT)
Dept: FAMILY MEDICINE | Facility: CLINIC | Age: 30
End: 2020-02-27
Payer: COMMERCIAL

## 2020-02-27 VITALS
HEART RATE: 105 BPM | DIASTOLIC BLOOD PRESSURE: 84 MMHG | TEMPERATURE: 98.7 F | SYSTOLIC BLOOD PRESSURE: 134 MMHG | BODY MASS INDEX: 34.98 KG/M2 | WEIGHT: 272.6 LBS | HEIGHT: 74 IN | OXYGEN SATURATION: 95 %

## 2020-02-27 DIAGNOSIS — F90.0 ADHD (ATTENTION DEFICIT HYPERACTIVITY DISORDER), INATTENTIVE TYPE: Primary | ICD-10-CM

## 2020-02-27 DIAGNOSIS — F41.9 ANXIETY: ICD-10-CM

## 2020-02-27 DIAGNOSIS — Z23 INFLUENZA VACCINE NEEDED: ICD-10-CM

## 2020-02-27 DIAGNOSIS — F33.1 MODERATE EPISODE OF RECURRENT MAJOR DEPRESSIVE DISORDER (H): ICD-10-CM

## 2020-02-27 DIAGNOSIS — F84.9 PERVASIVE DEVELOPMENTAL DISORDER: ICD-10-CM

## 2020-02-27 DIAGNOSIS — E66.811 OBESITY, CLASS I, BMI 30-34.9: ICD-10-CM

## 2020-02-27 PROCEDURE — 90686 IIV4 VACC NO PRSV 0.5 ML IM: CPT | Performed by: NURSE PRACTITIONER

## 2020-02-27 PROCEDURE — 90471 IMMUNIZATION ADMIN: CPT | Performed by: NURSE PRACTITIONER

## 2020-02-27 PROCEDURE — 99214 OFFICE O/P EST MOD 30 MIN: CPT | Mod: 25 | Performed by: NURSE PRACTITIONER

## 2020-02-27 RX ORDER — DEXTROAMPHETAMINE SACCHARATE, AMPHETAMINE ASPARTATE MONOHYDRATE, DEXTROAMPHETAMINE SULFATE AND AMPHETAMINE SULFATE 7.5; 7.5; 7.5; 7.5 MG/1; MG/1; MG/1; MG/1
30 CAPSULE, EXTENDED RELEASE ORAL DAILY
Qty: 30 CAPSULE | Refills: 0 | Status: SHIPPED | OUTPATIENT
Start: 2020-03-29 | End: 2020-04-28

## 2020-02-27 RX ORDER — SERTRALINE HYDROCHLORIDE 100 MG/1
200 TABLET, FILM COATED ORAL DAILY
Qty: 180 TABLET | Refills: 1 | Status: SHIPPED | OUTPATIENT
Start: 2020-02-27 | End: 2020-08-28

## 2020-02-27 RX ORDER — DEXTROAMPHETAMINE SACCHARATE, AMPHETAMINE ASPARTATE MONOHYDRATE, DEXTROAMPHETAMINE SULFATE AND AMPHETAMINE SULFATE 7.5; 7.5; 7.5; 7.5 MG/1; MG/1; MG/1; MG/1
30 CAPSULE, EXTENDED RELEASE ORAL DAILY
Qty: 30 CAPSULE | Refills: 0 | Status: SHIPPED | OUTPATIENT
Start: 2020-04-29 | End: 2020-05-29

## 2020-02-27 RX ORDER — DEXTROAMPHETAMINE SACCHARATE, AMPHETAMINE ASPARTATE MONOHYDRATE, DEXTROAMPHETAMINE SULFATE AND AMPHETAMINE SULFATE 7.5; 7.5; 7.5; 7.5 MG/1; MG/1; MG/1; MG/1
30 CAPSULE, EXTENDED RELEASE ORAL DAILY
Qty: 30 CAPSULE | Refills: 0 | Status: SHIPPED | OUTPATIENT
Start: 2020-02-27 | End: 2020-03-28

## 2020-02-27 ASSESSMENT — PAIN SCALES - GENERAL: PAINLEVEL: NO PAIN (0)

## 2020-02-27 ASSESSMENT — MIFFLIN-ST. JEOR: SCORE: 2271.26

## 2020-02-27 NOTE — PATIENT INSTRUCTIONS
At [facility], we strive to deliver an exceptional experience to you, every time we see you.  If you receive a survey in the mail, please send us back your thoughts. We really do value your feedback.    Based on your medical history, these are the current health maintenance/preventive care services that you are due for (some may have been done at this visit.)  Health Maintenance Due   Topic Date Due     PREVENTIVE CARE VISIT  08/06/2019     INFLUENZA VACCINE (1) 09/01/2019     PHQ-9  02/29/2020         Suggested websites for health information:  Www.[employer] : Up to date and easily searchable information on multiple topics.  Www.[employer].gov : medication info, interactive tutorials, watch real surgeries online  Www.familydoctor.org : good info from the Academy of Family Physicians  Www.cdc.gov : public health info, travel advisories, epidemics (H1N1)  Www.aap.org : children's health info, normal development, vaccinations  Www.health.[facility].[place].us : [place] dept of health, public health issues in [place], N1N1    Your care team:                            Family Medicine Internal Medicine   MD Rolando Salvador MD Shantel Branch-Fleming, MD Katya Georgiev PA-C Nam Ho, MD Pediatrics   SHAHANA Smith, BUBBA BERGER CNP   MD Kalie Wilkins MD Deborah Mielke, MD Kim Thein, APRN CNP      Clinic hours: Monday - Thursday 7 am-7 pm; Fridays 7 am-5 pm.   Urgent care: Monday - Friday 11 am-9 pm; Saturday and Sunday 9 am-5 pm.  Pharmacy : Monday -Thursday 8 am-8 pm; Friday 8 am-6 pm; Saturday and Sunday 9 am-5 pm.     Clinic: [phone]   Pharmacy: [phone]    Patient Education     Depression: Tips to Help Yourself    As your healthcare providers help treat your depression, you can also help yourself. Keep in mind that your illness affects you emotionally, physically, mentally, and socially. So full recovery will take time. Take  care of your body and your soul, and be patient with yourself as you get better.  Self-care    Educate yourself. Read about treatment and medicine options. If you have the energy, attend local conferences or support groups. Keep a list of useful websites and helpful books and use them as needed. This illness is not your fault. Don t blame yourself for your depression.    Manage early symptoms. If you notice symptoms returning, experience triggers, or identify other factors that may lead to a depressive episode, get help as soon as possible. Ask trusted friends and family to monitor your behavior and let you know if they see anything of concern.    Work with your provider. Find a provider you can trust. Communicate honestly with that person and share information on your treatment for depression and your reaction to medicines.    Be prepared for a crisis. Know what to do if you experience a crisis. Keep the phone number of a crisis hotline and know the location of your community's urgent care centers and the closest emergency department.    Hold off on big decisions. Depression can cloud your judgment. So wait until you feel better before making major life decisions, such as changing jobs, moving, or getting  or .    Be patient. Recovering from depression is a process. Don t be discouraged if it takes some time to feel better.    Keep it simple. Depression saps your energy and concentration. So you won t be able to do all the things you used to do. Set small goals and do what you can.    Be with others. Don t isolate yourself--you ll only feel worse. Try to be with other people. And take part in fun activities when you can. Go to a movie, ballgame, [employer] service, or social event. Talk openly with people you can trust. And accept help when it s offered.  Take care of your body  People with depression often lose the desire to take care of themselves. That only makes their problems worse. During  treatment and afterward, make a point to:    Exercise. It s a great way to take care of your body. And studies have shown that exercise helps fight depression.    Avoid drugs and alcohol. These may ease the pain in the short term. But they ll only make your problems worse in the long run.    Get relief from stress. Ask your healthcare provider for relaxation exercises and techniques to help relieve stress.    Eat right. A balanced and healthy diet helps keep your body healthy.  Date Last Reviewed: 1/1/2017 2000-2019 [employer]. [address] [place] [address], [place] [address]. All rights reserved. This information is not intended as a substitute for professional medical care. Always follow your healthcare professional's instructions.

## 2020-02-27 NOTE — PROGRESS NOTES
Subjective     Teddy Shah is a 29 year old male who presents to clinic today for the following health issues:    HPI    Recheck for ADHD, Renew medications-  Patient feels the medication might not be working as well as expected    How many servings of fruits and vegetables do you eat daily?  2-3    On average, how many sweetened beverages do you drink each day (Examples: soda, juice, sweet tea, etc.  Do NOT count diet or artificially sweetened beverages)?   1    How many days per week do you exercise enough to make your heart beat faster? 3 or less    How many minutes a day do you exercise enough to make your heart beat faster? 9 or less    How many days per week do you miss taking your medication? 0    Check moles located on back       Depression and Anxiety Follow-Up    How are you doing with your depression since your last visit? Improved     How are you doing with your anxiety since your last visit?  Improved     Are you having other symptoms that might be associated with depression or anxiety? No    Have you had a significant life event? No     Do you have any concerns with your use of alcohol or other drugs? No    Social History     Tobacco Use     Smoking status: Never Smoker     Smokeless tobacco: Never Used   Substance Use Topics     Alcohol use: No     Drug use: No     PHQ 3/27/2019 4/22/2019 8/29/2019   PHQ-9 Total Score 16 19 16   Q9: Thoughts of better off dead/self-harm past 2 weeks Not at all Not at all Not at all     KASSIE-7 SCORE 3/27/2019 4/22/2019 8/29/2019   Total Score - - -   Total Score 16 18 18     In the past two weeks have you had thoughts of suicide or self-harm?  No.    Do you have concerns about your personal safety or the safety of others?   No    Suicide Assessment Five-step Evaluation and Treatment (SAFE-T)      Patient Active Problem List   Diagnosis     Nasal obstruction     Deviated nasal septum     CARDIOVASCULAR SCREENING; LDL GOAL LESS THAN 160     Obesity, Class I, BMI 30-34.9      Pervasive developmental disorder     Moderate episode of recurrent major depressive disorder (H)     Anxiety     Tinnitus, bilateral     ADHD (attention deficit hyperactivity disorder), inattentive type     Past Surgical History:   Procedure Laterality Date     ADENOIDECTOMY       ENDOSCOPY      EGD     EXTRACTION(S) DENTAL      wisdom     PE TUBES      multiple     SEPTOPLASTY, TURBINOPLASTY, COMBINED Bilateral 6/19/2015    Procedure: COMBINED SEPTOPLASTY, TURBINOPLASTY;  Surgeon: Ally Finley MD;  Location:  OR       Social History     Tobacco Use     Smoking status: Never Smoker     Smokeless tobacco: Never Used   Substance Use Topics     Alcohol use: No     Family History   Problem Relation Age of Onset     Anesthesia Reaction Mother      Anesthesia Reaction Paternal Grandmother      Cerebrovascular Disease Paternal Grandmother      Coronary Artery Disease Paternal Grandmother         heart attack?     Depression Paternal Grandmother      Cerebrovascular Disease Father      Hypertension Father         ?     Personality Disorder Father      Substance Abuse Father      Coronary Artery Disease Maternal Grandfather         heart attack     Prostate Cancer Maternal Grandfather      Hypertension Maternal Grandmother      Lymphoma Maternal Grandmother      Bleeding Disorder No family hx of          Current Outpatient Medications   Medication Sig Dispense Refill     Acetaminophen (TYLENOL PO) Reported on 5/12/2017       amphetamine-dextroamphetamine (ADDERALL XR) 30 MG 24 hr capsule Take 1 capsule (30 mg) by mouth daily 30 capsule 0     [START ON 3/29/2020] amphetamine-dextroamphetamine (ADDERALL XR) 30 MG 24 hr capsule Take 1 capsule (30 mg) by mouth daily 30 capsule 0     [START ON 4/29/2020] amphetamine-dextroamphetamine (ADDERALL XR) 30 MG 24 hr capsule Take 1 capsule (30 mg) by mouth daily 30 capsule 0     amphetamine-dextroamphetamine (ADDERALL XR) 30 MG 24 hr capsule Take 1 capsule (30 mg) by mouth daily  30 capsule 0     amphetamine-dextroamphetamine (ADDERALL XR) 30 MG 24 hr capsule Take 1 capsule (30 mg) by mouth daily 30 capsule 0     IBUPROFEN PO Reported on 5/12/2017       sertraline (ZOLOFT) 100 MG tablet Take 2 tablets (200 mg) by mouth daily 180 tablet 1     sertraline (ZOLOFT) 100 MG tablet Take 1.5 tablets (150 mg) by mouth daily 135 tablet 1     hydrocortisone (WESTCORT) 0.2 % cream Apply twice daily to red areas on the face for 1 week, then weekends only. (Patient not taking: Reported on 2/27/2020) 45 g 3     triamcinolone (KENALOG) 0.1 % cream Apply sparingly to affected area twice a day  as needed (Patient not taking: Reported on 2/27/2020) 80 g 0     BP Readings from Last 3 Encounters:   02/27/20 134/84   01/10/20 123/79   08/29/19 123/78    Wt Readings from Last 3 Encounters:   02/27/20 123.7 kg (272 lb 9.6 oz)   01/10/20 124.4 kg (274 lb 3.2 oz)   08/29/19 125.2 kg (276 lb)                      Reviewed and updated as needed this visit by Provider         Review of Systems   ROS COMP: Constitutional, HEENT, cardiovascular, pulmonary, gi and gu systems are negative, except as otherwise noted.      Objective    There were no vitals taken for this visit.  There is no height or weight on file to calculate BMI.  Physical Exam   GENERAL: healthy, alert and no distress  EYES: Eyes grossly normal to inspection, PERRL and conjunctivae and sclerae normal  HENT: ear canals and TM's normal, nose and mouth without ulcers or lesions  NECK: no adenopathy, no asymmetry, masses, or scars and thyroid normal to palpation  RESP: lungs clear to auscultation - no rales, rhonchi or wheezes  CV: regular rate and rhythm, normal S1 S2, no S3 or S4, no murmur, click or rub, no peripheral edema and peripheral pulses strong  ABDOMEN: soft, nontender, no hepatosplenomegaly, no masses and bowel sounds normal  MS: no gross musculoskeletal defects noted, no edema  SKIN:multiple hyperpigmented lesion on sun exposed areas of back,  shoulders, arms, upper chest, no suspicious lesions or rashes  BACK: no CVA tenderness, no paralumbar tenderness  PSYCH: mentation appears normal, affect normal/bright  LYMPH: normal ant/post cervical, supraclavicular nodes    Diagnostic Test Results:  Labs reviewed in Epic  none         Assessment & Plan     1. ADHD (attention deficit hyperactivity disorder), inattentive type  Refilled Adderall for 3 months, call for refill in 3 months, OV due on 6 months, sooner if new/worsening symptoms.  - amphetamine-dextroamphetamine (ADDERALL XR) 30 MG 24 hr capsule; Take 1 capsule (30 mg) by mouth daily  Dispense: 30 capsule; Refill: 0  - amphetamine-dextroamphetamine (ADDERALL XR) 30 MG 24 hr capsule; Take 1 capsule (30 mg) by mouth daily  Dispense: 30 capsule; Refill: 0  - amphetamine-dextroamphetamine (ADDERALL XR) 30 MG 24 hr capsule; Take 1 capsule (30 mg) by mouth daily  Dispense: 30 capsule; Refill: 0    2. Moderate episode of recurrent major depressive disorder (H)  Increase Zoloft to 200 mg daily for improved symptoms control, phone or E visit in 4 weeks to evaluate symptoms.  - sertraline (ZOLOFT) 100 MG tablet; Take 2 tablets (200 mg) by mouth daily  Dispense: 180 tablet; Refill: 1    3. Anxiety  Increase Zoloft to 200 mg daily for improved symptoms control, phone or E visit in 4 weeks to evaluate symptoms.  - sertraline (ZOLOFT) 100 MG tablet; Take 2 tablets (200 mg) by mouth daily  Dispense: 180 tablet; Refill: 1    4. Pervasive developmental disorder  Pertinent history.  Patient continues to work full time at [employer].    5. Obesity, Class I, BMI 30-34.9  Benefits of weight loss reviewed in detail, encouraged him to cut back on the carbohydrates in the diet, consume more fruits and vegetables, drink plenty of water, avoid fruit juices, sodas, get 150 min moderate exercise/week.  Recheck weight in 6 months.      6. Influenza vaccine needed               Return in about 4 weeks (around 3/26/2020),  or if symptoms worsen or fail to improve, for Routine Visit.    AB Hinds [facility]

## 2020-03-09 ASSESSMENT — ANXIETY QUESTIONNAIRES
7. FEELING AFRAID AS IF SOMETHING AWFUL MIGHT HAPPEN: NEARLY EVERY DAY
IF YOU CHECKED OFF ANY PROBLEMS ON THIS QUESTIONNAIRE, HOW DIFFICULT HAVE THESE PROBLEMS MADE IT FOR YOU TO DO YOUR WORK, TAKE CARE OF THINGS AT HOME, OR GET ALONG WITH OTHER PEOPLE: VERY DIFFICULT
5. BEING SO RESTLESS THAT IT IS HARD TO SIT STILL: MORE THAN HALF THE DAYS
1. FEELING NERVOUS, ANXIOUS, OR ON EDGE: NEARLY EVERY DAY
6. BECOMING EASILY ANNOYED OR IRRITABLE: NEARLY EVERY DAY
GAD7 TOTAL SCORE: 20
3. WORRYING TOO MUCH ABOUT DIFFERENT THINGS: NEARLY EVERY DAY
2. NOT BEING ABLE TO STOP OR CONTROL WORRYING: NEARLY EVERY DAY

## 2020-03-09 ASSESSMENT — PATIENT HEALTH QUESTIONNAIRE - PHQ9
SUM OF ALL RESPONSES TO PHQ QUESTIONS 1-9: 18
5. POOR APPETITE OR OVEREATING: NEARLY EVERY DAY

## 2020-03-10 ASSESSMENT — ANXIETY QUESTIONNAIRES: GAD7 TOTAL SCORE: 20

## 2020-04-04 ENCOUNTER — MYC REFILL (OUTPATIENT)
Dept: FAMILY MEDICINE | Facility: CLINIC | Age: 30
End: 2020-04-04

## 2020-04-04 DIAGNOSIS — F90.0 ADHD (ATTENTION DEFICIT HYPERACTIVITY DISORDER), INATTENTIVE TYPE: ICD-10-CM

## 2020-04-06 RX ORDER — DEXTROAMPHETAMINE SACCHARATE, AMPHETAMINE ASPARTATE MONOHYDRATE, DEXTROAMPHETAMINE SULFATE AND AMPHETAMINE SULFATE 7.5; 7.5; 7.5; 7.5 MG/1; MG/1; MG/1; MG/1
30 CAPSULE, EXTENDED RELEASE ORAL DAILY
Qty: 30 CAPSULE | Refills: 0
Start: 2020-04-06

## 2020-04-25 ENCOUNTER — OFFICE VISIT (OUTPATIENT)
Dept: URGENT CARE | Facility: URGENT CARE | Age: 30
End: 2020-04-25
Payer: OTHER MISCELLANEOUS

## 2020-04-25 ENCOUNTER — NURSE TRIAGE (OUTPATIENT)
Dept: NURSING | Facility: CLINIC | Age: 30
End: 2020-04-25

## 2020-04-25 ENCOUNTER — ANCILLARY PROCEDURE (OUTPATIENT)
Dept: GENERAL RADIOLOGY | Facility: CLINIC | Age: 30
End: 2020-04-25
Attending: NURSE PRACTITIONER
Payer: OTHER MISCELLANEOUS

## 2020-04-25 VITALS
WEIGHT: 273 LBS | OXYGEN SATURATION: 96 % | BODY MASS INDEX: 35.05 KG/M2 | SYSTOLIC BLOOD PRESSURE: 129 MMHG | HEART RATE: 90 BPM | DIASTOLIC BLOOD PRESSURE: 82 MMHG | TEMPERATURE: 97.4 F | RESPIRATION RATE: 22 BRPM

## 2020-04-25 DIAGNOSIS — S69.92XA HAND INJURY, LEFT, INITIAL ENCOUNTER: ICD-10-CM

## 2020-04-25 DIAGNOSIS — S69.92XA HAND INJURY, LEFT, INITIAL ENCOUNTER: Primary | ICD-10-CM

## 2020-04-25 PROCEDURE — 99214 OFFICE O/P EST MOD 30 MIN: CPT | Performed by: NURSE PRACTITIONER

## 2020-04-25 PROCEDURE — 73130 X-RAY EXAM OF HAND: CPT | Mod: LT

## 2020-04-25 ASSESSMENT — PAIN SCALES - GENERAL: PAINLEVEL: NO PAIN (1)

## 2020-04-25 NOTE — PATIENT INSTRUCTIONS
Patient Education     Crush Injury of the Hand, No Fracture  You have a crush injury of your hand. This causes local pain, swelling, and sometimes bruising. You don t have any broken bones. This injury may take from a few days to a few weeks to heal.  If a fingernail has been severely injured, it may fall off in 1 to 2 weeks. A new one will usually start to grow back within a month.  Home care  Follow these guidelines when caring for yourself at home:    You may be given a splint to prevent movement of the injured hand.    Keep your hand elevated to reduce pain and swelling. When sitting or lying down keep your arm raised above the level of your heart, if possible. You can do this by placing your arm on a pillow that rests on your chest or on a pillow at your side. This is most important during the first 2 days (48 hours) after the injury.    Put an ice pack on the injured area. Do this for 20 minutes every 1 to 2 hours the first day for pain relief. You can make an ice pack by wrapping a plastic bag of ice cubes in a thin towel. As the ice melts, be careful that the splint doesn t get wet. Continue to use the ice pack 3 to 4 times a day until the pain and swelling go away.    You may use over-the-counter pain medicine such as acetaminophen or ibuprofen to control pain, unless another pain medicine was prescribed. If you have chronic liver or kidney disease, talk with your healthcare provider before using these medicines. Also talk with your provider if you ve had a stomach ulcer or gastrointestinal bleeding.    If you have a splint, keep it dry at all times. Bathe with your splint well out of the water. Protect it with a large plastic bag, rubber-banded at the top end. If a splint gets wet, you can dry it with a hair dryer on the cool setting.    Don t stick a needle into the wound to drain it.    Bruised skin may change colors over time. It may change from reddish to bluish to yellowish before the skin goes back  to normal coloring.  Follow-up care  Follow up with your healthcare provider, or as advised, if you don t start to get better within the next 3 days.  If X-rays were taken, you will be told of any new findings that may affect your care.  When to seek medical advice  Call your healthcare provider right away if any of these occur:    The plaster splint becomes wet or soft    The plaster splint stays wet for more than 24 hours    Tightness or pain under the splint gets worse    Fingers become swollen, cold, blue, numb, or tingly    Redness, warmth, swelling, drainage from the wound, or foul odor from a splint    You can t move your fingers    Fever of 100.4 F (38 C) or higher, or as directed by your healthcare provider   Date Last Reviewed: 6/1/2017 2000-2019 [employer]. [address] [place] [address]. All rights reserved. This information is not intended as a substitute for professional medical care. Always follow your healthcare professional's instructions.

## 2020-04-25 NOTE — TELEPHONE ENCOUNTER
"\"I was using the power jack at work and injured my hand.\" Patient reporting injury occurred 45 minutes ago. Reporting impact to area below pinky finger on right hand. Stating he has full range of motion in hand. Superficial abrasions. Ice applied now. Reporting pinky finger is swollen. Denies numbness or tingling.    Paged on call MD Dr Gallardo through [place] [facility] assistant at 110 pm to call Ana at .  Dr Gallardo advised patient to go into Urgent Care.    Patient notified and agrees to go into [facility] now.     Aan Belle RN  [facility]        Reason for Disposition    Can't use injured hand normally (e.g., make a fist, open fully, hold a glass of water)    Additional Information    Negative: [1] Similar pain previously AND [2] it was from \"heart attack\"    Negative: [1] Similar pain previously AND [2] it was from \"angina\" AND [3] not relieved by nitroglycerin    Negative: Sounds like a life-threatening emergency to the triager    Followed a hand or wrist injury    Negative: Serious injury with multiple fractures    Negative: [1] Major bleeding (e.g., actively dripping or spurting) AND [2] can't be stopped    Negative: Amputation    Negative: Sounds like a life-threatening emergency to the triager    Negative: Bullet wound, stabbed by knife, or other serious penetrating wound    Negative: Looks like a broken bone (e.g., knuckle is gone or depressed)    Negative: Looks like a dislocated joint (e.g., crooked or deformed)    Negative: Cut over knuckle (MCP joint)    Negative: Skin is split open or gaping  (or length > 1/2 inch or 12 mm)    Negative: [1] Bleeding AND [2] won't stop after 10 minutes of direct pressure (using correct technique)    Negative: [1] Dirt in the wound AND [2] not removed with 15 minutes of scrubbing    Negative: [1] Numbness (loss of sensation) of finger(s) AND [2] present now    Negative: High pressure injection injury (e.g., from grease gun or paint " gun, usually work-related)    Negative: Sounds like a serious injury to the triager    Negative: [1] SEVERE pain AND [2] not improved 2 hours after pain medicine/ice packs    Negative: [1] Large swelling or bruise (> 2 inches or 5 cm)    AND [2] can't use injured hand normally (e.g., make a fist, open fully, hold a glass of water)    Negative: Suspicious history for the injury    Protocols used: HAND AND WRIST INJURY-A-AH, HAND AND WRIST PAIN-A-AH

## 2020-04-25 NOTE — PROGRESS NOTES
SUBJECTIVE:  Teddy Shah is a 29 year old male who sustained a left hand injury 2 hours ago at work. Mechanism of injury: crushing injury   Immediate symptoms: immediate pain, immediate swelling, was able to use hand directly after injury, no deformity was noted by the patient. Symptoms have been improving since that time. Prior history of related problems: no prior problems with this area in the past.  Using ice    Past Medical History:   Diagnosis Date     Gastro-oesophageal reflux disease      History of anesthesia reaction     Severe N/V     PONV (postoperative nausea and vomiting)      Current Outpatient Medications   Medication     Acetaminophen (TYLENOL PO)     amphetamine-dextroamphetamine (ADDERALL XR) 30 MG 24 hr capsule     sertraline (ZOLOFT) 100 MG tablet     amphetamine-dextroamphetamine (ADDERALL XR) 30 MG 24 hr capsule     [START ON 4/29/2020] amphetamine-dextroamphetamine (ADDERALL XR) 30 MG 24 hr capsule     amphetamine-dextroamphetamine (ADDERALL XR) 30 MG 24 hr capsule     hydrocortisone (WESTCORT) 0.2 % cream     IBUPROFEN PO     triamcinolone (KENALOG) 0.1 % cream     No current facility-administered medications for this visit.         Allergies   Allergen Reactions     Latex Itching     Erythromycin Rash      ROS: 10 point ROS neg other than the symptoms noted above in the HPI.    OBJECTIVE:  /82   Pulse 90   Temp 97.4  F (36.3  C) (Tympanic)   Resp 22   Wt 123.8 kg (273 lb)   SpO2 96%   BMI 35.05 kg/m    Appearance: in no apparent distress.  CV: S1 and S2 normal, no murmurs, clicks, gallops or rubs. Regular rate and rhythm.   Lungs: Chest is clear; no wheezes or rales. No edema or JVD.  Hand exam: mild swelling, tenderness generalized,  ligaments intact, FROM all hand, wrist, finger joints.    X-ray: no fracture or dislocation noted, pending review by Radiologist.    ASSESSMENT:  (S69.92XA) Hand injury, left, initial encounter  (primary encounter diagnosis)      PLAN:  He left  work know about injury and that he was coming in.   NSAID, ice elevate activity as tolerated  Home treat and mointor symptoms call or rtc if new or worsening    AB Alberto CNP

## 2020-06-07 ENCOUNTER — MYC REFILL (OUTPATIENT)
Dept: FAMILY MEDICINE | Facility: CLINIC | Age: 30
End: 2020-06-07

## 2020-06-07 DIAGNOSIS — F90.0 ADHD (ATTENTION DEFICIT HYPERACTIVITY DISORDER), INATTENTIVE TYPE: ICD-10-CM

## 2020-06-08 RX ORDER — DEXTROAMPHETAMINE SACCHARATE, AMPHETAMINE ASPARTATE MONOHYDRATE, DEXTROAMPHETAMINE SULFATE AND AMPHETAMINE SULFATE 7.5; 7.5; 7.5; 7.5 MG/1; MG/1; MG/1; MG/1
30 CAPSULE, EXTENDED RELEASE ORAL DAILY
Qty: 30 CAPSULE | Refills: 0 | Status: SHIPPED | OUTPATIENT
Start: 2020-07-08 | End: 2020-06-10

## 2020-06-08 RX ORDER — DEXTROAMPHETAMINE SACCHARATE, AMPHETAMINE ASPARTATE MONOHYDRATE, DEXTROAMPHETAMINE SULFATE AND AMPHETAMINE SULFATE 7.5; 7.5; 7.5; 7.5 MG/1; MG/1; MG/1; MG/1
30 CAPSULE, EXTENDED RELEASE ORAL DAILY
Qty: 30 CAPSULE | Refills: 0 | Status: SHIPPED | OUTPATIENT
Start: 2020-06-08 | End: 2020-06-10

## 2020-06-08 NOTE — TELEPHONE ENCOUNTER
Prescribing error occurred 2 x after sending this medication. Will route to provider to attempt to resend if appropriate since it is a controlled med.    Susan Kuhn RN

## 2020-06-08 NOTE — TELEPHONE ENCOUNTER
Requested Prescriptions   Pending Prescriptions Disp Refills     amphetamine-dextroamphetamine (ADDERALL XR) 30 MG 24 hr capsule 30 capsule 0     Sig: Take 1 capsule (30 mg) by mouth daily       There is no refill protocol information for this order        amphetamine-dextroamphetamine (ADDERALL XR) 30 MG 24 hr capsule 30 capsule 0     Sig: Take 1 capsule (30 mg) by mouth daily       There is no refill protocol information for this order        Routing refill request to provider for review/approval because:  Drug not on the [facility] refill protocol     Robby Lindsey RN, BSN, PHN

## 2020-06-10 RX ORDER — DEXTROAMPHETAMINE SACCHARATE, AMPHETAMINE ASPARTATE MONOHYDRATE, DEXTROAMPHETAMINE SULFATE AND AMPHETAMINE SULFATE 7.5; 7.5; 7.5; 7.5 MG/1; MG/1; MG/1; MG/1
30 CAPSULE, EXTENDED RELEASE ORAL DAILY
Qty: 30 CAPSULE | Refills: 0 | Status: SHIPPED | OUTPATIENT
Start: 2020-06-10 | End: 2020-09-20

## 2020-06-10 RX ORDER — DEXTROAMPHETAMINE SACCHARATE, AMPHETAMINE ASPARTATE MONOHYDRATE, DEXTROAMPHETAMINE SULFATE AND AMPHETAMINE SULFATE 7.5; 7.5; 7.5; 7.5 MG/1; MG/1; MG/1; MG/1
30 CAPSULE, EXTENDED RELEASE ORAL DAILY
Qty: 30 CAPSULE | Refills: 0 | Status: SHIPPED | OUTPATIENT
Start: 2020-07-10 | End: 2021-07-28

## 2020-07-12 ENCOUNTER — MYC REFILL (OUTPATIENT)
Dept: FAMILY MEDICINE | Facility: CLINIC | Age: 30
End: 2020-07-12

## 2020-07-12 DIAGNOSIS — F90.0 ADHD (ATTENTION DEFICIT HYPERACTIVITY DISORDER), INATTENTIVE TYPE: ICD-10-CM

## 2020-07-14 RX ORDER — DEXTROAMPHETAMINE SACCHARATE, AMPHETAMINE ASPARTATE MONOHYDRATE, DEXTROAMPHETAMINE SULFATE AND AMPHETAMINE SULFATE 7.5; 7.5; 7.5; 7.5 MG/1; MG/1; MG/1; MG/1
30 CAPSULE, EXTENDED RELEASE ORAL DAILY
Qty: 30 CAPSULE | Refills: 0 | OUTPATIENT
Start: 2020-07-14

## 2020-09-20 ENCOUNTER — MYC REFILL (OUTPATIENT)
Dept: FAMILY MEDICINE | Facility: CLINIC | Age: 30
End: 2020-09-20

## 2020-09-20 ENCOUNTER — MYC REFILL (OUTPATIENT)
Dept: DERMATOLOGY | Facility: CLINIC | Age: 30
End: 2020-09-20

## 2020-09-20 ENCOUNTER — E-VISIT (OUTPATIENT)
Dept: FAMILY MEDICINE | Facility: CLINIC | Age: 30
End: 2020-09-20
Payer: COMMERCIAL

## 2020-09-20 DIAGNOSIS — F33.1 MODERATE EPISODE OF RECURRENT MAJOR DEPRESSIVE DISORDER (H): ICD-10-CM

## 2020-09-20 DIAGNOSIS — L21.9 DERMATITIS, SEBORRHEIC: ICD-10-CM

## 2020-09-20 DIAGNOSIS — F90.0 ADHD (ATTENTION DEFICIT HYPERACTIVITY DISORDER), INATTENTIVE TYPE: ICD-10-CM

## 2020-09-20 DIAGNOSIS — F41.9 ANXIETY: ICD-10-CM

## 2020-09-20 DIAGNOSIS — R23.3 PETECHIAL RASH: ICD-10-CM

## 2020-09-20 DIAGNOSIS — L71.9 ROSACEA: Primary | ICD-10-CM

## 2020-09-20 PROCEDURE — 99207 ZZC NO BILLABLE SERVICE THIS VISIT: CPT | Performed by: NURSE PRACTITIONER

## 2020-09-20 RX ORDER — TRIAMCINOLONE ACETONIDE 1 MG/G
CREAM TOPICAL
Qty: 80 G | Refills: 0 | Status: CANCELLED | OUTPATIENT
Start: 2020-09-20

## 2020-09-20 RX ORDER — HYDROCORTISONE VALERATE CREAM 2 MG/G
CREAM TOPICAL
Qty: 45 G | Refills: 3 | Status: CANCELLED | OUTPATIENT
Start: 2020-09-20

## 2020-09-21 RX ORDER — DEXTROAMPHETAMINE SACCHARATE, AMPHETAMINE ASPARTATE MONOHYDRATE, DEXTROAMPHETAMINE SULFATE AND AMPHETAMINE SULFATE 7.5; 7.5; 7.5; 7.5 MG/1; MG/1; MG/1; MG/1
30 CAPSULE, EXTENDED RELEASE ORAL DAILY
Qty: 30 CAPSULE | Refills: 0 | Status: SHIPPED | OUTPATIENT
Start: 2020-09-21 | End: 2020-10-16

## 2020-09-21 NOTE — TELEPHONE ENCOUNTER
Requested Prescriptions   Pending Prescriptions Disp Refills     amphetamine-dextroamphetamine (ADDERALL XR) 30 MG 24 hr capsule 30 capsule 0     Sig: Take 1 capsule (30 mg) by mouth daily       There is no refill protocol information for this order        Routing refill request to provider for review/approval because:  Drug not on the [facility] refill protocol       Robby Lindsey RN, BSN, PHN

## 2020-09-22 NOTE — TELEPHONE ENCOUNTER
Routing refill request to provider for review/approval because:  PHQ-9 score:    PHQ 3/9/2020   PHQ-9 Total Score 18   Q9: Thoughts of better off dead/self-harm past 2 weeks Not at all         Suyapa Espana RN  [facility]

## 2020-09-23 RX ORDER — TRIAMCINOLONE ACETONIDE 1 MG/G
CREAM TOPICAL
Qty: 80 G | Refills: 0 | Status: SHIPPED | OUTPATIENT
Start: 2020-09-23 | End: 2020-11-03

## 2020-09-24 RX ORDER — SERTRALINE HYDROCHLORIDE 100 MG/1
200 TABLET, FILM COATED ORAL DAILY
Qty: 60 TABLET | Refills: 0 | Status: SHIPPED | OUTPATIENT
Start: 2020-09-24 | End: 2020-10-16

## 2020-09-24 NOTE — TELEPHONE ENCOUNTER
Pls have him schedule virtual visit to follow up on his depression.We can do a video visit and I can look as his skin as well and determine the best plan for his rosacea.    Ashanti BERGER, CNP

## 2020-10-16 ENCOUNTER — VIRTUAL VISIT (OUTPATIENT)
Dept: FAMILY MEDICINE | Facility: CLINIC | Age: 30
End: 2020-10-16
Payer: COMMERCIAL

## 2020-10-16 DIAGNOSIS — F33.1 MODERATE EPISODE OF RECURRENT MAJOR DEPRESSIVE DISORDER (H): ICD-10-CM

## 2020-10-16 DIAGNOSIS — F90.0 ADHD (ATTENTION DEFICIT HYPERACTIVITY DISORDER), INATTENTIVE TYPE: Primary | ICD-10-CM

## 2020-10-16 DIAGNOSIS — F41.9 ANXIETY: ICD-10-CM

## 2020-10-16 PROCEDURE — 99214 OFFICE O/P EST MOD 30 MIN: CPT | Mod: 95 | Performed by: NURSE PRACTITIONER

## 2020-10-16 RX ORDER — SERTRALINE HYDROCHLORIDE 100 MG/1
200 TABLET, FILM COATED ORAL DAILY
Qty: 180 TABLET | Refills: 1 | Status: SHIPPED | OUTPATIENT
Start: 2020-10-16 | End: 2021-05-28

## 2020-10-16 RX ORDER — SERTRALINE HYDROCHLORIDE 100 MG/1
200 TABLET, FILM COATED ORAL DAILY
Qty: 60 TABLET | Refills: 0 | Status: SHIPPED | OUTPATIENT
Start: 2020-10-16 | End: 2020-10-16

## 2020-10-16 RX ORDER — DEXTROAMPHETAMINE SACCHARATE, AMPHETAMINE ASPARTATE MONOHYDRATE, DEXTROAMPHETAMINE SULFATE AND AMPHETAMINE SULFATE 7.5; 7.5; 7.5; 7.5 MG/1; MG/1; MG/1; MG/1
30 CAPSULE, EXTENDED RELEASE ORAL DAILY
Qty: 30 CAPSULE | Refills: 0 | Status: SHIPPED | OUTPATIENT
Start: 2020-12-17 | End: 2020-12-22

## 2020-10-16 RX ORDER — DEXTROAMPHETAMINE SACCHARATE, AMPHETAMINE ASPARTATE MONOHYDRATE, DEXTROAMPHETAMINE SULFATE AND AMPHETAMINE SULFATE 7.5; 7.5; 7.5; 7.5 MG/1; MG/1; MG/1; MG/1
30 CAPSULE, EXTENDED RELEASE ORAL DAILY
Qty: 30 CAPSULE | Refills: 0 | Status: SHIPPED | OUTPATIENT
Start: 2020-11-16 | End: 2020-12-16

## 2020-10-16 RX ORDER — DEXTROAMPHETAMINE SACCHARATE, AMPHETAMINE ASPARTATE MONOHYDRATE, DEXTROAMPHETAMINE SULFATE AND AMPHETAMINE SULFATE 7.5; 7.5; 7.5; 7.5 MG/1; MG/1; MG/1; MG/1
30 CAPSULE, EXTENDED RELEASE ORAL DAILY
Qty: 30 CAPSULE | Refills: 0 | Status: SHIPPED | OUTPATIENT
Start: 2020-10-16 | End: 2020-11-15

## 2020-10-16 ASSESSMENT — PATIENT HEALTH QUESTIONNAIRE - PHQ9: SUM OF ALL RESPONSES TO PHQ QUESTIONS 1-9: 18

## 2020-10-16 NOTE — PATIENT INSTRUCTIONS
Patient Education     Depression: Tips to Help Yourself    As your healthcare providers help treat your depression, you can also help yourself. Keep in mind that your illness affects you emotionally, physically, mentally, and socially. So full recovery will take time. Take care of your body and your soul, and be patient with yourself as you get better.  Self-care    Educate yourself. Read about treatment and medicine options. If you have the energy, attend local conferences or support groups. Keep a list of useful websites and helpful books and use them as needed. This illness is not your fault. Don t blame yourself for your depression.    Manage early symptoms. If you notice symptoms returning, experience triggers, or identify other factors that may lead to a depressive episode, get help as soon as possible. Ask trusted friends and family to monitor your behavior and let you know if they see anything of concern.    Work with your provider. Find a provider you can trust. Communicate honestly with that person and share information on your treatment for depression and your reaction to medicines.    Be prepared for a crisis. Know what to do if you experience a crisis. Keep the phone number of a crisis hotline and know the location of your community's urgent care centers and the closest emergency department.    Hold off on big decisions. Depression can cloud your judgment. So wait until you feel better before making major life decisions, such as changing jobs, moving, or getting  or .    Be patient. Recovering from depression is a process. Don t be discouraged if it takes some time to feel better.    Keep it simple. Depression saps your energy and concentration. So you won t be able to do all the things you used to do. Set small goals and do what you can.    Be with others. Don t isolate yourself--you ll only feel worse. Try to be with other people. And take part in fun activities when you can. Go to a  movie, ballgame, Hinduism service, or social event. Talk openly with people you can trust. And accept help when it s offered.  Take care of your body  People with depression often lose the desire to take care of themselves. That only makes their problems worse. During treatment and afterward, make a point to:    Exercise. It s a great way to take care of your body. And studies have shown that exercise helps fight depression.    Avoid drugs and alcohol. These may ease the pain in the short term. But they ll only make your problems worse in the long run.    Get relief from stress. Ask your healthcare provider for relaxation exercises and techniques to help relieve stress.    Eat right. A balanced and healthy diet helps keep your body healthy.  Date Last Reviewed: 1/1/2017 2000-2019 [employer]. [address] [place] [address], [place] [address]. All rights reserved. This information is not intended as a substitute for professional medical care. Always follow your healthcare professional's instructions.

## 2020-10-16 NOTE — PROGRESS NOTES
"Teddy Shah is a 29 year old adult who is being evaluated via a billable telephone visit.      The patient has been notified of following:     \"This telephone visit will be conducted via a call between you and your physician/provider. We have found that certain health care needs can be provided without the need for a physical exam.  This service lets us provide the care you need with a short phone conversation.  If a prescription is necessary we can send it directly to your pharmacy.  If lab work is needed we can place an order for that and you can then stop by our lab to have the test done at a later time.    Telephone visits are billed at different rates depending on your insurance coverage. During this emergency period, for some insurers they may be billed the same as an in-person visit.  Please reach out to your insurance provider with any questions.    If during the course of the call the physician/provider feels a telephone visit is not appropriate, you will not be charged for this service.\"    Patient has given verbal consent for Telephone visit?  Yes    What phone number would you like to be contacted at? [phone]    How would you like to obtain your AVS? [place]    [place]     Teddy Shah is a 29 year old adult who presents via phone visit today for the following health issues:    HPI     Depression and Anxiety Follow-Up    How are you doing with your depression since your last visit? Worsened     How are you doing with your anxiety since your last visit?  Worsened     Are you having other symptoms that might be associated with depression or anxiety? Yes:  same as before per patient    Have you had a significant life event? No     Do you have any concerns with your use of alcohol or other drugs? No    Social History     Tobacco Use     Smoking status: Never Smoker     Smokeless tobacco: Never Used   Substance Use Topics     Alcohol use: No     Drug use: No     PHQ 4/22/2019 8/29/2019 3/9/2020 "   PHQ-9 Total Score 19 16 18   Q9: Thoughts of better off dead/self-harm past 2 weeks Not at all Not at all Not at all     KASSIE-7 SCORE 4/22/2019 8/29/2019 3/9/2020   Total Score - - -   Total Score 18 18 20     Last PHQ-9 10/16/2020   1.  Little interest or pleasure in doing things 2   2.  Feeling down, depressed, or hopeless 3   3.  Trouble falling or staying asleep, or sleeping too much 1   4.  Feeling tired or having little energy 3   5.  Poor appetite or overeating 1   6.  Feeling bad about yourself 3   7.  Trouble concentrating 3   8.  Moving slowly or restless 2   Q9: Thoughts of better off dead/self-harm past 2 weeks 0   PHQ-9 Total Score 18   Difficulty at work, home, or with people -     KASSIE-7  3/9/2020   1. Feeling nervous, anxious, or on edge 3   2. Not being able to stop or control worrying 3   3. Worrying too much about different things 3   4. Trouble relaxing 3   5. Being so restless that it is hard to sit still 2   6. Becoming easily annoyed or irritable 3   7. Feeling afraid, as if something awful might happen 3   KASSIE-7 Total Score 20   If you checked any problems, how difficult have they made it for you to do your work, take care of things at home, or get along with other people? Very difficult       Suicide Assessment Five-step Evaluation and Treatment (SAFE-T)      How many servings of fruits and vegetables do you eat daily?  2-3    On average, how many sweetened beverages do you drink each day (Examples: soda, juice, sweet tea, etc.  Do NOT count diet or artificially sweetened beverages)?   0    How many days per week do you exercise enough to make your heart beat faster? 5    How many minutes a day do you exercise enough to make your heart beat faster? 30 - 60  How many days per week do you miss taking your medication? 0     ADHD- doing well on current 30 mg Adderall XR daily, no adverse effects.        Review of Systems   Constitutional, HEENT, cardiovascular, pulmonary, gi and gu systems are  "negative, except as otherwise noted.       Objective          Vitals:  No vitals were obtained today due to virtual visit.    healthy, alert, no distress and cooperative  PSYCH: Alert and oriented times 3; coherent speech, normal   rate and volume, able to articulate logical thoughts, able   to abstract reason, no tangential thoughts, no hallucinations   or delusions  Teddy A Rognas's affect is normal and pleasant  RESP: No cough, no audible wheezing, able to talk in full sentences  Remainder of exam unable to be completed due to telephone visits            Assessment/Plan:    Assessment & Plan     Moderate episode of recurrent major depressive disorder (H)  Stable on Zoloft, no sexual side effects, continue current management.   - sertraline (ZOLOFT) 100 MG tablet  Dispense: 180 tablet; Refill: 1    Anxiety  Stable on Zoloft.  He is not interested in counseling, admits to increased anxiety de to COVID-19 and working in a grocery store.   - sertraline (ZOLOFT) 100 MG tablet  Dispense: 180 tablet; Refill: 1    ADHD (attention deficit hyperactivity disorder), inattentive type  Doing well on current dose of Adderall, continue current management, call for refill in 3 months OV in 6 months.  - amphetamine-dextroamphetamine (ADDERALL XR) 30 MG 24 hr capsule  Dispense: 30 capsule; Refill: 0  - amphetamine-dextroamphetamine (ADDERALL XR) 30 MG 24 hr capsule  Dispense: 30 capsule; Refill: 0  - amphetamine-dextroamphetamine (ADDERALL XR) 30 MG 24 hr capsule  Dispense: 30 capsule; Refill: 0       BMI:   Estimated body mass index is 35.05 kg/m  as calculated from the following:    Height as of 2/27/20: 1.88 m (6' 2\").    Weight as of 4/25/20: 123.8 kg (273 lb).   Weight management plan: Discussed healthy diet and exercise guidelines         Work on weight loss  Regular exercise  See Patient Instructions    Return in about 3 months (around 1/16/2021), or if symptoms worsen or fail to improve, for using a video visit.    [place]" AB [facility]    Phone call duration:  17 minutes

## 2020-11-02 ENCOUNTER — MYC REFILL (OUTPATIENT)
Dept: FAMILY MEDICINE | Facility: CLINIC | Age: 30
End: 2020-11-02

## 2020-11-02 DIAGNOSIS — F90.0 ADHD (ATTENTION DEFICIT HYPERACTIVITY DISORDER), INATTENTIVE TYPE: ICD-10-CM

## 2020-11-02 RX ORDER — DEXTROAMPHETAMINE SACCHARATE, AMPHETAMINE ASPARTATE MONOHYDRATE, DEXTROAMPHETAMINE SULFATE AND AMPHETAMINE SULFATE 7.5; 7.5; 7.5; 7.5 MG/1; MG/1; MG/1; MG/1
30 CAPSULE, EXTENDED RELEASE ORAL DAILY
Qty: 30 CAPSULE | Refills: 0 | Status: CANCELLED | OUTPATIENT
Start: 2020-11-02

## 2020-11-03 RX ORDER — DEXTROAMPHETAMINE SACCHARATE, AMPHETAMINE ASPARTATE MONOHYDRATE, DEXTROAMPHETAMINE SULFATE AND AMPHETAMINE SULFATE 7.5; 7.5; 7.5; 7.5 MG/1; MG/1; MG/1; MG/1
30 CAPSULE, EXTENDED RELEASE ORAL DAILY
Qty: 30 CAPSULE | Refills: 0
Start: 2020-11-03

## 2020-11-03 NOTE — TELEPHONE ENCOUNTER
Patient should have prescriptions at pharmacy for him for November and December.       Robby Lindsey RN, BSN, PHN

## 2020-11-24 ENCOUNTER — MYC REFILL (OUTPATIENT)
Dept: FAMILY MEDICINE | Facility: CLINIC | Age: 30
End: 2020-11-24

## 2020-11-24 DIAGNOSIS — F41.9 ANXIETY: ICD-10-CM

## 2020-11-24 DIAGNOSIS — F33.1 MODERATE EPISODE OF RECURRENT MAJOR DEPRESSIVE DISORDER (H): ICD-10-CM

## 2020-11-28 RX ORDER — SERTRALINE HYDROCHLORIDE 100 MG/1
200 TABLET, FILM COATED ORAL DAILY
Qty: 180 TABLET | Refills: 1 | OUTPATIENT
Start: 2020-11-28

## 2020-11-28 NOTE — TELEPHONE ENCOUNTER
Should have refills on file at pharmacy. Patient to contact pharmacy.     Lawanda Espinal RN  [facility]

## 2020-12-22 PROBLEM — L71.9 ROSACEA: Status: ACTIVE | Noted: 2020-12-22

## 2021-01-08 DIAGNOSIS — K58.2 IRRITABLE BOWEL SYNDROME WITH BOTH CONSTIPATION AND DIARRHEA: ICD-10-CM

## 2021-01-08 DIAGNOSIS — R19.8 ALTERED BOWEL FUNCTION: ICD-10-CM

## 2021-01-08 LAB
ERYTHROCYTE [DISTWIDTH] IN BLOOD BY AUTOMATED COUNT: 12.8 % (ref 10–15)
HCT VFR BLD AUTO: 48.3 % (ref 40–53)
HGB BLD-MCNC: 15.9 G/DL (ref 13.3–17.7)
MCH RBC QN AUTO: 27.8 PG (ref 26.5–33)
MCHC RBC AUTO-ENTMCNC: 32.9 G/DL (ref 31.5–36.5)
MCV RBC AUTO: 85 FL (ref 78–100)
PLATELET # BLD AUTO: 231 10E9/L (ref 150–450)
RBC # BLD AUTO: 5.71 10E12/L (ref 4.4–5.9)
WBC # BLD AUTO: 5.6 10E9/L (ref 4–11)

## 2021-01-08 PROCEDURE — 83516 IMMUNOASSAY NONANTIBODY: CPT | Performed by: NURSE PRACTITIONER

## 2021-01-08 PROCEDURE — 85027 COMPLETE CBC AUTOMATED: CPT | Performed by: NURSE PRACTITIONER

## 2021-01-08 PROCEDURE — 36415 COLL VENOUS BLD VENIPUNCTURE: CPT | Performed by: NURSE PRACTITIONER

## 2021-01-08 PROCEDURE — 83516 IMMUNOASSAY NONANTIBODY: CPT | Mod: 59 | Performed by: NURSE PRACTITIONER

## 2021-01-11 LAB
TTG IGA SER-ACNC: 1 U/ML
TTG IGG SER-ACNC: <1 U/ML

## 2021-01-15 ENCOUNTER — HEALTH MAINTENANCE LETTER (OUTPATIENT)
Age: 31
End: 2021-01-15

## 2021-04-16 ENCOUNTER — IMMUNIZATION (OUTPATIENT)
Dept: NURSING | Facility: CLINIC | Age: 31
End: 2021-04-16
Payer: COMMERCIAL

## 2021-04-16 PROCEDURE — 0001A PR COVID VAC PFIZER DIL RECON 30 MCG/0.3 ML IM: CPT

## 2021-04-16 PROCEDURE — 91300 PR COVID VAC PFIZER DIL RECON 30 MCG/0.3 ML IM: CPT

## 2021-05-07 ENCOUNTER — IMMUNIZATION (OUTPATIENT)
Dept: NURSING | Facility: CLINIC | Age: 31
End: 2021-05-07
Attending: INTERNAL MEDICINE
Payer: COMMERCIAL

## 2021-05-07 PROCEDURE — 91300 PR COVID VAC PFIZER DIL RECON 30 MCG/0.3 ML IM: CPT

## 2021-05-07 PROCEDURE — 0002A PR COVID VAC PFIZER DIL RECON 30 MCG/0.3 ML IM: CPT

## 2021-06-11 ENCOUNTER — OFFICE VISIT (OUTPATIENT)
Dept: FAMILY MEDICINE | Facility: CLINIC | Age: 31
End: 2021-06-11

## 2021-06-11 VITALS
HEART RATE: 84 BPM | WEIGHT: 285 LBS | OXYGEN SATURATION: 96 % | BODY MASS INDEX: 36.57 KG/M2 | SYSTOLIC BLOOD PRESSURE: 121 MMHG | HEIGHT: 74 IN | DIASTOLIC BLOOD PRESSURE: 80 MMHG | TEMPERATURE: 97.9 F

## 2021-06-11 DIAGNOSIS — F41.9 ANXIETY: ICD-10-CM

## 2021-06-11 DIAGNOSIS — F84.9 PERVASIVE DEVELOPMENTAL DISORDER: ICD-10-CM

## 2021-06-11 DIAGNOSIS — E66.01 CLASS 2 SEVERE OBESITY DUE TO EXCESS CALORIES WITH SERIOUS COMORBIDITY AND BODY MASS INDEX (BMI) OF 36.0 TO 36.9 IN ADULT (H): ICD-10-CM

## 2021-06-11 DIAGNOSIS — E66.812 CLASS 2 SEVERE OBESITY DUE TO EXCESS CALORIES WITH SERIOUS COMORBIDITY AND BODY MASS INDEX (BMI) OF 36.0 TO 36.9 IN ADULT (H): ICD-10-CM

## 2021-06-11 DIAGNOSIS — F33.1 MODERATE EPISODE OF RECURRENT MAJOR DEPRESSIVE DISORDER (H): Primary | ICD-10-CM

## 2021-06-11 DIAGNOSIS — F90.0 ADHD (ATTENTION DEFICIT HYPERACTIVITY DISORDER), INATTENTIVE TYPE: ICD-10-CM

## 2021-06-11 PROCEDURE — 99214 OFFICE O/P EST MOD 30 MIN: CPT | Performed by: NURSE PRACTITIONER

## 2021-06-11 RX ORDER — METHYLPHENIDATE HYDROCHLORIDE 18 MG/1
18 TABLET ORAL EVERY MORNING
Qty: 30 TABLET | Refills: 0 | Status: SHIPPED | OUTPATIENT
Start: 2021-06-11 | End: 2021-07-28

## 2021-06-11 RX ORDER — SERTRALINE HYDROCHLORIDE 100 MG/1
200 TABLET, FILM COATED ORAL DAILY
Qty: 90 TABLET | Refills: 1 | Status: SHIPPED | OUTPATIENT
Start: 2021-06-11 | End: 2021-07-28

## 2021-06-11 ASSESSMENT — PAIN SCALES - GENERAL: PAINLEVEL: NO PAIN (0)

## 2021-06-11 ASSESSMENT — MIFFLIN-ST. JEOR: SCORE: 2322.5

## 2021-06-11 NOTE — PROGRESS NOTES
Assessment & Plan     Moderate episode of recurrent major depressive disorder (H)  Continue Zoloft at 200 mg/day, referring for counseling to help with  Mood and stress  Management, Follow back 3 months, sooner for new/worsening symptoms.  - sertraline (ZOLOFT) 100 MG tablet  Dispense: 90 tablet; Refill: 1  - MENTAL HEALTH REFERRAL  - Adult; Outpatient Treatment; Individual/Couples/Family/Group Therapy/Health Psychology; [facility] [place] [phone]; We will contact you to schedule the appointment or please call with any questions    Anxiety  Referring for counseling, continue zoloft  - sertraline (ZOLOFT) 100 MG tablet  Dispense: 90 tablet; Refill: 1  - MENTAL HEALTH REFERRAL  - Adult; Outpatient Treatment; Individual/Couples/Family/Group Therapy/Health Psychology; [facility] [place] [phone]; We will contact you to schedule the appointment or please call with any questions    ADHD (attention deficit hyperactivity disorder), inattentive type  He's been off Adderall for several months but wants to get back on something other than Adderall which he felt wasn't working well for him.  Starting Concerta at 18 mg and he'll follow back 3-4 weeks for recheck and likely dose adjustment.  Reviewed dosing, potential side effects.  - methylphenidate HCl ER (CONCERTA) 18 MG CR tablet  Dispense: 30 tablet; Refill: 0  - MENTAL HEALTH REFERRAL  - Adult; Outpatient Treatment; Individual/Couples/Family/Group Therapy/Health Psychology; [facility] [place] [phone]; We will contact you to schedule the appointment or please call with any questions    Pervasive developmental disorder  Referring for counseling- he is quite animated today as he discussed his concern others not getting COVID vaccine. He can benefit from working on boundary issues, recognizing what he can/cannot control, etc.  - MENTAL HEALTH REFERRAL  - Adult; Outpatient Treatment;  "Individual/Couples/Family/Group Therapy/Health Psychology; [facility]: [facility] [phone]; We will contact you to schedule the appointment or please call with any questions    Class 2 severe obesity due to excess calories with serious comorbidity and body mass index (BMI) of 36.0 to 36.9 in adult (H)  Benefits of weight loss reviewed in detail, encouraged him to cut back on the carbohydrates in the diet, consume more fruits and vegetables, drink plenty of water, avoid fruit juices, sodas, get 150 min moderate exercise/week.  Recheck weight in 6 months.       BMI:   Estimated body mass index is 36.59 kg/m  as calculated from the following:    Height as of this encounter: 1.88 m (6' 2\").    Weight as of this encounter: 129.3 kg (285 lb).   Weight management plan: Discussed healthy diet and exercise guidelines    Work on weight loss  Regular exercise  See Patient Instructions    Return in about 4 weeks (around 7/9/2021), or if symptoms worsen or fail to improve, for Follow up, using a video visit, using a phone visit.    [facility] OVISAAD Espinal is a 30 year old who presents for the following health issues  HPI     Depression and Anxiety Follow-Up    How are you doing with your depression since your last visit? Worsened     How are you doing with your anxiety since your last visit?  Worsened     Are you having other symptoms that might be associated with depression or anxiety? Yes:  .    Have you had a significant life event? OTHER: COVID pandemic, family stressors     Do you have any concerns with your use of alcohol or other drugs? No  PMH also significant for PDD- he is quite concerned about family members not getting COVID vaccine and placing others at risk. He lives with his parents who \"don't believe in COVID or the vaccine\" and this causes much personal and interpersonal strife.   Social History     Tobacco Use     Smoking status: " Never Smoker     Smokeless tobacco: Never Used   Substance Use Topics     Alcohol use: No     Drug use: No     PHQ 8/29/2019 3/9/2020 10/16/2020   PHQ-9 Total Score 16 18 18   Q9: Thoughts of better off dead/self-harm past 2 weeks Not at all Not at all Not at all     KASSIE-7 SCORE 4/22/2019 8/29/2019 3/9/2020   Total Score - - -   Total Score 18 18 20          Suicide Assessment Five-step Evaluation and Treatment (SAFE-T)        How many servings of fruits and vegetables do you eat daily?  2-3    On average, how many sweetened beverages do you drink each day (Examples: soda, juice, sweet tea, etc.  Do NOT count diet or artificially sweetened beverages)?   1    How many days per week do you exercise enough to make your heart beat faster? 5    How many minutes a day do you exercise enough to make your heart beat faster? 20 - 29    How many days per week do you miss taking your medication? 0      Review of Systems   Constitutional, HEENT, cardiovascular, pulmonary, gi and gu systems are negative, except as otherwise noted.      Objective    There were no vitals taken for this visit.  There is no height or weight on file to calculate BMI.  Physical Exam   GENERAL: healthy, alert and no distress  EYES: Eyes grossly normal to inspection, PERRL and conjunctivae and sclerae normal  HENT: ear canals and TM's normal, nose and mouth without ulcers or lesions  NECK: no adenopathy, no asymmetry, masses, or scars and thyroid normal to palpation  RESP: lungs clear to auscultation - no rales, rhonchi or wheezes  CV: regular rate and rhythm, normal S1 S2, no S3 or S4, no murmur, click or rub, no peripheral edema and peripheral pulses strong  ABDOMEN: soft, nontender, no hepatosplenomegaly, no masses and bowel sounds normal  MS: no gross musculoskeletal defects noted, no edema  SKIN: no suspicious lesions or rashes  NEURO: Normal strength and tone, mentation intact and speech normal  BACK: no CVA tenderness, no paralumbar  tenderness  PSYCH: mentation appears normal, affect normal/bright and appearance well groomed

## 2021-06-11 NOTE — PATIENT INSTRUCTIONS
At [facility], we strive to deliver an exceptional experience to you, every time we see you. If you receive a survey, please complete it as we do value your feedback.  If you have MyChart, you can expect to receive results automatically within 24 hours of their completion.  Your provider will send a note interpreting your results as well.   If you do not have MyChart, you should receive your results in about a week by mail.    Your care team:                            Family Medicine Internal Medicine   MD Rolando Salvador MD Shantel Branch-Fleming, MD Srinivasa Vaka, MD Katya Belousova, PADANIELA Chaudhary, APRN CNP    Yobani Camacho, MD Pediatrics   Jaocb Caicedo, PADANIELA Vizcarra, CNP MD Mimi Guerra APRN CNP   MD Kalie Wilkins MD Deborah Mielke, MD Lindsey Dee, APRN [facility]      Clinic hours: Monday - Thursday 7 am-6 pm; Fridays 7 am-5 pm.   Urgent care: Monday - Friday 10 am- 8 pm; Saturday and Sunday 9 am-5 pm.    Clinic: [phone]       [facility]: Monday - Thursday 8 am - 7 pm; Friday 8 am - 6 pm  [facility]: [phone]     Use www.oncare.org for 24/7 diagnosis and treatment of dozens of conditions.  Patient Education     Concerta 24 HR Extended Release Tablet 18 mg  Uses  For attention deficit hyperactivity disorder (ADHD).  Instructions  Swallow the medicine without crushing or chewing it.  This medicine may be taken with or without food.  Swallow with a full glass (8 oz) of water unless your doctor gives you different instructions.  You may take with food to prevent stomach upset.  It is very important that you take the medicine at about the same time every day. It will work best if you do this.  Take the medicine first thing in the morning.  Store at room temperature in a dry place. Do not keep in the bathroom.  Keep the medicine away from heat and  light.  If you forget to take a dose on time, take it as soon as you remember. If it is almost time for the next dose, do not take the missed dose. Return to your normal dosing schedule. Do not take 2 doses of this medicine at one time.  Please tell your doctor and pharmacist about all the medicines you take. Include both prescription and over-the-counter medicines. Also tell them about any vitamins, herbal medicines, or anything else you take for your health.  Do not suddenly stop taking this medicine. Check with your doctor before stopping.  It is very important that you follow your doctor's instructions for all blood tests.  It is very important that you keep all appointments for medical exams and tests while on this medicine.  Do not take the medicine more than once during 24 hours.  Cautions  This medicine can be habit-forming. If you use this medicine regularly for a long time, it can lead to withdrawal symptoms when you stop. Please use this medicine only as directed.  Tell your doctor and pharmacist if you ever had an allergic reaction to a medicine. Symptoms of an allergic reaction can include trouble breathing, skin rash, itching, swelling, or severe dizziness.  This medicine is associated with an increased risk of serious heart problems, heart attack, and stroke. Please speak with your doctor about the risks and benefits of using this medicine. Contact your doctor immediately if you experience chest pain or difficulty breathing.  Do not use the medication any more than instructed.  Your ability to stay alert or to react quickly may be impaired by this medicine. Do not drive or operate machinery until you know how this medicine will affect you.  Please check with your doctor before drinking alcohol while on this medicine.  Call the doctor if there are any signs of confusion or unusual changes in behavior.  Tell the doctor or pharmacist if you are pregnant, planning to be pregnant, or breastfeeding.  Ask  your pharmacist if this medicine can interact with any of your other medicines. Be sure to tell them about all the medicines you take.  Please tell all your doctors and dentists that you are on this medicine before they provide care.  Do not start or stop any other medicines without first speaking to your doctor or pharmacist.  Do not share this medicine with anyone who has not been prescribed this medicine.  This medicine can cause serious side effects in some patients. Important information from the U.S. Food and Drug Administration (FDA) is available from your pharmacist. Please review it carefully with your pharmacist to understand the risks associated with this medicine.  Side Effects  The following is a list of some common side effects from this medicine. Please speak with your doctor about what you should do if you experience these or other side effects.    agitated feeling or trouble sleeping    decreased appetite    dizziness    headaches    high blood pressure    irritability    nausea    nervousness    stomach upset or abdominal pain    vomiting    weight loss  Call your doctor or get medical help right away if you notice any of these more serious side effects:    change in behavior    chest pain    cold hands or feet    sores or blisters on hands or feet    fast or irregular heart beats    jaw pain    mood changes    loss of movement anywhere on the body    uncontrollable movement of face, tongue, arms or legs    seizures    shakiness    shortness of breath    symptoms of stroke (such as one-sided weakness, slurred speech, confusion)    blurring or changes of vision  A few people may have an allergic reactions to this medicine. Symptoms can include difficulty breathing, skin rash, itching, swelling, or severe dizziness. If you notice any of these symptoms, seek medical help quickly.  Extra  Please speak with your doctor, nurse, or pharmacist if you have any questions about this  medicine.  [email or web address]  IMPORTANT NOTE: This document tells you briefly how to take your medicine, but it does not tell you all there is to know about it.Your doctor or pharmacist may give you other documents about your medicine. Please talk to them if you have any questions.Always follow their advice. There is a more complete description of this medicine available in English.Scan this code on your smartphone or tablet or use the web address below. You can also ask your pharmacist for a printout. If you have any questions, please ask your pharmacist.     2021 [employer]           Patient Education     Coping with Attention Deficit Hyperactivity Disorder (ADHD)  Helpful Tips for Adults  If you have ADHD, it can be hard to sit still, pay attention or control impulsive behavior. ADHD can cause problems in many areas of your life. But you can learn ways to control your symptoms. Below are some ideas for coping with the most common ADHD problems.   Memory, focus and managing time  Be mindful of time.     Use a watch, phone, timer or other device that keeps correct time. Be sure you can see and hear it at all times.    Set more than one alarm to remind you how much time you have left for a task.    Give yourself more time than you think you need.    For important appointments or deadlines, set reminder alarms hours or days ahead of time.  Use a day planner.     A day planner can help you manage time and remember responsibilities.    Learning to use a planner is just like learning to use any tool. Practice makes perfect.  Use lists.     Lists and notes can help you keep track of regular tasks, projects, deadlines and appointments.    If you decide to use a daily planner, keep all lists and notes inside of it. Use color codes for tasks so you know which ones are the most important.  Learn to say no and set boundaries.     Do not take on too many projects or social  plans.    Overbooking yourself can be overwhelming and can lead to missed commitments.  Repeat out loud instructions you have been given.     This will help you remember, as well as let others correct you if needed.  Organization  Create space.     Ask yourself what you need on a daily basis. Then, find storage bins or closets for things you don't need every day.    Have specific areas for things like keys, bills and other items that are easy to misplace.    Throw away or donate things you don't need.  Deal with it now.     You can avoid forgetfulness and clutter by doing things right away, not some time in the future.    This includes filing papers, cleaning up messes, opening and responding to mail and returning phone calls.  Set up a filing system.     Use dividers or separate file folders for different types of documents, such as medical records, receipts and pay stubs.    Label and color-code your files so that you can quickly find what you need.  Break larger tasks into small, manageable pieces.     Write down the steps needed to finish the task. Follow each step in order until the task is done.    If needed, take small, timed breaks and return to finish the task.  Organize your work space.     Use lists or planners to organize your day.    Remove clutter from your desk.    Label any storage bins.    Reduce distraction as much as possible. Ideas include sitting facing the wall, closing your door or using a white noise machine.  Sitting still  Move around as needed.     Don't fight the urge to move around. If you need to fidget or stand up for a period of time to help you pay attention, then do so. But take care that you're not interrupting others.    You may also find it helpful to squeeze a stress ball.  Be active in a useful way.     Exercise can burn off extra energy, relieve stress, boost your mood and calm your mind.    Meditation, yoga or marbin chi can help you better control your attention and  "impulses.  Stay healthy.     Get enough sleep.    Avoid caffeine late in the day.    Exercise.    Create a relaxing bedtime routine.    Stick to a schedule or daily routine.    Eat small meals throughout the day.    Avoid sugar, eat fewer carbohydrates and eat more protein.  Close relationships  Talk to your loved ones about ADHD.     There are many resources to help your loved one understand ADHD. See the Resources section on the next page.  Divide daily tasks based on each person's strengths.     For example, if you have trouble with organization, you may not want to do financial planning tasks.  If you have trouble with focus, develop a sign that others in your life can use as a gentle reminder to pay attention.    The sign should be small but meaningful to you and the other person.  Improve communication.     Use a dry erase board in a common area to help the whole family stay in touch better.    Keep regular to-do lists and compare scheduled activities every day.    Writing notes to each other is also very helpful.  Be an active listener.     Try not to interrupt.    If you find your mind wandering when others are talking, mentally repeat their words so you can follow the conversation.    Ask questions and ask people to repeat what they said if needed.    Pay close attention to body language.  Organize your thoughts.     If you need to have a serious conversation, write a list of the points you would like to make or the important ideas you want to talk about. This can help you stay focused and remember what you need to say.  Think before speaking.     It can be hard to control your impulses when you feel strongly about something.    Before saying whatever pops into your head, stop to ask yourself \"Will this be helpful?\" or \"Will this help me get what I want?\"  Take charge of your life.     Work to accept that some things are harder for you.    Make a plan to address these troubles and seek the support you " "need.  Resources  Online    ADD Warehouse. www.addwarehouse.com    ADHD and Marriage. www.adhdmarDisclosureNet Inc..com    ADDitude. www.additudemag.com    Attention Deficit Disorder Association.   www.add.org    Children and Adults with Attention-Deficit/Hyperactivity Disorder (SKIP). www.skip.org    Carri Obrien. \"33 ADHD Friendly Ways to Get Organized with Adult ADHD.\" www.[employer].com/adhd/article/729.html    Neil Gutierrez and Deann Robert. \"ADHD in Adults.\" www.helpguide.org/mental/adhd_add_adult_strategies.htmYou can also find many apps for your phone that can help you with common ADHD struggles.  Books    Sancho Pacheco. ADHD in Adults. (2008).    Sancho Pacheco. Attention-Deficit Hyperactivity Disorder: A Handbook for Diagnosis and Treatment. (2015).    Sancho Pacheco. Taking Charge of Adult ADHD. (2010).    Abdifatah Ashford and Acosta Collins. Delivered from Distraction. (2006).    Abdifatah Collins. Driven to Distraction. (2011).    Jazmine Dubose, et al. You Mean I'm Not Lazy, Stupid or Crazy?!: The Classic Self-Help Book for Adults with Attention Deficit Disorder. (2006).    Ruby Doan. ADD in the Workplace. (1997).    Ruby Doan and Carri Obrien. ADD-Friendly Ways to Organize Your Life. (2016).    Ruby Doan, Chaparrita Ambrose, et al. Understanding Girls with ADHD: How They Feel and Why They Do What They Do. (2015).    Radha Curran. The ADHD Effect on Marriage: Understand and Rebuild Your Relationship in Six Steps. (2010).    Radha Curran and Laly Lofton. The Couple's Guide to Thriving with ADHD. (2014.)  ADHD Support groups    [address]  Visit www.[facility].Xtime/services/support-groups/adhd-adult-support-group-thursday-eveningsfor information about dates and times, or e-mail [email or web address].Xtime    add.org/adhd-support-groups&#047;(online group)  For informational purposes only. Not to replace the " advice of your health care provider. Copyright   2014 [facility]. All rights reserved. Clinically reviewed by [facility]. [employer] 267437 - REV 01/20.           Patient Education     Depression: Tips to Help Yourself    As your healthcare providers help treat your depression, you can also help yourself. Keep in mind that your illness affects you emotionally, physically, mentally, and socially. So full recovery will take time. Take care of your body and your soul, and be patient with yourself as you get better.  Self-care    Educate yourself. Read about treatment and medicine options. If you have the energy, attend local conferences or support groups. Keep a list of useful websites and helpful books and use them as needed. This illness is not your fault. Don t blame yourself for your depression.    Manage early symptoms. If you notice symptoms returning, experience triggers, or identify other factors that may lead to a depressive episode, get help as soon as possible. Ask trusted friends and family to monitor your behavior and let you know if they see anything of concern.    Work with your provider. Find a provider you can trust. Communicate honestly with that person and share information on your treatment for depression and your reaction to medicines.    Be prepared for a crisis. Know what to do if you experience a crisis. Keep the phone number of a crisis hotline and know the location of your community's urgent care centers and the closest emergency department.    Hold off on big decisions. Depression can cloud your judgment. So wait until you feel better before making major life decisions, such as changing jobs, moving, or getting  or .    Be patient. Recovering from depression is a process. Don t be discouraged if it takes some time to feel better.    Keep it simple. Depression saps your energy and concentration. So you won t be able to do all the things  you used to do. Set small goals and do what you can.    Be with others. Don t isolate yourself--you ll only feel worse. Try to be with other people. And take part in fun activities when you can. Go to a movie, ballgame, Congregational service, or social event. Talk openly with people you can trust. And accept help when it s offered.    Take care of your body  People with depression often lose the desire to take care of themselves. That only makes their problems worse. During treatment and afterward, make a point to:    Exercise. It s a great way to take care of your body. And studies have shown that exercise helps fight depression. Aim for 30 minutes of moderate activity a day. Walking in small blocks of time (5-10 minutes) is a good way to start, but anything that gets you moving (gardening, house cleaning) counts.    Don't use drugs and alcohol. These may ease the pain in the short term. But they ll only make your problems worse in the long run.    Get relief from stress. Ask your healthcare provider for relaxation exercises and techniques to help relieve stress. Consider activities like meditation, yoga, or Isaac Chi.    Eat right. A balanced and healthy diet helps keep your body healthy.    Get adequate sleep. Aim for 8 hours per night. Too much or too little sleep can cause other physical and emotional problems.  [employer] last reviewed this educational content on 12/1/2019 2000-2021 [employer]. All rights reserved. This information is not intended as a substitute for professional medical care. Always follow your healthcare professional's instructions.

## 2021-06-15 ASSESSMENT — ANXIETY QUESTIONNAIRES
6. BECOMING EASILY ANNOYED OR IRRITABLE: MORE THAN HALF THE DAYS
5. BEING SO RESTLESS THAT IT IS HARD TO SIT STILL: NEARLY EVERY DAY
1. FEELING NERVOUS, ANXIOUS, OR ON EDGE: NEARLY EVERY DAY
GAD7 TOTAL SCORE: 20
2. NOT BEING ABLE TO STOP OR CONTROL WORRYING: NEARLY EVERY DAY
7. FEELING AFRAID AS IF SOMETHING AWFUL MIGHT HAPPEN: NEARLY EVERY DAY
IF YOU CHECKED OFF ANY PROBLEMS ON THIS QUESTIONNAIRE, HOW DIFFICULT HAVE THESE PROBLEMS MADE IT FOR YOU TO DO YOUR WORK, TAKE CARE OF THINGS AT HOME, OR GET ALONG WITH OTHER PEOPLE: VERY DIFFICULT
3. WORRYING TOO MUCH ABOUT DIFFERENT THINGS: NEARLY EVERY DAY

## 2021-06-15 ASSESSMENT — PATIENT HEALTH QUESTIONNAIRE - PHQ9
SUM OF ALL RESPONSES TO PHQ QUESTIONS 1-9: 18
5. POOR APPETITE OR OVEREATING: NEARLY EVERY DAY

## 2021-06-16 ASSESSMENT — ANXIETY QUESTIONNAIRES: GAD7 TOTAL SCORE: 20

## 2021-07-28 ENCOUNTER — VIRTUAL VISIT (OUTPATIENT)
Dept: FAMILY MEDICINE | Facility: CLINIC | Age: 31
End: 2021-07-28
Payer: COMMERCIAL

## 2021-07-28 DIAGNOSIS — E66.812 CLASS 2 SEVERE OBESITY DUE TO EXCESS CALORIES WITH SERIOUS COMORBIDITY AND BODY MASS INDEX (BMI) OF 36.0 TO 36.9 IN ADULT (H): ICD-10-CM

## 2021-07-28 DIAGNOSIS — L71.9 ROSACEA: ICD-10-CM

## 2021-07-28 DIAGNOSIS — Z11.59 NEED FOR HEPATITIS C SCREENING TEST: ICD-10-CM

## 2021-07-28 DIAGNOSIS — F41.9 ANXIETY: ICD-10-CM

## 2021-07-28 DIAGNOSIS — F84.9 PERVASIVE DEVELOPMENTAL DISORDER: ICD-10-CM

## 2021-07-28 DIAGNOSIS — E66.01 CLASS 2 SEVERE OBESITY DUE TO EXCESS CALORIES WITH SERIOUS COMORBIDITY AND BODY MASS INDEX (BMI) OF 36.0 TO 36.9 IN ADULT (H): ICD-10-CM

## 2021-07-28 DIAGNOSIS — F90.0 ADHD (ATTENTION DEFICIT HYPERACTIVITY DISORDER), INATTENTIVE TYPE: Primary | ICD-10-CM

## 2021-07-28 DIAGNOSIS — L21.9 DERMATITIS, SEBORRHEIC: ICD-10-CM

## 2021-07-28 DIAGNOSIS — F33.1 MODERATE EPISODE OF RECURRENT MAJOR DEPRESSIVE DISORDER (H): ICD-10-CM

## 2021-07-28 PROCEDURE — 99214 OFFICE O/P EST MOD 30 MIN: CPT | Mod: GT | Performed by: NURSE PRACTITIONER

## 2021-07-28 RX ORDER — SERTRALINE HYDROCHLORIDE 100 MG/1
200 TABLET, FILM COATED ORAL DAILY
Qty: 180 TABLET | Refills: 1 | Status: SHIPPED | OUTPATIENT
Start: 2021-07-28 | End: 2022-03-03

## 2021-07-28 RX ORDER — DEXTROAMPHETAMINE SACCHARATE, AMPHETAMINE ASPARTATE MONOHYDRATE, DEXTROAMPHETAMINE SULFATE AND AMPHETAMINE SULFATE 7.5; 7.5; 7.5; 7.5 MG/1; MG/1; MG/1; MG/1
30 CAPSULE, EXTENDED RELEASE ORAL DAILY
Qty: 30 CAPSULE | Refills: 0 | Status: SHIPPED | OUTPATIENT
Start: 2021-09-10 | End: 2021-10-10

## 2021-07-28 RX ORDER — DEXTROAMPHETAMINE SACCHARATE, AMPHETAMINE ASPARTATE MONOHYDRATE, DEXTROAMPHETAMINE SULFATE AND AMPHETAMINE SULFATE 7.5; 7.5; 7.5; 7.5 MG/1; MG/1; MG/1; MG/1
30 CAPSULE, EXTENDED RELEASE ORAL DAILY
Qty: 30 CAPSULE | Refills: 0 | Status: SHIPPED | OUTPATIENT
Start: 2021-08-10 | End: 2021-09-09

## 2021-07-28 RX ORDER — METRONIDAZOLE 7.5 MG/G
GEL TOPICAL 2 TIMES DAILY
Qty: 45 G | Refills: 1 | Status: SHIPPED | OUTPATIENT
Start: 2021-07-28 | End: 2022-03-03

## 2021-07-28 RX ORDER — DEXTROAMPHETAMINE SACCHARATE, AMPHETAMINE ASPARTATE MONOHYDRATE, DEXTROAMPHETAMINE SULFATE AND AMPHETAMINE SULFATE 7.5; 7.5; 7.5; 7.5 MG/1; MG/1; MG/1; MG/1
30 CAPSULE, EXTENDED RELEASE ORAL DAILY
Qty: 30 CAPSULE | Refills: 0 | Status: SHIPPED | OUTPATIENT
Start: 2021-10-10 | End: 2021-11-09

## 2021-07-28 ASSESSMENT — PATIENT HEALTH QUESTIONNAIRE - PHQ9: SUM OF ALL RESPONSES TO PHQ QUESTIONS 1-9: 14

## 2021-07-28 NOTE — PROGRESS NOTES
Teddy is a 30 year old who is being evaluated via a billable video visit.      How would you like to obtain your AVS? ImpulseSave  If the video visit is dropped, the invitation should be resent by: will connect through [employer]   Will anyone else be joining your video visit? No      Video Start Time: 11:36 AM    Assessment & Plan     ADHD (attention deficit hyperactivity disorder), inattentive type  Tolerating Adderall XR 30 mg daily well, no adverse effects. REfilled medicaiton X 3 months, follow back 3 months for medication check (jannet OK)  - REVIEW OF HEALTH MAINTENANCE PROTOCOL ORDERS  - amphetamine-dextroamphetamine (ADDERALL XR) 30 MG 24 hr capsule  Dispense: 30 capsule; Refill: 0  - amphetamine-dextroamphetamine (ADDERALL XR) 30 MG 24 hr capsule  Dispense: 30 capsule; Refill: 0  - amphetamine-dextroamphetamine (ADDERALL XR) 30 MG 24 hr capsule  Dispense: 30 capsule; Refill: 0    Moderate episode of recurrent major depressive disorder (H)  He continues on 200 mg Zoloft daily, PHQ remains elevated. He as counseling appt for 8/4/21. Follow back 3 months, sooner for new/worsening symptoms.  - sertraline (ZOLOFT) 100 MG tablet  Dispense: 180 tablet; Refill: 1    Anxiety  Continue with Zoloot and counseling.  - sertraline (ZOLOFT) 100 MG tablet  Dispense: 180 tablet; Refill: 1    Class 2 severe obesity due to excess calories with serious comorbidity and body mass index (BMI) of 36.0 to 36.9 in adult (H)  Benefits of weight loss reviewed in detail, encouraged him to cut back on the carbohydrates in the diet, consume more fruits and vegetables, drink plenty of water, avoid fruit juices, sodas, get 150 min moderate exercise/week.  Recheck weight in 6 months.      Pervasive developmental disorder  Scheduled for counseling. He is hoping to find a better job, is saving to be able to move out on his own in the near future.    Rosacea  Skin care reviewed, starting Metrogel, return to clinic if not improved, new, or worsening  symptoms.    - metroNIDAZOLE (METROGEL) 0.75 % external gel  Dispense: 45 g; Refill: 1    Need for hepatitis C screening test    - Hepatitis C Screen Reflex to HCV RNA Quant and Genotype         Work on weight loss  Regular exercise  See Patient Instructions    No follow-ups on file.    AB Hinds CNP  [facility]    Clinton Espinal is a 30 year old who presents for the following health issues  HPI     Medication Followup of Adderall XR     Taking Medication as prescribed: yes    Side Effects:  No change    Medication Helping Symptoms:  yes     Depression Followup    How are you doing with your depression since your last visit? No change    Are you having other symptoms that might be associated with depression? Yes:  .    Have you had a significant life event?  Job Concerns     Are you feeling anxious or having panic attacks?   No    Do you have any concerns with your use of alcohol or other drugs? 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  Little interest or pleasure in doing things 1   2.  Feeling down, depressed, or hopeless 3   3.  Trouble falling or staying asleep, or sleeping too much 1   4.  Feeling tired or having little energy 3   5.  Poor appetite or overeating 0   6.  Feeling bad about yourself 1   7.  Trouble concentrating 2   8.  Moving slowly or restless 3   Q9: Thoughts of better off  dead/self-harm past 2 weeks 0   PHQ-9 Total Score 14   Difficulty at work, home, or with people Very difficult       Suicide Assessment Five-step Evaluation and Treatment (SAFE-T)      How many servings of fruits and vegetables do you eat daily?  2-3    On average, how many sweetened beverages do you drink each day (Examples: soda, juice, sweet tea, etc.  Do NOT count diet or artificially sweetened beverages)?   0    How many days per week do you exercise enough to make your heart beat faster? 5    How many minutes a day do you exercise enough to make your heart beat faster? 30 - 60    How many days per week do you miss taking your medication? 0      Review of Systems   Constitutional, HEENT, cardiovascular, pulmonary, gi and gu systems are negative, except as otherwise noted.      Objective           Vitals:  No vitals were obtained today due to virtual visit.    Physical Exam   GENERAL: Healthy, alert and no distress  EYES: Eyes grossly normal to inspection.  No discharge or erythema, or obvious scleral/conjunctival abnormalities.  HENT: Normal cephalic/atraumatic.  External ears, nose and mouth without ulcers or lesions.  No nasal drainage visible.  NECK: No asymmetry, visible masses or scars  RESP: No audible wheeze, cough, or visible cyanosis.  No visible retractions or increased work of breathing.    SKIN: Visible skin clear. No significant rash, abnormal pigmentation or lesions.  NEURO: Cranial nerves grossly intact.  Mentation and speech appropriate for age.  PSYCH: Mentation appears normal, affect normal/bright, judgement and insight intact, normal speech and appearance well-groomed.          Video-Visit Details    Type of service:  Video Visit    Video End Time:11:55 AM    Originating Location (pt. Location): Home    Distant Location (provider location):  [facility]     Platform used for Video Visit: [employer]

## 2021-07-28 NOTE — PATIENT INSTRUCTIONS
Patient Education     Depression: Tips to Help Yourself    As your healthcare providers help treat your depression, you can also help yourself. Keep in mind that your illness affects you emotionally, physically, mentally, and socially. So full recovery will take time. Take care of your body and your soul, and be patient with yourself as you get better.  Self-care    Educate yourself. Read about treatment and medicine options. If you have the energy, attend local conferences or support groups. Keep a list of useful websites and helpful books and use them as needed. This illness is not your fault. Don t blame yourself for your depression.    Manage early symptoms. If you notice symptoms returning, experience triggers, or identify other factors that may lead to a depressive episode, get help as soon as possible. Ask trusted friends and family to monitor your behavior and let you know if they see anything of concern.    Work with your provider. Find a provider you can trust. Communicate honestly with that person and share information on your treatment for depression and your reaction to medicines.    Be prepared for a crisis. Know what to do if you experience a crisis. Keep the phone number of a crisis hotline and know the location of your community's urgent care centers and the closest emergency department.    Hold off on big decisions. Depression can cloud your judgment. So wait until you feel better before making major life decisions, such as changing jobs, moving, or getting  or .    Be patient. Recovering from depression is a process. Don t be discouraged if it takes some time to feel better.    Keep it simple. Depression saps your energy and concentration. So you won t be able to do all the things you used to do. Set small goals and do what you can.    Be with others. Don t isolate yourself--you ll only feel worse. Try to be with other people. And take part in fun activities when you can. Go to a  movie, ballgame, Mosque service, or social event. Talk openly with people you can trust. And accept help when it s offered.    Take care of your body  People with depression often lose the desire to take care of themselves. That only makes their problems worse. During treatment and afterward, make a point to:    Exercise. It s a great way to take care of your body. And studies have shown that exercise helps fight depression. Aim for 30 minutes of moderate activity a day. Walking in small blocks of time (5-10 minutes) is a good way to start, but anything that gets you moving (gardening, house cleaning) counts.    Don't use drugs and alcohol. These may ease the pain in the short term. But they ll only make your problems worse in the long run.    Get relief from stress. Ask your healthcare provider for relaxation exercises and techniques to help relieve stress. Consider activities like meditation, yoga, or Isaac Chi.    Eat right. A balanced and healthy diet helps keep your body healthy.    Get adequate sleep. Aim for 8 hours per night. Too much or too little sleep can cause other physical and emotional problems.  [employer] last reviewed this educational content on 12/1/2019 2000-2021 [employer]. All rights reserved. This information is not intended as a substitute for professional medical care. Always follow your healthcare professional's instructions.

## 2021-08-02 ENCOUNTER — TELEPHONE (OUTPATIENT)
Dept: PSYCHOLOGY | Facility: CLINIC | Age: 31
End: 2021-08-02

## 2021-08-02 NOTE — TELEPHONE ENCOUNTER
Left Message - 8/2 M regarding visit on 8/4 @ 10am  Consents up to date  My[facility] Active  Call [phone] for questions

## 2021-08-04 ENCOUNTER — VIRTUAL VISIT (OUTPATIENT)
Dept: PSYCHOLOGY | Facility: CLINIC | Age: 31
End: 2021-08-04
Attending: NURSE PRACTITIONER
Payer: COMMERCIAL

## 2021-08-04 ENCOUNTER — FCC EXTENDED DOCUMENTATION (OUTPATIENT)
Dept: PSYCHOLOGY | Facility: CLINIC | Age: 31
End: 2021-08-04

## 2021-08-04 DIAGNOSIS — F32.89 OTHER DEPRESSION: ICD-10-CM

## 2021-08-04 DIAGNOSIS — F41.1 GENERALIZED ANXIETY DISORDER: Primary | ICD-10-CM

## 2021-08-04 PROCEDURE — 90785 PSYTX COMPLEX INTERACTIVE: CPT | Mod: 95

## 2021-08-04 PROCEDURE — 90834 PSYTX W PT 45 MINUTES: CPT | Mod: 95

## 2021-08-04 ASSESSMENT — COLUMBIA-SUICIDE SEVERITY RATING SCALE - C-SSRS
2. HAVE YOU ACTUALLY HAD ANY THOUGHTS OF KILLING YOURSELF?: NO
TOTAL  NUMBER OF ABORTED OR SELF INTERRUPTED ATTEMPTS PAST 3 MONTHS: NO
3. HAVE YOU BEEN THINKING ABOUT HOW YOU MIGHT KILL YOURSELF?: NO
6. HAVE YOU EVER DONE ANYTHING, STARTED TO DO ANYTHING, OR PREPARED TO DO ANYTHING TO END YOUR LIFE?: NO
4. HAVE YOU HAD THESE THOUGHTS AND HAD SOME INTENTION OF ACTING ON THEM?: NO
TOTAL  NUMBER OF INTERRUPTED ATTEMPTS LIFETIME: NO
6. HAVE YOU EVER DONE ANYTHING, STARTED TO DO ANYTHING, OR PREPARED TO DO ANYTHING TO END YOUR LIFE?: NO
2. HAVE YOU ACTUALLY HAD ANY THOUGHTS OF KILLING YOURSELF LIFETIME?: YES
REASONS FOR IDEATION LIFETIME: COMPLETELY TO END OR STOP THE PAIN (YOU COULDN'T GO ON LIVING WITH THE PAIN OR HOW YOU WERE FEELING)
ATTEMPT PAST THREE MONTHS: NO
1. IN THE PAST MONTH, HAVE YOU WISHED YOU WERE DEAD OR WISHED YOU COULD GO TO SLEEP AND NOT WAKE UP?: NO
ATTEMPT LIFETIME: NO
TOTAL  NUMBER OF ABORTED OR SELF INTERRUPTED ATTEMPTS PAST LIFETIME: NO
5. HAVE YOU STARTED TO WORK OUT OR WORKED OUT THE DETAILS OF HOW TO KILL YOURSELF? DO YOU INTEND TO CARRY OUT THIS PLAN?: NO
TOTAL  NUMBER OF INTERRUPTED ATTEMPTS PAST 3 MONTHS: NO
1. IN THE PAST MONTH, HAVE YOU WISHED YOU WERE DEAD OR WISHED YOU COULD GO TO SLEEP AND NOT WAKE UP?: YES
4. HAVE YOU HAD THESE THOUGHTS AND HAD SOME INTENTION OF ACTING ON THEM?: NO
5. HAVE YOU STARTED TO WORK OUT OR WORKED OUT THE DETAILS OF HOW TO KILL YOURSELF? DO YOU INTEND TO CARRY OUT THIS PLAN?: NO

## 2021-08-04 ASSESSMENT — PATIENT HEALTH QUESTIONNAIRE - PHQ9
10. IF YOU CHECKED OFF ANY PROBLEMS, HOW DIFFICULT HAVE THESE PROBLEMS MADE IT FOR YOU TO DO YOUR WORK, TAKE CARE OF THINGS AT HOME, OR GET ALONG WITH OTHER PEOPLE: VERY DIFFICULT
SUM OF ALL RESPONSES TO PHQ QUESTIONS 1-9: 14
SUM OF ALL RESPONSES TO PHQ QUESTIONS 1-9: 14

## 2021-08-04 ASSESSMENT — ANXIETY QUESTIONNAIRES
3. WORRYING TOO MUCH ABOUT DIFFERENT THINGS: NEARLY EVERY DAY
6. BECOMING EASILY ANNOYED OR IRRITABLE: MORE THAN HALF THE DAYS
GAD7 TOTAL SCORE: 17
1. FEELING NERVOUS, ANXIOUS, OR ON EDGE: NEARLY EVERY DAY
GAD7 TOTAL SCORE: 17
5. BEING SO RESTLESS THAT IT IS HARD TO SIT STILL: SEVERAL DAYS
7. FEELING AFRAID AS IF SOMETHING AWFUL MIGHT HAPPEN: NEARLY EVERY DAY
4. TROUBLE RELAXING: MORE THAN HALF THE DAYS
2. NOT BEING ABLE TO STOP OR CONTROL WORRYING: NEARLY EVERY DAY
GAD7 TOTAL SCORE: 17
8. IF YOU CHECKED OFF ANY PROBLEMS, HOW DIFFICULT HAVE THESE MADE IT FOR YOU TO DO YOUR WORK, TAKE CARE OF THINGS AT HOME, OR GET ALONG WITH OTHER PEOPLE?: VERY DIFFICULT
7. FEELING AFRAID AS IF SOMETHING AWFUL MIGHT HAPPEN: NEARLY EVERY DAY

## 2021-08-04 NOTE — PROGRESS NOTES
"                 Progress Note - Initial Visit    Client Name:  Teddy Shah Date: 21         Service Type: Individual     Visit Start Time: 10:03am  Visit End Time: 11:06am    Visit #: 1    Attendees: Client attended alone    Service Modality:  Video Visit and Phone Visit.  Provider and patient needed to switch modality mid-way through due to connectivity issues.       Provider verified identity through the following two step process.  Patient provided:  Patient  and Patient address    Telemedicine Visit: The patient's condition can be safely assessed and treated via synchronous audio and visual telemedicine encounter.      Reason for Telemedicine Visit: Services only offered telehealth    Originating Site (Patient Location): Patient was in a car at a park near his home.    Distant Site (Provider Location): Provider Remote Setting- Home Office    Consent:  The patient/guardian has verbally consented to: the potential risks and benefits of telemedicine (video visit) versus in person care; bill my insurance or make self-payment for services provided; and responsibility for payment of non-covered services.     Patient would like the video invitation sent by:  My Chart    Mode of Communication:  Video Conference via [employer]    As the provider I attest to compliance with applicable laws and regulations related to telemedicine.       DATA:   Interactive Complexity: Yes, visit entailed Interactive Complexity evidenced by:  -The need to manage maladaptive communication (related to, e.g., high anxiety, high reactivity, repeated questions, or disagreement) among participants that complicates delivery of care   Crisis: No     Presenting Concerns/  Current Stressors:   Patient is experiencing longstanding and significant anxious and depressive sxs.  This has been exacerbated by the constant \"codeswitching\" patient has needed to do in different settings as a result of tomas differences in political and Sikh beliefs " "from the rest of his family.  Patient stated his Cheondo[employer] upbringing had also been consequential.  Patient disclosed distant hx of passive SI without intent, and informed provider he was diagnosed previously with ASD.  Provider assessed for safety.  Patient stated he felt safe at home as long as he was able to continue hiding identity from family members.  Provider offered validation, and normalized the patient's emotional response.  Provider worked to provide a space for conversation that felt safe.          ASSESSMENT:  Mental Status Assessment:  Appearance:   Appropriate   Eye Contact:   Fair   Psychomotor Behavior: Agitated   Attitude:   Cooperative  Ablert  Orientation:   All  Speech   Rate / Production: Talkative   Volume:  Normal   Mood:    Angry  Anxious  Fearful  Affect:    Blunted   Thought Content:  Rumination   Thought Form:  Logical   Insight:    Fair       Safety Issues and Plan for Safety and Risk Management:     [place] Suicide Severity Rating Scale (Lifetime/Recent)  [place] Suicide Severity Rating (Lifetime/Recent) 8/4/2021   1. Wish to be Dead (Lifetime) Yes   Wish to be Dead Description (Lifetime) One instance in [employer] (age 12): \"I wish I was dead\".   period of extreme stress, admitted at school, was ambulanced to the hospital.  Patient feels they were in the middle of a panic attack, denied plan or intent.   1. Wish to be Dead (Recent) No   2. Non-Specific Active Suicidal Thoughts (Lifetime) Yes   2. Non-Specific Active Suicidal Thoughts (Recent) No   3. Active Suicidal Ideation with any Methods (Not Plan) Without Intent to Act (Lifetime) No   3. Active Suicidal Ideation with any Methods (Not Plan) Without Intent to Act (Recent) No   4. Active Suicidal Ideation with Some Intent to Act, Without Specific Plan (Lifetime) No   4. Active Suicidal Ideation with Some Intent to Act, Without Specific Plan (Recent) No   5. Active Suicidal Ideation with Specific Plan and Intent (Lifetime) No   5. " Active Suicidal Ideation with Specific Plan and Intent (Recent) No   Most Severe Ideation Rating (Lifetime) 2   Most Severe Ideation Description (Lifetime) Patient experienced a fleeting instance of SI in [employer] [place] at age 12.  Denied intent or plan.   Frequency (Lifetime) 1   Duration (Lifetime) 1   Controllability (Lifetime) 1   Protective Factors  (Lifetime) 1   Reasons for Ideation (Lifetime) 5   Most Severe Ideation Rating (Past Month) NA   Frequency (Past Month) NA   Duration (Past Month) NA   Controllability (Past Month) NA   Protective Factors (Past Month) NA   Reasons for Ideation (Past Month) NA   Actual Attempt (Lifetime) No   Actual Attempt (Past 3 Months) No   Has subject engaged in non-suicidal self-injurious behavior? (Lifetime) No   Has subject engaged in non-suicidal self-injurious behavior? (Past 3 Months) No   Interrupted Attempts (Lifetime) No   Interrupted Attempts (Past 3 Months) No   Aborted or Self-Interrupted Attempt (Lifetime) No   Aborted or Self-Interrupted Attempt (Past 3 Months) No   Preparatory Acts or Behavior (Lifetime) No   Preparatory Acts or Behavior (Past 3 Months) No     Patient denies current fears or concerns for personal safety.  Patient denies current or recent suicidal ideation or behaviors.  Patient denies current or recent homicidal ideation or behaviors.  Patient denies current or recent self injurious behavior or ideation.  Patient denies other safety concerns.  Recommended that patient call 911 or go to the local ED should there be a change in any of these risk factors.  Patient reports there are firearms in the house. The firearms are secured in a locked space.     Diagnostic Criteria:  A. Excessive anxiety and worry about a number of events or activities (such as work or school performance).   B. The person finds it difficult to control the worry.   - Restlessness or feeling keyed up or on edge.    - Being easily fatigued.    - Difficulty concentrating or mind going  blank.    - Irritability.   D. The focus of the anxiety and worry is not confined to features of an Axis I disorder.  E. The anxiety, worry, or physical symptoms cause clinically significant distress or impairment in social, occupational, or other important areas of functioning.   F. The disturbance is not due to the direct physiological effects of a substance (e.g., a drug of abuse, a medication) or a general medical condition (e.g., hyperthyroidism) and does not occur exclusively during a Mood Disorder, a Psychotic Disorder, or a Pervasive Developmental Disorder.   - Depressed mood. Note: In children and adolescents, can be irritable mood.     - Fatigue or loss of energy.    - Feelings of worthlessness or inappropriate and excessive guilt.    - Diminished ability to think or concentrate, or indecisiveness.     DSM5 Diagnoses: (Sustained by DSM5 Criteria Listed Above)  Diagnoses: 311 (F32.8) Other/unspec. Depressive Disorder  300.02 (F41.1) Generalized Anxiety Disorder  Psychosocial & Contextual Factors: Hostile home environment, lack of social support, current events/politics a significant stress, family conflict  WHODAS 2.0 (12 item):   WHODAS 2.0 Total Score 8/4/2021   Total Score 26     Intervention:   Psychodynamic- Patient processed internal experiences  and Completed through review of safety issues and safety interventions  Collateral Reports Completed:  Not Applicable      PLAN: (Homework, other):  1. Provider will continue Diagnostic Assessment.     2. Provider recommended the following referrals: No referrals recommended.  Provider will continue to assess.      3. No safety plan needed at this time. Provider will continue to assess.       CINDI Posada  August 4, 2021  Service Performed and Documented by [facility]-   Note reviewed and clinical supervision by [facility]            Answers for HPI/ROS submitted by the patient on 8/4/2021  If you checked off any problems, how  difficult have these problems made it for you to do your work, take care of things at home, or get along with other people?: Very difficult  PHQ9 TOTAL SCORE: 14  KASSIE 7 TOTAL SCORE: 17

## 2021-08-05 ASSESSMENT — PATIENT HEALTH QUESTIONNAIRE - PHQ9: SUM OF ALL RESPONSES TO PHQ QUESTIONS 1-9: 14

## 2021-08-05 ASSESSMENT — ANXIETY QUESTIONNAIRES: GAD7 TOTAL SCORE: 17

## 2021-08-30 ENCOUNTER — VIRTUAL VISIT (OUTPATIENT)
Dept: PSYCHOLOGY | Facility: CLINIC | Age: 31
End: 2021-08-30
Payer: COMMERCIAL

## 2021-08-30 DIAGNOSIS — F32.A DEPRESSION, UNSPECIFIED DEPRESSION TYPE: ICD-10-CM

## 2021-08-30 DIAGNOSIS — F41.1 GENERALIZED ANXIETY DISORDER: Primary | ICD-10-CM

## 2021-08-30 PROCEDURE — 90791 PSYCH DIAGNOSTIC EVALUATION: CPT | Mod: 95
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
## 2021-09-04 ENCOUNTER — HEALTH MAINTENANCE LETTER (OUTPATIENT)
Age: 31
End: 2021-09-04

## 2021-09-07 ENCOUNTER — VIRTUAL VISIT (OUTPATIENT)
Dept: PSYCHOLOGY | Facility: CLINIC | Age: 31
End: 2021-09-07
Payer: COMMERCIAL

## 2021-09-07 DIAGNOSIS — F41.1 GENERALIZED ANXIETY DISORDER: Primary | ICD-10-CM

## 2021-09-07 DIAGNOSIS — F32.A DEPRESSION, UNSPECIFIED DEPRESSION TYPE: ICD-10-CM

## 2021-09-07 PROCEDURE — 90834 PSYTX W PT 45 MINUTES: CPT | Mod: 95

## 2021-09-07 ASSESSMENT — PATIENT HEALTH QUESTIONNAIRE - PHQ9
SUM OF ALL RESPONSES TO PHQ QUESTIONS 1-9: 15
5. POOR APPETITE OR OVEREATING: NEARLY EVERY DAY

## 2021-09-07 ASSESSMENT — ANXIETY QUESTIONNAIRES
2. NOT BEING ABLE TO STOP OR CONTROL WORRYING: NEARLY EVERY DAY
3. WORRYING TOO MUCH ABOUT DIFFERENT THINGS: NEARLY EVERY DAY
6. BECOMING EASILY ANNOYED OR IRRITABLE: SEVERAL DAYS
7. FEELING AFRAID AS IF SOMETHING AWFUL MIGHT HAPPEN: NEARLY EVERY DAY
GAD7 TOTAL SCORE: 17
IF YOU CHECKED OFF ANY PROBLEMS ON THIS QUESTIONNAIRE, HOW DIFFICULT HAVE THESE PROBLEMS MADE IT FOR YOU TO DO YOUR WORK, TAKE CARE OF THINGS AT HOME, OR GET ALONG WITH OTHER PEOPLE: SOMEWHAT DIFFICULT
5. BEING SO RESTLESS THAT IT IS HARD TO SIT STILL: SEVERAL DAYS
1. FEELING NERVOUS, ANXIOUS, OR ON EDGE: NEARLY EVERY DAY

## 2021-09-07 NOTE — PROGRESS NOTES
Progress Note    Patient Name: Teddy Shah  Date: 9/7/21         Service Type: Individual      Session Start Time: 12:31pm  Session End Time: 1:20pm     Session Length: 38-52min    Session #: 3    Attendees: Client attended alone    Service Modality:  Video Visit:      Provider verified identity through the following two step process.  Patient provided:  Patient is known previously to provider    Telemedicine Visit: The patient's condition can be safely assessed and treated via synchronous audio and visual telemedicine encounter.      Reason for Telemedicine Visit: Services only offered telehealth    Originating Site (Patient Location): Patient's other (car parked at a park near patient's home)    Distant Site (Provider Location): Provider Remote Setting- Home Office    Consent:  The patient/guardian has verbally consented to: the potential risks and benefits of telemedicine (video visit) versus in person care; bill my insurance or make self-payment for services provided; and responsibility for payment of non-covered services.     Patient would like the video invitation sent by:  My Chart    Mode of Communication:  Video Conference via [employer]well    As the provider I attest to compliance with applicable laws and regulations related to telemedicine.     Treatment Plan Last Reviewed: 9/7/21  PHQ-9 / KASSIE-7 : 15/17    DATA  Interactive Complexity: No  Crisis: No       Progress Since Last Session (Related to Symptoms / Goals / Homework):   Symptoms: No change per patient report    Homework: Completed in session      Episode of Care Goals: Minimal progress - PREPARATION (Decided to change - considering how); Intervened by negotiating a change plan and determining options / strategies for behavior change, identifying triggers, exploring social supports, and working towards setting a date to begin behavior change     Current / Ongoing Stressors and Concerns:   Rumination, playing  political arguments through his head, anxious dreams, non-restorative sleep, work stress, family difficulty, current events, politics     Treatment Objective(s) Addressed in This Session:   use at least   coping skills for anxiety management in the next 4 weeks  Decrease frequency and intensity of feeling down, depressed, hopeless  Identify negative self-talk and behaviors: challenge core beliefs, myths, and actions  Patient will attend and participate in social or recreational activities    Develop and utilize self-compassion strategies.   identify the time in your life when you began to feel poorly about themselves.     Intervention:   CBT: Provider utilized CBT strategies to challenge and reframe identified cognitive distortions.   Motivational Interviewing    MI Intervention: Co-Developed Goal: treatment planning, Expressed Empathy/Understanding, Supported Autonomy, Collaboration, Evocation, Permission to raise concern or advise, Open-ended questions, Reflections: simple and complex, Rolled with resistance: Complex reflection, Amplified reflection (weaker or stronger meaning) and Evoked patient agenda, Change talk (evoked) and Reframe     Change Talk Expressed by the Patient: Desire to change Reasons to change Need to change    Provider Response to Change Talk: E - Evoked more info from patient about behavior change, A - Affirmed patient's thoughts, decisions, or attempts at behavior change, R - Reflected patient's change talk and S - Summarized patient's change talk statements    Solution Focused: Provider utilized solutionc focused strategies to engage patient in treatment planning.    Provider processed patient's experiences using reflective listening, validation, and normalization of patient's emotional response.         ASSESSMENT: Current Emotional / Mental Status (status of significant symptoms):   Risk status (Self / Other harm or suicidal ideation)   Patient denies current fears or concerns for personal  safety.   Patient denies current or recent suicidal ideation or behaviors.   Patient denies current or recent homicidal ideation or behaviors.   Patient denies current or recent self injurious behavior or ideation.   Patient reports other safety concerns including safety at home if family members discover true identity.  Patient is currently hiding identity and does not feel he is at risk at this moment in time. .   Patient reports there has been no change in risk factors since their last session.     Patient reports there has been no change in protective factors since their last session.     Recommended that patient call 911 or go to the local ED should there be a change in any of these risk factors.     Appearance:   Appropriate    Eye Contact:   Poor   Psychomotor Behavior: Normal    Attitude:   Cooperative    Orientation:   All   Speech    Rate / Production: Talkative    Volume:  Normal    Mood:    Anxious  Depressed    Affect:    Constricted  Subdued    Thought Content:  Rumination    Thought Form:  Coherent  Tangential    Insight:    Poor      Medication Review:   No changes to current psychiatric medication(s)     Medication Compliance:   Yes     Changes in Health Issues:   None reported     Chemical Use Review:   Substance Use: Chemical use reviewed, no active concerns identified      Tobacco Use: No current tobacco use.      Diagnosis:  1. Generalized anxiety disorder    2. Depression, unspecified depression type        Collateral Reports Completed:   Not Applicable    PLAN: (Patient Tasks / Therapist Tasks / Other)  Patient will utilize crisis resources as needed.  Provider will work with patient to set healthy boundaries an dutilize new coping skills.      Leatha Pearce TOSHIA  9/7/2021  Service Performed and Documented by CINDI-   Note reviewed and clinical supervision [facility]      ___________________________________________________    Treatment Plan    Patient's Name: Teddy SANCHEZ  "Jahosvaldo  YOB: 1990    Date: 9/7/21    DSM5 Diagnoses: 311 (F32.9) Unspecified Depressive Disorder  or 300.02 (F41.1) Generalized Anxiety Disorder  Psychosocial / Contextual Factors: Hostile home environment, lack of social support, current events/politics a significant stress, family conflict  WHODAS: 26    Referral / Collaboration:  Referral to another professional/service is not indicated at this time..    Anticipated number of session or this episode of care: 20      MeasurableTreatment Goal(s) related to diagnosis / functional impairment(s)  Goal 1: Patient will successfully develop and utilize coping skills to manage depressive, anxious and ADHD sxs within 90 days.     I will know I've met my goal when I feel like I can get some of my executive function back (completing tasks).      Objective #A Patient will process experience around romantic relationship, reflect on how it has impacted his view of self, and how he would like to move forward.     Patient will attend and participate in social or recreational activities    Develop and utilize self-compassion strategies.   identify the time in your life when you began to feel poorly about themselves.  Status: New - Date: 9/7/21     Intervention(s)  Therapist will assign homework around utilizing self compassion strategies  teach psychoeducation around shame, guilt and healthy relationships.    Objective #B Patient will work to develop coping skills to \"turn brain off\" or switch tracks.    Patient will use thought-stopping strategy daily to reduce intrusive thoughts.  Status: New - Date: 9/7/21     Intervention(s)  Therapist will assign homework around using thought-stopping strategies and other coping skills  teach emotional regulation skills.  .      Patient has reviewed and agreed to the above plan.      CINDI Posada  September 7, 2021  Service Performed and Documented by [facility]-   tx plan reviewed and clinical supervision by Ashleigh Herring, " LONDON [facility] 9/8/2021

## 2021-09-08 ASSESSMENT — ANXIETY QUESTIONNAIRES: GAD7 TOTAL SCORE: 17

## 2021-09-14 ENCOUNTER — VIRTUAL VISIT (OUTPATIENT)
Dept: PSYCHOLOGY | Facility: CLINIC | Age: 31
End: 2021-09-14
Payer: COMMERCIAL

## 2021-09-14 DIAGNOSIS — F41.1 GENERALIZED ANXIETY DISORDER: Primary | ICD-10-CM

## 2021-09-14 DIAGNOSIS — F32.A DEPRESSION, UNSPECIFIED DEPRESSION TYPE: ICD-10-CM

## 2021-09-14 PROCEDURE — 90834 PSYTX W PT 45 MINUTES: CPT | Mod: 95

## 2021-09-14 NOTE — PROGRESS NOTES
Progress Note    Patient Name: Teddy Shah  Date: 9/14/21         Service Type: Individual      Session Start Time: 11:06am  Session End Time: 11:51am     Session Length: 38-52min    Session #: 4    Attendees: Client attended alone    Service Modality:  Video Visit:      Provider verified identity through the following two step process.  Patient provided:  Patient is known previously to provider    Telemedicine Visit: The patient's condition can be safely assessed and treated via synchronous audio and visual telemedicine encounter.      Reason for Telemedicine Visit: Services only offered telehealth    Originating Site (Patient Location): Patient's other (car parked at a park near patient's home)    Distant Site (Provider Location): Provider Remote Setting- Home Office    Consent:  The patient/guardian has verbally consented to: the potential risks and benefits of telemedicine (video visit) versus in person care; bill my insurance or make self-payment for services provided; and responsibility for payment of non-covered services.     Patient would like the video invitation sent by:  My Chart    Mode of Communication:  Video Conference via [employer]well    As the provider I attest to compliance with applicable laws and regulations related to telemedicine.     Treatment Plan Last Reviewed: 9/7/21  PHQ-9 / KASSIE-7 : 15/17 (Provider will re-assess at the next session)    DATA  Interactive Complexity: No  Crisis: No       Progress Since Last Session (Related to Symptoms / Goals / Homework):   Symptoms: Improving slightly per patient.  Patient attributed this to current events he considered to be positive.  Patient reported presence of OCD tendencies during this session.    Homework: Achieved / completed to satisfaction      Episode of Care Goals: Satisfactory progress - ACTION (Actively working towards change); Intervened by reinforcing change plan / affirming steps taken     Current /  Ongoing Stressors and Concerns:   Twitter, fear of being blind-sided, some occasional difficulty with sleep, politics/current events, work, intrusive thoughts, hx of pain-based facial tics, rumination     Treatment Objective(s) Addressed in This Session:   use at least   coping skills for anxiety management in the next 4 weeks  Decrease frequency and intensity of feeling down, depressed, hopeless  Identify negative self-talk and behaviors: challenge core beliefs, myths, and actions  Patient will attend and participate in social or recreational activities    Develop and utilize self-compassion strategies.   identify the time in your life when you began to feel poorly about themselves.     Intervention:   CBT: Provider utilized CBT strategies to challenge and reframe identified cognitive distortions.   Motivational Interviewing    MI Intervention: Expressed Empathy/Understanding, Supported Autonomy, Collaboration, Evocation, Permission to raise concern or advise, Open-ended questions, Reflections: simple and complex, Rolled with resistance: Emphasized patient autonomy, Amplified reflection (weaker or stronger meaning) and Evoked patient agenda, Change talk (evoked) and Reframe     Change Talk Expressed by the Patient: Desire to change Reasons to change Need to change Committment to change    Provider Response to Change Talk: E - Evoked more info from patient about behavior change, A - Affirmed patient's thoughts, decisions, or attempts at behavior change, R - Reflected patient's change talk and S - Summarized patient's change talk statements    Provider processed patient's experiences using reflective listening, validation, and normalization of patient's emotional response. Provider worked to develop greater insight into stressors and sxs.    Psychodynamic: Provider utilized psychodynamic strategies to engage patient in exploring the relationship of how his Denominational upbringing has impacted how he views himself and  his sense of identity.         ASSESSMENT: Current Emotional / Mental Status (status of significant symptoms):   Risk status (Self / Other harm or suicidal ideation)   Patient denies current fears or concerns for personal safety.   Patient denies current or recent suicidal ideation or behaviors.   Patient denies current or recent homicidal ideation or behaviors.   Patient denies current or recent self injurious behavior or ideation.   Patient reports other safety concerns including safety at home if family members discover true identity.  Patient is currently hiding identity and does not feel he is at risk at this moment in time. .   Patient reports there has been no change in risk factors since their last session.     Patient reports there has been no change in protective factors since their last session.     Recommended that patient call 911 or go to the local ED should there be a change in any of these risk factors.     Appearance:   Appropriate    Eye Contact:   Poor   Psychomotor Behavior: Restless    Attitude:   Cooperative    Orientation:   All   Speech    Rate / Production: Talkative    Volume:  Normal    Mood:    Anxious  Depressed    Affect:    Constricted    Thought Content:  Rumination    Thought Form:  Coherent  Tangential    Insight:    Poor      Medication Review:   No changes to current psychiatric medication(s)     Medication Compliance:   Yes     Changes in Health Issues:   None reported     Chemical Use Review:   Substance Use: Chemical use reviewed, no active concerns identified      Tobacco Use: No current tobacco use.      Diagnosis:  1. Generalized anxiety disorder    2. Depression, unspecified depression type    R/O Obsessive Compulsive Disorder    Collateral Reports Completed:   Not Applicable    PLAN: (Patient Tasks / Therapist Tasks / Other)  Patient will utilize crisis resources as needed.  Provider will work with patient to set healthy boundaries and utilize new coping skills.  Patient  committed to try to decrease checking Twitter to 8x a day and think about how to increase positive messaging day to day to offset negativity.  Patient will also track OCD sxs.    Provider will assess R/O Obsessive Compulsive Disorder at the next session       CINDI Posada  9/14/21  Service Performed and Documented by TOSHIA-   Note reviewed and clinical supervision by LONDON Patino [facility] 9/14/2021    ___________________________________________________    Treatment Plan    Patient's Name: Teddy Shah  YOB: 1990    Date: 9/7/21    DSM5 Diagnoses: 311 (F32.9) Unspecified Depressive Disorder  or 300.02 (F41.1) Generalized Anxiety Disorder  Psychosocial / Contextual Factors: Hostile home environment, lack of social support, current events/politics a significant stress, family conflict  WHODAS: 26    Referral / Collaboration:  Referral to another professional/service is not indicated at this time..    Anticipated number of session or this episode of care: 20      MeasurableTreatment Goal(s) related to diagnosis / functional impairment(s)  Goal 1: Patient will successfully develop and utilize coping skills to manage depressive, anxious and ADHD sxs within 90 days.     I will know I've met my goal when I feel like I can get some of my executive function back (completing tasks).      Objective #A Patient will process experience around romantic relationship, reflect on how it has impacted his view of self, and how he would like to move forward.     Patient will attend and participate in social or recreational activities    Develop and utilize self-compassion strategies.   identify the time in your life when you began to feel poorly about themselves.  Status: New - Date: 9/7/21     Intervention(s)  Therapist will assign homework around utilizing self compassion strategies  teach psychoeducation around shame, guilt and healthy relationships.    Objective #B Patient will work to develop coping skills  "to \"turn brain off\" or switch tracks.    Patient will use thought-stopping strategy daily to reduce intrusive thoughts.  Status: New - Date: 9/7/21     Intervention(s)  Therapist will assign homework around using thought-stopping strategies and other coping skills  teach emotional regulation skills.  .      Patient has reviewed and agreed to the above plan.      Leatha Pearce TOSHIA  September 7, 2021  Service Performed and Documented by TOSHIA-   tx plan reviewed and clinical supervision by LONDON Patino [facility] 9/8/2021   "

## 2021-10-11 ENCOUNTER — VIRTUAL VISIT (OUTPATIENT)
Dept: PSYCHOLOGY | Facility: CLINIC | Age: 31
End: 2021-10-11
Payer: COMMERCIAL

## 2021-10-11 DIAGNOSIS — F41.1 GENERALIZED ANXIETY DISORDER: Primary | ICD-10-CM

## 2021-10-11 DIAGNOSIS — F32.A DEPRESSION, UNSPECIFIED DEPRESSION TYPE: ICD-10-CM

## 2021-10-11 PROCEDURE — 90834 PSYTX W PT 45 MINUTES: CPT | Mod: 95

## 2021-10-11 ASSESSMENT — ANXIETY QUESTIONNAIRES
1. FEELING NERVOUS, ANXIOUS, OR ON EDGE: NEARLY EVERY DAY
7. FEELING AFRAID AS IF SOMETHING AWFUL MIGHT HAPPEN: NEARLY EVERY DAY
5. BEING SO RESTLESS THAT IT IS HARD TO SIT STILL: NOT AT ALL
GAD7 TOTAL SCORE: 16
6. BECOMING EASILY ANNOYED OR IRRITABLE: SEVERAL DAYS
IF YOU CHECKED OFF ANY PROBLEMS ON THIS QUESTIONNAIRE, HOW DIFFICULT HAVE THESE PROBLEMS MADE IT FOR YOU TO DO YOUR WORK, TAKE CARE OF THINGS AT HOME, OR GET ALONG WITH OTHER PEOPLE: VERY DIFFICULT
3. WORRYING TOO MUCH ABOUT DIFFERENT THINGS: NEARLY EVERY DAY
2. NOT BEING ABLE TO STOP OR CONTROL WORRYING: NEARLY EVERY DAY

## 2021-10-11 ASSESSMENT — PATIENT HEALTH QUESTIONNAIRE - PHQ9
SUM OF ALL RESPONSES TO PHQ QUESTIONS 1-9: 17
5. POOR APPETITE OR OVEREATING: NEARLY EVERY DAY

## 2021-10-11 NOTE — PROGRESS NOTES
Progress Note    Patient Name: Teddy Shah  Date: 10/11/21         Service Type: Individual      Session Start Time: 10:06am  Session End Time: 11:04am     Session Length: 38-52min    Session #: 4    Attendees: Client attended alone    Service Modality:  Video Visit:      Provider verified identity through the following two step process.  Patient provided:  Patient is known previously to provider    Telemedicine Visit: The patient's condition can be safely assessed and treated via synchronous audio and visual telemedicine encounter.      Reason for Telemedicine Visit: Services only offered telehealth    Originating Site (Patient Location): Patient's other (car parked at a park near patient's home)    Distant Site (Provider Location): Provider Remote Setting- Home Office    Consent:  The patient/guardian has verbally consented to: the potential risks and benefits of telemedicine (video visit) versus in person care; bill my insurance or make self-payment for services provided; and responsibility for payment of non-covered services.     Patient would like the video invitation sent by:  My Chart    Mode of Communication:  Video Conference via [employer]    As the provider I attest to compliance with applicable laws and regulations related to telemedicine.     Treatment Plan Last Reviewed: 9/7/21  PHQ-9 / KASSIE-7 : 17/16    DATA  Interactive Complexity: No  Crisis: No       Progress Since Last Session (Related to Symptoms / Goals / Homework):   Symptoms: Worsening depressive sxs per PHQ-9 and improved anxious sxs per KASSIE-7.      Homework: Completed in session      Episode of Care Goals: Satisfactory progress - ACTION (Actively working towards change); Intervened by reinforcing change plan / affirming steps taken     Current / Ongoing Stressors and Concerns:   Difficulty avoiding discourse on social media, conversation with siblings, olitics/current events, work, intrusive thoughts,  hx of pain-based facial tics, rumination       Treatment Objective(s) Addressed in This Session:   use at least   coping skills for anxiety management in the next 4 weeks  Decrease frequency and intensity of feeling down, depressed, hopeless  Identify negative self-talk and behaviors: challenge core beliefs, myths, and actions  Patient will attend and participate in social or recreational activities    Develop and utilize self-compassion strategies.   identify the time in your life when you began to feel poorly about themselves.     Intervention:   Provider processed patient's experiences using reflective listening, validation, and normalization of patient's emotional response. Provider worked to develop greater insight into stressors and sxs.    Motivational Interviewing    MI Intervention: Expressed Empathy/Understanding, Supported Autonomy, Collaboration, Evocation, Permission to raise concern or advise, Open-ended questions, Reflections: simple and complex, Rolled with resistance: Amplified reflection (weaker or stronger meaning) and Reframed sustain talk in the direction of change and Change talk (evoked)     Change Talk Expressed by the Patient: Desire to change Reasons to change Need to change Committment to change Activation    Provider Response to Change Talk: E - Evoked more info from patient about behavior change, A - Affirmed patient's thoughts, decisions, or attempts at behavior change, R - Reflected patient's change talk and S - Summarized patient's change talk statements    Psychoeducation: Provider taught psychoeducation around anxiety and the efficacy of deep breathing and grounding to manage anxious sxs.      CBT: Provider utilized CBT strategies to reframe patient's identified cognitive distortions.         ASSESSMENT: Current Emotional / Mental Status (status of significant symptoms):   Risk status (Self / Other harm or suicidal ideation)   Patient denies current fears or concerns for personal  safety.   Patient denies current or recent suicidal ideation or behaviors.   Patient denies current or recent homicidal ideation or behaviors.   Patient denies current or recent self injurious behavior or ideation.   Patient reports other safety concerns including safety at home if family members discover true identity.  Patient is currently hiding identity and does not feel he is at risk at this moment in time. .   Patient reports there has been no change in risk factors since their last session.     Patient reports there has been no change in protective factors since their last session.     Recommended that patient call 911 or go to the local ED should there be a change in any of these risk factors.     Appearance:   Appropriate    Eye Contact:   Poor   Psychomotor Behavior: Restless    Attitude:   Cooperative    Orientation:   All   Speech    Rate / Production: Talkative    Volume:  Normal    Mood:    Anxious  Depressed  Euthymic   Affect:    Constricted    Thought Content:  Rumination    Thought Form:  Coherent  Tangential    Insight:    Poor      Medication Review:   No changes to current psychiatric medication(s)     Medication Compliance:   Yes     Changes in Health Issues:   None reported     Chemical Use Review:   Substance Use: Chemical use reviewed, no active concerns identified      Tobacco Use: No current tobacco use.      Diagnosis:  1. Generalized anxiety disorder    2. Depression, unspecified depression type    R/O Obsessive Compulsive Disorder    Collateral Reports Completed:   Not Applicable    PLAN: (Patient Tasks / Therapist Tasks / Other)  Patient will utilize crisis resources as needed.  Provider will work with patient to set healthy boundaries and utilize new coping skills.  Patient committed to try to decrease checking Twitter and think about how to increase positive messaging day to day to offset negativity.  Patient will also track OCD sxs.    Provider will assess R/O Obsessive Compulsive  "Disorder at the next session   Provider will explore patient's experiences with feeling overwhelmed during work.       Deep breathing, grounding exercises, temperature change. [place] exercise.     Leatha Pearce [facility]  10/11/21  Service Performed and Documented by [facility]-   Note reviewed and clinical supervision by LONDON Patino [facility] 10/11/2021    ___________________________________________________    Treatment Plan    Patient's Name: Teddy Shah  YOB: 1990    Date: 9/7/21    DSM5 Diagnoses: 311 (F32.9) Unspecified Depressive Disorder  or 300.02 (F41.1) Generalized Anxiety Disorder  Psychosocial / Contextual Factors: Hostile home environment, lack of social support, current events/politics a significant stress, family conflict  WHODAS: 26    Referral / Collaboration:  Referral to another professional/service is not indicated at this time..    Anticipated number of session or this episode of care: 20      MeasurableTreatment Goal(s) related to diagnosis / functional impairment(s)  Goal 1: Patient will successfully develop and utilize coping skills to manage depressive, anxious and ADHD sxs within 90 days.     I will know I've met my goal when I feel like I can get some of my executive function back (completing tasks).      Objective #A Patient will process experience around romantic relationship, reflect on how it has impacted his view of self, and how he would like to move forward.     Patient will attend and participate in social or recreational activities    Develop and utilize self-compassion strategies.   identify the time in your life when you began to feel poorly about themselves.  Status: New - Date: 9/7/21     Intervention(s)  Therapist will assign homework around utilizing self compassion strategies  teach psychoeducation around shame, guilt and healthy relationships.    Objective #B Patient will work to develop coping skills to \"turn brain off\" or switch tracks.    Patient will use " thought-stopping strategy daily to reduce intrusive thoughts.  Status: New - Date: 9/7/21     Intervention(s)  Therapist will assign homework around using thought-stopping strategies and other coping skills  teach emotional regulation skills.  .      Patient has reviewed and agreed to the above plan.      CINDI Posada  September 7, 2021  Service Performed and Documented by ATILIO rojas plan reviewed and clinical supervision [facility] 9/8/2021

## 2021-10-12 ASSESSMENT — ANXIETY QUESTIONNAIRES: GAD7 TOTAL SCORE: 16

## 2021-11-01 ENCOUNTER — VIRTUAL VISIT (OUTPATIENT)
Dept: PSYCHOLOGY | Facility: CLINIC | Age: 31
End: 2021-11-01
Payer: COMMERCIAL

## 2021-11-01 DIAGNOSIS — F32.A DEPRESSION, UNSPECIFIED DEPRESSION TYPE: ICD-10-CM

## 2021-11-01 DIAGNOSIS — F41.1 GENERALIZED ANXIETY DISORDER: Primary | ICD-10-CM

## 2021-11-01 PROCEDURE — 90834 PSYTX W PT 45 MINUTES: CPT | Mod: 95

## 2021-11-01 NOTE — PROGRESS NOTES
Progress Note    Patient Name: Teddy Shah  Date: 11/1/21         Service Type: Individual      Session Start Time: 10:04am  Session End Time: 10:49am     Session Length: 38-52min    Session #: 5    Attendees: Client attended alone    Service Modality:  Video Visit:      Provider verified identity through the following two step process.  Patient provided:  Patient is known previously to provider    Telemedicine Visit: The patient's condition can be safely assessed and treated via synchronous audio and visual telemedicine encounter.      Reason for Telemedicine Visit: Services only offered telehealth    Originating Site (Patient Location): Patient's other (car parked at a park near patient's home)    Distant Site (Provider Location): Provider Remote Setting- Home Office    Consent:  The patient/guardian has verbally consented to: the potential risks and benefits of telemedicine (video visit) versus in person care; bill my insurance or make self-payment for services provided; and responsibility for payment of non-covered services.     Patient would like the video invitation sent by:  My Chart    Mode of Communication:  Video Conference via [employer]well    As the provider I attest to compliance with applicable laws and regulations related to telemedicine.     Treatment Plan Last Reviewed: 9/7/21  PHQ-9 / KASSIE-7 : 17/16 (Provider will re-assess at the next session)    DATA  Interactive Complexity: No  Crisis: No       Progress Since Last Session (Related to Symptoms / Goals / Homework):   Symptoms: No change per patient report.       Homework: Completed in session      Episode of Care Goals: Satisfactory progress - ACTION (Actively working towards change); Intervened by reinforcing change plan / affirming steps taken     Current / Ongoing Stressors and Concerns:   Conversation with sister, family conflict, relationship with mom, work stress, rumination       Treatment Objective(s)  Addressed in This Session:   use at least   coping skills for anxiety management in the next 4 weeks  Decrease frequency and intensity of feeling down, depressed, hopeless  Identify negative self-talk and behaviors: challenge core beliefs, myths, and actions  Patient will attend and participate in social or recreational activities    Develop and utilize self-compassion strategies.   identify the time in your life when you began to feel poorly about themselves.     Intervention:   Provider processed patient's experiences using reflective listening, validation, and normalization of patient's emotional response. Provider worked to develop greater insight into stressors and sxs.    Motivational Interviewing    MI Intervention: Expressed Empathy/Understanding, Supported Autonomy, Collaboration, Evocation, Permission to raise concern or advise, Open-ended questions, Reflections: simple and complex, Rolled with resistance: Amplified reflection (weaker or stronger meaning) and Reframed sustain talk in the direction of change and Change talk (evoked)     Change Talk Expressed by the Patient: Desire to change Reasons to change Need to change Committment to change Activation    Provider Response to Change Talk: E - Evoked more info from patient about behavior change, A - Affirmed patient's thoughts, decisions, or attempts at behavior change, R - Reflected patient's change talk and S - Summarized patient's change talk statements    Psychoeducation: Provider taught psychoeducation around anxiety.    Psychodynamic: Provider explored patient's relationship with his parents and politics, and how it has impacted the role that he plays and how he views himself.     CBT: Provider utilized CBT strategies to reframe patient's identified cognitive distortions.         ASSESSMENT: Current Emotional / Mental Status (status of significant symptoms):   Risk status (Self / Other harm or suicidal ideation)   Patient denies current fears or  concerns for personal safety.   Patient denies current or recent suicidal ideation or behaviors.   Patient denies current or recent homicidal ideation or behaviors.   Patient denies current or recent self injurious behavior or ideation.   Patient reports other safety concerns including safety at home if family members discover true identity.  Patient is currently hiding identity and does not feel he is at risk at this moment in time. .   Patient reports there has been no change in risk factors since their last session.     Patient reports there has been no change in protective factors since their last session.     Recommended that patient call 911 or go to the local ED should there be a change in any of these risk factors.     Appearance:   Appropriate    Eye Contact:   Poor   Psychomotor Behavior: Restless    Attitude:   Cooperative    Orientation:   All   Speech    Rate / Production: Talkative    Volume:  Normal    Mood:    Anxious  Euthymic   Affect:    Constricted    Thought Content:  Rumination    Thought Form:  Coherent  Tangential    Insight:    Poor      Medication Review:   No changes to current psychiatric medication(s)     Medication Compliance:   Yes     Changes in Health Issues:   None reported     Chemical Use Review:   Substance Use: Chemical use reviewed, no active concerns identified      Tobacco Use: No current tobacco use.      Diagnosis:  1. Generalized anxiety disorder    2. Depression, unspecified depression type    R/O Obsessive Compulsive Disorder    Collateral Reports Completed:   Not Applicable    PLAN: (Patient Tasks / Therapist Tasks / Other)  Patient will utilize crisis resources as needed.  Provider will work with patient to set healthy boundaries and utilize new coping skills.  Patient committed to try to decrease checking Twitter and think about how to increase positive messaging day to day to offset negativity.  Patient will also track OCD sxs.    Provider will assess R/O Obsessive  Compulsive Disorder at the next session   Provider will explore patient's experiences with feeling overwhelmed during work. Patient requested discussing pet Christiano Chowdary.       Patient will utilize the following coping strategies to manage anxiety: Deep breathing, grounding exercises, temperature change. [place] exercise. Patient will also process and reality-check anxious thoughts after anxiety has decreased.    Leatha Pearce [facility]  11/1/21  Service Performed and Documented by [facility]-   Note reviewed and clinical supervision by LONDON Patino [facility] 11/1/2021      ___________________________________________________    Treatment Plan    Patient's Name: Teddy Shah  YOB: 1990    Date: 9/7/21    DSM5 Diagnoses: 311 (F32.9) Unspecified Depressive Disorder  or 300.02 (F41.1) Generalized Anxiety Disorder  Psychosocial / Contextual Factors: Hostile home environment, lack of social support, current events/politics a significant stress, family conflict  WHODAS: 26    Referral / Collaboration:  Referral to another professional/service is not indicated at this time..    Anticipated number of session or this episode of care: 20      MeasurableTreatment Goal(s) related to diagnosis / functional impairment(s)  Goal 1: Patient will successfully develop and utilize coping skills to manage depressive, anxious and ADHD sxs within 90 days.     I will know I've met my goal when I feel like I can get some of my executive function back (completing tasks).      Objective #A Patient will process experience around romantic relationship, reflect on how it has impacted his view of self, and how he would like to move forward.     Patient will attend and participate in social or recreational activities    Develop and utilize self-compassion strategies.   identify the time in your life when you began to feel poorly about themselves.  Status: New - Date: 9/7/21     Intervention(s)  Therapist will assign homework around utilizing  "self compassion strategies  teach psychoeducation around shame, guilt and healthy relationships.    Objective #B Patient will work to develop coping skills to \"turn brain off\" or switch tracks.    Patient will use thought-stopping strategy daily to reduce intrusive thoughts.  Status: New - Date: 9/7/21     Intervention(s)  Therapist will assign homework around using thought-stopping strategies and other coping skills  teach emotional regulation skills.  .      Patient has reviewed and agreed to the above plan.      CINDI Posada  September 7, 2021  Service Performed and Documented by ATILIO rojas plan reviewed and clinical supervision by [facility] 9/8/[facility]21   "

## 2021-11-15 ENCOUNTER — VIRTUAL VISIT (OUTPATIENT)
Dept: PSYCHOLOGY | Facility: CLINIC | Age: 31
End: 2021-11-15
Payer: COMMERCIAL

## 2021-11-15 DIAGNOSIS — F41.1 GENERALIZED ANXIETY DISORDER: Primary | ICD-10-CM

## 2021-11-15 DIAGNOSIS — F32.A DEPRESSION, UNSPECIFIED DEPRESSION TYPE: ICD-10-CM

## 2021-11-15 PROCEDURE — 90834 PSYTX W PT 45 MINUTES: CPT | Mod: 95

## 2021-11-15 ASSESSMENT — ANXIETY QUESTIONNAIRES
7. FEELING AFRAID AS IF SOMETHING AWFUL MIGHT HAPPEN: NEARLY EVERY DAY
6. BECOMING EASILY ANNOYED OR IRRITABLE: MORE THAN HALF THE DAYS
5. BEING SO RESTLESS THAT IT IS HARD TO SIT STILL: MORE THAN HALF THE DAYS
2. NOT BEING ABLE TO STOP OR CONTROL WORRYING: NEARLY EVERY DAY
3. WORRYING TOO MUCH ABOUT DIFFERENT THINGS: NEARLY EVERY DAY
IF YOU CHECKED OFF ANY PROBLEMS ON THIS QUESTIONNAIRE, HOW DIFFICULT HAVE THESE PROBLEMS MADE IT FOR YOU TO DO YOUR WORK, TAKE CARE OF THINGS AT HOME, OR GET ALONG WITH OTHER PEOPLE: SOMEWHAT DIFFICULT
GAD7 TOTAL SCORE: 19
1. FEELING NERVOUS, ANXIOUS, OR ON EDGE: NEARLY EVERY DAY

## 2021-11-15 ASSESSMENT — PATIENT HEALTH QUESTIONNAIRE - PHQ9: 5. POOR APPETITE OR OVEREATING: NEARLY EVERY DAY

## 2021-11-16 ASSESSMENT — ANXIETY QUESTIONNAIRES: GAD7 TOTAL SCORE: 19

## 2021-11-16 ASSESSMENT — PATIENT HEALTH QUESTIONNAIRE - PHQ9: SUM OF ALL RESPONSES TO PHQ QUESTIONS 1-9: 14

## 2021-12-14 ENCOUNTER — VIRTUAL VISIT (OUTPATIENT)
Dept: PSYCHOLOGY | Facility: CLINIC | Age: 31
End: 2021-12-14
Payer: COMMERCIAL

## 2021-12-14 DIAGNOSIS — F41.1 GENERALIZED ANXIETY DISORDER: Primary | ICD-10-CM

## 2021-12-14 DIAGNOSIS — F32.A DEPRESSION, UNSPECIFIED DEPRESSION TYPE: ICD-10-CM

## 2021-12-14 PROCEDURE — 90834 PSYTX W PT 45 MINUTES: CPT | Mod: 95

## 2021-12-14 ASSESSMENT — ANXIETY QUESTIONNAIRES
7. FEELING AFRAID AS IF SOMETHING AWFUL MIGHT HAPPEN: MORE THAN HALF THE DAYS
7. FEELING AFRAID AS IF SOMETHING AWFUL MIGHT HAPPEN: MORE THAN HALF THE DAYS
GAD7 TOTAL SCORE: 13
1. FEELING NERVOUS, ANXIOUS, OR ON EDGE: NEARLY EVERY DAY
GAD7 TOTAL SCORE: 13
GAD7 TOTAL SCORE: 13
6. BECOMING EASILY ANNOYED OR IRRITABLE: SEVERAL DAYS
5. BEING SO RESTLESS THAT IT IS HARD TO SIT STILL: NOT AT ALL
4. TROUBLE RELAXING: MORE THAN HALF THE DAYS
2. NOT BEING ABLE TO STOP OR CONTROL WORRYING: NEARLY EVERY DAY
3. WORRYING TOO MUCH ABOUT DIFFERENT THINGS: MORE THAN HALF THE DAYS

## 2021-12-14 ASSESSMENT — PATIENT HEALTH QUESTIONNAIRE - PHQ9: SUM OF ALL RESPONSES TO PHQ QUESTIONS 1-9: 12

## 2021-12-14 NOTE — PROGRESS NOTES
Progress Note    Patient Name: Teddy Shah  Date: 12/14/21         Service Type: Individual      Session Start Time: 11:08am  Session End Time: 11:56am     Session Length: 38-52min    Session #: 7    Attendees: Client attended alone    Service Modality:  Video Visit:      Provider verified identity through the following two step process.  Patient provided:  Patient is known previously to provider    Telemedicine Visit: The patient's condition can be safely assessed and treated via synchronous audio and visual telemedicine encounter.      Reason for Telemedicine Visit: Services only offered telehealth    Originating Site (Patient Location): Patient's other (car parked at a park near patient's home)    Distant Site (Provider Location): Provider Remote Setting- Home Office    Consent:  The patient/guardian has verbally consented to: the potential risks and benefits of telemedicine (video visit) versus in person care; bill my insurance or make self-payment for services provided; and responsibility for payment of non-covered services.     Patient would like the video invitation sent by:  My Chart    Mode of Communication:  Video Conference via [facility]    As the provider I attest to compliance with applicable laws and regulations related to telemedicine.     Treatment Plan Last Reviewed: 12/14/21 due 3/14/21  PHQ-9 / KASSIE-7 : 12/13    DATA  Interactive Complexity: No  Crisis: No       Progress Since Last Session (Related to Symptoms / Goals / Homework):   Symptoms: Improving depressive sxs and anxious sxs per PHQ-9, KASSIE-7 and patient report      Homework: Partially completed      Episode of Care Goals: Minimal progress - ACTION (Actively working towards change); Intervened by reinforcing change plan / affirming steps taken     Current / Ongoing Stressors and Concerns:   Work stress, feeling disconnected, pathologizing self     Treatment Objective(s) Addressed in This  Session:   use at least   coping skills for anxiety management in the next 4 weeks  Decrease frequency and intensity of feeling down, depressed, hopeless  Identify negative self-talk and behaviors: challenge core beliefs, myths, and actions  Patient will attend and participate in social or recreational activities    Develop and utilize self-compassion strategies.   identify the time in your life when you began to feel poorly about themselves.     Intervention:   Provider processed patient's experiences using reflective listening, validation, and normalization of patient's emotional response. Provider worked to develop greater insight into stressors and sxs.  Provider validated patient's use of coping strategies and the increased insight he had.     Motivational Interviewing    MI Intervention: Expressed Empathy/Understanding, Supported Autonomy, Collaboration, Evocation and Reframe     Change Talk Expressed by the Patient: Desire to change Reasons to change Need to change Committment to change Activation    Provider Response to Change Talk: E - Evoked more info from patient about behavior change, A - Affirmed patient's thoughts, decisions, or attempts at behavior change, R - Reflected patient's change talk and S - Summarized patient's change talk statements    CBT: Provider utilized CBT strategies to reframe patient's identified cognitive distortions.     Provider held space as patient as he discussed his mental health.  Provider validated patient and and normalized his emotional response.      Psychoeducation: Provider taught psychoeducation around the diagnostic process.         ASSESSMENT: Current Emotional / Mental Status (status of significant symptoms):   Risk status (Self / Other harm or suicidal ideation)   Patient denies current fears or concerns for personal safety.   Patient denies current or recent suicidal ideation or behaviors.   Patient denies current or recent homicidal ideation or  behaviors.   Patient denies current or recent self injurious behavior or ideation.   Patient denies other safety concerns.   Patient reports there has been no change in risk factors since their last session.     Patient reports there has been no change in protective factors since their last session.     Recommended that patient call 911 or go to the local ED should there be a change in any of these risk factors.     Appearance:   Appropriate    Eye Contact:   Poor   Psychomotor Behavior: Restless    Attitude:   Cooperative    Orientation:   All   Speech    Rate / Production: Talkative    Volume:  Normal    Mood:    Euthymic   Affect:    Constricted  Flat  Worrisome    Thought Content:  Rumination    Thought Form:  Coherent  Goal Directed  Tangential    Insight:    Fair      Medication Review:   No changes to current psychiatric medication(s)     Medication Compliance:   Yes     Changes in Health Issues:   None reported     Chemical Use Review:   Substance Use: Chemical use reviewed, no active concerns identified      Tobacco Use: No current tobacco use.      Diagnosis:  1. Generalized anxiety disorder    2. Depression, unspecified depression type    R/O Obsessive Compulsive Disorder  R/O Schizoid and Schizotypal Personality Disorder    Collateral Reports Completed:   Not Applicable    PLAN: (Patient Tasks / Therapist Tasks / Other)  Patient will utilize crisis resources as needed.  Provider will work with patient to set healthy boundaries and utilize new coping skills.  Patient committed to try to decrease checking Twitter and think about how to increase positive messaging day to day to offset negativity.  Patient will also track OCD sxs.    Patient will utilize the following coping strategies to manage anxiety: Deep breathing, grounding exercises, engaging in civic duty. [place] exercise. Patient will also process and reality-check anxious thoughts after anxiety has decreased.    Provider will assess R/O Obsessive  Compulsive Disorder at the next session   Provider will explore patient's experiences with feeling overwhelmed during work. Provider will discuss patient's pet Christiano Chowdary and his anxiety around taking anti-anxiety medications.     Patient will incorporate more deep-breathing and grounding exercises throughout the day.       Provider and patient will focus on interpersonal relationships moving forward. Provider will continue to assess.     Leatha Pearce [facility]  12/14/21  Service Performed and Documented by [facility]-   Note reviewed and clinical supervision by LONDON Patino [facility] 12/15/2021    Answers for HPI/ROS submitted by the patient on 12/14/2021  KASSIE 7 TOTAL SCORE: 13          ___________________________________________________    Treatment Plan    Patient's Name: Teddy Shah  YOB: 1990    Date: 12/14/21    DSM5 Diagnoses: 311 (F32.9) Unspecified Depressive Disorder  or 300.02 (F41.1) Generalized Anxiety Disorder  Psychosocial / Contextual Factors: Hostile home environment, lack of social support, current events/politics a significant stress, family conflict  WHODAS: 24    Referral / Collaboration:  Referral to another professional/service is not indicated at this time..    Anticipated number of session or this episode of care: 20      MeasurableTreatment Goal(s) related to diagnosis / functional impairment(s)  Goal 1: Patient will successfully develop and utilize coping skills to manage depressive, anxious and ADHD sxs within 90 days. (Temporarily deferred by patient 12/14/21)    I will know I've met my goal when I feel like I can get some of my executive function back (completing tasks).      Objective #A Patient will process experience around romantic relationship, reflect on how it has impacted his view of self, and how he would like to move forward.     Patient will attend and participate in social or recreational activities    Develop and utilize self-compassion strategies.   identify the  "time in your life when you began to feel poorly about themselves.  Status: Continued - Date(s):12/14/21     Intervention(s)  Therapist will assign homework around utilizing self compassion strategies  teach psychoeducation around shame, guilt and healthy relationships.    Objective #B Patient will work to develop coping skills to \"turn brain off\" or switch tracks.    Patient will use thought-stopping strategy daily to reduce intrusive thoughts.  Status: Continued - Date(s): 12/14/21     Intervention(s)  Therapist will assign homework around using thought-stopping strategies and other coping skills  teach emotional regulation skills.  .      Patient has reviewed and agreed to the above plan.      CINDI Posada  September 7, 2021, reviewed December 14, 2021  Service Performed and Documented by CINDI-   tx plan reviewed and clinical [facility], 12/15/2021     "

## 2021-12-15 ASSESSMENT — ANXIETY QUESTIONNAIRES: GAD7 TOTAL SCORE: 13

## 2022-01-03 ENCOUNTER — VIRTUAL VISIT (OUTPATIENT)
Dept: PSYCHOLOGY | Facility: CLINIC | Age: 32
End: 2022-01-03
Payer: COMMERCIAL

## 2022-01-03 DIAGNOSIS — F41.1 GENERALIZED ANXIETY DISORDER: Primary | ICD-10-CM

## 2022-01-03 DIAGNOSIS — F32.A DEPRESSION, UNSPECIFIED DEPRESSION TYPE: ICD-10-CM

## 2022-01-03 PROCEDURE — 90834 PSYTX W PT 45 MINUTES: CPT | Mod: 95

## 2022-01-03 NOTE — PROGRESS NOTES
Progress Note    Patient Name: Teddy Shah  Date: 1/3/22         Service Type: Individual      Session Start Time: 11:04am  Session End Time: 11:50am     Session Length: 38-52min    Session #: 8    Attendees: Client attended alone    Service Modality:  Video Visit:      Provider verified identity through the following two step process.  Patient provided:  Patient is known previously to provider    Telemedicine Visit: The patient's condition can be safely assessed and treated via synchronous audio and visual telemedicine encounter.      Reason for Telemedicine Visit: Services only offered telehealth    Originating Site (Patient Location): Patient's other (car parked at a park near patient's home)    Distant Site (Provider Location): Provider Remote Setting- Home Office    Consent:  The patient/guardian has verbally consented to: the potential risks and benefits of telemedicine (video visit) versus in person care; bill my insurance or make self-payment for services provided; and responsibility for payment of non-covered services.     Patient would like the video invitation sent by:  My Chart    Mode of Communication:  Video Conference via [employer]well    As the provider I attest to compliance with applicable laws and regulations related to telemedicine.     Treatment Plan Last Reviewed: 12/14/21 due 3/14/21  PHQ-9 / KASSIE-7 : 12/13 (Provider will re-assess at the next session)    DATA  Interactive Complexity: No  Crisis: No       Progress Since Last Session (Related to Symptoms / Goals / Homework):   Symptoms: No change in anxious and depressive sxs      Homework: Partially completed      Episode of Care Goals: Minimal progress - ACTION (Actively working towards change); Intervened by reinforcing change plan / affirming steps taken     Current / Ongoing Stressors and Concerns:  Sleep, family stress, work stress, interpersonal relationships     Treatment Objective(s) Addressed in  This Session:   use at least   coping skills for anxiety management in the next 4 weeks  Decrease frequency and intensity of feeling down, depressed, hopeless  Identify negative self-talk and behaviors: challenge core beliefs, myths, and actions  Patient will attend and participate in social or recreational activities    Develop and utilize self-compassion strategies.   identify the time in your life when you began to feel poorly about themselves.     Intervention:   Provider processed patient's experiences using reflective listening, validation, and normalization of patient's emotional response. Provider worked to develop greater insight into stressors and sxs.      Motivational Interviewing    MI Intervention: Expressed Empathy/Understanding, Supported Autonomy, Collaboration, Evocation and Reframe     Change Talk Expressed by the Patient: Desire to change Reasons to change Need to change Committment to change Activation    Provider Response to Change Talk: E - Evoked more info from patient about behavior change, A - Affirmed patient's thoughts, decisions, or attempts at behavior change, R - Reflected patient's change talk and S - Summarized patient's change talk statements    CBT: Provider utilized CBT strategies to reframe patient's identified cognitive distortions.         ASSESSMENT: Current Emotional / Mental Status (status of significant symptoms):   Risk status (Self / Other harm or suicidal ideation)   Patient denies current fears or concerns for personal safety.   Patient denies current or recent suicidal ideation or behaviors.   Patient denies current or recent homicidal ideation or behaviors.   Patient denies current or recent self injurious behavior or ideation.   Patient denies other safety concerns.   Patient reports there has been no change in risk factors since their last session.     Patient reports there has been no change in protective factors since their last session.     Recommended that  patient call 911 or go to the local ED should there be a change in any of these risk factors.     Appearance:   Appropriate    Eye Contact:   Poor   Psychomotor Behavior: Restless    Attitude:   Cooperative    Orientation:   All   Speech    Rate / Production: Talkative    Volume:  Normal    Mood:    Euthymic   Affect:    Constricted  Flat    Thought Content:  Rumination    Thought Form:  Coherent  Goal Directed  Tangential    Insight:    Fair      Medication Review:   No changes to current psychiatric medication(s)     Medication Compliance:   Yes     Changes in Health Issues:   None reported     Chemical Use Review:   Substance Use: Chemical use reviewed, no active concerns identified      Tobacco Use: No current tobacco use.      Diagnosis:  1. Generalized anxiety disorder    2. Depression, unspecified depression type    R/O Obsessive Compulsive Disorder  R/O Schizoid and Schizotypal Personality Disorder    Collateral Reports Completed:   Not Applicable    PLAN: (Patient Tasks / Therapist Tasks / Other)  Patient will utilize crisis resources as needed.  Provider will work with patient to set healthy boundaries and utilize new coping skills.  Patient committed to try to decrease checking Twitter and think about how to increase positive messaging day to day to offset negativity.  Patient will also track OCD sxs.    Patient will utilize the following coping strategies to manage anxiety: Deep breathing, grounding exercises, engaging in civic duty. [place] exercise. Patient will also process and reality-check anxious thoughts after anxiety has decreased.    Patient will incorporate more deep-breathing and grounding exercises throughout the day.       Provider and patient will focus on interpersonal relationships, difficulties in communication moving forward. Provider will continue to assess R/O Obsessive Compulsive Disorder, R/O Schizoid and Schizotypal Personality Disorder.         Leatha Pearce, ALIASW   "1/3/22    Answers for HPI/ROS submitted by the patient on 12/14/2021  KASSIE 7 TOTAL SCORE: 13  Service Performed and Documented by SW-   Note reviewed and clinical supervision by LONDON Patino [facility] 1/3/2022          ___________________________________________________    Treatment Plan    Patient's Name: Teddy Shah  YOB: 1990    Date: 12/14/21    DSM5 Diagnoses: 311 (F32.9) Unspecified Depressive Disorder  or 300.02 (F41.1) Generalized Anxiety Disorder  Psychosocial / Contextual Factors: Hostile home environment, lack of social support, current events/politics a significant stress, family conflict  WHODAS: 24    Referral / Collaboration:  Referral to another professional/service is not indicated at this time..    Anticipated number of session or this episode of care: 20      MeasurableTreatment Goal(s) related to diagnosis / functional impairment(s)  Goal 1: Patient will successfully develop and utilize coping skills to manage depressive, anxious and ADHD sxs within 90 days. (Temporarily deferred by patient 12/14/21)    I will know I've met my goal when I feel like I can get some of my executive function back (completing tasks).      Objective #A Patient will process experience around romantic relationship, reflect on how it has impacted his view of self, and how he would like to move forward.     Patient will attend and participate in social or recreational activities    Develop and utilize self-compassion strategies.   identify the time in your life when you began to feel poorly about themselves.  Status: Continued - Date(s):12/14/21     Intervention(s)  Therapist will assign homework around utilizing self compassion strategies  teach psychoeducation around shame, guilt and healthy relationships.    Objective #B Patient will work to develop coping skills to \"turn brain off\" or switch tracks.    Patient will use thought-stopping strategy daily to reduce intrusive thoughts.  Status: Continued " - Date(s): 12/14/21     Intervention(s)  Therapist will assign homework around using thought-stopping strategies and other coping skills  teach emotional regulation skills.  .      Patient has reviewed and agreed to the above plan.      CINDI Posada  September 7, 2021, reviewed December 14, 2021  Service Performed and Documented by [facility]-   tx plan reviewed and clinical supervision [facility]/8/2021, 12/15/2021

## 2022-01-17 ENCOUNTER — VIRTUAL VISIT (OUTPATIENT)
Dept: PSYCHOLOGY | Facility: CLINIC | Age: 32
End: 2022-01-17
Payer: COMMERCIAL

## 2022-01-17 DIAGNOSIS — F32.A DEPRESSION, UNSPECIFIED DEPRESSION TYPE: Primary | ICD-10-CM

## 2022-01-17 DIAGNOSIS — F41.1 GENERALIZED ANXIETY DISORDER: ICD-10-CM

## 2022-01-17 PROCEDURE — 90834 PSYTX W PT 45 MINUTES: CPT | Mod: 95

## 2022-01-17 ASSESSMENT — ANXIETY QUESTIONNAIRES
2. NOT BEING ABLE TO STOP OR CONTROL WORRYING: NEARLY EVERY DAY
6. BECOMING EASILY ANNOYED OR IRRITABLE: SEVERAL DAYS
7. FEELING AFRAID AS IF SOMETHING AWFUL MIGHT HAPPEN: NEARLY EVERY DAY
GAD7 TOTAL SCORE: 16
1. FEELING NERVOUS, ANXIOUS, OR ON EDGE: NEARLY EVERY DAY
GAD7 TOTAL SCORE: 16
3. WORRYING TOO MUCH ABOUT DIFFERENT THINGS: NEARLY EVERY DAY
4. TROUBLE RELAXING: NEARLY EVERY DAY
7. FEELING AFRAID AS IF SOMETHING AWFUL MIGHT HAPPEN: NEARLY EVERY DAY
GAD7 TOTAL SCORE: 16
5. BEING SO RESTLESS THAT IT IS HARD TO SIT STILL: NOT AT ALL

## 2022-01-17 ASSESSMENT — PATIENT HEALTH QUESTIONNAIRE - PHQ9
SUM OF ALL RESPONSES TO PHQ QUESTIONS 1-9: 16
10. IF YOU CHECKED OFF ANY PROBLEMS, HOW DIFFICULT HAVE THESE PROBLEMS MADE IT FOR YOU TO DO YOUR WORK, TAKE CARE OF THINGS AT HOME, OR GET ALONG WITH OTHER PEOPLE: VERY DIFFICULT
SUM OF ALL RESPONSES TO PHQ QUESTIONS 1-9: 16

## 2022-01-17 NOTE — PROGRESS NOTES
Progress Note    Patient Name: Teddy Shah  Date: 1/17/22         Service Type: Individual      Session Start Time: 11:05am  Session End Time: 11:52am     Session Length: 38-52min    Session #: 9    Attendees: Client attended alone    Service Modality:  Video Visit:      Provider verified identity through the following two step process.  Patient provided:  Patient is known previously to provider    Telemedicine Visit: The patient's condition can be safely assessed and treated via synchronous audio and visual telemedicine encounter.      Reason for Telemedicine Visit: Services only offered telehealth    Originating Site (Patient Location): Patient's other (car parked at a park near patient's home)    Distant Site (Provider Location): Provider Remote Setting- Home Office    Consent:  The patient/guardian has verbally consented to: the potential risks and benefits of telemedicine (video visit) versus in person care; bill my insurance or make self-payment for services provided; and responsibility for payment of non-covered services.     Patient would like the video invitation sent by:  My Chart    Mode of Communication:  Video Conference via [employer]well    As the provider I attest to compliance with applicable laws and regulations related to telemedicine.     Treatment Plan Last Reviewed: 12/14/21 due 3/14/21  PHQ-9 / KASSIE-7 : 16/16  Answers for HPI/ROS submitted by the patient on 1/17/2022  If you checked off any problems, how difficult have these problems made it for you to do your work, take care of things at home, or get along with other people?: Very difficult  PHQ9 TOTAL SCORE: 16  KASSIE 7 TOTAL SCORE: 16        DATA  Interactive Complexity: No  Crisis: No       Progress Since Last Session (Related to Symptoms / Goals / Homework):   Symptoms: Worsening depressive and anxious sxs per PHQ-9, KASSIE-7 and patient report      Homework: Partially completed      Episode of Care Goals:  Minimal progress - ACTION (Actively working towards change); Intervened by reinforcing change plan / affirming steps taken     Current / Ongoing Stressors and Concerns:  Politics, catastrophizing, Negative self-talk, low self worth     Treatment Objective(s) Addressed in This Session:   use at least   coping skills for anxiety management in the next 4 weeks  Decrease frequency and intensity of feeling down, depressed, hopeless  Identify negative self-talk and behaviors: challenge core beliefs, myths, and actions  Patient will attend and participate in social or recreational activities    Develop and utilize self-compassion strategies.   identify the time in your life when you began to feel poorly about themselves.     Intervention:   Provider processed patient's experiences using reflective listening, validation, and normalization of patient's emotional response. Provider worked to develop greater insight into stressors and sxs.      Motivational Interviewing    MI Intervention: Expressed Empathy/Understanding, Supported Autonomy, Collaboration, Evocation, Open-ended questions, Change talk (evoked) and Reframe     Change Talk Expressed by the Patient: Desire to change Reasons to change Need to change Committment to change Activation    Provider Response to Change Talk: E - Evoked more info from patient about behavior change, A - Affirmed patient's thoughts, decisions, or attempts at behavior change, R - Reflected patient's change talk and S - Summarized patient's change talk statements    CBT: Provider utilized CBT strategies to challenge and reframe patient's identified cognitive distortions around politics (catastrophizing) and around himself.  Provider coached patient around challenging and reframing identified cognitive distortions.      Provider encouraged patient to set boundaries around consumption of politics and coached patient on acknowledging and letting go of intrusive thoughts and rationalization of  anxious thoughts.      Solution Focused: Provider utilized solution-focused strategies to identify coping strategies patient was agreeable to using to distract from rumination/spiralling thoughts: Video games, watching YouTube channels, cooking/baking, introducing new interests.         ASSESSMENT: Current Emotional / Mental Status (status of significant symptoms):   Risk status (Self / Other harm or suicidal ideation)   Patient denies current fears or concerns for personal safety.   Patient denies current or recent suicidal ideation or behaviors.   Patient denies current or recent homicidal ideation or behaviors.   Patient denies current or recent self injurious behavior or ideation.   Patient denies other safety concerns.   Patient reports there has been no change in risk factors since their last session.     Patient reports there has been no change in protective factors since their last session.     Recommended that patient call 911 or go to the local ED should there be a change in any of these risk factors.     Appearance:   Appropriate    Eye Contact:   Poor   Psychomotor Behavior: Normal    Attitude:   Cooperative    Orientation:   All   Speech    Rate / Production: Talkative    Volume:  Normal    Mood:    Anxious  Depressed  Fearful   Affect:    Constricted  Worrisome    Thought Content:  Referential Thinking  Rumination    Thought Form:  Coherent  Tangential    Insight:    Poor      Medication Review:   No changes to current psychiatric medication(s)     Medication Compliance:   Yes     Changes in Health Issues:   None reported     Chemical Use Review:   Substance Use: Chemical use reviewed, no active concerns identified      Tobacco Use: No current tobacco use.      Diagnosis:  1. Depression, unspecified depression type    2. Generalized anxiety disorder    R/O Obsessive Compulsive Disorder  R/O Schizoid and Schizotypal Personality Disorder    Collateral Reports Completed:   Not Applicable    PLAN: (Patient  Tasks / Therapist Tasks / Other)  Patient will utilize crisis resources as needed.  Provider will work with patient to set healthy boundaries and utilize new coping skills.  Patient committed to try to decrease checking Twitter and think about how to increase positive messaging day to day to offset negativity.  Patient will also track OCD sxs.    Patient will utilize the following coping strategies to manage anxiety: Deep breathing, grounding exercises, engaging in civic duty. [place] exercise. Patient will also process and reality-check anxious thoughts after anxiety has decreased.    Patient will incorporate more deep-breathing and grounding exercises throughout the day.  Patient will utilize coping strategies discussed in session and challenge/reframe cognitive distortions.        Provider and patient will focus on interpersonal relationships, difficulties in communication moving forward. Provider will continue to assess R/O Obsessive Compulsive Disorder, R/O Schizoid and Schizotypal Personality Disorder.     Leatha Pearce [facility]  1/17/22  Service Performed and Documented by [facility]-   Note reviewed and clinical supervision by LONDON Patino [facility] 1/17/2022    ___________________________________________________    Treatment Plan    Patient's Name: Teddy Shah  YOB: 1990    Date: 12/14/21    DSM5 Diagnoses: 311 (F32.9) Unspecified Depressive Disorder  or 300.02 (F41.1) Generalized Anxiety Disorder  Psychosocial / Contextual Factors: Hostile home environment, lack of social support, current events/politics a significant stress, family conflict  WHODAS: 24    Referral / Collaboration:  Referral to another professional/service is not indicated at this time..    Anticipated number of session or this episode of care: 20      MeasurableTreatment Goal(s) related to diagnosis / functional impairment(s)  Goal 1: Patient will successfully develop and utilize coping skills to manage depressive, anxious and  "ADHD sxs within 90 days. (Temporarily deferred by patient 12/14/21)    I will know I've met my goal when I feel like I can get some of my executive function back (completing tasks).      Objective #A Patient will process experience around romantic relationship, reflect on how it has impacted his view of self, and how he would like to move forward.     Patient will attend and participate in social or recreational activities    Develop and utilize self-compassion strategies.   identify the time in your life when you began to feel poorly about themselves.  Status: Continued - Date(s):12/14/21     Intervention(s)  Therapist will assign homework around utilizing self compassion strategies  teach psychoeducation around shame, guilt and healthy relationships.    Objective #B Patient will work to develop coping skills to \"turn brain off\" or switch tracks.    Patient will use thought-stopping strategy daily to reduce intrusive thoughts.  Status: Continued - Date(s): 12/14/21     Intervention(s)  Therapist will assign homework around using thought-stopping strategies and other coping skills  teach emotional regulation skills.  .      Patient has reviewed and agreed to the above plan.      CINDI Posada  September 7, 2021, reviewed December 14, 2021  Service Performed and Documented by ATILIO rojas plan reviewed and clinical supervision by LONDON Patino 9/8/2021, 12/15/2021     "

## 2022-01-18 ASSESSMENT — ANXIETY QUESTIONNAIRES: GAD7 TOTAL SCORE: 16

## 2022-01-18 ASSESSMENT — PATIENT HEALTH QUESTIONNAIRE - PHQ9: SUM OF ALL RESPONSES TO PHQ QUESTIONS 1-9: 16

## 2022-01-21 ENCOUNTER — MYC MEDICAL ADVICE (OUTPATIENT)
Dept: FAMILY MEDICINE | Facility: CLINIC | Age: 32
End: 2022-01-21
Payer: COMMERCIAL

## 2022-01-24 NOTE — TELEPHONE ENCOUNTER
Routing to provider as FYI.     Please refer to [employer] message.     Iveth Sandra RN, BSN  [facility]

## 2022-01-28 ENCOUNTER — IMMUNIZATION (OUTPATIENT)
Dept: NURSING | Facility: CLINIC | Age: 32
End: 2022-01-28
Payer: COMMERCIAL

## 2022-01-28 PROCEDURE — 91305 COVID-19,PF,PFIZER (12+ YRS): CPT

## 2022-01-28 PROCEDURE — 0054A COVID-19,PF,PFIZER (12+ YRS): CPT

## 2022-01-31 ENCOUNTER — VIRTUAL VISIT (OUTPATIENT)
Dept: PSYCHOLOGY | Facility: CLINIC | Age: 32
End: 2022-01-31
Payer: COMMERCIAL

## 2022-01-31 DIAGNOSIS — F41.1 GENERALIZED ANXIETY DISORDER: ICD-10-CM

## 2022-01-31 DIAGNOSIS — F32.A DEPRESSION, UNSPECIFIED DEPRESSION TYPE: Primary | ICD-10-CM

## 2022-01-31 PROCEDURE — 90834 PSYTX W PT 45 MINUTES: CPT | Mod: 95

## 2022-01-31 ASSESSMENT — ANXIETY QUESTIONNAIRES
5. BEING SO RESTLESS THAT IT IS HARD TO SIT STILL: NOT AT ALL
1. FEELING NERVOUS, ANXIOUS, OR ON EDGE: NEARLY EVERY DAY
2. NOT BEING ABLE TO STOP OR CONTROL WORRYING: NEARLY EVERY DAY
7. FEELING AFRAID AS IF SOMETHING AWFUL MIGHT HAPPEN: NEARLY EVERY DAY
GAD7 TOTAL SCORE: 14
GAD7 TOTAL SCORE: 14
3. WORRYING TOO MUCH ABOUT DIFFERENT THINGS: NEARLY EVERY DAY
6. BECOMING EASILY ANNOYED OR IRRITABLE: SEVERAL DAYS
GAD7 TOTAL SCORE: 14
7. FEELING AFRAID AS IF SOMETHING AWFUL MIGHT HAPPEN: NEARLY EVERY DAY
4. TROUBLE RELAXING: SEVERAL DAYS

## 2022-01-31 ASSESSMENT — PATIENT HEALTH QUESTIONNAIRE - PHQ9
SUM OF ALL RESPONSES TO PHQ QUESTIONS 1-9: 13
10. IF YOU CHECKED OFF ANY PROBLEMS, HOW DIFFICULT HAVE THESE PROBLEMS MADE IT FOR YOU TO DO YOUR WORK, TAKE CARE OF THINGS AT HOME, OR GET ALONG WITH OTHER PEOPLE: SOMEWHAT DIFFICULT
SUM OF ALL RESPONSES TO PHQ QUESTIONS 1-9: 13

## 2022-01-31 NOTE — PROGRESS NOTES
"                                           Progress Note    Patient Name: Teddy Shah  Date: 1/31/22         Service Type: Individual      Session Start Time: 11:04am  Session End Time: 11:52am     Session Length: 38-52min    Session #: 10    Attendees: Client attended alone    Service Modality:  Video Visit:      Provider verified identity through the following two step process.  Patient provided:  Patient is known previously to provider    Telemedicine Visit: The patient's condition can be safely assessed and treated via synchronous audio and visual telemedicine encounter.      Reason for Telemedicine Visit: Services only offered telehealth    Originating Site (Patient Location): Patient's other (car parked at a park near patient's home)    Distant Site (Provider Location): Provider Remote Setting- Home Office    Consent:  The patient/guardian has verbally consented to: the potential risks and benefits of telemedicine (video visit) versus in person care; bill my insurance or make self-payment for services provided; and responsibility for payment of non-covered services.     Patient would like the video invitation sent by:  My Chart    Mode of Communication:  Video Conference via [employer]well    As the provider I attest to compliance with applicable laws and regulations related to telemedicine.     Treatment Plan Last Reviewed: 12/14/21 due 3/14/21  PHQ-9 / KASSIE-7 : 13/14  Answers for HPI/ROS submitted by the patient on 1/31/2022  If you checked off any problems, how difficult have these problems made it for you to do your work, take care of things at home, or get along with other people?: Somewhat difficult  PHQ9 TOTAL SCORE: 13  KASSIE 7 TOTAL SCORE: 14            DATA  Interactive Complexity: No  Crisis: No       Progress Since Last Session (Related to Symptoms / Goals / Homework):   Symptoms: Improving anxious and depressive sxs per KASSIE-7 and PHQ-9, patient reported \"spiked\" anxiety related to family member's medical " difficulty      Homework: Partially completed      Episode of Care Goals: Satisfactory progress - ACTION (Actively working towards change); Intervened by reinforcing change plan / affirming steps taken     Current / Ongoing Stressors and Concerns:  Family member's illness, family conflict, catastrophizing     Treatment Objective(s) Addressed in This Session:   use at least   coping skills for anxiety management in the next 4 weeks  Decrease frequency and intensity of feeling down, depressed, hopeless  Identify negative self-talk and behaviors: challenge core beliefs, myths, and actions  Patient will attend and participate in social or recreational activities    Develop and utilize self-compassion strategies.   identify the time in your life when you began to feel poorly about themselves.     Intervention:   Provider processed patient's experiences using reflective listening, validation, and normalization of patient's emotional response. Provider worked to develop greater insight into stressors and sxs.      Motivational Interviewing    MI Intervention: Expressed Empathy/Understanding, Supported Autonomy, Collaboration, Evocation, Open-ended questions, Change talk (evoked) and Reframe     Change Talk Expressed by the Patient: Desire to change Reasons to change Need to change Committment to change Activation    Provider Response to Change Talk: E - Evoked more info from patient about behavior change, A - Affirmed patient's thoughts, decisions, or attempts at behavior change, R - Reflected patient's change talk and S - Summarized patient's change talk statements      ASSESSMENT: Current Emotional / Mental Status (status of significant symptoms):   Risk status (Self / Other harm or suicidal ideation)   Patient denies current fears or concerns for personal safety.   Patient denies current or recent suicidal ideation or behaviors.   Patient denies current or recent homicidal ideation or behaviors.   Patient denies current  or recent self injurious behavior or ideation.   Patient denies other safety concerns.   Patient reports there has been no change in risk factors since their last session.     Patient reports there has been no change in protective factors since their last session.     Recommended that patient call 911 or go to the local ED should there be a change in any of these risk factors.     Appearance:   Appropriate    Eye Contact:   Poor   Psychomotor Behavior: Normal    Attitude:   Cooperative    Orientation:   All   Speech    Rate / Production: Talkative    Volume:  Normal    Mood:    Anxious  Sad  Fearful   Affect:    Constricted  Worrisome    Thought Content:  Referential Thinking  Rumination    Thought Form:  Coherent  Tangential    Insight:    Poor      Medication Review:   No changes to current psychiatric medication(s)     Medication Compliance:   Yes     Changes in Health Issues:   None reported     Chemical Use Review:   Substance Use: Chemical use reviewed, no active concerns identified      Tobacco Use: No current tobacco use.      Diagnosis:  1. Depression, unspecified depression type    2. Generalized anxiety disorder    R/O Obsessive Compulsive Disorder  R/O Schizoid and Schizotypal Personality Disorder    Collateral Reports Completed:   Not Applicable    PLAN: (Patient Tasks / Therapist Tasks / Other)  Patient will utilize crisis resources as needed.  Provider will work with patient to set healthy boundaries and utilize new coping skills.  Patient committed to try to decrease checking Twitter and think about how to increase positive messaging day to day to offset negativity.  Patient will also track OCD sxs.    Patient will utilize the following coping strategies to manage anxiety: Deep breathing, grounding exercises, engaging in civic duty. [place] exercise. Patient will also process and reality-check anxious thoughts after anxiety has decreased.    Patient will incorporate more deep-breathing and grounding  exercises throughout the day.  Patient will utilize coping strategies discussed in session and challenge/reframe cognitive distortions (catastrophizing).        Provider and patient will focus on interpersonal relationships, difficulties in communication moving forward. Provider will continue to assess R/O Obsessive Compulsive Disorder, R/O Schizoid and Schizotypal Personality Disorder.     Leatha Pearce, [facility]  1/31/22  Service Performed and Documented by [facility]-   Note reviewed and clinical supervision by LONDON Patino [facility] 1/31/2022    ___________________________________________________    Treatment Plan    Patient's Name: Teddy Shah  YOB: 1990    Date: 12/14/21    DSM5 Diagnoses: 311 (F32.9) Unspecified Depressive Disorder  or 300.02 (F41.1) Generalized Anxiety Disorder  Psychosocial / Contextual Factors: Hostile home environment, lack of social support, current events/politics a significant stress, family conflict  WHODAS: 24    Referral / Collaboration:  Referral to another professional/service is not indicated at this time..    Anticipated number of session or this episode of care: 20      MeasurableTreatment Goal(s) related to diagnosis / functional impairment(s)  Goal 1: Patient will successfully develop and utilize coping skills to manage depressive, anxious and ADHD sxs within 90 days. (Temporarily deferred by patient 12/14/21)    I will know I've met my goal when I feel like I can get some of my executive function back (completing tasks).      Objective #A Patient will process experience around romantic relationship, reflect on how it has impacted his view of self, and how he would like to move forward.     Patient will attend and participate in social or recreational activities    Develop and utilize self-compassion strategies.   identify the time in your life when you began to feel poorly about themselves.  Status: Continued - Date(s):12/14/21     Intervention(s)  Therapist will  "assign homework around utilizing self compassion strategies  teach psychoeducation around shame, guilt and healthy relationships.    Objective #B Patient will work to develop coping skills to \"turn brain off\" or switch tracks.    Patient will use thought-stopping strategy daily to reduce intrusive thoughts.  Status: Continued - Date(s): 12/14/21     Intervention(s)  Therapist will assign homework around using thought-stopping strategies and other coping skills  teach emotional regulation skills.  .      Patient has reviewed and agreed to the above plan.      CINDI Posada  September 7, 2021, reviewed December 14, 2021  Service Performed and Documented by CINDI-   tx plan reviewed and clinical supervision [facility], 12/15/2021     "

## 2022-02-01 ASSESSMENT — ANXIETY QUESTIONNAIRES: GAD7 TOTAL SCORE: 14

## 2022-02-01 ASSESSMENT — PATIENT HEALTH QUESTIONNAIRE - PHQ9: SUM OF ALL RESPONSES TO PHQ QUESTIONS 1-9: 13

## 2022-02-15 ENCOUNTER — VIRTUAL VISIT (OUTPATIENT)
Dept: PSYCHOLOGY | Facility: CLINIC | Age: 32
End: 2022-02-15
Payer: COMMERCIAL

## 2022-02-15 DIAGNOSIS — F32.A DEPRESSION, UNSPECIFIED DEPRESSION TYPE: ICD-10-CM

## 2022-02-15 DIAGNOSIS — F41.1 GENERALIZED ANXIETY DISORDER: Primary | ICD-10-CM

## 2022-02-15 PROCEDURE — 90834 PSYTX W PT 45 MINUTES: CPT | Mod: 95

## 2022-02-15 ASSESSMENT — PATIENT HEALTH QUESTIONNAIRE - PHQ9
SUM OF ALL RESPONSES TO PHQ QUESTIONS 1-9: 13
10. IF YOU CHECKED OFF ANY PROBLEMS, HOW DIFFICULT HAVE THESE PROBLEMS MADE IT FOR YOU TO DO YOUR WORK, TAKE CARE OF THINGS AT HOME, OR GET ALONG WITH OTHER PEOPLE: VERY DIFFICULT
SUM OF ALL RESPONSES TO PHQ QUESTIONS 1-9: 13

## 2022-02-15 ASSESSMENT — ANXIETY QUESTIONNAIRES
1. FEELING NERVOUS, ANXIOUS, OR ON EDGE: NEARLY EVERY DAY
7. FEELING AFRAID AS IF SOMETHING AWFUL MIGHT HAPPEN: NEARLY EVERY DAY
3. WORRYING TOO MUCH ABOUT DIFFERENT THINGS: NEARLY EVERY DAY
GAD7 TOTAL SCORE: 14
7. FEELING AFRAID AS IF SOMETHING AWFUL MIGHT HAPPEN: NEARLY EVERY DAY
GAD7 TOTAL SCORE: 14
4. TROUBLE RELAXING: SEVERAL DAYS
5. BEING SO RESTLESS THAT IT IS HARD TO SIT STILL: NOT AT ALL
2. NOT BEING ABLE TO STOP OR CONTROL WORRYING: NEARLY EVERY DAY
6. BECOMING EASILY ANNOYED OR IRRITABLE: SEVERAL DAYS
GAD7 TOTAL SCORE: 14

## 2022-02-15 NOTE — PROGRESS NOTES
Progress Note    Patient Name: Teddy Shah  Date: 2/15/22         Service Type: Individual      Session Start Time: 11:04am  Session End Time: 11:48pm     Session Length: 38-52min    Session #: 11    Attendees: Client attended alone    Service Modality:  Video Visit:      Provider verified identity through the following two step process.  Patient provided:  Patient is known previously to provider    Telemedicine Visit: The patient's condition can be safely assessed and treated via synchronous audio and visual telemedicine encounter.      Reason for Telemedicine Visit: Services only offered telehealth    Originating Site (Patient Location): Patient's other (car parked at a park near patient's home)    Distant Site (Provider Location): Provider Remote Setting- Home Office    Consent:  The patient/guardian has verbally consented to: the potential risks and benefits of telemedicine (video visit) versus in person care; bill my insurance or make self-payment for services provided; and responsibility for payment of non-covered services.     Patient would like the video invitation sent by:  My Chart    Mode of Communication:  Video Conference via [employer]    As the provider I attest to compliance with applicable laws and regulations related to telemedicine.     Treatment Plan Last Reviewed: 12/14/21 due 3/14/21  PHQ-9 / KASSIE-7 : 13/14  Answers for HPI/ROS submitted by the patient on 2/15/2022  If you checked off any problems, how difficult have these problems made it for you to do your work, take care of things at home, or get along with other people?: Very difficult  PHQ9 TOTAL SCORE: 13  KASSIE 7 TOTAL SCORE: 14      DATA  Interactive Complexity: No  Crisis: No       Progress Since Last Session (Related to Symptoms / Goals / Homework):   Symptoms: No change in anxious and depressive sxs per KASSIE-7, PHQ-9 and patient report      Homework: Partially completed      Episode of Care Goals:  Satisfactory progress - ACTION (Actively working towards change); Intervened by reinforcing change plan / affirming steps taken     Current / Ongoing Stressors and Concerns:  Really bad day, politics, interpersonal difficulties, low self worth     Treatment Objective(s) Addressed in This Session:   use at least   coping skills for anxiety management in the next 4 weeks  Decrease frequency and intensity of feeling down, depressed, hopeless  Identify negative self-talk and behaviors: challenge core beliefs, myths, and actions  Patient will attend and participate in social or recreational activities    Develop and utilize self-compassion strategies.   identify the time in your life when you began to feel poorly about themselves.     Intervention:   Provider processed patient's experiences using reflective listening, validation, and normalization of patient's emotional response. Provider worked to develop greater insight into stressors and sxs.      CBT: Provider utilized CBT strategies to challenge and reframe patient's identified cognitive distortions.     Provider reviewed coping strategies with patient.     Motivational Interviewing    MI Intervention: Expressed Empathy/Understanding, Supported Autonomy, Collaboration, Evocation, Open-ended questions, Change talk (evoked) and Reframe     Change Talk Expressed by the Patient: Desire to change Reasons to change Need to change Committment to change Activation    Provider Response to Change Talk: E - Evoked more info from patient about behavior change, A - Affirmed patient's thoughts, decisions, or attempts at behavior change, R - Reflected patient's change talk and S - Summarized patient's change talk statements      ASSESSMENT: Current Emotional / Mental Status (status of significant symptoms):   Risk status (Self / Other harm or suicidal ideation)   Patient denies current fears or concerns for personal safety.   Patient denies current or recent suicidal ideation or  behaviors.   Patient denies current or recent homicidal ideation or behaviors.   Patient denies current or recent self injurious behavior or ideation.   Patient denies other safety concerns.   Patient reports there has been no change in risk factors since their last session.     Patient reports there has been no change in protective factors since their last session.     Recommended that patient call 911 or go to the local ED should there be a change in any of these risk factors.     Appearance:   Appropriate    Eye Contact:   Poor   Psychomotor Behavior: Normal    Attitude:   Cooperative    Orientation:   All   Speech    Rate / Production: Talkative    Volume:  Normal    Mood:    Anxious  Sad  Fearful   Affect:    Subdued    Thought Content:  Referential Thinking  Rumination    Thought Form:  Coherent  Tangential    Insight:    Poor      Medication Review:   No changes to current psychiatric medication(s)     Medication Compliance:   Yes     Changes in Health Issues:   None reported     Chemical Use Review:   Substance Use: Chemical use reviewed, no active concerns identified      Tobacco Use: No current tobacco use.      Diagnosis:  1. Generalized anxiety disorder    2. Depression, unspecified depression type    R/O Obsessive Compulsive Disorder  R/O Schizoid and Schizotypal Personality Disorder    Collateral Reports Completed:   Not Applicable    PLAN: (Patient Tasks / Therapist Tasks / Other)  Patient will utilize crisis resources as needed.  Provider will work with patient to set healthy boundaries and utilize new coping skills.  Patient committed to try to decrease checking Twitter and think about how to increase positive messaging day to day to offset negativity.  Patient will also track OCD sxs.    Patient will utilize the following coping strategies to manage anxiety: Deep breathing, grounding exercises, engaging in civic duty. [place] exercise. Patient will also process and reality-check anxious thoughts  after anxiety has decreased.    Patient will incorporate more deep-breathing and grounding exercises throughout the day.  Patient will utilize coping strategies discussed in session and challenge/reframe cognitive distortions (catastrophizing).        Provider and patient will focus on interpersonal relationships, internalizing criticism, self worth, and difficulties in communication moving forward. Provider will continue to assess R/O Obsessive Compulsive Disorder, R/O Schizoid and Schizotypal Personality Disorder.       Leatha Pearce, [facility]  2/15/22  Service Performed and Documented by [facility]-   Note reviewed and clinical supervision by LONDON Patino [facility] 2/15/2022    ___________________________________________________    Treatment Plan    Patient's Name: Teddy Shah  YOB: 1990    Date: 12/14/21    DSM5 Diagnoses: 311 (F32.9) Unspecified Depressive Disorder  or 300.02 (F41.1) Generalized Anxiety Disorder  Psychosocial / Contextual Factors: Hostile home environment, lack of social support, current events/politics a significant stress, family conflict  WHODAS: 24    Referral / Collaboration:  Referral to another professional/service is not indicated at this time..    Anticipated number of session or this episode of care: 20      MeasurableTreatment Goal(s) related to diagnosis / functional impairment(s)  Goal 1: Patient will successfully develop and utilize coping skills to manage depressive, anxious and ADHD sxs within 90 days. (Temporarily deferred by patient 12/14/21)    I will know I've met my goal when I feel like I can get some of my executive function back (completing tasks).      Objective #A Patient will process experience around romantic relationship, reflect on how it has impacted his view of self, and how he would like to move forward.     Patient will attend and participate in social or recreational activities    Develop and utilize self-compassion strategies.   identify the time in  "your life when you began to feel poorly about themselves.  Status: Continued - Date(s):12/14/21     Intervention(s)  Therapist will assign homework around utilizing self compassion strategies  teach psychoeducation around shame, guilt and healthy relationships.    Objective #B Patient will work to develop coping skills to \"turn brain off\" or switch tracks.    Patient will use thought-stopping strategy daily to reduce intrusive thoughts.  Status: Continued - Date(s): 12/14/21     Intervention(s)  Therapist will assign homework around using thought-stopping strategies and other coping skills  teach emotional regulation skills.  .      Patient has reviewed and agreed to the above plan.      CINDI Posada  September 7, 2021, reviewed December 14, 2021  Service Performed and Documented by CINDI-   tx plan reviewed and clinical supervision by LONDON Patino 9/8/2021, 12/15/2021     "

## 2022-02-16 ASSESSMENT — PATIENT HEALTH QUESTIONNAIRE - PHQ9: SUM OF ALL RESPONSES TO PHQ QUESTIONS 1-9: 13

## 2022-02-16 ASSESSMENT — ANXIETY QUESTIONNAIRES: GAD7 TOTAL SCORE: 14

## 2022-02-19 ENCOUNTER — HEALTH MAINTENANCE LETTER (OUTPATIENT)
Age: 32
End: 2022-02-19

## 2022-03-03 ENCOUNTER — OFFICE VISIT (OUTPATIENT)
Dept: FAMILY MEDICINE | Facility: CLINIC | Age: 32
End: 2022-03-03
Payer: COMMERCIAL

## 2022-03-03 VITALS
HEART RATE: 97 BPM | HEIGHT: 72 IN | SYSTOLIC BLOOD PRESSURE: 126 MMHG | BODY MASS INDEX: 38.47 KG/M2 | RESPIRATION RATE: 12 BRPM | WEIGHT: 284 LBS | TEMPERATURE: 97.2 F | DIASTOLIC BLOOD PRESSURE: 86 MMHG | OXYGEN SATURATION: 96 %

## 2022-03-03 DIAGNOSIS — Z30.09 VISIT FOR VASECTOMY EVALUATION: ICD-10-CM

## 2022-03-03 DIAGNOSIS — M67.431 GANGLION CYST OF WRIST, RIGHT: ICD-10-CM

## 2022-03-03 DIAGNOSIS — E66.812 CLASS 2 SEVERE OBESITY DUE TO EXCESS CALORIES WITH SERIOUS COMORBIDITY AND BODY MASS INDEX (BMI) OF 36.0 TO 36.9 IN ADULT (H): Primary | ICD-10-CM

## 2022-03-03 DIAGNOSIS — Z11.59 NEED FOR HEPATITIS C SCREENING TEST: ICD-10-CM

## 2022-03-03 DIAGNOSIS — F33.1 MODERATE EPISODE OF RECURRENT MAJOR DEPRESSIVE DISORDER (H): ICD-10-CM

## 2022-03-03 DIAGNOSIS — Z13.6 CARDIOVASCULAR SCREENING; LDL GOAL LESS THAN 160: ICD-10-CM

## 2022-03-03 DIAGNOSIS — F41.9 ANXIETY: ICD-10-CM

## 2022-03-03 DIAGNOSIS — E66.01 CLASS 2 SEVERE OBESITY DUE TO EXCESS CALORIES WITH SERIOUS COMORBIDITY AND BODY MASS INDEX (BMI) OF 36.0 TO 36.9 IN ADULT (H): Primary | ICD-10-CM

## 2022-03-03 DIAGNOSIS — L73.9 FOLLICULITIS: ICD-10-CM

## 2022-03-03 DIAGNOSIS — R73.03 PREDIABETES: ICD-10-CM

## 2022-03-03 LAB
CHOLEST SERPL-MCNC: 177 MG/DL
FASTING STATUS PATIENT QL REPORTED: NO
FASTING STATUS PATIENT QL REPORTED: NO
GLUCOSE BLD-MCNC: 97 MG/DL (ref 70–99)
HBA1C MFR BLD: 6.1 % (ref 0–5.6)
HDLC SERPL-MCNC: 36 MG/DL
LDLC SERPL CALC-MCNC: 111 MG/DL
NONHDLC SERPL-MCNC: 141 MG/DL
TRIGL SERPL-MCNC: 151 MG/DL

## 2022-03-03 PROCEDURE — 83036 HEMOGLOBIN GLYCOSYLATED A1C: CPT | Performed by: NURSE PRACTITIONER

## 2022-03-03 PROCEDURE — 82947 ASSAY GLUCOSE BLOOD QUANT: CPT | Performed by: NURSE PRACTITIONER

## 2022-03-03 PROCEDURE — 80061 LIPID PANEL: CPT | Performed by: NURSE PRACTITIONER

## 2022-03-03 PROCEDURE — 86803 HEPATITIS C AB TEST: CPT | Performed by: NURSE PRACTITIONER

## 2022-03-03 PROCEDURE — 36415 COLL VENOUS BLD VENIPUNCTURE: CPT | Performed by: NURSE PRACTITIONER

## 2022-03-03 PROCEDURE — 99214 OFFICE O/P EST MOD 30 MIN: CPT | Mod: 25 | Performed by: NURSE PRACTITIONER

## 2022-03-03 PROCEDURE — 99395 PREV VISIT EST AGE 18-39: CPT | Performed by: NURSE PRACTITIONER

## 2022-03-03 RX ORDER — CEPHALEXIN 500 MG/1
500 CAPSULE ORAL 2 TIMES DAILY
Qty: 10 CAPSULE | Refills: 0 | Status: SHIPPED | OUTPATIENT
Start: 2022-03-03 | End: 2022-03-08

## 2022-03-03 RX ORDER — SERTRALINE HYDROCHLORIDE 100 MG/1
200 TABLET, FILM COATED ORAL DAILY
Qty: 180 TABLET | Refills: 1 | Status: SHIPPED | OUTPATIENT
Start: 2022-03-03 | End: 2022-08-30

## 2022-03-03 ASSESSMENT — PAIN SCALES - GENERAL: PAINLEVEL: NO PAIN (1)

## 2022-03-03 ASSESSMENT — PATIENT HEALTH QUESTIONNAIRE - PHQ9
SUM OF ALL RESPONSES TO PHQ QUESTIONS 1-9: 15
10. IF YOU CHECKED OFF ANY PROBLEMS, HOW DIFFICULT HAVE THESE PROBLEMS MADE IT FOR YOU TO DO YOUR WORK, TAKE CARE OF THINGS AT HOME, OR GET ALONG WITH OTHER PEOPLE: SOMEWHAT DIFFICULT
SUM OF ALL RESPONSES TO PHQ QUESTIONS 1-9: 15

## 2022-03-03 NOTE — PATIENT INSTRUCTIONS
Patient Education     Prevention Guidelines, Women Ages 18 to 39  Screening tests and vaccines are an important part of managing your health. A screening test is done to find possible disorders or diseases in people who don't have any symptoms. The goal is to find a disease early so lifestyle changes can be made and you can be watched more closely to reduce the risk of disease, or to detect it early enough to treat it most effectively. Screening tests are not considered diagnostic, but are used to determine if more testing is needed. Health counseling is essential, too. Below are guidelines for these, for women ages 18 to 39. Talk with your healthcare provider to make sure you re up-to-date on what you need.   Screening Who needs it How often   Alcohol misuse All women in this age group  At routine exams   Blood pressure All women in this age group  Yearly checkup if your blood pressure is normal   Normal blood pressure is less than 120/80 mm Hg   If your blood pressure reading is higher than normal, follow the advice of your healthcare provider    Breast cancer All women in this age group should talk with their healthcare providers about the need for clinical breast exams (CBE)1  Clinical breast exam every 3 years1    Cervical cancer Women ages 21 and older  Women between ages 21 and 29 should have a Pap test every 3 years; women between ages 30 and 65 are advised to have a Pap test plus an HPV test every 5 years    Chlamydia Sexually active women ages 24 and younger, and women at increased risk for infection  Every 3 years if you're at risk or have symptoms    Depression All women in this age group  At routine exams   Diabetes mellitus, type 2  Adults with no symptoms who are overweight or obese and have 1 or more other risk factors for diabetes  At least every 3 years. Also, testing for diabetes during pregnancy after the 24th week.     Gonorrhea Sexually active women at increased risk for infection  At routine  exams   Hepatitis C Anyone at increased risk  At routine exams   HIV All women At routine exams3     Obesity All women in this age group  At routine exams   Syphilis Women at increased risk for infection should talk with their healthcare provider  At routine exams   Tuberculosis Women at increased risk for infection should talk with their healthcare provider  Ask your healthcare provider    Vision All women in this age group  At least 1 complete exam in your 20s, and 2 in your 30s    Vaccine Who needs it How often   Chickenpox (varicella)  All women in this age group who have no record of this infection or vaccine  2 doses; the second dose should be given 4 to 8 weeks after the first dose    Hepatitis A Women at increased risk for infection should talk with their healthcare provider  2 doses given at least 6 months apart    Hepatitis B Women at increased risk for infection should talk with their healthcare provider  3 doses over 6 months; second dose should be given 1 month after the first dose; the third dose should be given at least 2 months after the second dose and at least 4 months after the first dose    Haemophilus influenzae  Type B (HIB)  Women at increased risk for infection should talk with their healthcare provider  1 to 3 doses   Human papillomavirus (HPV)  All women in this age group up to age 26  3 doses; the second dose should be given 1 to 2 months after the first dose and the third dose given 6 months after the first dose    Influenza (flu) All women in this age group  Once a year   Measles, mumps, rubella (MMR)  All women in this age group who have no record of these infections or vaccines  1 or 2 doses   Meningococcal Women at increased risk for infection should talk with their healthcare provider  1 or more doses   Pneumococcal conjugate vaccine (PCV13) and pneumococcal polysaccharide vaccine (PPSV23)  Women at increased risk for infection should talk with their healthcare provider  PCV13: 1  dose ages 19 to 65 (protects against 13 types of pneumococcal bacteria)   PPSV23: 1 to 2 doses through age 64, or 1 dose at 65 or older (protects against 23 types of pneumococcal bacteria)    Tetanus/diphtheria/pertussis (Td/Tdap) booster All women in this age group  Td every 10 years, or a one-time dose of Tdap instead of a Td booster after age 18, then Td every 10 years    Counseling Who needs it How often   BRCA gene mutation testing for breast and ovarian cancer susceptibility  Women with increased risk for having gene mutation  When your risk is known    Breast cancer and chemoprevention  Women at high risk for breast cancer  When your risk is known    Diet and exercise Women who are overweight or obese  When diagnosed, and then at routine exams    Domestic violence Women at the age in which they are able to have children  At routine exams   Sexually transmitted infection prevention  Women who are sexually active  At routine exams   Skin cancer Prevention of skin cancer in fair-skinned adults  At routine exams   Use of tobacco and the health effects it can cause  All women in this age group  Every visit   1 According to the ACS, women ages 20 to 39 years should have a clinical breast exam (CBE) as part of their routine health exam every 3 years. Breast self-exams are an option for women starting in their 20s. But the [place]. Preventive Services Task Force (USPSTF) does not recommend CBE.   2 Those who are 18 years old and not up-to-date on their childhood vaccines should get all appropriate catch-up vaccines recommended by the CDC.   3 The USPSTF recommends that all people ages 15 to 65 years be screened for HIV and those younger or older people at increased risk. The CDC recommends that everyone between the ages of 13 and 64 get tested for HIV at least once as part of routine health care.   Joel last reviewed this educational content on 10/1/2017    9310-9405 [employer]. All rights reserved.  This information is not intended as a substitute for professional medical care. Always follow your healthcare professional's instructions.           Patient Education     Back Care Tips     Caring for your back  These are things you can do to prevent a recurrence of acute back pain and to reduce symptoms from chronic back pain:    Stay at a healthy weight. If you are overweight, losing weight will help most types of back pain.    Exercise is an important part of recovery from most types of back pain. The muscles behind and in front of the spine support the back. This means strengthening both the back muscles and the abdominal muscles will provide better support for your spine.     Swimming and brisk walking are good overall exercises to improve your fitness level.    Practice safe lifting methods (see below).    Practice good posture when sitting, standing, and walking. Don't sit for a long time. This puts more stress on the lower back than standing or walking.    Wear quality shoes with good arch support. Foot and ankle alignment can affect back symptoms. Don't wear high heels.    Therapeutic massage can help relax the back muscles without stretching them.    During the first 24 to 72 hours after an acute injury or flare-up of chronic back pain, put an ice pack on the painful area for 20 minutes and then remove it for 20 minutes. Do thisover a period of 60 to 90 minutes, or several times a day. As a safety precaution, don't use a heating pad at bedtime. Sleeping on a heating pad can lead to skin burns or tissue damage.    You can alternate using ice and heat.  Medicines  Talk with your healthcare provider before using medicines, especially if you have other health problems or are taking other medicines.    You may use over-the-counter medicines, such as acetaminophen, ibuprofen, or naprosyn to control pain, unless your healthcare provider prescribed other pain medicine. Talk with your healthcare provider before  taking any medicines if you have a chronic condition such as diabetes, liver or kidney disease, stomach ulcers, or digestive bleeding, or are taking blood thinners.    Be careful if you are given prescription pain medicines, opioids, or medicine for muscle spasm. They can cause drowsiness, and affect your coordination, reflexes, and judgment. Don't drive or operate heavy machinery while taking these types of medicines. Take prescription pain medicine only as prescribed by your healthcare provider.  Lumbar stretch  This simple stretch will help relax muscle spasm and keep your back more limber. If exercise makes your back pain worse, don t do it.    Lie on your back with your knees bent and both feet on the ground.    Slowly raise your left knee to your chest as you flatten your lower back against the floor. Hold for 5 seconds.    Relax and repeat the exercise with your right knee.    Do 10 of these exercises for each leg.  Safe lifting method    Don t bend over at the waist to lift an object off the floor.  Instead, bend your knees and hips in a squat.     Keep your back and head upright    Hold the object close to your body, directly in front of you.    Straighten your legs to lift the object.     Lower the object to the floor in the reverse fashion.    If you must slide something across the floor, push it.    Posture tips  Sitting  Sit in chairs with straight backs or low-back support. Keep your knees lower than your hips, with your feet flat on the floor.  When driving, sit up straight. Adjust the seat forward so you are not leaning toward the steering wheel.  A small pillow or rolled towel behind your lower back may help if you are driving long distances.   Standing  When standing for long periods, shift most of your weight to one leg at a time. Switch legs every few minutes.   Sleeping  The best way to sleep is on your side with your knees bent. Put a low pillow under your head to support your neck in a neutral  spine position. Don't use thick pillows that bend your neck to one side. Put a pillow between your legs to further relax your lower back. If you sleep on your back, put pillows under your knees to support your legs in a slightly flexed position. Use a firm mattress. If your mattress sags, replace it, or use a 1/2-inch plywood board under the mattress to add support.  Follow-up care  Follow up with your healthcare provider, or as advised.  If X-rays, a CT scan or an MRI scan were taken, they may be reviewed by a radiologist. You will be told of any new findings that may affect your care.  Call 911  Call 911 if any of the following occur:    Trouble breathing    Confusion    Very drowsy    Fainting or loss of consciousness    Rapid or very slow heart rate    Loss of  bowel or bladder control  When to seek medical advice  Call your healthcare provider right away if any of the following occur:    Pain becomes worse or spreads to your arms or legs    Weakness or numbness in one or both arms or legs    Numbness in the groin area  [employer] last reviewed this educational content on 11/1/2019 2000-2021 [employer]. All rights reserved. This information is not intended as a substitute for professional medical care. Always follow your healthcare professional's instructions.           Patient Education     Exercises to Strengthen Your Lower Back  Strong lower back and abdominal muscles work together to support your spine. The exercises below will help strengthen the lower back. It's important that you start exercising slowly and increase levels gradually.   Always start any exercise program with stretching. If you feel pain while doing any of these exercises, stop and talk to your doctor about a more specific exercise program that better suits your condition.    Low back stretch  The point of stretching is to make you more flexible and increase your range of motion. Stretch only as much as you are able. Stretch  slowly. Don't push your stretch to the limit. If at any point you feel pain while stretching, this is your (temporary) limit.     Lie on your back with your knees bent and both feet on the ground.    Slowly raise your left knee to your chest as you flatten your lower back against the floor. Hold for 5 seconds.    Relax and repeat the exercise with your right knee.    Do 10 of these exercises for each leg.    Repeat hugging both knees to your chest at the same time.  Building lower back strength  Start your exercise routine with 10 to 30 minutes a day, 1 to 3 times a day.  Initial exercises  Lying on your back:  1. Ankle pumps. Move your foot up and down, towards your head, and then away. Repeat 10 times with each foot.  2. Heel slides. Slowly bend your knee, drawing the heel of your foot towards you. Then slide your heel/foot from you, straightening your knee. Don't lift your foot off the floor (this is not a leg lift).  3. Abdominal contraction. Bend your knees and put your hands on your stomach. Tighten your stomach muscles. Hold for 5 seconds, then relax. Repeat 10 times.  4. Straight leg raise. Bend one leg at the knee and keep the other leg straight. Tighten your stomach muscles. Slowly lift your straight leg 6 to 12 inches off the floor and hold for up to 5 seconds. Repeat 10 times on each side.  Standin. Wall squats. Stand with your back against the wall. Move your feet about 12 inches away from the wall. Tighten your stomach muscles, and slowly bend your knees until they are at about a 45 degree angle. Don't go down too far. Hold about 5 seconds. Then slowly return to your starting position. Repeat 10 times.  2. Heel raises. Stand facing the wall. Slowly raise the heels of your feet up and down, while keeping your toes on the floor. If you have trouble balancing, you can touch the wall with your hands. Repeat 10 times.  More advanced exercises  When you feel comfortable enough, try these  exercises.  1. Kneeling lumbar extension. Start on your hands and knees. At the same time, raise and straighten your right arm and left leg until they are parallel to the ground. Hold for 2 seconds and come back slowly to a starting position. Repeat with left arm and right leg, alternating 10 times.  2. Prone lumbar extension. Lie face down, arms extended overhead, palms on the floor. At the same time, raise your right arm and left leg as high as comfortably possible. Hold for 10 seconds and slowly return to start. Repeat with left arm and right leg, alternating 10 times. Gradually build up to 20 times. (Advanced: Repeat this exercise raising both arms and both legs a few inches off the floor at the same time. Hold for 5 seconds and release.)  3. Pelvic tilt. Lie on the floor on your back with your knees bent at 90 degrees. Your feet should be flat on the floor. Inhale, exhale, then slowly contract your abdominal muscles bringing your navel toward your spine. Let your pelvis rock back until your lower back is flat on the floor. Hold for 10 seconds while breathing smoothly.  4. Abdominal crunch. Perform a pelvic tilt (above) flattening your lower back against the floor. Holding the tension in your abdominal muscles, take another breath and raise your shoulder blades off the ground (this is not a full sit-up). Keep your head in line with your body (don t bend your neck forward). Hold for 2 seconds, then slowly lower.  [email or web address] last reviewed this educational content on 8/1/2019 2000-2021 [employer]. All rights reserved. This information is not intended as a substitute for professional medical care. Always follow your healthcare professional's instructions.

## 2022-03-03 NOTE — PROGRESS NOTES
Assessment & Plan     Class 2 severe obesity due to excess calories with serious comorbidity and body mass index (BMI) of 36.0 to 36.9 in adult (H)  Benefits of weight loss reviewed in detail, encouraged him to cut back on the carbohydrates in the diet, consume more fruits and vegetables, drink plenty of water, avoid fruit juices, sodas, get 150 min moderate exercise/week.  Recheck weight in 6 months.    - Glucose  - Hemoglobin A1c  - Adult Sleep Eval & Management  Referral  - Glucose  - Hemoglobin A1c    Ganglion cyst of wrist, right  Referring to Orthopedics for possible drainage vs excision  - Orthopedic  Referral    Moderate episode of recurrent major depressive disorder (H)  Continue current 100 mg zoloft daily.  - sertraline (ZOLOFT) 100 MG tablet  Dispense: 180 tablet; Refill: 1    Folliculitis  Treating with 5 day course of keflex, advised use of deodorant soap.  - cephALEXin (KEFLEX) 500 MG capsule  Dispense: 10 capsule; Refill: 0    Prediabetes  Referring to Nutrition, advised weight loss, regular exercise, low carb diet.  - Nutrition Referral    Anxiety  Stable  - sertraline (ZOLOFT) 100 MG tablet  Dispense: 180 tablet; Refill: 1    Visit for vasectomy evaluation  Advised to schedule with Dr. Hale for evaluation for vasectomy.    CARDIOVASCULAR SCREENING; LDL GOAL LESS THAN 160    - Lipid panel reflex to direct LDL Non-fasting  - Lipid panel reflex to direct LDL Non-fasting    Need for hepatitis C screening test    - Hepatitis C Screen Reflex to HCV RNA Quant and Genotype      Ordering of each unique test  Prescription drug management  40 minutes spent on the date of the encounter doing chart review, history and exam, documentation and further activities per the note       BMI:   Estimated body mass index is 38.52 kg/m  as calculated from the following:    Height as of this encounter: 1.829 m (6').    Weight as of this encounter: 128.8 kg (284 lb).   Weight management plan:  Discussed healthy diet and exercise guidelines    Work on weight loss  Regular exercise  See Patient Instructions    Return in about 4 weeks (around 3/31/2022), or if symptoms worsen or fail to improve, for Follow up.    [facility] is a 31 year old who presents for the following health issues   History of Present Illness     Reason for visit:  Ganglion cyst and general medical questions  Symptom onset:  More than a month  Symptoms include:  Swelling, pressure sensitivity  Symptom intensity:  Mild  Symptom progression:  Worsening  Had these symptoms before:  Yes  Has tried/received treatment for these symptoms:  No    Teddy Shah eats 2-3 servings of fruits and vegetables daily.Teddy Shah consumes 0 sweetened beverage(s) daily.Teddy Shah exercises with enough effort to increase Teddy Shah's heart rate 30 to 60 minutes per day.  Teddy Shah exercises with enough effort to increase Teddy Shah's heart rate 5 days per week.   Teddy Shah is taking medications regularly.         Chronic/Recurring Back Pain Follow Up      Where is your back pain located? (Select all that apply) low back midline    How would you describe your back pain?  aching    Where does your back pain spread? nowhere    Since your last clinic visit for back pain, how has your pain changed? unchanged    Does your back pain interfere with your job? YES    Since your last visit, have you tried any new treatment? No    Annual Wellness Visit    Patient has been advised of split billing requirements and indicates understanding: Yes     Are you in the first 12 months of your Medicare Part B coverage?  No    Physical Health:    In general, how would you rate your overall physical health? good    Outside of work, how many days during the week do you exercise?4-5 days/week    Outside of work, approximately how many minutes a day do you exercise?greater than 60  "minutes    If you drink alcohol do you typically have >3 drinks per day or >7 drinks per week? No    Do you usually eat at least 4 servings of fruit and vegetables a day, include whole grains & fiber and avoid regularly eating high fat or \"junk\" foods? Yes    Do you have any problems taking medications regularly? No    Do you have any side effects from medications? none    Needs assistance for the following daily activities: no assistance needed    Which of the following safety concerns are present in your home?  none identified     Hearing impairment: No    In the past 6 months, have you been bothered by leaking of urine? no    Mental Health:    In general, how would you rate your overall mental or emotional health? good  PHQ-2 Score: (!) (P) 3    Do you feel safe in your environment? Yes    Have you ever done Advance Care Planning? (For example, a Health Directive, POLST, or a discussion with a medical provider or your loved ones about your wishes)? No, advance care planning information given to patient to review.  Patient declined advance care planning discussion at this time.    Fall risk:  Fall Risk Assessment not completed.      Do you have sleep apnea, excessive snoring or daytime drowsiness?: yes    Current providers sharing in care for this patient include:   Patient Care Team:  Ashanti Dee APRN CNP as PCP - General (Nurse Practitioner - Family)  Ashanti Dee APRN CNP as Assigned PCP    Patient has been advised of split billing requirements and indicates understanding: Yes  .  Review of Systems   Constitutional, HEENT, cardiovascular, pulmonary, gi and gu systems are negative, except as otherwise noted.      Objective    /86 (BP Location: Left arm, Patient Position: Sitting, Cuff Size: Adult Regular)   Pulse 97   Temp 97.2  F (36.2  C) (Temporal)   Resp 12   Ht 1.829 m (6')   Wt 128.8 kg (284 lb)   SpO2 96%   BMI 38.52 kg/m    Body mass index is 38.52 kg/m .  Physical Exam "   GENERAL: healthy, alert and no distress  EYES: Eyes grossly normal to inspection, PERRL and conjunctivae and sclerae normal  HENT: ear canals and TM's normal, nose and mouth without ulcers or lesions  NECK: no adenopathy, no asymmetry, masses, or scars and thyroid normal to palpation  RESP: lungs clear to auscultation - no rales, rhonchi or wheezes  CV: regular rate and rhythm, normal S1 S2, no S3 or S4, no murmur, click or rub, no peripheral edema and peripheral pulses strong  ABDOMEN: soft, nontender, no hepatosplenomegaly, no masses and bowel sounds normal  MS: 1 cm nontender cystic lesion volar aspect right radial wrist consistent with ganglion, no gross musculoskeletal defects noted, no edema  SKIN: multiple follicular lesion on erythematous base consistent with folliculitis on upper chest, back shoulders,, otherwise, no suspicious lesions or rashes  NEURO: Normal strength and tone, mentation intact and speech normal  PSYCH: mentation appears normal, affect normal/bright  LYMPH: no cervical, supraclavicular, axillary, or inguinal adenopathy    Results for orders placed or performed in visit on 03/03/22   Hepatitis C Screen Reflex to HCV RNA Quant and Genotype     Status: Normal   Result Value Ref Range    Hepatitis C Antibody Nonreactive Nonreactive    Narrative    Assay performance characteristics have not been established for newborns, infants, and children.   Glucose     Status: None   Result Value Ref Range    Glucose 97 70 - 99 mg/dL    Patient Fasting > 8hrs? No    Hemoglobin A1c     Status: Abnormal   Result Value Ref Range    Hemoglobin A1C 6.1 (H) 0.0 - 5.6 %   Lipid panel reflex to direct LDL Non-fasting     Status: Abnormal   Result Value Ref Range    Cholesterol 177 <200 mg/dL    Triglycerides 151 (H) <150 mg/dL    Direct Measure HDL 36 (L) >=40 mg/dL    LDL Cholesterol Calculated 111 (H) <=100 mg/dL    Non HDL Cholesterol 141 (H) <130 mg/dL    Patient Fasting > 8hrs? No     Narrative    The sex  of this patient cannot be reliably determined based on discrepancies in demographics (legal sex, sex assigned at birth, gender identity).  Both male and female reference ranges are provided where applicable.  Careful evaluation of the patient s results as compared to the gender specific reference intervals is required in this setting.   Cholesterol  Desirable:  <200 mg/dL    Triglycerides  Normal:  Less than 150 mg/dL  Borderline High:  150-199 mg/dL  High:  200-499 mg/dL  Very High:  Greater than or equal to 500 mg/dL    Direct Measure HDL  Female:  Greater than or equal to 50 mg/dL   Male:  Greater than or equal to 40 mg/dL    LDL Cholesterol  Desirable:  <100mg/dL  Above Desirable:  100-129 mg/dL   Borderline High:  130-159 mg/dL   High:  160-189 mg/dL   Very High:  >= 190 mg/dL    Non HDL Cholesterol  Desirable:  130 mg/dL  Above Desirable:  130-159 mg/dL  Borderline High:  160-189 mg/dL  High:  190-219 mg/dL  Very High:  Greater than or equal to 220 mg/dL       Answers for HPI/ROS submitted by the patient on 3/3/2022  If you checked off any problems, how difficult have these problems made it for you to do your work, take care of things at home, or get along with other people?: Somewhat difficult  PHQ9 TOTAL SCORE: 15

## 2022-03-04 LAB — HCV AB SERPL QL IA: NONREACTIVE

## 2022-03-04 ASSESSMENT — PATIENT HEALTH QUESTIONNAIRE - PHQ9: SUM OF ALL RESPONSES TO PHQ QUESTIONS 1-9: 15

## 2022-03-14 ENCOUNTER — VIRTUAL VISIT (OUTPATIENT)
Dept: PSYCHOLOGY | Facility: CLINIC | Age: 32
End: 2022-03-14
Payer: COMMERCIAL

## 2022-03-14 DIAGNOSIS — F41.1 GENERALIZED ANXIETY DISORDER: Primary | ICD-10-CM

## 2022-03-14 DIAGNOSIS — F32.A DEPRESSION, UNSPECIFIED DEPRESSION TYPE: ICD-10-CM

## 2022-03-14 PROCEDURE — 90834 PSYTX W PT 45 MINUTES: CPT | Mod: 95

## 2022-03-14 NOTE — PROGRESS NOTES
[facility]                                     Progress Note    Patient Name: Teddy Shah  Date: 3/14/22         Service Type: Individual      Session Start Time: 11:02am  Session End Time: 11:40am     Session Length: 38-52    Session #: 12    Attendees: Client attended alone    Service Modality:  Video Visit:      Provider verified identity through the following two step process.  Patient provided:  Patient , Patient address and Patient is known previously to provider    Telemedicine Visit: The patient's condition can be safely assessed and treated via synchronous audio and visual telemedicine encounter.      Reason for Telemedicine Visit: Services only offered telehealth    Originating Site (Patient Location): Patient's car     Distant Site (Provider Location): Provider Remote Setting- Home Office    Consent:  The patient/guardian has verbally consented to: the potential risks and benefits of telemedicine (video visit) versus in person care; bill my insurance or make self-payment for services provided; and responsibility for payment of non-covered services.     Patient would like the video invitation sent by:  My Chart    Mode of Communication:  Video Conference via [employer]    As the provider I attest to compliance with applicable laws and regulations related to telemedicine.    DATA  Interactive Complexity: No  Crisis: No        Progress Since Last Session (Related to Symptoms / Goals / Homework):   Symptoms: No change in anxious and depressive sxs per patient report    Homework: Completed in session      Episode of Care Goals: Satisfactory progress - ACTION (Actively working towards change); Intervened by reinforcing change plan / affirming steps taken     Current / Ongoing Stressors and Concerns:   Father's death, trip to [place],      Treatment Objective(s) Addressed in This Session:   use at least   coping skills for anxiety management in the next 4 weeks  Decrease frequency and  intensity of feeling down, depressed, hopeless  Identify negative self-talk and behaviors: challenge core beliefs, myths, and actions  Patient will attend and participate in social or recreational activities    Develop and utilize self-compassion strategies.   identify the time in your life when you began to feel poorly about themselves.     Intervention:   Motivational Interviewing    MI Intervention: Expressed Empathy/Understanding and Supported Autonomy, Collaboration, Evocation     Change Talk Expressed by the Patient: NA - Precontemplative    Provider Response to Change Talk: E - Evoked more info from patient about behavior change and A - Affirmed patient's thoughts, decisions, or attempts at behavior change      Provider held space as patient processed experiences using reflective listening, validation, and normalization of patient's emotional response.  Provider worked to develop greater insight into patient's experiences.      CBT: Provider utilized CBT strategies to challenge and reframe patient's identified cognitive distortions.  Provider began to utilize an ask ladder to identify underlying core beliefs contributing to the cognitive distortion.      Assessments completed prior to visit:  The following assessments were completed by patient for this visit: None at this time provider will assess at the next session.      ASSESSMENT: Current Emotional / Mental Status (status of significant symptoms):   Risk status (Self / Other harm or suicidal ideation)   Patient denies current fears or concerns for personal safety.   Patient denies current or recent suicidal ideation or behaviors.   Patient denies current or recent homicidal ideation or behaviors.   Patient denies current or recent self injurious behavior or ideation.   Patient denies other safety concerns.   Patient reports there has been no change in risk factors since their last session.     Patient reports there has been no change in protective factors  since their last session.     Recommended that patient call 911 or go to the local ED should there be a change in any of these risk factors.     Appearance:   Appropriate    Eye Contact:   Fair    Psychomotor Behavior: Normal    Attitude:   Cooperative    Orientation:   All   Speech    Rate / Production: Talkative    Volume:  Normal    Mood:    Anxious  Euthymic   Affect:    Appropriate  Worrisome    Thought Content:  Rumination    Thought Form:  Coherent    Insight:    Fair      Medication Review:   No changes to current psychiatric medication(s)     Medication Compliance:   Yes     Changes in Health Issues:   None reported     Chemical Use Review:   Substance Use: Chemical use reviewed, no active concerns identified      Tobacco Use: No current tobacco use.      Diagnosis:  1. Generalized anxiety disorder    2. Depression, unspecified depression type        Collateral Reports Completed:   Not Applicable    PLAN: (Patient Tasks / Therapist Tasks / Other)    Patient will utilize crisis resources as needed.  Provider will work with patient to set healthy boundaries and utilize new coping skills.  Patient committed to try to decrease checking Twitter and think about how to increase positive messaging day to day to offset negativity.  Patient will also track OCD sxs.     Patient will utilize the following coping strategies to manage anxiety: Deep breathing, grounding exercises, engaging in civic duty. [place] exercise. Patient will also process and reality-check anxious thoughts after anxiety has decreased.     Patient will incorporate more deep-breathing and grounding exercises throughout the day.  Patient will utilize coping strategies discussed in session and challenge/reframe cognitive distortions (catastrophizing).         Provider and patient will focus on interpersonal relationships, internalizing criticism, self worth, and difficulties in communication moving forward. Provider will continue to assess R/O  Obsessive Compulsive Disorder, R/O Schizoid and Schizotypal Personality Disorder.     Provider will revies treatment plan with patient at the next session.  Provider will assess with the [place] and [employer].   Reflect on being selfish.       Leatha Pearce, [facility]   3/14/22      Service Performed and Documented by [facility]-   Note reviewed and clinical supervision by LONDON [facility] 3/21/2022                                                     ______________________________________________________________________    Individual Treatment Plan    Patient's Name: Teddy Shah  YOB: 1990    Date of Creation: 9/7/21  Date Treatment Plan Last Reviewed/Revised: 3/14/22    DSM5 Diagnoses: 311 (F32.9) Unspecified Depressive Disorder  or 300.02 (F41.1) Generalized Anxiety Disorder  Psychosocial / Contextual Factors: Hostile home environment, lack of social support, current events/politics a significant stress, family conflict  PROMIS (reviewed every 90 days):   WHODAS: 24    Referral / Collaboration:  Referral to another professional/service is not indicated at this time..    Anticipated number of session for this episode of care:   Anticipation frequency of session: Biweekly  Anticipated Duration of each session: 38-52 minutes  Treatment plan will be reviewed in 90 days or when goals have been changed.       MeasurableTreatment Goal(s) related to diagnosis / functional impairment(s)  Goal 1: Patient will successfully develop and utilize coping skills to manage depressive, anxious and ADHD sxs within 90 days. (Temporarily deferred by patient 12/14/21)    I will know I've met my goal when I feel like I can get some of my executive function back (completing tasks).       Objective #A Patient will process experience around romantic relationship, reflect on how it has impacted his view of self, and how he would like to move forward.                  Patient will attend and participate in social or  "recreational activities    Develop and utilize self-compassion strategies.   identify the time in your life when you began to feel poorly about themselves.  Status: Restarted - Date: 3/14/22     Intervention(s)  Therapist will assign homework around utilizing self compassion strategies  teach psychoeducation around shame, guilt and healthy relationships.     Objective #B Patient will work to develop coping skills to \"turn brain off\" or switch tracks.    Patient will use thought-stopping strategy daily to reduce intrusive thoughts.  Status: Restarted - Date: 3/14/22       Intervention(s)  Therapist will assign homework around using thought-stopping strategies and other coping skills  teach emotional regulation skills.       Patient has reviewed and agreed to the above plan.      Leatha Pearce, CINDI  March 14, 2022  "

## 2022-03-23 ENCOUNTER — MYC MEDICAL ADVICE (OUTPATIENT)
Dept: FAMILY MEDICINE | Facility: CLINIC | Age: 32
End: 2022-03-23
Payer: COMMERCIAL

## 2022-03-23 NOTE — TELEPHONE ENCOUNTER
Called patient and discussed scheduling consultation for a vasectomy in the upcoming month. Patient requested for the available visit on 4/19 with Alexia.   Patient was also informed the vasectomy could be scheduled during the office visit and that if he does have government insurance, a 30 day period must be between consultation and vasectomy. Patient understood

## 2022-03-23 NOTE — TELEPHONE ENCOUNTER
He just needs to get set up for a consultation which can be 20 minutes in office.  And then we can set him up for a vasectomy when he is here or or even when he schedules.  The only thing to note is that if he has a government insurance like [place] there needs to be 30 days between consultation and procedure.  Otherwise it does not really matter.  Not the same day obviously

## 2022-03-28 ENCOUNTER — VIRTUAL VISIT (OUTPATIENT)
Dept: PSYCHOLOGY | Facility: CLINIC | Age: 32
End: 2022-03-28
Payer: COMMERCIAL

## 2022-03-28 DIAGNOSIS — F32.A DEPRESSION, UNSPECIFIED DEPRESSION TYPE: Primary | ICD-10-CM

## 2022-03-28 DIAGNOSIS — F41.1 GENERALIZED ANXIETY DISORDER: ICD-10-CM

## 2022-03-28 PROCEDURE — 90834 PSYTX W PT 45 MINUTES: CPT | Mod: 95

## 2022-03-28 ASSESSMENT — ANXIETY QUESTIONNAIRES
4. TROUBLE RELAXING: SEVERAL DAYS
GAD7 TOTAL SCORE: 13
7. FEELING AFRAID AS IF SOMETHING AWFUL MIGHT HAPPEN: NEARLY EVERY DAY
GAD7 TOTAL SCORE: 13
1. FEELING NERVOUS, ANXIOUS, OR ON EDGE: NEARLY EVERY DAY
5. BEING SO RESTLESS THAT IT IS HARD TO SIT STILL: NOT AT ALL
6. BECOMING EASILY ANNOYED OR IRRITABLE: NOT AT ALL
2. NOT BEING ABLE TO STOP OR CONTROL WORRYING: NEARLY EVERY DAY
GAD7 TOTAL SCORE: 13
7. FEELING AFRAID AS IF SOMETHING AWFUL MIGHT HAPPEN: NEARLY EVERY DAY
3. WORRYING TOO MUCH ABOUT DIFFERENT THINGS: NEARLY EVERY DAY

## 2022-03-28 ASSESSMENT — COLUMBIA-SUICIDE SEVERITY RATING SCALE - C-SSRS
TOTAL  NUMBER OF ABORTED OR SELF INTERRUPTED ATTEMPTS SINCE LAST CONTACT: NO
1. SINCE LAST CONTACT, HAVE YOU WISHED YOU WERE DEAD OR WISHED YOU COULD GO TO SLEEP AND NOT WAKE UP?: NO
SUICIDE, SINCE LAST CONTACT: NO
6. HAVE YOU EVER DONE ANYTHING, STARTED TO DO ANYTHING, OR PREPARED TO DO ANYTHING TO END YOUR LIFE?: NO
ATTEMPT SINCE LAST CONTACT: NO
TOTAL  NUMBER OF INTERRUPTED ATTEMPTS SINCE LAST CONTACT: NO
2. HAVE YOU ACTUALLY HAD ANY THOUGHTS OF KILLING YOURSELF?: NO

## 2022-03-28 ASSESSMENT — PATIENT HEALTH QUESTIONNAIRE - PHQ9: SUM OF ALL RESPONSES TO PHQ QUESTIONS 1-9: 12

## 2022-03-28 NOTE — PROGRESS NOTES
[facility]                                     Progress Note    Patient Name: Teddy Shah  Date: 3/28/22         Service Type: Individual      Session Start Time: 11:04am  Session End Time: 11:46am      Session Length: 38-52    Session #: 14    Attendees: Client attended alone    Service Modality:  Video Visit:      Provider verified identity through the following two step process.  Patient provided:  Patient , Patient address and Patient is known previously to provider    Telemedicine Visit: The patient's condition can be safely assessed and treated via synchronous audio and visual telemedicine encounter.      Reason for Telemedicine Visit: Services only offered telehealth    Originating Site (Patient Location): Patient's car     Distant Site (Provider Location): Provider Remote Setting- Home Office    Consent:  The patient/guardian has verbally consented to: the potential risks and benefits of telemedicine (video visit) versus in person care; bill my insurance or make self-payment for services provided; and responsibility for payment of non-covered services.     Patient would like the video invitation sent by:  My Chart    Mode of Communication:  Video Conference via Amwell    As the provider I attest to compliance with applicable laws and regulations related to telemedicine.    DATA  Interactive Complexity: No  Crisis: No        Progress Since Last Session (Related to Symptoms / Goals / Homework):   Symptoms: Improving depressive sxs per PHQ-9, incresaed anxious sxs per patient report    Homework: Completed in session      Episode of Care Goals: Satisfactory progress - ACTION (Actively working towards change); Intervened by reinforcing change plan / affirming steps taken     Current / Ongoing Stressors and Concerns:   Presurgical consult, family conflict      Treatment Objective(s) Addressed in This Session:   use at least   coping skills for anxiety management in the next 4  weeks  Decrease frequency and intensity of feeling down, depressed, hopeless  Identify negative self-talk and behaviors: challenge core beliefs, myths, and actions  Patient will attend and participate in social or recreational activities    Develop and utilize self-compassion strategies.   identify the time in your life when you began to feel poorly about themselves.     Intervention:   Motivational Interviewing    MI Intervention: Expressed Empathy/Understanding and Supported Autonomy, Collaboration, Evocation     Change Talk Expressed by the Patient: Desire to change    Provider Response to Change Talk: E - Evoked more info from patient about behavior change and A - Affirmed patient's thoughts, decisions, or attempts at behavior change      Provider held space as patient processed experiences using reflective listening, validation, and normalization of patient's emotional response.  Provider worked to develop greater insight into patient's experiences.      CBT: Provider utilized CBT strategies to challenge and reframe patient's identified cognitive distortions.      Provider reviewed treatment plan with patient.     Assessments completed prior to visit:  The following assessments were completed by patient for this visit:  PROMIS 10-Global Health (only subscores and total score):   PROMIS-10 Scores Only 12/14/2021 3/28/2022   Global Mental Health Score 7 8   Global Physical Health Score 14 14   PROMIS TOTAL - SUBSCORES 21 22   PHQ-9: 12  KASSIE-7: 13  [place] (Short version): see chart.      ASSESSMENT: Current Emotional / Mental Status (status of significant symptoms):   Risk status (Self / Other harm or suicidal ideation)   Patient denies current fears or concerns for personal safety.   Patient denies current or recent suicidal ideation or behaviors.   Patient denies current or recent homicidal ideation or behaviors.   Patient denies current or recent self injurious behavior or ideation.   Patient denies other  safety concerns.   Patient reports there has been no change in risk factors since their last session.     Patient reports there has been no change in protective factors since their last session.     Recommended that patient call 911 or go to the local ED should there be a change in any of these risk factors.     Appearance:   Appropriate    Eye Contact:   Fair    Psychomotor Behavior: Normal    Attitude:   Cooperative    Orientation:   All   Speech    Rate / Production: Talkative    Volume:  Normal    Mood:    Anxious  Euthymic   Affect:    Appropriate  Worrisome    Thought Content:  Rumination    Thought Form:  Coherent    Insight:    Fair      Medication Review:   No changes to current psychiatric medication(s)     Medication Compliance:   Yes     Changes in Health Issues:   None reported     Chemical Use Review:   Substance Use: Chemical use reviewed, no active concerns identified      Tobacco Use: No current tobacco use.      Diagnosis:  1. Depression, unspecified depression type    2. Generalized anxiety disorder        Collateral Reports Completed:   Not Applicable    PLAN: (Patient Tasks / Therapist Tasks / Other)    Patient will utilize crisis resources as needed.  Provider will work with patient to set healthy boundaries and utilize new coping skills.  Patient committed to try to decrease checking Twitter and think about how to increase positive messaging day to day to offset negativity.  Patient will also track OCD sxs.     Patient will utilize the following coping strategies to manage anxiety: Deep breathing, grounding exercises, engaging in civic duty. [place] exercise. Patient will also process and reality-check anxious thoughts after anxiety has decreased.     Patient will incorporate more deep-breathing and grounding exercises throughout the day.  Patient will utilize coping strategies discussed in session and challenge/reframe cognitive distortions (catastrophizing).         Provider and patient will  focus on interpersonal relationships, internalizing criticism, self worth, and difficulties in communication moving forward. Provider will continue to assess R/O Obsessive Compulsive Disorder, R/O Schizoid and Schizotypal Personality Disorder.     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  Patient will continue to reflect on impact of fear of worms and manage anxiety.        Leatha Pearce [facility]   3/28/22                                                   Service Performed and Documented by [facility]-   Note reviewed and clinical supervision by LONDON Patino [facility] 3/29/2022    ______________________________________________________________________    Individual Treatment Plan    Patient's Name: Teddy Shah  YOB: 1990    Date of Creation: 9/7/21  Date Treatment Plan Last Reviewed/Revised: 3/14/22    DSM5 Diagnoses: 311 (F32.9) Unspecified Depressive Disorder  or 300.02 (F41.1) Generalized Anxiety Disorder  Psychosocial / Contextual Factors: Hostile home environment, lack of social support, current events/politics a significant stress, family conflict  PROMIS (reviewed every 90 days):   WHODAS: 24    Referral / Collaboration:  Referral to another professional/service is not indicated at this time..    Anticipated number of session for this episode of care:   Anticipation frequency of session: Biweekly  Anticipated Duration of each session: 38-52 minutes  Treatment plan will be reviewed in 90 days or when goals have been changed.       MeasurableTreatment Goal(s) related to diagnosis / functional impairment(s)  Goal 1: Patient will successfully develop and utilize coping skills to manage depressive, anxious and ADHD sxs within 90 days. (Temporarily deferred, ongoing by patient 3/28/22)    I will know I've met my goal when I feel like I can get some of my executive function back (completing tasks).       Objective #A Patient will  "process experience around romantic relationship, reflect on how it has impacted his view of self, and how he would like to move forward.                  Patient will attend and participate in social or recreational activities    Develop and utilize self-compassion strategies.   identify the time in your life when you began to feel poorly about themselves.  Status: Restarted - Date: 3/14/22     Intervention(s)  Therapist will assign homework around utilizing self compassion strategies  teach psychoeducation around shame, guilt and healthy relationships.     Objective #B Patient will work to develop coping skills to \"turn brain off\" or switch tracks.    Patient will use thought-stopping strategy daily to reduce intrusive thoughts.  Status: Restarted - Date: 3/14/22       Intervention(s)  Therapist will assign homework around using thought-stopping strategies and other coping skills  teach emotional regulation skills.       Patient has reviewed and agreed to the above plan.      CINDI Posada  March 14, 2022  Service Performed and Documented by ATILIO rojas plan reviewed and clinical supervision [facility] 3/29/2022    Answers for HPI/ROS submitted by the patient on 3/28/2022  KASSIE 7 TOTAL SCORE: 13      "

## 2022-03-29 ASSESSMENT — ANXIETY QUESTIONNAIRES: GAD7 TOTAL SCORE: 13

## 2022-04-19 ENCOUNTER — OFFICE VISIT (OUTPATIENT)
Dept: FAMILY MEDICINE | Facility: CLINIC | Age: 32
End: 2022-04-19
Payer: COMMERCIAL

## 2022-04-19 VITALS
DIASTOLIC BLOOD PRESSURE: 78 MMHG | TEMPERATURE: 97.9 F | OXYGEN SATURATION: 97 % | RESPIRATION RATE: 20 BRPM | SYSTOLIC BLOOD PRESSURE: 120 MMHG | WEIGHT: 281.5 LBS | HEIGHT: 72 IN | HEART RATE: 74 BPM | BODY MASS INDEX: 38.13 KG/M2

## 2022-04-19 DIAGNOSIS — Z30.09 ENCOUNTER FOR VASECTOMY COUNSELING: Primary | ICD-10-CM

## 2022-04-19 PROCEDURE — 99213 OFFICE O/P EST LOW 20 MIN: CPT | Performed by: FAMILY MEDICINE

## 2022-04-19 RX ORDER — DIAZEPAM 10 MG
TABLET ORAL
Qty: 1 TABLET | Refills: 0 | Status: SHIPPED | OUTPATIENT
Start: 2022-04-19 | End: 2022-10-11

## 2022-04-19 ASSESSMENT — PATIENT HEALTH QUESTIONNAIRE - PHQ9
10. IF YOU CHECKED OFF ANY PROBLEMS, HOW DIFFICULT HAVE THESE PROBLEMS MADE IT FOR YOU TO DO YOUR WORK, TAKE CARE OF THINGS AT HOME, OR GET ALONG WITH OTHER PEOPLE: SOMEWHAT DIFFICULT
SUM OF ALL RESPONSES TO PHQ QUESTIONS 1-9: 8
SUM OF ALL RESPONSES TO PHQ QUESTIONS 1-9: 8

## 2022-04-19 ASSESSMENT — PAIN SCALES - GENERAL: PAINLEVEL: NO PAIN (0)

## 2022-04-19 NOTE — PROGRESS NOTES
SUBJECTIVE:  Teddy Shah, a 31 year old adult, is seen today in consultatiron regarding vasectomy.  Patient is single.  He was presented with the vasectomy instruction packet when roomed; we subsequently discussed the procedure in detail together, including potential risks, expected recovery course, etc.  He understands the small, but present, risk of bleeding, infection, post-vasectomy pain, failure rate, and expresses understanding of the need for back-up contraception until sperm counts are clear at 6-8 weeks.  All concerns addressed and questions answered. He was encouraged to check with his insurance carrier on details of coverage.    Past medical, family, and social history reviewed in his chart.  Review of systems otherwise negative.    OBJECTIVE:    GEN'L:  Alert, pleasant, upbeat, and in no apparent discomfort.  SKIN:  I note only benign skin findings. No unusual rashes or suspicious skin lesions noted. Nails appear normal.  :  Normal penis and scrotum without obvious abnormality.   Vas deferens palpable without difficulty.   Hydrocele present: neither  ASSESSMENT:  Vasectomy consultation    PLAN:  The patient will schedule a vasectomy at his convenience.  Instructed to take valium 10 mg orally 30-45 minutes before the procedure.  He will contact me with any questions or concerns ahead of time.    ANSELMO Hale MD             Answers for HPI/ROS submitted by the patient on 4/19/2022  If you checked off any problems, how difficult have these problems made it for you to do your work, take care of things at home, or get along with other people?: Somewhat difficult  PHQ9 TOTAL SCORE: 8  How many servings of fruits and vegetables do you eat daily?: 2-3  On average, how many sweetened beverages do you drink each day (Examples: soda, juice, sweet tea, etc.  Do NOT count diet or artificially sweetened beverages)?: 0  How many minutes a day do you exercise enough to make your heart beat faster?: 20 to  29  How many days a week do you exercise enough to make your heart beat faster?: 5  How many days per week do you miss taking your medication?: 0  What is the reason for your visit today?: Consult

## 2022-04-20 ASSESSMENT — PATIENT HEALTH QUESTIONNAIRE - PHQ9: SUM OF ALL RESPONSES TO PHQ QUESTIONS 1-9: 8

## 2022-04-25 ENCOUNTER — VIRTUAL VISIT (OUTPATIENT)
Dept: PSYCHOLOGY | Facility: CLINIC | Age: 32
End: 2022-04-25
Payer: COMMERCIAL

## 2022-04-25 DIAGNOSIS — F41.1 GENERALIZED ANXIETY DISORDER: ICD-10-CM

## 2022-04-25 DIAGNOSIS — F32.A DEPRESSION, UNSPECIFIED DEPRESSION TYPE: Primary | ICD-10-CM

## 2022-04-25 PROCEDURE — 90834 PSYTX W PT 45 MINUTES: CPT | Mod: 95

## 2022-04-25 ASSESSMENT — ANXIETY QUESTIONNAIRES
GAD7 TOTAL SCORE: 14
GAD7 TOTAL SCORE: 14
1. FEELING NERVOUS, ANXIOUS, OR ON EDGE: NEARLY EVERY DAY
2. NOT BEING ABLE TO STOP OR CONTROL WORRYING: NEARLY EVERY DAY
GAD7 TOTAL SCORE: 14
7. FEELING AFRAID AS IF SOMETHING AWFUL MIGHT HAPPEN: NEARLY EVERY DAY
7. FEELING AFRAID AS IF SOMETHING AWFUL MIGHT HAPPEN: NEARLY EVERY DAY
3. WORRYING TOO MUCH ABOUT DIFFERENT THINGS: MORE THAN HALF THE DAYS
6. BECOMING EASILY ANNOYED OR IRRITABLE: SEVERAL DAYS
4. TROUBLE RELAXING: SEVERAL DAYS
5. BEING SO RESTLESS THAT IT IS HARD TO SIT STILL: SEVERAL DAYS

## 2022-04-25 ASSESSMENT — PATIENT HEALTH QUESTIONNAIRE - PHQ9
SUM OF ALL RESPONSES TO PHQ QUESTIONS 1-9: 13
SUM OF ALL RESPONSES TO PHQ QUESTIONS 1-9: 13
10. IF YOU CHECKED OFF ANY PROBLEMS, HOW DIFFICULT HAVE THESE PROBLEMS MADE IT FOR YOU TO DO YOUR WORK, TAKE CARE OF THINGS AT HOME, OR GET ALONG WITH OTHER PEOPLE: VERY DIFFICULT

## 2022-04-25 NOTE — PROGRESS NOTES
[facility]                                     Progress Note    Patient Name: Teddy Shah  Date: 22         Service Type: Individual      Session Start Time: 11:01am  Session End Time:  11:54am     Session Length: 38-52    Session #: 15    Attendees: Client attended alone    Service Modality:  Video Visit:      Provider verified identity through the following two step process.  Patient provided:  Patient , Patient address and Patient is known previously to provider    Telemedicine Visit: The patient's condition can be safely assessed and treated via synchronous audio and visual telemedicine encounter.      Reason for Telemedicine Visit: Services only offered telehealth    Originating Site (Patient Location): Patient's car     Distant Site (Provider Location): Provider Remote Setting- Home Office    Consent:  The patient/guardian has verbally consented to: the potential risks and benefits of telemedicine (video visit) versus in person care; bill my insurance or make self-payment for services provided; and responsibility for payment of non-covered services.     Patient would like the video invitation sent by:  My Chart    Mode of Communication:  Video Conference via Amwell    As the provider I attest to compliance with applicable laws and regulations related to telemedicine.    DATA  Interactive Complexity: No  Crisis: No        Progress Since Last Session (Related to Symptoms / Goals / Homework):   Symptoms: Worsening anxious and depressive sxs per KASSIE-7, PHQ-9 and patient report.  Patient attributed this to life stressors and illness.    Homework: Completed in session      Episode of Care Goals: Satisfactory progress - ACTION (Actively working towards change); Intervened by reinforcing change plan / affirming steps taken     Current / Ongoing Stressors and Concerns:   Presurgical consult, family conflict     Stomach flu, Easter season, work,work incident 2 years prior, hx of adverse  childhood experiences,      Treatment Objective(s) Addressed in This Session:   use at least   coping skills for anxiety management in the next 4 weeks  Decrease frequency and intensity of feeling down, depressed, hopeless  Identify negative self-talk and behaviors: challenge core beliefs, myths, and actions  Patient will attend and participate in social or recreational activities    Develop and utilize self-compassion strategies.   identify the time in your life when you began to feel poorly about themselves.     Intervention:   Motivational Interviewing    MI Intervention: Expressed Empathy/Understanding, Supported Autonomy, Collaboration, Evocation and Reframe     Change Talk Expressed by the Patient: Desire to change Reasons to change Activation    Provider Response to Change Talk: E - Evoked more info from patient about behavior change, A - Affirmed patient's thoughts, decisions, or attempts at behavior change, R - Reflected patient's change talk and S - Summarized patient's change talk statements      Provider held space as patient processed experiences using reflective listening, validation, and normalization of patient's emotional response.  Provider worked to develop greater insight into patient's experiences.  Provider validated patient's use of boundaries.      CBT: Provider utilized CBT strategies to challenge and reframe patient's identified cognitive distortions.      Psychodynamic: Provider and patient explored how his adverse childhood experiences influence how he views himself and how he fears he will show up in a relationship.      Psychoeducation: Provider taught psychoeducation around attachment, relationships and domestic violence.       Assessments completed prior to visit:  The following assessments were completed by patient for this visit:  PROMIS 10-Global Health (only subscores and total score):   PROMIS-10 Scores Only 12/14/2021 3/28/2022 4/25/2022   Global Mental Health Score 7 8 6    Global Physical Health Score 14 14 14   PROMIS TOTAL - SUBSCORES 21 22 20   Answers for HPI/ROS submitted by the patient on 4/25/2022  If you checked off any problems, how difficult have these problems made it for you to do your work, take care of things at home, or get along with other people?: Very difficult  PHQ9 TOTAL SCORE: 13  KASSIE 7 TOTAL SCORE: 14      [place] (Short version): see chart.      ASSESSMENT: Current Emotional / Mental Status (status of significant symptoms):   Risk status (Self / Other harm or suicidal ideation)   Patient denies current fears or concerns for personal safety.   Patient denies current or recent suicidal ideation or behaviors.   Patient denies current or recent homicidal ideation or behaviors.   Patient denies current or recent self injurious behavior or ideation.   Patient denies other safety concerns.   Patient reports there has been no change in risk factors since their last session.     Patient reports there has been no change in protective factors since their last session.     Recommended that patient call 911 or go to the local ED should there be a change in any of these risk factors.     Appearance:   Appropriate    Eye Contact:   Fair    Psychomotor Behavior: Normal    Attitude:   Cooperative    Orientation:   All   Speech    Rate / Production: Talkative    Volume:  Normal    Mood:    Anxious  Euthymic   Affect:    Appropriate  Worrisome    Thought Content:  Rumination    Thought Form:  Coherent    Insight:    Fair      Medication Review:   No changes to current psychiatric medication(s)     Medication Compliance:   Yes     Changes in Health Issues:   Yes: Patient reported having the stomach flu.  Patient feels they are recovered.     Chemical Use Review:   Substance Use: Chemical use reviewed, no active concerns identified      Tobacco Use: No current tobacco use.      Diagnosis:  1. Depression, unspecified depression type    2. Generalized anxiety disorder   "      Collateral Reports Completed:   Not Applicable    PLAN: (Patient Tasks / Therapist Tasks / Other)    Patient will utilize crisis resources as needed.  Provider will work with patient to set healthy boundaries and utilize new coping skills.  Patient committed to try to decrease checking Twitter and think about how to increase positive messaging day to day to offset negativity.  Patient will also track OCD sxs.     Patient will utilize the following coping strategies to manage anxiety: Deep breathing, grounding exercises, engaging in civic duty. [place] exercise. Patient will also process and reality-check anxious thoughts after anxiety has decreased.     Patient will incorporate more deep-breathing and grounding exercises throughout the day.  Patient will utilize coping strategies discussed in session and challenge/reframe cognitive distortions (catastrophizing).         Provider and patient will focus on interpersonal relationships, internalizing criticism, self worth, and difficulties in communication moving forward. Provider will continue to assess R/O Obsessive Compulsive Disorder, R/O Schizoid and Schizotypal Personality Disorder.     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 Patient will continue to set boundaries and reflect on the \"minimum\" he feels that he needs to be at to be in a relationship.        Leatha Pearce [facility]   4/25/22  Service Performed and Documented by [facility]-   Note reviewed and clinical supervision by LONDON Patino [facility] 4/25/2022      ______________________________________________________________________    Individual Treatment Plan    Patient's Name: Teddy Shah  YOB: 1990    Date of Creation: 9/7/21  Date Treatment Plan Last Reviewed/Revised: 3/14/22    DSM5 Diagnoses: 311 (F32.9) Unspecified Depressive Disorder  or 300.02 (F41.1) Generalized Anxiety Disorder  Psychosocial / Contextual " "Factors: Hostile home environment, lack of social support, current events/politics a significant stress, family conflict  PROMIS (reviewed every 90 days):   WHODAS: 24    Referral / Collaboration:  Referral to another professional/service is not indicated at this time..    Anticipated number of session for this episode of care:   Anticipation frequency of session: Biweekly  Anticipated Duration of each session: 38-52 minutes  Treatment plan will be reviewed in 90 days or when goals have been changed.       MeasurableTreatment Goal(s) related to diagnosis / functional impairment(s)  Goal 1: Patient will successfully develop and utilize coping skills to manage depressive, anxious and ADHD sxs within 90 days. (Temporarily deferred, ongoing by patient 3/28/22)    I will know I've met my goal when I feel like I can get some of my executive function back (completing tasks).       Objective #A Patient will process experience around romantic relationship, reflect on how it has impacted his view of self, and how he would like to move forward.                  Patient will attend and participate in social or recreational activities    Develop and utilize self-compassion strategies.   identify the time in your life when you began to feel poorly about themselves.  Status: Restarted - Date: 3/14/22     Intervention(s)  Therapist will assign homework around utilizing self compassion strategies  teach psychoeducation around shame, guilt and healthy relationships.     Objective #B Patient will work to develop coping skills to \"turn brain off\" or switch tracks.    Patient will use thought-stopping strategy daily to reduce intrusive thoughts.  Status: Restarted - Date: 3/14/22       Intervention(s)  Therapist will assign homework around using thought-stopping strategies and other coping skills  teach emotional regulation skills.       Patient has reviewed and agreed to the above plan.      Leatha Pearce, [facility]  March 14, 2022  Service " Performed and Documented by CINDI-   tx plan reviewed and clinical supervision by LONDON Patino [facility] 3/29/2022    Answers for HPI/ROS submitted by the patient on 3/28/2022  KASSIE 7 TOTAL SCORE: 13

## 2022-04-26 ASSESSMENT — PATIENT HEALTH QUESTIONNAIRE - PHQ9: SUM OF ALL RESPONSES TO PHQ QUESTIONS 1-9: 13

## 2022-04-26 ASSESSMENT — ANXIETY QUESTIONNAIRES: GAD7 TOTAL SCORE: 14

## 2022-06-13 ENCOUNTER — VIRTUAL VISIT (OUTPATIENT)
Dept: PSYCHOLOGY | Facility: CLINIC | Age: 32
End: 2022-06-13
Payer: COMMERCIAL

## 2022-06-13 DIAGNOSIS — F41.1 GENERALIZED ANXIETY DISORDER: ICD-10-CM

## 2022-06-13 DIAGNOSIS — F32.A DEPRESSION, UNSPECIFIED DEPRESSION TYPE: Primary | ICD-10-CM

## 2022-06-13 PROCEDURE — 90834 PSYTX W PT 45 MINUTES: CPT | Mod: 95

## 2022-06-13 ASSESSMENT — ANXIETY QUESTIONNAIRES
2. NOT BEING ABLE TO STOP OR CONTROL WORRYING: NEARLY EVERY DAY
5. BEING SO RESTLESS THAT IT IS HARD TO SIT STILL: NOT AT ALL
7. FEELING AFRAID AS IF SOMETHING AWFUL MIGHT HAPPEN: NEARLY EVERY DAY
3. WORRYING TOO MUCH ABOUT DIFFERENT THINGS: NEARLY EVERY DAY
GAD7 TOTAL SCORE: 16
6. BECOMING EASILY ANNOYED OR IRRITABLE: SEVERAL DAYS
4. TROUBLE RELAXING: NEARLY EVERY DAY
GAD7 TOTAL SCORE: 16
1. FEELING NERVOUS, ANXIOUS, OR ON EDGE: NEARLY EVERY DAY
7. FEELING AFRAID AS IF SOMETHING AWFUL MIGHT HAPPEN: NEARLY EVERY DAY
8. IF YOU CHECKED OFF ANY PROBLEMS, HOW DIFFICULT HAVE THESE MADE IT FOR YOU TO DO YOUR WORK, TAKE CARE OF THINGS AT HOME, OR GET ALONG WITH OTHER PEOPLE?: VERY DIFFICULT
GAD7 TOTAL SCORE: 16

## 2022-06-13 ASSESSMENT — PATIENT HEALTH QUESTIONNAIRE - PHQ9
SUM OF ALL RESPONSES TO PHQ QUESTIONS 1-9: 13
SUM OF ALL RESPONSES TO PHQ QUESTIONS 1-9: 13
10. IF YOU CHECKED OFF ANY PROBLEMS, HOW DIFFICULT HAVE THESE PROBLEMS MADE IT FOR YOU TO DO YOUR WORK, TAKE CARE OF THINGS AT HOME, OR GET ALONG WITH OTHER PEOPLE: SOMEWHAT DIFFICULT

## 2022-06-13 NOTE — PROGRESS NOTES
[facility]                                     Progress Note    Patient Name: Teddy Shah  Date: 22         Service Type: Individual      Session Start Time: 11:04am  Session End Time:  11:52am     Session Length: 38-52    Session #: 16    Attendees: Client attended alone    Service Modality:  Video Visit:      Provider verified identity through the following two step process.  Patient provided:  Patient , Patient address and Patient is known previously to provider    Telemedicine Visit: The patient's condition can be safely assessed and treated via synchronous audio and visual telemedicine encounter.      Reason for Telemedicine Visit: Services only offered telehealth    Originating Site (Patient Location): Patient's car     Distant Site (Provider Location): Provider Remote Setting- Home Office    Consent:  The patient/guardian has verbally consented to: the potential risks and benefits of telemedicine (video visit) versus in person care; bill my insurance or make self-payment for services provided; and responsibility for payment of non-covered services.     Patient would like the video invitation sent by:  My Chart    Mode of Communication:  Video Conference via Amwell    As the provider I attest to compliance with applicable laws and regulations related to telemedicine.    DATA  Interactive Complexity: No  Crisis: No        Progress Since Last Session (Related to Symptoms / Goals / Homework):   Symptoms: No change in depressive sxs and worsening anxious sxs per PHQ-9 and KASSIE-7    Homework: Completed in session      Episode of Care Goals: Satisfactory progress - ACTION (Actively working towards change); Intervened by reinforcing change plan / affirming steps taken     Current / Ongoing Stressors and Concerns:  Work stress, assertive communication, social isolation, upcoming surgery      Treatment Objective(s) Addressed in This Session:   use at least   coping skills for anxiety  management in the next 4 weeks  Decrease frequency and intensity of feeling down, depressed, hopeless  Identify negative self-talk and behaviors: challenge core beliefs, myths, and actions  Patient will attend and participate in social or recreational activities    Develop and utilize self-compassion strategies.   identify the time in your life when you began to feel poorly about themselves.     Intervention:   Motivational Interviewing    MI Intervention: Expressed Empathy/Understanding, Supported Autonomy, Collaboration, Evocation and Reframe     Change Talk Expressed by the Patient: Desire to change Reasons to change Committment to change    Provider Response to Change Talk: E - Evoked more info from patient about behavior change, A - Affirmed patient's thoughts, decisions, or attempts at behavior change, R - Reflected patient's change talk and S - Summarized patient's change talk statements      Provider held space as patient processed experiences using reflective listening, validation, and normalization of patient's emotional response.  Provider worked to develop greater insight into patient's experiences.      CBT: Provider utilized CBT strategies to challenge and reframe patient's identified cognitive distortions around his communication.  Provider coached patient on assertive communication.      Psychodynamic: Provider and patient explored how his learned behavior around communication has impacted his view of self and the role he plays in his relationships.    Provider reviewed treatment plan with patient.       Assessments completed prior to visit:  The following assessments were completed by patient for this visit:  PROMIS 10-Global Health (only subscores and total score):   PROMIS-10 Scores Only 12/14/2021 3/28/2022 4/25/2022   Global Mental Health Score 7 8 6   Global Physical Health Score 14 14 14   PROMIS TOTAL - SUBSCORES 21 22 20   Answers for HPI/ROS submitted by the patient on 4/25/2022  If you  checked off any problems, how difficult have these problems made it for you to do your work, take care of things at home, or get along with other people?: Very difficult  PHQ9 TOTAL SCORE: 13  KASSIE 7 TOTAL SCORE: 14      [place] (Short version): see chart.      ASSESSMENT: Current Emotional / Mental Status (status of significant symptoms):   Risk status (Self / Other harm or suicidal ideation)   Patient denies current fears or concerns for personal safety.   Patient denies current or recent suicidal ideation or behaviors.   Patient denies current or recent homicidal ideation or behaviors.   Patient denies current or recent self injurious behavior or ideation.   Patient denies other safety concerns.   Patient reports there has been no change in risk factors since their last session.     Patient reports there has been no change in protective factors since their last session.     Recommended that patient call 911 or go to the local ED should there be a change in any of these risk factors.     Appearance:   Appropriate    Eye Contact:   Fair    Psychomotor Behavior: Normal    Attitude:   Cooperative    Orientation:   All   Speech    Rate / Production: Talkative    Volume:  Normal    Mood:    Anxious  Euthymic   Affect:    Appropriate  Worrisome    Thought Content:  Rumination    Thought Form:  Coherent    Insight:    Fair      Medication Review:   No changes to current psychiatric medication(s)     Medication Compliance:   Yes     Changes in Health Issues:   None reported     Chemical Use Review:   Substance Use: Chemical use reviewed, no active concerns identified      Tobacco Use: No current tobacco use.      Diagnosis:  1. Depression, unspecified depression type    2. Generalized anxiety disorder        Collateral Reports Completed:   Not Applicable    PLAN: (Patient Tasks / Therapist Tasks / Other)    Patient will utilize crisis resources as needed.  Provider will work with patient to set healthy boundaries and  "utilize new coping skills.  Patient committed to try to decrease checking Twitter and think about how to increase positive messaging day to day to offset negativity.  Patient will also track OCD sxs.     Patient will utilize the following coping strategies to manage anxiety: Deep breathing, grounding exercises, engaging in civic duty. [place] exercise. Patient will also process and reality-check anxious thoughts after anxiety has decreased.     Patient will incorporate more deep-breathing and grounding exercises throughout the day.  Patient will utilize coping strategies discussed in session and challenge/reframe cognitive distortions (catastrophizing).         Provider and patient will focus on interpersonal relationships, internalizing criticism, self worth, and difficulties in communication moving forward. Provider will continue to assess R/O Obsessive Compulsive Disorder, R/O Schizoid and Schizotypal Personality Disorder.     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 Patient will continue to set boundaries and reflect on the \"minimum\" he feels that he needs to be at to be in a relationship.      Provider will complete the [place] and PROMIS with patient. Patient will reflect on what he would like to see in regards to change in his social life/interaction.        Leatha Pearce TOSHIA   6/13/22  Service Performed and Documented by [facility]-   Note reviewed and clinical supervision by LONDON Patino [facility] 6/13/2022      ______________________________________________________________________    Individual Treatment Plan    Patient's Name: Teddy Shah  YOB: 1990    Date of Creation: 9/7/21  Date Treatment Plan Last Reviewed/Revised: 6/13/22    DSM5 Diagnoses: 311 (F32.9) Unspecified Depressive Disorder  or 300.02 (F41.1) Generalized Anxiety Disorder  Psychosocial / Contextual Factors: Hostile home environment, lack of social " "support, current events/politics a significant stress, family conflict  PROMIS (reviewed every 90 days):   WHODAS: 24    Referral / Collaboration:  Referral to another professional/service is not indicated at this time..    Anticipated number of session for this episode of care:   Anticipation frequency of session: Biweekly  Anticipated Duration of each session: 38-52 minutes  Treatment plan will be reviewed in 90 days or when goals have been changed.       MeasurableTreatment Goal(s) related to diagnosis / functional impairment(s)  Goal 1: Patient will successfully develop and utilize coping skills to manage depressive, anxious and ADHD sxs within 90 days. (Temporarily deferred, ongoing by patient 6/13/22)    I will know I've met my goal when I feel like I can get some of my executive function back (completing tasks).       Objective #A Patient will process experience around romantic relationship, reflect on how it has impacted his view of self, and how he would like to move forward.      Patient will work to develop a \"social life\" outside of work, and manage mental health sxs as they arise.              Patient will attend and participate in social or recreational activities    Develop and utilize self-compassion strategies.   identify the time in your life when you began to feel poorly about themselves.  Status: Restarted - Date: 6/13/22     Intervention(s)  Therapist will assign homework around utilizing self compassion strategies  teach psychoeducation around shame, guilt and healthy relationships.     Objective #B Patient will work to develop coping skills to \"turn brain off\" or switch tracks.    Patient will use thought-stopping strategy daily to reduce intrusive thoughts.  Status: Completed - Date: 6/13/22 (maintenance as needed)       Intervention(s)  Therapist will assign homework around using thought-stopping strategies and other coping skills  teach emotional regulation skills.       Patient has " reviewed and agreed to the above plan.      Leatha Pearce, CINDI  March 14, 2022, reviewed by 6/13/22  Service Performed and Documented by [facility]-   tx plan reviewed and clinical supervision by LONDON Patino [facility] 3/29/2022, 6/13/2022    Answers for HPI/ROS submitted by the patient on 3/28/2022  KASSIE 7 TOTAL SCORE: 13

## 2022-07-11 ENCOUNTER — OFFICE VISIT (OUTPATIENT)
Dept: FAMILY MEDICINE | Facility: CLINIC | Age: 32
End: 2022-07-11
Payer: COMMERCIAL

## 2022-07-11 VITALS
HEART RATE: 93 BPM | RESPIRATION RATE: 20 BRPM | DIASTOLIC BLOOD PRESSURE: 78 MMHG | WEIGHT: 276.6 LBS | OXYGEN SATURATION: 94 % | TEMPERATURE: 98 F | HEIGHT: 72 IN | BODY MASS INDEX: 37.46 KG/M2 | SYSTOLIC BLOOD PRESSURE: 110 MMHG

## 2022-07-11 DIAGNOSIS — Z30.2 ENCOUNTER FOR VASECTOMY: Primary | ICD-10-CM

## 2022-07-11 PROCEDURE — 55250 REMOVAL OF SPERM DUCT(S): CPT | Performed by: FAMILY MEDICINE

## 2022-07-11 PROCEDURE — 99207 PR DROP WITH A PROCEDURE: CPT | Mod: 25 | Performed by: FAMILY MEDICINE

## 2022-07-11 PROCEDURE — 88302 TISSUE EXAM BY PATHOLOGIST: CPT | Performed by: PATHOLOGY

## 2022-07-11 NOTE — PROGRESS NOTES
Teddy Shah is a 31 year old male here for vasectomy.     He has been in previously for vasectomy consult in which we discussed the no-scapel procedure, including risks and benefits, and other options of birth control.  All questions have been answered and the consent form is signed.  Premedicated with 10 mg oral valium Yes           Procedure:  He was placed in the supine position on the procedure table.  He was prepped and draped in the usual sterile fashion.  The right vas was identified and brought up to the surface.  The skin overlying the vas was anesthetized with 1% lidocaine with no epinephrine and a vas-block permormed through intact skin.  The no-scaple vas clamp was used to grasp the vas and the dissecting instrument was used to puncture the skin and create a 5 mm incision. The vas deferens was isolated in the usual no-scalpel fashion, using pressure and electrocautery for hemostasis.  Surgical clips were placed, 1 proximal and 1 distal.  A 5 mm segment of the vas was removed, both ends were cauterized and dropped down into the scrotum.  The procedure was repeated for the left vas.  There were no complications, and bleeding was negligible.  He tolerated the procedure well.  The midline incision was clamped with curved hemostats for 30 seconds.  Bacitracin and gauze applied to the wound.         A:  Vasectomy         P:  He was given standard post-vasectomy instructions, including  4 containers for post vas specimens.  He should apply ice   and wear support for the next 2-3 days.  He should  abstain from sexual intercourse and any heavy lifting for the next week.  Call or return to clinic for any  problems.  The excised specimens were sent for pathology.  He may use over the counter tylenol +/- ibuprofen for pain.  He is not  considered sterile until follow up vas specimens in 8-12 weeks are free of sperm.    ANSELMO Hale MD

## 2022-07-13 LAB
PATH REPORT.COMMENTS IMP SPEC: NORMAL
PATH REPORT.COMMENTS IMP SPEC: NORMAL
PATH REPORT.FINAL DX SPEC: NORMAL
PATH REPORT.GROSS SPEC: NORMAL
PATH REPORT.MICROSCOPIC SPEC OTHER STN: NORMAL
PATH REPORT.RELEVANT HX SPEC: NORMAL
PHOTO IMAGE: NORMAL

## 2022-07-13 NOTE — RESULT ENCOUNTER NOTE
Teddy,  Pathology confirmed a successful vasectomy.  Hope you are healing up well.    ANSELMO Hale MD

## 2022-07-15 ENCOUNTER — VIRTUAL VISIT (OUTPATIENT)
Dept: PSYCHOLOGY | Facility: CLINIC | Age: 32
End: 2022-07-15
Payer: COMMERCIAL

## 2022-07-15 DIAGNOSIS — F90.0 ATTENTION DEFICIT HYPERACTIVITY DISORDER (ADHD), PREDOMINANTLY INATTENTIVE TYPE: ICD-10-CM

## 2022-07-15 DIAGNOSIS — F32.A DEPRESSION, UNSPECIFIED DEPRESSION TYPE: ICD-10-CM

## 2022-07-15 DIAGNOSIS — F41.1 GENERALIZED ANXIETY DISORDER: Primary | ICD-10-CM

## 2022-07-15 PROCEDURE — 90834 PSYTX W PT 45 MINUTES: CPT | Mod: 95

## 2022-07-15 ASSESSMENT — COLUMBIA-SUICIDE SEVERITY RATING SCALE - C-SSRS
TOTAL  NUMBER OF INTERRUPTED ATTEMPTS SINCE LAST CONTACT: NO
1. SINCE LAST CONTACT, HAVE YOU WISHED YOU WERE DEAD OR WISHED YOU COULD GO TO SLEEP AND NOT WAKE UP?: NO
2. HAVE YOU ACTUALLY HAD ANY THOUGHTS OF KILLING YOURSELF?: NO
TOTAL  NUMBER OF ABORTED OR SELF INTERRUPTED ATTEMPTS SINCE LAST CONTACT: NO
ATTEMPT SINCE LAST CONTACT: NO
6. HAVE YOU EVER DONE ANYTHING, STARTED TO DO ANYTHING, OR PREPARED TO DO ANYTHING TO END YOUR LIFE?: NO
SUICIDE, SINCE LAST CONTACT: NO

## 2022-07-15 ASSESSMENT — ANXIETY QUESTIONNAIRES
1. FEELING NERVOUS, ANXIOUS, OR ON EDGE: NEARLY EVERY DAY
GAD7 TOTAL SCORE: 15
7. FEELING AFRAID AS IF SOMETHING AWFUL MIGHT HAPPEN: NEARLY EVERY DAY
5. BEING SO RESTLESS THAT IT IS HARD TO SIT STILL: NOT AT ALL
GAD7 TOTAL SCORE: 15
3. WORRYING TOO MUCH ABOUT DIFFERENT THINGS: NEARLY EVERY DAY
4. TROUBLE RELAXING: MORE THAN HALF THE DAYS
GAD7 TOTAL SCORE: 15
2. NOT BEING ABLE TO STOP OR CONTROL WORRYING: NEARLY EVERY DAY
8. IF YOU CHECKED OFF ANY PROBLEMS, HOW DIFFICULT HAVE THESE MADE IT FOR YOU TO DO YOUR WORK, TAKE CARE OF THINGS AT HOME, OR GET ALONG WITH OTHER PEOPLE?: SOMEWHAT DIFFICULT
6. BECOMING EASILY ANNOYED OR IRRITABLE: SEVERAL DAYS
7. FEELING AFRAID AS IF SOMETHING AWFUL MIGHT HAPPEN: NEARLY EVERY DAY

## 2022-07-15 ASSESSMENT — PATIENT HEALTH QUESTIONNAIRE - PHQ9
SUM OF ALL RESPONSES TO PHQ QUESTIONS 1-9: 11
10. IF YOU CHECKED OFF ANY PROBLEMS, HOW DIFFICULT HAVE THESE PROBLEMS MADE IT FOR YOU TO DO YOUR WORK, TAKE CARE OF THINGS AT HOME, OR GET ALONG WITH OTHER PEOPLE: SOMEWHAT DIFFICULT
SUM OF ALL RESPONSES TO PHQ QUESTIONS 1-9: 11

## 2022-07-15 NOTE — PROGRESS NOTES
[facility]                                     Progress Note    Patient Name: Teddy Shah  Date: 7/15/22         Service Type: Individual      Session Start Time: 11:09am  Session End Time:  11:55am     Session Length: 38-52    Session #: 17    Attendees: Client attended alone    Service Modality:  Video Visit:      Provider verified identity through the following two step process.  Patient provided:  Patient , Patient address and Patient is known previously to provider    Telemedicine Visit: The patient's condition can be safely assessed and treated via synchronous audio and visual telemedicine encounter.      Reason for Telemedicine Visit: Services only offered telehealth    Originating Site (Patient Location): Patient's car     Distant Site (Provider Location): Provider Remote Setting- Home Office    Consent:  The patient/guardian has verbally consented to: the potential risks and benefits of telemedicine (video visit) versus in person care; bill my insurance or make self-payment for services provided; and responsibility for payment of non-covered services.     Patient would like the video invitation sent by:  My Chart    Mode of Communication:  Video Conference via Amwell    As the provider I attest to compliance with applicable laws and regulations related to telemedicine.    DATA  Interactive Complexity: No  Crisis: No        Progress Since Last Session (Related to Symptoms / Goals / Homework):   Symptoms: Improving anxious and depressive sxs per patient report    Homework: Partially completed  Completed in session      Episode of Care Goals: Satisfactory progress - ACTION (Actively working towards change); Intervened by reinforcing change plan / affirming steps taken     Current / Ongoing Stressors and Concerns:  Work stress, assertive communication, social isolation, upcoming surgery     Surgery, politics     Treatment Objective(s) Addressed in This Session:   use at least   coping  skills for anxiety management in the next 4 weeks  Decrease frequency and intensity of feeling down, depressed, hopeless  Identify negative self-talk and behaviors: challenge core beliefs, myths, and actions  Patient will attend and participate in social or recreational activities    Develop and utilize self-compassion strategies.   identify the time in your life when you began to feel poorly about themselves.     Intervention:   Motivational Interviewing    MI Intervention: Expressed Empathy/Understanding, Supported Autonomy, Collaboration, Evocation and Reframe     Change Talk Expressed by the Patient: Desire to change Reasons to change Committment to change    Provider Response to Change Talk: E - Evoked more info from patient about behavior change, A - Affirmed patient's thoughts, decisions, or attempts at behavior change, R - Reflected patient's change talk and S - Summarized patient's change talk statements      Provider held space as patient processed experiences using reflective listening, validation, and normalization of patient's emotional response.  Provider worked to develop greater insight into patient's experiences.  Provider validated patient.     Provider coached patient on managing and setting boundaries around anxious thoughts.     CBT: Provider utilized CBT strategies to challenge and reframe patient's identified cognitive distortions.      Provider worked to build rapport and strengthen therapeutic alliance.     Assessments completed prior to visit:  The following assessments were completed by patient for this visit:  PROMIS 10-Global Health (only subscores and total score):   PROMIS-10 Scores Only 12/14/2021 3/28/2022 4/25/2022   Global Mental Health Score 7 8 6   Global Physical Health Score 14 14 14   PROMIS TOTAL - SUBSCORES 21 22 20   Answers for HPI/ROS submitted by the patient on 7/15/2022  If you checked off any problems, how difficult have these problems made it for you to do your work,  take care of things at home, or get along with other people?: Somewhat difficult  PHQ9 TOTAL SCORE: 11  KASSIE 7 TOTAL SCORE: 15          [place] (Short version): see chart.      ASSESSMENT: Current Emotional / Mental Status (status of significant symptoms):   Risk status (Self / Other harm or suicidal ideation)   Patient denies current fears or concerns for personal safety.   Patient denies current or recent suicidal ideation or behaviors.   Patient denies current or recent homicidal ideation or behaviors.   Patient denies current or recent self injurious behavior or ideation.   Patient denies other safety concerns.   Patient reports there has been no change in risk factors since their last session.     Patient reports there has been no change in protective factors since their last session.     Recommended that patient call 911 or go to the local ED should there be a change in any of these risk factors.     Appearance:   Appropriate    Eye Contact:   Fair    Psychomotor Behavior: Normal    Attitude:   Cooperative    Orientation:   All   Speech    Rate / Production: Talkative    Volume:  Normal    Mood:    Anxious  Euthymic   Affect:    Appropriate  Worrisome    Thought Content:  Rumination    Thought Form:  Coherent    Insight:    Fair      Medication Review:   No changes to current psychiatric medication(s)     Medication Compliance:   Yes     Changes in Health Issues:   Yes: patient is making a healthy recovery following a medical procedure.     Chemical Use Review:   Substance Use: Chemical use reviewed, no active concerns identified      Tobacco Use: No current tobacco use.      Diagnosis:  1. Depression, unspecified depression type    2. Generalized anxiety disorder    3. Attention deficit hyperactivity disorder (ADHD), predominantly inattentive type     (Patient mentioned ADHD diagnosis in session.  Provider verified ADHD testing via chart review, patient was found to meet criteria for ADHD-predominantly  "inattentive type).    Collateral Reports Completed:   Not Applicable    PLAN: (Patient Tasks / Therapist Tasks / Other)    Patient will utilize crisis resources as needed.  Provider will work with patient to set healthy boundaries and utilize new coping skills.  Patient committed to try to decrease checking Twitter and think about how to increase positive messaging day to day to offset negativity.  Patient will also track OCD sxs.     Patient will utilize the following coping strategies to manage anxiety: Deep breathing, grounding exercises, engaging in civic duty. [place] exercise. Patient will also process and reality-check anxious thoughts after anxiety has decreased.     Patient will incorporate more deep-breathing and grounding exercises throughout the day.  Patient will utilize coping strategies discussed in session and challenge/reframe cognitive distortions (catastrophizing).         Provider and patient will focus on interpersonal relationships, internalizing criticism, self worth, and difficulties in communication moving forward. Provider will continue to assess R/O Obsessive Compulsive Disorder, R/O Schizoid and Schizotypal Personality Disorder.     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 Patient will continue to set boundaries and reflect on the \"minimum\" he feels that he needs to be at to be in a relationship.      Patient will utilize boundary setting strategies discussed in session.  Patient will reflect on what he would like to see in regards to change in his social life/interaction.        CINDI Posada  7/15/22  Service Performed and Documented by [facility]-   Note reviewed and clinical supervision by LONDON Patino [facility] 7/18/2022    ______________________________________________________________________    Individual Treatment Plan    Patient's Name: Teddy Shah  YOB: 1990    Date of Creation: " "9/7/21  Date Treatment Plan Last Reviewed/Revised: 6/13/22    DSM5 Diagnoses: 311 (F32.9) Unspecified Depressive Disorder  or 300.02 (F41.1) Generalized Anxiety Disorder  Psychosocial / Contextual Factors: Hostile home environment, lack of social support, current events/politics a significant stress, family conflict  PROMIS (reviewed every 90 days):   WHODAS: 24    Referral / Collaboration:  Referral to another professional/service is not indicated at this time..    Anticipated number of session for this episode of care:   Anticipation frequency of session: Biweekly  Anticipated Duration of each session: 38-52 minutes  Treatment plan will be reviewed in 90 days or when goals have been changed.       MeasurableTreatment Goal(s) related to diagnosis / functional impairment(s)  Goal 1: Patient will successfully develop and utilize coping skills to manage depressive, anxious and ADHD sxs within 90 days. (Temporarily deferred, ongoing by patient 6/13/22)    I will know I've met my goal when I feel like I can get some of my executive function back (completing tasks).       Objective #A Patient will process experience around romantic relationship, reflect on how it has impacted his view of self, and how he would like to move forward.      Patient will work to develop a \"social life\" outside of work, and manage mental health sxs as they arise.              Patient will attend and participate in social or recreational activities    Develop and utilize self-compassion strategies.   identify the time in your life when you began to feel poorly about themselves.  Status: Restarted - Date: 6/13/22     Intervention(s)  Therapist will assign homework around utilizing self compassion strategies  teach psychoeducation around shame, guilt and healthy relationships.     Objective #B Patient will work to develop coping skills to \"turn brain off\" or switch tracks.    Patient will use thought-stopping strategy daily to reduce intrusive " thoughts.  Status: Completed - Date: 6/13/22 (maintenance as needed)       Intervention(s)  Therapist will assign homework around using thought-stopping strategies and other coping skills  teach emotional regulation skills.       Patient has reviewed and agreed to the above plan.      CINDI Posada  March 14, 2022, reviewed by 6/13/22  Service Performed and Documented by [facility]-   tx plan reviewed and clinical supervision [facility], 6/13/2022    Answers for HPI/ROS submitted by the patient on 3/28/2022  KASSIE 7 TOTAL SCORE: 13

## 2022-08-22 ENCOUNTER — VIRTUAL VISIT (OUTPATIENT)
Dept: PSYCHOLOGY | Facility: CLINIC | Age: 32
End: 2022-08-22
Payer: COMMERCIAL

## 2022-08-22 DIAGNOSIS — F90.0 ATTENTION DEFICIT HYPERACTIVITY DISORDER (ADHD), PREDOMINANTLY INATTENTIVE TYPE: ICD-10-CM

## 2022-08-22 DIAGNOSIS — F41.1 GENERALIZED ANXIETY DISORDER: Primary | ICD-10-CM

## 2022-08-22 DIAGNOSIS — F32.A DEPRESSION, UNSPECIFIED DEPRESSION TYPE: ICD-10-CM

## 2022-08-22 PROCEDURE — 90834 PSYTX W PT 45 MINUTES: CPT | Mod: 95

## 2022-08-22 ASSESSMENT — ANXIETY QUESTIONNAIRES
IF YOU CHECKED OFF ANY PROBLEMS ON THIS QUESTIONNAIRE, HOW DIFFICULT HAVE THESE PROBLEMS MADE IT FOR YOU TO DO YOUR WORK, TAKE CARE OF THINGS AT HOME, OR GET ALONG WITH OTHER PEOPLE: VERY DIFFICULT
GAD7 TOTAL SCORE: 15
8. IF YOU CHECKED OFF ANY PROBLEMS, HOW DIFFICULT HAVE THESE MADE IT FOR YOU TO DO YOUR WORK, TAKE CARE OF THINGS AT HOME, OR GET ALONG WITH OTHER PEOPLE?: VERY DIFFICULT
GAD7 TOTAL SCORE: 15
4. TROUBLE RELAXING: MORE THAN HALF THE DAYS
6. BECOMING EASILY ANNOYED OR IRRITABLE: SEVERAL DAYS
7. FEELING AFRAID AS IF SOMETHING AWFUL MIGHT HAPPEN: NEARLY EVERY DAY
3. WORRYING TOO MUCH ABOUT DIFFERENT THINGS: NEARLY EVERY DAY
1. FEELING NERVOUS, ANXIOUS, OR ON EDGE: NEARLY EVERY DAY
2. NOT BEING ABLE TO STOP OR CONTROL WORRYING: NEARLY EVERY DAY
GAD7 TOTAL SCORE: 15
7. FEELING AFRAID AS IF SOMETHING AWFUL MIGHT HAPPEN: NEARLY EVERY DAY
5. BEING SO RESTLESS THAT IT IS HARD TO SIT STILL: NOT AT ALL

## 2022-08-22 ASSESSMENT — PATIENT HEALTH QUESTIONNAIRE - PHQ9
10. IF YOU CHECKED OFF ANY PROBLEMS, HOW DIFFICULT HAVE THESE PROBLEMS MADE IT FOR YOU TO DO YOUR WORK, TAKE CARE OF THINGS AT HOME, OR GET ALONG WITH OTHER PEOPLE: SOMEWHAT DIFFICULT
SUM OF ALL RESPONSES TO PHQ QUESTIONS 1-9: 13
SUM OF ALL RESPONSES TO PHQ QUESTIONS 1-9: 13

## 2022-08-22 NOTE — PROGRESS NOTES
[facility]                                     Progress Note    Patient Name: Teddy Shah  Date: 22         Service Type: Individual      Session Start Time: 11:03am  Session End Time:  11:49am     Session Length: 38-52    Session #: 18    Attendees: Client attended alone    Service Modality:  Video Visit:      Provider verified identity through the following two step process.  Patient provided:  Patient , Patient address and Patient is known previously to provider    Telemedicine Visit: The patient's condition can be safely assessed and treated via synchronous audio and visual telemedicine encounter.      Reason for Telemedicine Visit: Services only offered telehealth    Originating Site (Patient Location): Patient's car     Distant Site (Provider Location): Provider Remote Setting- Home Office    Consent:  The patient/guardian has verbally consented to: the potential risks and benefits of telemedicine (video visit) versus in person care; bill my insurance or make self-payment for services provided; and responsibility for payment of non-covered services.     Patient would like the video invitation sent by:  My Chart    Mode of Communication:  Video Conference via Amwell    As the provider I attest to compliance with applicable laws and regulations related to telemedicine.    DATA  Interactive Complexity: No  Crisis: No        Progress Since Last Session (Related to Symptoms / Goals / Homework):   Symptoms: No change in anxious sxs and worsening depressive sxs per KASSIE-7 and PHQ-9, improving depressive sxs per patient report related to life stressor    Homework: Achieved / completed to satisfaction  Completed in session      Episode of Care Goals: Satisfactory progress - ACTION (Actively working towards change); Intervened by reinforcing change plan / affirming steps taken     Current / Ongoing Stressors and Concerns:  Work stress, social interactions, low self worth, romantic  relationships     Treatment Objective(s) Addressed in This Session:   use at least   coping skills for anxiety management in the next 4 weeks  Decrease frequency and intensity of feeling down, depressed, hopeless  Identify negative self-talk and behaviors: challenge core beliefs, myths, and actions  Patient will attend and participate in social or recreational activities    Develop and utilize self-compassion strategies.   identify the time in your life when you began to feel poorly about themselves.     Intervention:   Motivational Interviewing    MI Intervention: Expressed Empathy/Understanding, Supported Autonomy, Collaboration, Evocation and Reframe     Change Talk Expressed by the Patient: Desire to change Reasons to change Committment to change    Provider Response to Change Talk: E - Evoked more info from patient about behavior change, A - Affirmed patient's thoughts, decisions, or attempts at behavior change, R - Reflected patient's change talk and S - Summarized patient's change talk statements    Provider held space as patient processed experiences using reflective listening, validation, and normalization of patient's emotional response.  Provider worked to develop greater insight into patient's experiences.  Provider validated patient.     Provider coached patient on managing and setting boundaries around anxious thoughts.     CBT: Provider utilized CBT strategies to challenge and reframe patient's identified cognitive distortions.      Psychoeducation: Provider taught psychoeducation around anxiety.  Provider coached patient on utilizing coping skills to manage anxious sxs.       Psychodynamic: Provider utilized psychodynamic strategies to explore how patient's relationship with romantic interests have impacted his view of self and the role he has played.  Provider worked to develop greater insight.      Assessments completed prior to visit:  The following assessments were completed by patient for this  visit:  PROMIS 10-Global Health (only subscores and total score):   PROMIS-10 Scores Only 12/14/2021 3/28/2022 4/25/2022 7/15/2022   Global Mental Health Score 7 8 6 5   Global Physical Health Score 14 14 14 15   PROMIS TOTAL - SUBSCORES 21 22 20 20   Answers for HPI/ROS submitted by the patient on 7/15/2022  If you checked off any problems, how difficult have these problems made it for you to do your work, take care of things at home, or get along with other people?: Somewhat difficult  PHQ9 TOTAL SCORE: 11  KASSIE 7 TOTAL SCORE: 15  Answers for HPI/ROS submitted by the patient on 8/22/2022  If you checked off any problems, how difficult have these problems made it for you to do your work, take care of things at home, or get along with other people?: Somewhat difficult  PHQ9 TOTAL SCORE: 13  KASSIE 7 TOTAL SCORE: 15              [place] (Short version): see chart.      ASSESSMENT: Current Emotional / Mental Status (status of significant symptoms):   Risk status (Self / Other harm or suicidal ideation)   Patient denies current fears or concerns for personal safety.   Patient denies current or recent suicidal ideation or behaviors.   Patient denies current or recent homicidal ideation or behaviors.   Patient denies current or recent self injurious behavior or ideation.   Patient denies other safety concerns.   Patient reports there has been no change in risk factors since their last session.     Patient reports there has been no change in protective factors since their last session.     Recommended that patient call 911 or go to the local ED should there be a change in any of these risk factors.     Appearance:   Appropriate    Eye Contact:   Fair    Psychomotor Behavior: Normal    Attitude:   Cooperative    Orientation:   All   Speech    Rate / Production: Talkative    Volume:  Normal    Mood:    Anxious  Euthymic   Affect:    Appropriate  Worrisome    Thought Content:  Rumination    Thought Form:  Coherent     Insight:    Fair      Medication Review:   No changes to current psychiatric medication(s)     Medication Compliance:   Yes     Changes in Health Issues:   Yes: patient is making a healthy recovery following a medical procedure.     Chemical Use Review:   Substance Use: Chemical use reviewed, no active concerns identified      Tobacco Use: No current tobacco use.      Diagnosis:  1. Generalized anxiety disorder    2. Depression, unspecified depression type    3. Attention deficit hyperactivity disorder (ADHD), predominantly inattentive type     (Patient mentioned ADHD diagnosis in session.  Provider verified ADHD testing via chart review, patient was found to meet criteria for ADHD-predominantly inattentive type).    Collateral Reports Completed:   Not Applicable    PLAN: (Patient Tasks / Therapist Tasks / Other)    Patient will utilize crisis resources as needed.  Provider will work with patient to set healthy boundaries and utilize new coping skills.  Patient committed to try to decrease checking Twitter and think about how to increase positive messaging day to day to offset negativity.  Patient will also track OCD sxs.     Patient will utilize the following coping strategies to manage anxiety: Deep breathing, grounding exercises, engaging in civic duty. [place] exercise. Patient will also process and reality-check anxious thoughts after anxiety has decreased.     Patient will incorporate more deep-breathing and grounding exercises throughout the day.  Patient will utilize coping strategies discussed in session and challenge/reframe cognitive distortions (catastrophizing).         Provider and patient will focus on interpersonal relationships, internalizing criticism, self worth, and difficulties in communication moving forward. Provider will continue to assess R/O Obsessive Compulsive Disorder, R/O Schizoid and Schizotypal Personality Disorder.     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 Patient will continue to set boundaries and reflect on the \"minimum\" he feels that he needs to be at to be in a relationship.      Patient will utilize boundary setting strategies discussed in session.  Patient will reflect on what he would like to see in regards to change in his social life/interaction.  Patient will utilize coping strategies discussed in session.  Provider and patient will continue to explore patient's beliefs/thoughts around romantic relationships.       Leatha Pearce [facility]  8/22/22  Service Performed and Documented by [facility]-   Note reviewed and clinical supervision by LONDON Patino [facility] 8/23/2022  ______________________________________________________________________    Individual Treatment Plan    Patient's Name: Teddy Shah  YOB: 1990    Date of Creation: 9/7/21  Date Treatment Plan Last Reviewed/Revised: 6/13/22    DSM5 Diagnoses: 311 (F32.9) Unspecified Depressive Disorder  or 300.02 (F41.1) Generalized Anxiety Disorder  Psychosocial / Contextual Factors: Hostile home environment, lack of social support, current events/politics a significant stress, family conflict  PROMIS (reviewed every 90 days):   WHODAS: 24    Referral / Collaboration:  Referral to another professional/service is not indicated at this time..    Anticipated number of session for this episode of care:   Anticipation frequency of session: Biweekly  Anticipated Duration of each session: 38-52 minutes  Treatment plan will be reviewed in 90 days or when goals have been changed.       MeasurableTreatment Goal(s) related to diagnosis / functional impairment(s)  Goal 1: Patient will successfully develop and utilize coping skills to manage depressive, anxious and ADHD sxs within 90 days. (Temporarily deferred, ongoing by patient 6/13/22)    I will know I've met my goal when I feel like I can get some of my executive function back (completing tasks).  " "     Objective #A Patient will process experience around romantic relationship, reflect on how it has impacted his view of self, and how he would like to move forward.      Patient will work to develop a \"social life\" outside of work, and manage mental health sxs as they arise.              Patient will attend and participate in social or recreational activities    Develop and utilize self-compassion strategies.   identify the time in your life when you began to feel poorly about themselves.  Status: Restarted - Date: 6/13/22     Intervention(s)  Therapist will assign homework around utilizing self compassion strategies  teach psychoeducation around shame, guilt and healthy relationships.     Objective #B Patient will work to develop coping skills to \"turn brain off\" or switch tracks.    Patient will use thought-stopping strategy daily to reduce intrusive thoughts.  Status: Completed - Date: 6/13/22 (maintenance as needed)       Intervention(s)  Therapist will assign homework around using thought-stopping strategies and other coping skills  teach emotional regulation skills.       Patient has reviewed and agreed to the above plan.      CINDI Posada  March 14, 2022, reviewed by 6/13/22  Service Performed and Documented by [facility]-   tx plan reviewed and clinical supervision by LONDON [facility] 3/29/2022, 6/13/2022    Answers for HPI/ROS submitted by the patient on 3/28/2022  KASSIE 7 TOTAL SCORE: 13  "

## 2022-08-28 DIAGNOSIS — F41.9 ANXIETY: ICD-10-CM

## 2022-08-28 DIAGNOSIS — F33.1 MODERATE EPISODE OF RECURRENT MAJOR DEPRESSIVE DISORDER (H): ICD-10-CM

## 2022-08-28 NOTE — LETTER
September 2, 2022          Teddy Shah  [address]  [facility] [address]            Dear Teddy Shah,      At [facility] we care about your health and are committed to providing quality patient care. Regular appointments are a vital part of the care and management of your health and can help prevent many of the complications that can occur.      It has come to our attention that you are due for Depression / Anxiety check  .  Please call [facility] at [phone] soon to schedule your follow up appointment.      Sincerely,      [facility] Care Team

## 2022-08-30 RX ORDER — SERTRALINE HYDROCHLORIDE 100 MG/1
TABLET, FILM COATED ORAL
Qty: 180 TABLET | Refills: 0 | Status: SHIPPED | OUTPATIENT
Start: 2022-08-30 | End: 2022-12-05

## 2022-09-02 NOTE — TELEPHONE ENCOUNTER
LVM for pt to schedule appointment and sent letter. If / when patient calls back please assist with scheduling Depression / Anxiety  appointment.

## 2022-09-14 ENCOUNTER — VIRTUAL VISIT (OUTPATIENT)
Dept: PSYCHOLOGY | Facility: CLINIC | Age: 32
End: 2022-09-14
Payer: COMMERCIAL

## 2022-09-14 DIAGNOSIS — F41.1 GENERALIZED ANXIETY DISORDER: Primary | ICD-10-CM

## 2022-09-14 DIAGNOSIS — F32.A DEPRESSION, UNSPECIFIED DEPRESSION TYPE: ICD-10-CM

## 2022-09-14 DIAGNOSIS — F90.0 ATTENTION DEFICIT HYPERACTIVITY DISORDER (ADHD), PREDOMINANTLY INATTENTIVE TYPE: ICD-10-CM

## 2022-09-14 PROCEDURE — 90834 PSYTX W PT 45 MINUTES: CPT | Mod: 95

## 2022-09-14 NOTE — PROGRESS NOTES
[facility]                                     Progress Note    Patient Name: Teddy Shah  Date: 22         Service Type: Individual      Session Start Time: 11:03am  Session End Time:  11:49am     Session Length: 38-52    Session #: 19    Attendees: Client attended alone    Service Modality:  Video Visit:      Provider verified identity through the following two step process.  Patient provided:  Patient , Patient address and Patient is known previously to provider    Telemedicine Visit: The patient's condition can be safely assessed and treated via synchronous audio and visual telemedicine encounter.      Reason for Telemedicine Visit: Services only offered telehealth    Originating Site (Patient Location): Patient's car     Distant Site (Provider Location): Provider Remote Setting- Home Office    Consent:  The patient/guardian has verbally consented to: the potential risks and benefits of telemedicine (video visit) versus in person care; bill my insurance or make self-payment for services provided; and responsibility for payment of non-covered services.     Patient would like the video invitation sent by:  My Chart    Mode of Communication:  Video Conference via Amwell    As the provider I attest to compliance with applicable laws and regulations related to telemedicine.    DATA  Interactive Complexity: No  Crisis: No        Progress Since Last Session (Related to Symptoms / Goals / Homework):   Symptoms: No change in anxious and depressive sxs per patient report    Homework: Achieved / completed to satisfaction  Completed in session      Episode of Care Goals: Satisfactory progress - ACTION (Actively working towards change); Intervened by reinforcing change plan / affirming steps taken     Current / Ongoing Stressors and Concerns:  Work stress, house tension, social interactions     Treatment Objective(s) Addressed in This Session:   use at least   coping skills for anxiety  management in the next 4 weeks  Decrease frequency and intensity of feeling down, depressed, hopeless  Identify negative self-talk and behaviors: challenge core beliefs, myths, and actions  Patient will attend and participate in social or recreational activities    Develop and utilize self-compassion strategies.   identify the time in your life when you began to feel poorly about themselves.     Intervention:   Motivational Interviewing    MI Intervention: Expressed Empathy/Understanding, Supported Autonomy, Collaboration, Evocation and Reframe     Change Talk Expressed by the Patient: Desire to change Reasons to change Committment to change    Provider Response to Change Talk: E - Evoked more info from patient about behavior change, A - Affirmed patient's thoughts, decisions, or attempts at behavior change, R - Reflected patient's change talk and S - Summarized patient's change talk statements    Provider held space as patient processed experiences using reflective listening, validation, and normalization of patient's emotional response.  Provider worked to develop greater insight into patient's experiences.  Provider validated patient's progress.      CBT: Provider utilized CBT strategies to challenge and reframe patient's identified cognitive distortions.  Provider coached patient on challenging and reframing identified cognitive distortions).    Solution-Focused: Provider utilized solution-focused strategies to identify steps patient was willing to take to increase socialization: Patient was agreeable to submitting an application to a [employer] group, and utilize coping strategies discussed in session to manage anxiety (Beforehand: clearing space beforehand, avoiding twitter/politics, avoiding family  Reading, listening to music. Afterwards: take a shower, go to sleep, reframing/challenging identified cognitive distortions.    Assessments completed prior to visit:  The following assessments were completed by  patient for this visit:  PROMIS 10-Global Health (only subscores and total score):   PROMIS-10 Scores Only 12/14/2021 3/28/2022 4/25/2022 7/15/2022   Global Mental Health Score 7 8 6 5   Global Physical Health Score 14 14 14 15   PROMIS TOTAL - SUBSCORES 21 22 20 20   Answers for HPI/ROS submitted by the patient on 7/15/2022  If you checked off any problems, how difficult have these problems made it for you to do your work, take care of things at home, or get along with other people?: Somewhat difficult  PHQ9 TOTAL SCORE: 11  KASSIE 7 TOTAL SCORE: 15  Answers for HPI/ROS submitted by the patient on 8/22/2022  If you checked off any problems, how difficult have these problems made it for you to do your work, take care of things at home, or get along with other people?: Somewhat difficult  PHQ9 TOTAL SCORE: 13  KASSIE 7 TOTAL SCORE: 15              [place] (Short version): see chart.      ASSESSMENT: Current Emotional / Mental Status (status of significant symptoms):   Risk status (Self / Other harm or suicidal ideation)   Patient denies current fears or concerns for personal safety.   Patient denies current or recent suicidal ideation or behaviors.   Patient denies current or recent homicidal ideation or behaviors.   Patient denies current or recent self injurious behavior or ideation.   Patient denies other safety concerns.   Patient reports there has been no change in risk factors since their last session.     Patient reports there has been no change in protective factors since their last session.     Recommended that patient call 911 or go to the local ED should there be a change in any of these risk factors.     Appearance:   Appropriate    Eye Contact:   Fair    Psychomotor Behavior: Normal    Attitude:   Cooperative    Orientation:   All   Speech    Rate / Production: Talkative    Volume:  Normal    Mood:    Anxious  Euthymic   Affect:    Appropriate  Worrisome    Thought Content:  Rumination    Thought  Form:  Coherent    Insight:    Fair      Medication Review:   No changes to current psychiatric medication(s)     Medication Compliance:   Yes     Changes in Health Issues:   Yes: patient is making a healthy recovery following a medical procedure.     Chemical Use Review:   Substance Use: Chemical use reviewed, no active concerns identified      Tobacco Use: No current tobacco use.      Diagnosis:  1. Generalized anxiety disorder    2. Depression, unspecified depression type    3. Attention deficit hyperactivity disorder (ADHD), predominantly inattentive type     (Patient mentioned ADHD diagnosis in session.  Provider verified ADHD testing via chart review, patient was found to meet criteria for ADHD-predominantly inattentive type).    Collateral Reports Completed:   Not Applicable    PLAN: (Patient Tasks / Therapist Tasks / Other)    Patient will utilize crisis resources as needed.  Provider will work with patient to set healthy boundaries and utilize new coping skills.  Patient committed to try to decrease checking Twitter and think about how to increase positive messaging day to day to offset negativity.  Patient will also track OCD sxs.     Patient will utilize the following coping strategies to manage anxiety: Deep breathing, grounding exercises, engaging in civic duty. [place] exercise. Patient will also process and reality-check anxious thoughts after anxiety has decreased.     Patient will incorporate more deep-breathing and grounding exercises throughout the day.  Patient will utilize coping strategies discussed in session and challenge/reframe cognitive distortions (catastrophizing).         Provider and patient will focus on interpersonal relationships, internalizing criticism, self worth, and difficulties in communication moving forward. Provider will continue to assess R/O Obsessive Compulsive Disorder, R/O Schizoid and Schizotypal Personality Disorder.     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 Patient will continue to set boundaries and reflect on the \"minimum\" he feels that he needs to be at to be in a relationship.      Patient will utilize boundary setting strategies discussed in session.  Patient will reflect on what he would like to see in regards to change in his social life/interaction.  Patient will utilize coping strategies discussed in session.  Provider and patient will continue to explore patient's beliefs/thoughts around romantic relationships. Patient will take steps and utilize coping strategies discussed in session.      Provider will review treatment plan with patient as the next session.      Leatha Pearce [facility]  9/14/22  Service Performed and Documented by [facility]-   Note reviewed and clinical supervision by LONDON Patino [facility] 9/14/2022    ______________________________________________________________________    Individual Treatment Plan    Patient's Name: Teddy Shah  YOB: 1990    Date of Creation: 9/7/21  Date Treatment Plan Last Reviewed/Revised: 9/14/22    DSM5 Diagnoses: 311 (F32.9) Unspecified Depressive Disorder  or 300.02 (F41.1) Generalized Anxiety Disorder  Psychosocial / Contextual Factors: Hostile home environment, lack of social support, current events/politics a significant stress, family conflict  PROMIS (reviewed every 90 days):   WHODAS: 24    Referral / Collaboration:  Referral to another professional/service is not indicated at this time..    Anticipated number of session for this episode of care:   Anticipation frequency of session: Biweekly  Anticipated Duration of each session: 38-52 minutes  Treatment plan will be reviewed in 90 days or when goals have been changed.       MeasurableTreatment Goal(s) related to diagnosis / functional impairment(s)  Goal 1: Patient will successfully develop and utilize coping skills to manage depressive, anxious and ADHD sxs within 90 days. " "(Temporarily deferred, ongoing by patient 6/13/22)    I will know I've met my goal when I feel like I can get some of my executive function back (completing tasks).       Objective #A Patient will process experience around romantic relationship, reflect on how it has impacted his view of self, and how he would like to move forward.      Patient will work to develop a \"social life\" outside of work, and manage mental health sxs as they arise.              Patient will attend and participate in social or recreational activities    Develop and utilize self-compassion strategies.   identify the time in your life when you began to feel poorly about themselves.  Status: Restarted - Date: 9/14/22     Intervention(s)  Therapist will assign homework around utilizing self compassion strategies  teach psychoeducation around shame, guilt and healthy relationships.     Objective #B Patient will work to develop coping skills to \"turn brain off\" or switch tracks.    Patient will use thought-stopping strategy daily to reduce intrusive thoughts.  Status: Completed - Date: 6/13/22 (maintenance as needed)       Intervention(s)  Therapist will assign homework around using thought-stopping strategies and other coping skills  teach emotional regulation skills.       Patient has reviewed and agreed to the above plan.      CINDI Posada  March 14, 2022, reviewed by 6/13/22, reviewed 9/14/22  Service Performed and Documented by [facility]-   tx plan reviewed and clinical supervision by LONDON Patino [facility] 3/29/2022, 6/13/2022, 9/14/2022      Answers for HPI/ROS submitted by the patient on 3/28/2022  KASSIE 7 TOTAL SCORE: 13  "

## 2022-09-23 ENCOUNTER — E-VISIT (OUTPATIENT)
Dept: FAMILY MEDICINE | Facility: CLINIC | Age: 32
End: 2022-09-23
Payer: COMMERCIAL

## 2022-09-23 DIAGNOSIS — F90.9 ATTENTION DEFICIT HYPERACTIVITY DISORDER (ADHD), UNSPECIFIED ADHD TYPE: ICD-10-CM

## 2022-09-23 DIAGNOSIS — F90.0 ADHD (ATTENTION DEFICIT HYPERACTIVITY DISORDER), INATTENTIVE TYPE: Primary | ICD-10-CM

## 2022-09-23 PROCEDURE — 99421 OL DIG E/M SVC 5-10 MIN: CPT | Performed by: NURSE PRACTITIONER

## 2022-10-03 ENCOUNTER — VIRTUAL VISIT (OUTPATIENT)
Dept: PSYCHOLOGY | Facility: CLINIC | Age: 32
End: 2022-10-03
Payer: COMMERCIAL

## 2022-10-03 DIAGNOSIS — F90.0 ATTENTION DEFICIT HYPERACTIVITY DISORDER (ADHD), PREDOMINANTLY INATTENTIVE TYPE: ICD-10-CM

## 2022-10-03 DIAGNOSIS — F41.1 GENERALIZED ANXIETY DISORDER: Primary | ICD-10-CM

## 2022-10-03 DIAGNOSIS — F32.A DEPRESSION, UNSPECIFIED DEPRESSION TYPE: ICD-10-CM

## 2022-10-03 PROCEDURE — 90834 PSYTX W PT 45 MINUTES: CPT | Mod: 95

## 2022-10-03 ASSESSMENT — ANXIETY QUESTIONNAIRES
GAD7 TOTAL SCORE: 13
4. TROUBLE RELAXING: SEVERAL DAYS
5. BEING SO RESTLESS THAT IT IS HARD TO SIT STILL: NOT AT ALL
IF YOU CHECKED OFF ANY PROBLEMS ON THIS QUESTIONNAIRE, HOW DIFFICULT HAVE THESE PROBLEMS MADE IT FOR YOU TO DO YOUR WORK, TAKE CARE OF THINGS AT HOME, OR GET ALONG WITH OTHER PEOPLE: VERY DIFFICULT
1. FEELING NERVOUS, ANXIOUS, OR ON EDGE: NEARLY EVERY DAY
8. IF YOU CHECKED OFF ANY PROBLEMS, HOW DIFFICULT HAVE THESE MADE IT FOR YOU TO DO YOUR WORK, TAKE CARE OF THINGS AT HOME, OR GET ALONG WITH OTHER PEOPLE?: VERY DIFFICULT
6. BECOMING EASILY ANNOYED OR IRRITABLE: NOT AT ALL
GAD7 TOTAL SCORE: 13
GAD7 TOTAL SCORE: 13
7. FEELING AFRAID AS IF SOMETHING AWFUL MIGHT HAPPEN: NEARLY EVERY DAY
2. NOT BEING ABLE TO STOP OR CONTROL WORRYING: NEARLY EVERY DAY
7. FEELING AFRAID AS IF SOMETHING AWFUL MIGHT HAPPEN: NEARLY EVERY DAY
3. WORRYING TOO MUCH ABOUT DIFFERENT THINGS: NEARLY EVERY DAY

## 2022-10-03 NOTE — PROGRESS NOTES
[facility]                                     Progress Note    Patient Name: Teddy Shah  Date: 10/3/22         Service Type: Individual      Session Start Time: 11:13am  Session End Time:  11:53am     Session Length: 38-52    Session #: 20    Attendees: Client attended alone    Service Modality:  Video Visit:      Provider verified identity through the following two step process.  Patient provided:  Patient , Patient address and Patient is known previously to provider    Telemedicine Visit: The patient's condition can be safely assessed and treated via synchronous audio and visual telemedicine encounter.      Reason for Telemedicine Visit: Services only offered telehealth    Originating Site (Patient Location): Patient's car     Distant Site (Provider Location): Provider Remote Setting- Home Office    Consent:  The patient/guardian has verbally consented to: the potential risks and benefits of telemedicine (video visit) versus in person care; bill my insurance or make self-payment for services provided; and responsibility for payment of non-covered services.     Patient would like the video invitation sent by:  My Chart    Mode of Communication:  Video Conference via Amwell    As the provider I attest to compliance with applicable laws and regulations related to telemedicine.    DATA  Interactive Complexity: No  Crisis: No        Progress Since Last Session (Related to Symptoms / Goals / Homework):   Symptoms: Worsening depressive sxs per PHQ-9 and patient report, improving anxious sxs per KASSIE-7    Homework: Achieved / completed to satisfaction  Completed in session      Episode of Care Goals: Satisfactory progress - ACTION (Actively working towards change); Intervened by reinforcing change plan / affirming steps taken     Current / Ongoing Stressors and Concerns:  Work environment, work stress, interpersonal relationships, low self worth       Treatment Objective(s) Addressed in This  Session:   use at least   coping skills for anxiety management in the next 4 weeks  Decrease frequency and intensity of feeling down, depressed, hopeless  Identify negative self-talk and behaviors: challenge core beliefs, myths, and actions  Patient will attend and participate in social or recreational activities    Develop and utilize self-compassion strategies.   identify the time in your life when you began to feel poorly about themselves.     Intervention:   Motivational Interviewing    MI Intervention: Expressed Empathy/Understanding, Supported Autonomy, Collaboration, Evocation, Rolled with resistance: Amplified reflection (weaker or stronger meaning) and Reframe     Change Talk Expressed by the Patient: Desire to change Reasons to change Committment to change    Provider Response to Change Talk: E - Evoked more info from patient about behavior change, A - Affirmed patient's thoughts, decisions, or attempts at behavior change, R - Reflected patient's change talk and S - Summarized patient's change talk statements    Provider held space as patient processed experiences using reflective listening, validation, and normalization of patient's emotional response.  Provider worked to develop greater insight into patient's experiences. Provider validated and challenged patient.     CBT: Provider utilized CBT strategies to challenge and reframe patient's identified cognitive distortions.  Provider coached patient on challenging and reframing identified cognitive distortions.    Psychodynamic: Provider utilized psychodynamic strategies to identify how patient's perceived past failures have impacted his view of self and the role he plays at work and in relationships.  Provider worked to develop greater insight.      PHQ 7/15/2022 8/22/2022 10/3/2022   PHQ-9 Total Score 11 13 16   Q9: Thoughts of better off dead/self-harm past 2 weeks Not at all Not at all Not at all     KASSIE-7 SCORE 7/15/2022 8/22/2022 10/3/2022   Total  Score 15 (severe anxiety) 15 (severe anxiety) 13 (moderate anxiety)   Total Score 15 15 13           Assessments completed prior to visit:  The following assessments were completed by patient for this visit:  PROMIS 10-Global Health (only subscores and total score):   PROMIS-10 Scores Only 12/14/2021 3/28/2022 4/25/2022 7/15/2022   Global Mental Health Score 7 8 6 5   Global Physical Health Score 14 14 14 15   PROMIS TOTAL - SUBSCORES 21 22 20 20   Answers for HPI/ROS submitted by the patient on 10/3/2022  If you checked off any problems, how difficult have these problems made it for you to do your work, take care of things at home, or get along with other people?: Very difficult  PHQ9 TOTAL SCORE: 16  KASSIE 7 TOTAL SCORE: 13      [place] (Short version): see chart.      ASSESSMENT: Current Emotional / Mental Status (status of significant symptoms):   Risk status (Self / Other harm or suicidal ideation)   Patient denies current fears or concerns for personal safety.   Patient denies current or recent suicidal ideation or behaviors.   Patient denies current or recent homicidal ideation or behaviors.   Patient denies current or recent self injurious behavior or ideation.   Patient denies other safety concerns.   Patient reports there has been no change in risk factors since their last session.     Patient reports there has been no change in protective factors since their last session.     Recommended that patient call 911 or go to the local ED should there be a change in any of these risk factors.     Appearance:   Appropriate    Eye Contact:   Fair    Psychomotor Behavior: Normal    Attitude:   Cooperative    Orientation:   All   Speech    Rate / Production: Talkative    Volume:  Normal    Mood:    Anxious  Depressed  Fearful   Affect:    Subdued  Worrisome    Thought Content:  Rumination    Thought Form:  Coherent    Insight:    Fair      Medication Review:   No changes to current psychiatric  medication(s)     Medication Compliance:   Yes     Changes in Health Issues:   Yes: patient is making a healthy recovery following a medical procedure.     Chemical Use Review:   Substance Use: Chemical use reviewed, no active concerns identified      Tobacco Use: No current tobacco use.      Diagnosis:  1. Generalized anxiety disorder    2. Depression, unspecified depression type    3. Attention deficit hyperactivity disorder (ADHD), predominantly inattentive type     (Patient mentioned ADHD diagnosis in session.  Provider verified ADHD testing via chart review, patient was found to meet criteria for ADHD-predominantly inattentive type).    Collateral Reports Completed:   Not Applicable    PLAN: (Patient Tasks / Therapist Tasks / Other)    Patient will utilize crisis resources as needed.  Provider will work with patient to set healthy boundaries and utilize new coping skills.  Patient committed to try to decrease checking Twitter and think about how to increase positive messaging day to day to offset negativity.  Patient will also track OCD sxs.     Patient will utilize the following coping strategies to manage anxiety: Deep breathing, grounding exercises, engaging in civic duty. [place] exercise. Patient will also process and reality-check anxious thoughts after anxiety has decreased.     Patient will incorporate more deep-breathing and grounding exercises throughout the day.  Patient will utilize coping strategies discussed in session and challenge/reframe cognitive distortions (catastrophizing).         Provider and patient will focus on interpersonal relationships, internalizing criticism, self worth, and difficulties in communication moving forward. Provider will continue to assess R/O Obsessive Compulsive Disorder, R/O Schizoid and Schizotypal Personality Disorder.     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 Patient will continue to  "set boundaries and reflect on the \"minimum\" he feels that he needs to be at to be in a relationship.      Patient will utilize boundary setting strategies discussed in session.  Patient will reflect on what he would like to see in regards to change in his social life/interaction.  Patient will utilize coping strategies discussed in session.  Provider and patient will continue to explore patient's beliefs/thoughts around romantic relationships. Patient will take steps and utilize coping strategies discussed in session.      Provider will review treatment plan with patient as the next session.  Patient will work to challenge and reframe identified cognitive distortions and negative thoughts.  Provider will work to build up patient's sense of self worth.        Leatha Pearce [facility]  10/3/22  Service Performed and Documented by [facility]-   Note reviewed and clinical supervision by LONDON Patino [facility] 10/4/2022    ______________________________________________________________________    Individual Treatment Plan    Patient's Name: Teddy Shah  YOB: 1990    Date of Creation: 9/7/21  Date Treatment Plan Last Reviewed/Revised: 9/14/22    DSM5 Diagnoses: 311 (F32.9) Unspecified Depressive Disorder  or 300.02 (F41.1) Generalized Anxiety Disorder  Psychosocial / Contextual Factors: Hostile home environment, lack of social support, current events/politics a significant stress, family conflict  PROMIS (reviewed every 90 days):   WHODAS: 24    Referral / Collaboration:  Referral to another professional/service is not indicated at this time..    Anticipated number of session for this episode of care:   Anticipation frequency of session: Biweekly  Anticipated Duration of each session: 38-52 minutes  Treatment plan will be reviewed in 90 days or when goals have been changed.       MeasurableTreatment Goal(s) related to diagnosis / functional impairment(s)  Goal 1: Patient will successfully develop and utilize " "coping skills to manage depressive, anxious and ADHD sxs within 90 days. (Temporarily deferred, ongoing by patient 6/13/22)    I will know I've met my goal when I feel like I can get some of my executive function back (completing tasks).       Objective #A Patient will process experience around romantic relationship, reflect on how it has impacted his view of self, and how he would like to move forward.      Patient will work to develop a \"social life\" outside of work, and manage mental health sxs as they arise.              Patient will attend and participate in social or recreational activities    Develop and utilize self-compassion strategies.   identify the time in your life when you began to feel poorly about themselves.  Status: Restarted - Date: 9/14/22     Intervention(s)  Therapist will assign homework around utilizing self compassion strategies  teach psychoeducation around shame, guilt and healthy relationships.     Objective #B Patient will work to develop coping skills to \"turn brain off\" or switch tracks.    Patient will use thought-stopping strategy daily to reduce intrusive thoughts.  Status: Completed - Date: 6/13/22 (maintenance as needed)       Intervention(s)  Therapist will assign homework around using thought-stopping strategies and other coping skills  teach emotional regulation skills.       Patient has reviewed and agreed to the above plan.      CINDI Posada  March 14, 2022, reviewed by 6/13/22, reviewed 9/14/22  Service Performed and Documented by CINDI-   tx plan reviewed and clinical [facility]2022, 6/13/2022, 9/14/2022      Answers for HPI/ROS submitted by the patient on 3/28/2022  KASSIE 7 TOTAL SCORE: 13  "

## 2022-10-11 RX ORDER — DEXTROAMPHETAMINE SACCHARATE, AMPHETAMINE ASPARTATE MONOHYDRATE, DEXTROAMPHETAMINE SULFATE AND AMPHETAMINE SULFATE 7.5; 7.5; 7.5; 7.5 MG/1; MG/1; MG/1; MG/1
30 CAPSULE, EXTENDED RELEASE ORAL DAILY
Qty: 30 CAPSULE | Refills: 0 | Status: SHIPPED | OUTPATIENT
Start: 2022-11-11 | End: 2022-12-11

## 2022-10-11 RX ORDER — DEXTROAMPHETAMINE SACCHARATE, AMPHETAMINE ASPARTATE MONOHYDRATE, DEXTROAMPHETAMINE SULFATE AND AMPHETAMINE SULFATE 7.5; 7.5; 7.5; 7.5 MG/1; MG/1; MG/1; MG/1
30 CAPSULE, EXTENDED RELEASE ORAL DAILY
Qty: 30 CAPSULE | Refills: 0 | Status: SHIPPED | OUTPATIENT
Start: 2022-12-12 | End: 2023-01-11

## 2022-10-11 RX ORDER — DEXTROAMPHETAMINE SACCHARATE, AMPHETAMINE ASPARTATE MONOHYDRATE, DEXTROAMPHETAMINE SULFATE AND AMPHETAMINE SULFATE 7.5; 7.5; 7.5; 7.5 MG/1; MG/1; MG/1; MG/1
30 CAPSULE, EXTENDED RELEASE ORAL DAILY
Qty: 30 CAPSULE | Refills: 0 | Status: SHIPPED | OUTPATIENT
Start: 2022-10-11 | End: 2022-11-23

## 2022-10-11 NOTE — PATIENT INSTRUCTIONS
Thank you for choosing us for your care. I have placed an order for a prescription so that you can start treatment. View your full visit summary for details by clicking on the link below. Your pharmacist will able to address any questions you may have about the medication.     If you're not feeling better within 5-7 days, please schedule an appointment.  You can schedule an appointment right here in [facility], or call [phone]  If the visit is for the same symptoms as your eVisit, we'll refund the cost of your eVisit if seen within seven days.

## 2022-10-31 ENCOUNTER — MYC MEDICAL ADVICE (OUTPATIENT)
Dept: FAMILY MEDICINE | Facility: CLINIC | Age: 32
End: 2022-10-31

## 2022-11-10 ENCOUNTER — VIRTUAL VISIT (OUTPATIENT)
Dept: PSYCHOLOGY | Facility: CLINIC | Age: 32
End: 2022-11-10
Payer: COMMERCIAL

## 2022-11-10 DIAGNOSIS — F90.0 ATTENTION DEFICIT HYPERACTIVITY DISORDER (ADHD), PREDOMINANTLY INATTENTIVE TYPE: ICD-10-CM

## 2022-11-10 DIAGNOSIS — F41.1 GENERALIZED ANXIETY DISORDER: ICD-10-CM

## 2022-11-10 DIAGNOSIS — F32.A DEPRESSION, UNSPECIFIED DEPRESSION TYPE: Primary | ICD-10-CM

## 2022-11-10 PROCEDURE — 90834 PSYTX W PT 45 MINUTES: CPT | Mod: 95

## 2022-11-10 ASSESSMENT — ANXIETY QUESTIONNAIRES
GAD7 TOTAL SCORE: 14
2. NOT BEING ABLE TO STOP OR CONTROL WORRYING: NEARLY EVERY DAY
3. WORRYING TOO MUCH ABOUT DIFFERENT THINGS: NEARLY EVERY DAY
5. BEING SO RESTLESS THAT IT IS HARD TO SIT STILL: NOT AT ALL
1. FEELING NERVOUS, ANXIOUS, OR ON EDGE: NEARLY EVERY DAY
7. FEELING AFRAID AS IF SOMETHING AWFUL MIGHT HAPPEN: NEARLY EVERY DAY
4. TROUBLE RELAXING: SEVERAL DAYS
GAD7 TOTAL SCORE: 14
GAD7 TOTAL SCORE: 14
7. FEELING AFRAID AS IF SOMETHING AWFUL MIGHT HAPPEN: NEARLY EVERY DAY
6. BECOMING EASILY ANNOYED OR IRRITABLE: SEVERAL DAYS
IF YOU CHECKED OFF ANY PROBLEMS ON THIS QUESTIONNAIRE, HOW DIFFICULT HAVE THESE PROBLEMS MADE IT FOR YOU TO DO YOUR WORK, TAKE CARE OF THINGS AT HOME, OR GET ALONG WITH OTHER PEOPLE: VERY DIFFICULT
8. IF YOU CHECKED OFF ANY PROBLEMS, HOW DIFFICULT HAVE THESE MADE IT FOR YOU TO DO YOUR WORK, TAKE CARE OF THINGS AT HOME, OR GET ALONG WITH OTHER PEOPLE?: VERY DIFFICULT

## 2022-11-10 NOTE — PROGRESS NOTES
[facility]                                     Progress Note    Patient Name: Teddy Shah  Date: 11/10/22         Service Type: Individual      Session Start Time: 10:01am  Session End Time:  10:47am     Session Length: 38-52min    Session #: 21    Attendees: Client attended alone    Service Modality:  Video Visit:      Provider verified identity through the following two step process.  Patient provided:  Patient , Patient address and Patient is known previously to provider    Telemedicine Visit: The patient's condition can be safely assessed and treated via synchronous audio and visual telemedicine encounter.      Reason for Telemedicine Visit: Services only offered telehealth    Originating Site (Patient Location): Patient's car     Distant Site (Provider Location): Provider Remote Setting- Home Office    Consent:  The patient/guardian has verbally consented to: the potential risks and benefits of telemedicine (video visit) versus in person care; bill my insurance or make self-payment for services provided; and responsibility for payment of non-covered services.     Patient would like the video invitation sent by:  My Chart    Mode of Communication:  Video Conference via [employer]    As the provider I attest to compliance with applicable laws and regulations related to telemedicine.    DATA  Interactive Complexity: No  Crisis: No        Progress Since Last Session (Related to Symptoms / Goals / Homework):   Symptoms: Improving depressive sxs and worsening anxious sxs per PHQ-9, KASSIE-7, and patient report    Homework: Completed in session      Episode of Care Goals: Satisfactory progress - ACTION (Actively working towards change); Intervened by reinforcing change plan / affirming steps taken     Current / Ongoing Stressors and Concerns:  Work stress, politics, low self worth, feeling stuck, negative experience with school       Treatment Objective(s) Addressed in This Session:   use at least    coping skills for anxiety management in the next 4 weeks  Decrease frequency and intensity of feeling down, depressed, hopeless  Identify negative self-talk and behaviors: challenge core beliefs, myths, and actions  Patient will attend and participate in social or recreational activities    Develop and utilize self-compassion strategies.   identify the time in your life when you began to feel poorly about themselves.     Intervention:   Motivational Interviewing    MI Intervention: Expressed Empathy/Understanding, Supported Autonomy, Collaboration, Evocation, Rolled with resistance: Emphasized patient autonomy, Reframed sustain talk in the direction of change and Evoked patient agenda, Importance Scale (1-10) 10/10 Confidence Scale (1-10) 3/10  and Reframe     Change Talk Expressed by the Patient: Desire to change Reasons to change Committment to change    Provider Response to Change Talk: E - Evoked more info from patient about behavior change, A - Affirmed patient's thoughts, decisions, or attempts at behavior change, R - Reflected patient's change talk and S - Summarized patient's change talk statements    Provider held space as patient processed experiences using reflective listening, validation, and normalization of patient's emotional response.  Provider worked to develop greater insight into patient's experiences. Provider validated and challenged patient.     CBT: Provider utilized CBT strategies to challenge and reframe patient's identified cognitive distortions.  Provider coached patient on challenging and reframing identified cognitive distortions.    Provider engaged patient in conversation around feeling stuck.  Provider worked to develop greater insight around patient's feelings and role of anxiety as a limiting factor.  Provider validated patient.      Solution-focused: Provider and patient worked to identify a solution that patient was agreeable to working towards to improve self worth and move  towards increased independence.          PHQ 8/22/2022 10/3/2022 11/10/2022   PHQ-9 Total Score 13 16 14   Q9: Thoughts of better off dead/self-harm past 2 weeks Not at all Not at all Not at all     KASSIE-7 SCORE 8/22/2022 10/3/2022 11/10/2022   Total Score 15 (severe anxiety) 13 (moderate anxiety) 14 (moderate anxiety)   Total Score 15 13 14     Answers for HPI/ROS submitted by the patient on 11/10/2022  If you checked off any problems, how difficult have these problems made it for you to do your work, take care of things at home, or get along with other people?: Very difficult  PHQ9 TOTAL SCORE: 14  KASSIE 7 TOTAL SCORE: 14          Assessments completed prior to visit:  The following assessments were completed by patient for this visit:  PROMIS 10-Global Health (only subscores and total score):   PROMIS-10 Scores Only 12/14/2021 3/28/2022 4/25/2022 7/15/2022 11/10/2022   Global Mental Health Score 7 8 6 5 7   Global Physical Health Score 14 14 14 15 14   PROMIS TOTAL - SUBSCORES 21 22 20 20 21   Answers for HPI/ROS submitted by the patient on 10/3/2022  If you checked off any problems, how difficult have these problems made it for you to do your work, take care of things at home, or get along with other people?: Very difficult  PHQ9 TOTAL SCORE: 16  KASSIE 7 TOTAL SCORE: 13      [place] (Short version): see chart.      ASSESSMENT: Current Emotional / Mental Status (status of significant symptoms):   Risk status (Self / Other harm or suicidal ideation)   Patient denies current fears or concerns for personal safety.   Patient denies current or recent suicidal ideation or behaviors.   Patient denies current or recent homicidal ideation or behaviors.   Patient denies current or recent self injurious behavior or ideation.   Patient denies other safety concerns.   Patient reports there has been no change in risk factors since their last session.     Patient reports there has been no change in protective factors since their  last session.     Recommended that patient call 911 or go to the local ED should there be a change in any of these risk factors.     Appearance:   Appropriate    Eye Contact:   Fair    Psychomotor Behavior: Normal    Attitude:   Cooperative    Orientation:   All   Speech    Rate / Production: Talkative    Volume:  Normal    Mood:    Anxious  Depressed    Affect:    Subdued  Worrisome    Thought Content:  Rumination    Thought Form:  Coherent    Insight:    Fair      Medication Review:   No changes to current psychiatric medication(s)     Medication Compliance:   Yes     Changes in Health Issues:   Yes: patient is making a healthy recovery following a medical procedure.     Chemical Use Review:   Substance Use: Chemical use reviewed, no active concerns identified      Tobacco Use: No current tobacco use.      Diagnosis:  1. Depression, unspecified depression type    2. Generalized anxiety disorder    3. Attention deficit hyperactivity disorder (ADHD), predominantly inattentive type     (Patient mentioned ADHD diagnosis in session.  Provider verified ADHD testing via chart review, patient was found to meet criteria for ADHD-predominantly inattentive type).    Collateral Reports Completed:   Not Applicable    PLAN: (Patient Tasks / Therapist Tasks / Other)    Patient will utilize crisis resources as needed.  Provider will work with patient to set healthy boundaries and utilize new coping skills.  Patient committed to try to decrease checking Twitter and think about how to increase positive messaging day to day to offset negativity.  Patient will also track OCD sxs.     Patient will utilize the following coping strategies to manage anxiety: Deep breathing, grounding exercises, engaging in civic duty. [place] exercise. Patient will also process and reality-check anxious thoughts after anxiety has decreased.     Patient will incorporate more deep-breathing and grounding exercises throughout the day.  Patient will utilize  "coping strategies discussed in session and challenge/reframe cognitive distortions (catastrophizing).         Provider and patient will focus on interpersonal relationships, internalizing criticism, self worth, and difficulties in communication moving forward. Provider will continue to assess R/O Obsessive Compulsive Disorder, R/O Schizoid and Schizotypal Personality Disorder.     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 Patient will continue to set boundaries and reflect on the \"minimum\" he feels that he needs to be at to be in a relationship.      Patient will utilize boundary setting strategies discussed in session.  Patient will reflect on what he would like to see in regards to change in his social life/interaction.  Patient will utilize coping strategies discussed in session.  Provider and patient will continue to explore patient's beliefs/thoughts around romantic relationships. Patient will take steps and utilize coping strategies discussed in session.      Patient will work to challenge and reframe identified cognitive distortions and negative thoughts.  Provider will work to build up patient's sense of self worth.      Patient will reflect on the January conversation he would like to have about a raise.        Leatha Pearce TOSHIA  11/10/22  Service Performed and Documented by [facility]-   Note reviewed and clinical supervision by LONDON Patino [facility] 11/10/2022    ______________________________________________________________________    Individual Treatment Plan    Patient's Name: Teddy Shah  YOB: 1990    Date of Creation: 9/7/21  Date Treatment Plan Last Reviewed/Revised: 9/14/22    DSM5 Diagnoses: 311 (F32.9) Unspecified Depressive Disorder  or 300.02 (F41.1) Generalized Anxiety Disorder  Psychosocial / Contextual Factors: Hostile home environment, lack of social support, current events/politics a significant " "stress, family conflict  PROMIS (reviewed every 90 days):   WHODAS: 24    Referral / Collaboration:  Referral to another professional/service is not indicated at this time..    Anticipated number of session for this episode of care:   Anticipation frequency of session: Biweekly  Anticipated Duration of each session: 38-52 minutes  Treatment plan will be reviewed in 90 days or when goals have been changed.       MeasurableTreatment Goal(s) related to diagnosis / functional impairment(s)  Goal 1: Patient will successfully develop and utilize coping skills to manage depressive, anxious and ADHD sxs within 90 days. (Temporarily deferred, ongoing by patient 6/13/22)    I will know I've met my goal when I feel like I can get some of my executive function back (completing tasks).       Objective #A Patient will process experience around romantic relationship, reflect on how it has impacted his view of self, and how he would like to move forward.      Patient will work to develop a \"social life\" outside of work, and manage mental health sxs as they arise.              Patient will attend and participate in social or recreational activities    Develop and utilize self-compassion strategies.   identify the time in your life when you began to feel poorly about themselves.  Status: Restarted - Date: 9/14/22     Intervention(s)  Therapist will assign homework around utilizing self compassion strategies  teach psychoeducation around shame, guilt and healthy relationships.     Objective #B Patient will work to develop coping skills to \"turn brain off\" or switch tracks.    Patient will use thought-stopping strategy daily to reduce intrusive thoughts.  Status: Completed - Date: 6/13/22 (maintenance as needed)       Intervention(s)  Therapist will assign homework around using thought-stopping strategies and other coping skills  teach emotional regulation skills.       Patient has reviewed and agreed to the above plan.      Leatha SKINNER" CINDI Pearce  March 14, 2022, reviewed by 6/13/22, reviewed 9/14/22  Service Performed and Documented by CINDI-   tx plan reviewed and clinical supervision [facility], 6/13/2022, 9/14/2022      Answers for HPI/ROS submitted by the patient on 3/28/2022  KASSIE 7 TOTAL SCORE: 13

## 2022-11-23 ENCOUNTER — MYC REFILL (OUTPATIENT)
Dept: FAMILY MEDICINE | Facility: CLINIC | Age: 32
End: 2022-11-23

## 2022-11-23 DIAGNOSIS — F90.0 ADHD (ATTENTION DEFICIT HYPERACTIVITY DISORDER), INATTENTIVE TYPE: ICD-10-CM

## 2022-11-26 DIAGNOSIS — F33.1 MODERATE EPISODE OF RECURRENT MAJOR DEPRESSIVE DISORDER (H): ICD-10-CM

## 2022-11-26 DIAGNOSIS — F41.9 ANXIETY: ICD-10-CM

## 2022-12-03 ENCOUNTER — MYC REFILL (OUTPATIENT)
Dept: FAMILY MEDICINE | Facility: CLINIC | Age: 32
End: 2022-12-03

## 2022-12-03 DIAGNOSIS — F41.9 ANXIETY: ICD-10-CM

## 2022-12-03 DIAGNOSIS — F90.0 ADHD (ATTENTION DEFICIT HYPERACTIVITY DISORDER), INATTENTIVE TYPE: ICD-10-CM

## 2022-12-03 DIAGNOSIS — F33.1 MODERATE EPISODE OF RECURRENT MAJOR DEPRESSIVE DISORDER (H): ICD-10-CM

## 2022-12-03 RX ORDER — DEXTROAMPHETAMINE SACCHARATE, AMPHETAMINE ASPARTATE MONOHYDRATE, DEXTROAMPHETAMINE SULFATE AND AMPHETAMINE SULFATE 7.5; 7.5; 7.5; 7.5 MG/1; MG/1; MG/1; MG/1
30 CAPSULE, EXTENDED RELEASE ORAL DAILY
Qty: 30 CAPSULE | Refills: 0 | Status: CANCELLED | OUTPATIENT
Start: 2022-12-03

## 2022-12-05 RX ORDER — SERTRALINE HYDROCHLORIDE 100 MG/1
TABLET, FILM COATED ORAL
Qty: 180 TABLET | Refills: 0 | Status: SHIPPED | OUTPATIENT
Start: 2022-12-05 | End: 2022-12-22

## 2022-12-05 RX ORDER — DEXTROAMPHETAMINE SACCHARATE, AMPHETAMINE ASPARTATE MONOHYDRATE, DEXTROAMPHETAMINE SULFATE AND AMPHETAMINE SULFATE 7.5; 7.5; 7.5; 7.5 MG/1; MG/1; MG/1; MG/1
30 CAPSULE, EXTENDED RELEASE ORAL DAILY
Qty: 30 CAPSULE | Refills: 0 | Status: SHIPPED | OUTPATIENT
Start: 2022-12-05 | End: 2023-10-27 | Stop reason: DRUGHIGH

## 2022-12-05 RX ORDER — SERTRALINE HYDROCHLORIDE 100 MG/1
TABLET, FILM COATED ORAL
Qty: 180 TABLET | Refills: 0 | OUTPATIENT
Start: 2022-12-05

## 2022-12-09 ENCOUNTER — VIRTUAL VISIT (OUTPATIENT)
Dept: PSYCHOLOGY | Facility: CLINIC | Age: 32
End: 2022-12-09
Payer: COMMERCIAL

## 2022-12-09 DIAGNOSIS — F90.0 ATTENTION DEFICIT HYPERACTIVITY DISORDER (ADHD), PREDOMINANTLY INATTENTIVE TYPE: ICD-10-CM

## 2022-12-09 DIAGNOSIS — F41.1 GENERALIZED ANXIETY DISORDER: ICD-10-CM

## 2022-12-09 DIAGNOSIS — F32.A DEPRESSION, UNSPECIFIED DEPRESSION TYPE: Primary | ICD-10-CM

## 2022-12-09 PROCEDURE — 90834 PSYTX W PT 45 MINUTES: CPT | Mod: 95

## 2022-12-09 NOTE — PROGRESS NOTES
"    [facility] Counseling                                     Progress Note    Patient Name: Teddy Shah  Date: 22         Service Type: Individual      Session Start Time: 11:06am  Session End Time:  11:50am     Session Length: 38-52min    Session #: 22    Attendees: Client attended alone    Service Modality:  Phone Visit:      Provider verified identity through the following two step process.  Patient provided:  Patient  and Patient is known previously to provider    The patient has been notified of the following:      \"We have found that certain health care needs can be provided without the need for a face to face visit.  This service lets us provide the care you need with a phone conversation.       I will have full access to your [facility] medical record during this entire phone call.   I will be taking notes for your medical record.      Since this is like an office visit, we will bill your insurance company for this service.       There are potential benefits and risks of telephone visits (e.g. limits to patient confidentiality) that differ from in-person visits.?Confidentiality still applies for telephone services, and nobody will record the visit.  It is important to be in a quiet, private space that is free of distractions (including cell phone or other devices) during the visit.??      If during the course of the call I believe a telephone visit is not appropriate, you will not be charged for this service\"     Consent has been obtained for this service by care team member: Yes    DATA  Interactive Complexity: No  Crisis: No        Progress Since Last Session (Related to Symptoms / Goals / Homework):   Symptoms: No change in anxious and depressive sxs per patient report    Homework: Completed in session      Episode of Care Goals: Satisfactory progress - ACTION (Actively working towards change); Intervened by reinforcing change plan / affirming steps taken     Current / Ongoing " Stressors and Concerns:  Work stress, politics, low self worth, feeling stuck, negative experience with school       Treatment Objective(s) Addressed in This Session:   use at least   coping skills for anxiety management in the next 4 weeks  Decrease frequency and intensity of feeling down, depressed, hopeless  Identify negative self-talk and behaviors: challenge core beliefs, myths, and actions  Patient will attend and participate in social or recreational activities    Develop and utilize self-compassion strategies.   identify the time in your life when you began to feel poorly about themselves.     Intervention:   Motivational Interviewing    MI Intervention: Expressed Empathy/Understanding, Supported Autonomy, Collaboration, Evocation, Rolled with resistance: Emphasized patient autonomy, Reframed sustain talk in the direction of change and Evoked patient agenda and Reframe     Change Talk Expressed by the Patient: Desire to change Reasons to change Committment to change    Provider Response to Change Talk: E - Evoked more info from patient about behavior change, A - Affirmed patient's thoughts, decisions, or attempts at behavior change, R - Reflected patient's change talk and S - Summarized patient's change talk statements    Provider held space as patient processed experiences using reflective listening, validation, and normalization of patient's emotional response.  Provider worked to develop greater insight into patient's experiences. Provider validated and challenged patient.     Provider validated patient and worked to develop greater self compassion.      CBT: Provider utilized CBT strategies to challenge and reframe patient's identified cognitive distortions.        PHQ 8/22/2022 10/3/2022 11/10/2022   PHQ-9 Total Score 13 16 14   Q9: Thoughts of better off dead/self-harm past 2 weeks Not at all Not at all Not at all     KASSIE-7 SCORE 8/22/2022 10/3/2022 11/10/2022   Total Score 15 (severe anxiety) 13  (moderate anxiety) 14 (moderate anxiety)   Total Score 15 13 14     Answers for HPI/ROS submitted by the patient on 11/10/2022  If you checked off any problems, how difficult have these problems made it for you to do your work, take care of things at home, or get along with other people?: Very difficult  PHQ9 TOTAL SCORE: 14  KASSIE 7 TOTAL SCORE: 14          Assessments completed prior to visit:  The following assessments were completed by patient for this visit:  PROMIS 10-Global Health (only subscores and total score):   PROMIS-10 Scores Only 12/14/2021 3/28/2022 4/25/2022 7/15/2022 11/10/2022   Global Mental Health Score 7 8 6 5 7   Global Physical Health Score 14 14 14 15 14   PROMIS TOTAL - SUBSCORES 21 22 20 20 21   Answers for HPI/ROS submitted by the patient on 10/3/2022  If you checked off any problems, how difficult have these problems made it for you to do your work, take care of things at home, or get along with other people?: Very difficult  PHQ9 TOTAL SCORE: 16  KASSIE 7 TOTAL SCORE: 13      [place] (Short version): see chart.      ASSESSMENT: Current Emotional / Mental Status (status of significant symptoms):   Risk status (Self / Other harm or suicidal ideation)   Patient denies current fears or concerns for personal safety.   Patient denies current or recent suicidal ideation or behaviors.   Patient denies current or recent homicidal ideation or behaviors.   Patient denies current or recent self injurious behavior or ideation.   Patient denies other safety concerns.   Patient reports there has been no change in risk factors since their last session.     Patient reports there has been no change in protective factors since their last session.     Recommended that patient call 911 or go to the local ED should there be a change in any of these risk factors.     Appearance:   N/A Phone Visit    Eye Contact:   N/A Phone Visit    Psychomotor Behavior: N/A Phone Visit    Attitude:   Cooperative     Orientation:   All   Speech    Rate / Production: Talkative    Volume:  Normal    Mood:    Anxious  Depressed    Affect:    Subdued  Worrisome    Thought Content:  Rumination    Thought Form:  Coherent    Insight:    Fair      Medication Review:   No changes to current psychiatric medication(s)     Medication Compliance:   Yes     Changes in Health Issues:   Yes: patient is making a healthy recovery following a medical procedure.     Chemical Use Review:   Substance Use: Chemical use reviewed, no active concerns identified      Tobacco Use: No current tobacco use.      Diagnosis:  1. Depression, unspecified depression type    2. Generalized anxiety disorder    3. Attention deficit hyperactivity disorder (ADHD), predominantly inattentive type     (Patient mentioned ADHD diagnosis in session.  Provider verified ADHD testing via chart review, patient was found to meet criteria for ADHD-predominantly inattentive type).    Collateral Reports Completed:   Not Applicable    PLAN: (Patient Tasks / Therapist Tasks / Other)    Patient will utilize crisis resources as needed.  Provider will work with patient to set healthy boundaries and utilize new coping skills.  Patient committed to try to decrease checking Twitter and think about how to increase positive messaging day to day to offset negativity.  Patient will also track OCD sxs.     Patient will utilize the following coping strategies to manage anxiety: Deep breathing, grounding exercises, engaging in civic duty. [place] exercise. Patient will also process and reality-check anxious thoughts after anxiety has decreased.     Patient will incorporate more deep-breathing and grounding exercises throughout the day.  Patient will utilize coping strategies discussed in session and challenge/reframe cognitive distortions (catastrophizing).         Provider and patient will focus on interpersonal relationships, internalizing criticism, self worth, and difficulties in  "communication moving forward. Provider will continue to assess R/O Obsessive Compulsive Disorder, R/O Schizoid and Schizotypal Personality Disorder.     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 Patient will continue to set boundaries and reflect on the \"minimum\" he feels that he needs to be at to be in a relationship.      Patient will utilize boundary setting strategies discussed in session.  Patient will reflect on what he would like to see in regards to change in his social life/interaction.  Patient will utilize coping strategies discussed in session.  Provider and patient will continue to explore patient's beliefs/thoughts around romantic relationships. Patient will take steps and utilize coping strategies discussed in session.      Patient will work to challenge and reframe identified cognitive distortions and negative thoughts.  Provider will work to build up patient's sense of self worth.      Patient will reflect on the conversation he would like to have about a raise.          Leatha Pearce [facility]  12/9/22  Service Performed and Documented by [facility]-   Note reviewed and clinical supervision by LONDON Patino [facility] 12/12/2022    ______________________________________________________________________    Individual Treatment Plan    Patient's Name: Teddy Shah  YOB: 1990    Date of Creation: 9/7/21  Date Treatment Plan Last Reviewed/Revised: 9/14/22    DSM5 Diagnoses: 311 (F32.9) Unspecified Depressive Disorder  or 300.02 (F41.1) Generalized Anxiety Disorder  Psychosocial / Contextual Factors: Hostile home environment, lack of social support, current events/politics a significant stress, family conflict  PROMIS (reviewed every 90 days):   WHODAS: 24    Referral / Collaboration:  Referral to another professional/service is not indicated at this time..    Anticipated number of session for this episode of care: " "  Anticipation frequency of session: Biweekly  Anticipated Duration of each session: 38-52 minutes  Treatment plan will be reviewed in 90 days or when goals have been changed.       MeasurableTreatment Goal(s) related to diagnosis / functional impairment(s)  Goal 1: Patient will successfully develop and utilize coping skills to manage depressive, anxious and ADHD sxs within 90 days. (Temporarily deferred, ongoing by patient 6/13/22)    I will know I've met my goal when I feel like I can get some of my executive function back (completing tasks).       Objective #A Patient will process experience around romantic relationship, reflect on how it has impacted his view of self, and how he would like to move forward.      Patient will work to develop a \"social life\" outside of work, and manage mental health sxs as they arise.              Patient will attend and participate in social or recreational activities    Develop and utilize self-compassion strategies.   identify the time in your life when you began to feel poorly about themselves.  Status: Restarted - Date: 9/14/22     Intervention(s)  Therapist will assign homework around utilizing self compassion strategies  teach psychoeducation around shame, guilt and healthy relationships.     Objective #B Patient will work to develop coping skills to \"turn brain off\" or switch tracks.    Patient will use thought-stopping strategy daily to reduce intrusive thoughts.  Status: Completed - Date: 6/13/22 (maintenance as needed)       Intervention(s)  Therapist will assign homework around using thought-stopping strategies and other coping skills  teach emotional regulation skills.       Patient has reviewed and agreed to the above plan.      CINDI Posada  March 14, 2022, reviewed by 6/13/22, reviewed 9/14/22  Service Performed and Documented by CINDI-   tx plan reviewed and clinical [facility]2022, 6/13/2022, 9/14/2022      Answers for " HPI/ROS submitted by the patient on 3/28/2022  KASSIE 7 TOTAL SCORE: 13

## 2022-12-22 ENCOUNTER — VIRTUAL VISIT (OUTPATIENT)
Dept: FAMILY MEDICINE | Facility: CLINIC | Age: 32
End: 2022-12-22
Payer: COMMERCIAL

## 2022-12-22 DIAGNOSIS — F33.1 MODERATE EPISODE OF RECURRENT MAJOR DEPRESSIVE DISORDER (H): ICD-10-CM

## 2022-12-22 DIAGNOSIS — F41.9 ANXIETY: ICD-10-CM

## 2022-12-22 DIAGNOSIS — E66.01 CLASS 2 SEVERE OBESITY DUE TO EXCESS CALORIES WITH SERIOUS COMORBIDITY AND BODY MASS INDEX (BMI) OF 36.0 TO 36.9 IN ADULT (H): ICD-10-CM

## 2022-12-22 DIAGNOSIS — F90.0 ADHD (ATTENTION DEFICIT HYPERACTIVITY DISORDER), INATTENTIVE TYPE: Primary | ICD-10-CM

## 2022-12-22 DIAGNOSIS — E66.812 CLASS 2 SEVERE OBESITY DUE TO EXCESS CALORIES WITH SERIOUS COMORBIDITY AND BODY MASS INDEX (BMI) OF 36.0 TO 36.9 IN ADULT (H): ICD-10-CM

## 2022-12-22 PROCEDURE — 99214 OFFICE O/P EST MOD 30 MIN: CPT | Mod: 95 | Performed by: NURSE PRACTITIONER

## 2022-12-22 RX ORDER — SERTRALINE HYDROCHLORIDE 100 MG/1
200 TABLET, FILM COATED ORAL DAILY
Qty: 180 TABLET | Refills: 1 | Status: SHIPPED | OUTPATIENT
Start: 2022-12-22 | End: 2023-04-17

## 2022-12-22 RX ORDER — DEXTROAMPHETAMINE SACCHARATE, AMPHETAMINE ASPARTATE MONOHYDRATE, DEXTROAMPHETAMINE SULFATE AND AMPHETAMINE SULFATE 7.5; 7.5; 7.5; 7.5 MG/1; MG/1; MG/1; MG/1
30 CAPSULE, EXTENDED RELEASE ORAL DAILY
Qty: 30 CAPSULE | Refills: 0 | Status: SHIPPED | OUTPATIENT
Start: 2023-02-22 | End: 2023-03-24

## 2022-12-22 RX ORDER — DEXTROAMPHETAMINE SACCHARATE, AMPHETAMINE ASPARTATE MONOHYDRATE, DEXTROAMPHETAMINE SULFATE AND AMPHETAMINE SULFATE 7.5; 7.5; 7.5; 7.5 MG/1; MG/1; MG/1; MG/1
30 CAPSULE, EXTENDED RELEASE ORAL DAILY
Qty: 30 CAPSULE | Refills: 0 | Status: SHIPPED | OUTPATIENT
Start: 2022-12-22 | End: 2023-01-21

## 2022-12-22 RX ORDER — DEXTROAMPHETAMINE SACCHARATE, AMPHETAMINE ASPARTATE MONOHYDRATE, DEXTROAMPHETAMINE SULFATE AND AMPHETAMINE SULFATE 7.5; 7.5; 7.5; 7.5 MG/1; MG/1; MG/1; MG/1
30 CAPSULE, EXTENDED RELEASE ORAL DAILY
Qty: 30 CAPSULE | Refills: 0 | Status: SHIPPED | OUTPATIENT
Start: 2023-01-22 | End: 2023-02-21

## 2022-12-22 NOTE — PROGRESS NOTES
Teddy is a 32 year old who is being evaluated via a billable video visit.      How would you like to obtain your AVS? MyChart  If the video visit is dropped, the invitation should be resent by: Text to cell phone: [phone]  Will anyone else be joining your video visit? No        Assessment & Plan     ADHD (attention deficit hyperactivity disorder), inattentive type  Stable on current dose of Adderall.  - REVIEW OF HEALTH MAINTENANCE PROTOCOL ORDERS  - amphetamine-dextroamphetamine (ADDERALL XR) 30 MG 24 hr capsule  Dispense: 30 capsule; Refill: 0  - amphetamine-dextroamphetamine (ADDERALL XR) 30 MG 24 hr capsule  Dispense: 30 capsule; Refill: 0  - amphetamine-dextroamphetamine (ADDERALL XR) 30 MG 24 hr capsule  Dispense: 30 capsule; Refill: 0    Moderate episode of recurrent major depressive disorder (H)  Stable, continue Zoloft and counselor.   - sertraline (ZOLOFT) 100 MG tablet  Dispense: 180 tablet; Refill: 1    Class 2 severe obesity due to excess calories with serious comorbidity and body mass index (BMI) of 36.0 to 36.9 in adult (H)  Benefits of weight loss reviewed in detail, encouraged him to cut back on the carbohydrates in the diet, consume more fruits and vegetables, drink plenty of water, avoid fruit juices, sodas, get 150 min moderate exercise/week.  Recheck weight in 6 months.     Anxiety  Continue Zoloft and counseling.  - sertraline (ZOLOFT) 100 MG tablet  Dispense: 180 tablet; Refill: 1      Ordering of each unique test  Prescription drug management  20 minutes spent on the date of the encounter doing chart review, history and exam, documentation and further activities per the note       BMI:   Estimated body mass index is 37.51 kg/m  as calculated from the following:    Height as of 7/11/22: 1.829 m (6').    Weight as of 7/11/22: 125.5 kg (276 lb 9.6 oz).   Weight management plan: Discussed healthy diet and exercise guidelines    Work on weight loss  Regular exercise  See Patient  "Instructions    No follow-ups on file.    AB Hinds CNP  [facility] OVI Espinal is a 32 year old presenting for the following health issues:  Recheck Medication (Adderall) and Covid 19 Testing (Tested positive on 12/5)      History of Present Illness       Reason for visit:  Followup on ADHD medication    Teddy Shah eats 2-3 servings of fruits and vegetables daily.Teddy Shah consumes 0 sweetened beverage(s) daily.Teddy Shah exercises with enough effort to increase Teddy Shah's heart rate 30 to 60 minutes per day.  Teddy Shah exercises with enough effort to increase Teddy Shah's heart rate 5 days per week.   Teddy Shah is taking medications regularly.        Medication Followup of Adderall    Taking Medication as prescribed: yes    Side Effects:  \"dinged appetite\", occasional headaches    Medication Helping Symptoms:  yes      COVID-19 Symptom Review  How many days ago did these symptoms start? 12/4    Are any of the following symptoms significant for you?    New or worsening difficulty breathing? No - previously did     Worsening cough? No - previously did    Fever or chills? No - previously did    Headache: No    Sore throat: No    Chest pain: No - previously did    Diarrhea: No    Body aches? No    Fatigue    \"Lungs feel like they have crap\"   Sense of taste is not back to normal yet.    What treatments has patient tried? tylenol   Does patient live in a nursing home, group home, or shelter? No  Does patient have a way to get food/medications during quarantined? Yes, I have a friend or family member who can help me.    Depression Followup    How are you doing with your depression since your last visit? 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    Are you having other symptoms that might be associated with depression? Yes:  .    Have you had a significant life event?  Job Concerns     Are you feeling anxious or having panic " attacks?   No    Do you have any concerns with your use of alcohol or other drugs? No    Social History     Tobacco Use     Smoking status: Never     Smokeless tobacco: Never   Substance Use Topics     Alcohol use: No     Drug use: No     PHQ 8/22/2022 10/3/2022 11/10/2022   PHQ-9 Total Score 13 16 14   Q9: Thoughts of better off dead/self-harm past 2 weeks Not at all Not at all Not at all     KASSIE-7 SCORE 8/22/2022 10/3/2022 11/10/2022   Total Score 15 (severe anxiety) 13 (moderate anxiety) 14 (moderate anxiety)   Total Score 15 13 14     Last PHQ-9 11/10/2022   1.  Little interest or pleasure in doing things 1   2.  Feeling down, depressed, or hopeless 2   3.  Trouble falling or staying asleep, or sleeping too much 1   4.  Feeling tired or having little energy 3   5.  Poor appetite or overeating 1   6.  Feeling bad about yourself 2   7.  Trouble concentrating 3   8.  Moving slowly or restless 1   Q9: Thoughts of better off dead/self-harm past 2 weeks 0   PHQ-9 Total Score 14   Difficulty at work, home, or with people -     KASSIE-7  11/10/2022   1. Feeling nervous, anxious, or on edge 3   2. Not being able to stop or control worrying 3   3. Worrying too much about different things 3   4. Trouble relaxing 1   5. Being so restless that it is hard to sit still 0   6. Becoming easily annoyed or irritable 1   7. Feeling afraid, as if something awful might happen 3   KASSIE-7 Total Score 14   If you checked any problems, how difficult have they made it for you to do your work, take care of things at home, or get along with other people? Very difficult       Suicide Assessment Five-step Evaluation and Treatment (SAFE-T)          Review of Systems   Constitutional, HEENT, cardiovascular, pulmonary, gi and gu systems are negative, except as otherwise noted.      Objective           Vitals:  No vitals were obtained today due to virtual visit.    Physical Exam   GENERAL: Healthy, alert and no distress  EYES: Eyes grossly normal to  inspection.  No discharge or erythema, or obvious scleral/conjunctival abnormalities.  HENT: Normal cephalic/atraumatic.  External ears, nose and mouth without ulcers or lesions.  No nasal drainage visible.  NECK: No asymmetry, visible masses or scars  RESP: No audible wheeze, cough, or visible cyanosis.  No visible retractions or increased work of breathing.    SKIN: Visible skin clear. No significant rash, abnormal pigmentation or lesions.  NEURO: Cranial nerves grossly intact.  Mentation and speech appropriate for age.  PSYCH: Mentation appears normal, affect normal/bright, judgement and insight intact, normal speech and appearance well-groomed.          Video-Visit Details    Type of service:  Video Visit     Originating Location (pt. Location): Home  Distant Location (provider location):  Off-site  Platform used for Video Visit: [employer]

## 2023-01-08 ENCOUNTER — MYC REFILL (OUTPATIENT)
Dept: FAMILY MEDICINE | Facility: CLINIC | Age: 33
End: 2023-01-08

## 2023-01-08 DIAGNOSIS — F90.0 ADHD (ATTENTION DEFICIT HYPERACTIVITY DISORDER), INATTENTIVE TYPE: ICD-10-CM

## 2023-01-09 ENCOUNTER — VIRTUAL VISIT (OUTPATIENT)
Dept: PSYCHOLOGY | Facility: CLINIC | Age: 33
End: 2023-01-09
Payer: COMMERCIAL

## 2023-01-09 DIAGNOSIS — F32.A DEPRESSION, UNSPECIFIED DEPRESSION TYPE: ICD-10-CM

## 2023-01-09 DIAGNOSIS — F90.0 ATTENTION DEFICIT HYPERACTIVITY DISORDER (ADHD), PREDOMINANTLY INATTENTIVE TYPE: ICD-10-CM

## 2023-01-09 DIAGNOSIS — F41.1 GENERALIZED ANXIETY DISORDER: Primary | ICD-10-CM

## 2023-01-09 PROCEDURE — 90834 PSYTX W PT 45 MINUTES: CPT | Mod: 95

## 2023-01-09 ASSESSMENT — ANXIETY QUESTIONNAIRES
7. FEELING AFRAID AS IF SOMETHING AWFUL MIGHT HAPPEN: NEARLY EVERY DAY
GAD7 TOTAL SCORE: 14
3. WORRYING TOO MUCH ABOUT DIFFERENT THINGS: NEARLY EVERY DAY
IF YOU CHECKED OFF ANY PROBLEMS ON THIS QUESTIONNAIRE, HOW DIFFICULT HAVE THESE PROBLEMS MADE IT FOR YOU TO DO YOUR WORK, TAKE CARE OF THINGS AT HOME, OR GET ALONG WITH OTHER PEOPLE: SOMEWHAT DIFFICULT
2. NOT BEING ABLE TO STOP OR CONTROL WORRYING: NEARLY EVERY DAY
7. FEELING AFRAID AS IF SOMETHING AWFUL MIGHT HAPPEN: NEARLY EVERY DAY
6. BECOMING EASILY ANNOYED OR IRRITABLE: SEVERAL DAYS
8. IF YOU CHECKED OFF ANY PROBLEMS, HOW DIFFICULT HAVE THESE MADE IT FOR YOU TO DO YOUR WORK, TAKE CARE OF THINGS AT HOME, OR GET ALONG WITH OTHER PEOPLE?: SOMEWHAT DIFFICULT
4. TROUBLE RELAXING: SEVERAL DAYS
1. FEELING NERVOUS, ANXIOUS, OR ON EDGE: NEARLY EVERY DAY
5. BEING SO RESTLESS THAT IT IS HARD TO SIT STILL: NOT AT ALL

## 2023-01-09 NOTE — PROGRESS NOTES
[facility]                                     Progress Note    Patient Name: Teddy Shah  Date: 23         Service Type: Individual      Session Start Time: 11:06am  Session End Time:  11:59am     Session Length: 38-52min    Session #: 23    Attendees: Client attended alone    Service Modality:  Video session  Yes, the patient's condition can be safely assessed and treated via synchronous audio and visual telemedicine encounter.  (Provider and Patient had to switch to phone due to technical difficulties)    Reason for Video Visit: Services only offered telehealth    Location of Originating Site: Patient's home    Distant Site: Provider Remote Setting home office    Provider verified identity through the following two step process.  Patient provided:  Patient  and Patient address                          Consent:  The patient/guardian has verbally consented to: the potential risks and benefits of telemedicine (video visit) versus in person care; bill my insurance or make self-payment for services provided; and responsibility for payment of non-covered services.                 Mode of transmission-[employer]  Interactive Complexity: No  Crisis: No        Progress Since Last Session (Related to Symptoms / Goals / Homework):   Symptoms: No change in anxious sxs and improving depressive sxs per KASSIE-7 and PHQ-9, worsening depressive sxs per patient report    Homework: Completed in session      Episode of Care Goals: Satisfactory progress - ACTION (Actively working towards change); Intervened by reinforcing change plan / affirming steps taken     Current / Ongoing Stressors and Concerns:  Work, self identity, cognitive distortions    Provider and patient had to switch to phone due to technical difficulties.  Provider determined a longer session was clinically necessary given patient's disclosure of significant difficulties around core beliefs and values that caused significant  psychological distress.          Treatment Objective(s) Addressed in This Session:   use at least   coping skills for anxiety management in the next 4 weeks  Decrease frequency and intensity of feeling down, depressed, hopeless  Identify negative self-talk and behaviors: challenge core beliefs, myths, and actions  Patient will attend and participate in social or recreational activities    Develop and utilize self-compassion strategies.   identify the time in your life when you began to feel poorly about themselves.     Intervention:   Motivational Interviewing    MI Intervention: Expressed Empathy/Understanding, Supported Autonomy, Collaboration, Evocation, Rolled with resistance: Emphasized patient autonomy, Reframed sustain talk in the direction of change and Evoked patient agenda and Reframe     Change Talk Expressed by the Patient: Desire to change Reasons to change Committment to change    Provider Response to Change Talk: E - Evoked more info from patient about behavior change, A - Affirmed patient's thoughts, decisions, or attempts at behavior change, R - Reflected patient's change talk and S - Summarized patient's change talk statements    Provider held space as patient processed experiences using reflective listening, validation, and normalization of patient's emotional response.  Provider worked to develop greater insight into patient's experiences. Provider validated and challenged patient.     Emotion-Focused: Provider worked to develop greater self compassion, and utilized emotion-focused strategies to identify and normalize patient's emotional response.  Provider worked to develop greater insight.        CBT: Provider utilized CBT strategies to challenge and reframe patient's identified cognitive distortions.  Provider encouraged patient to challenge and reframe identified cognitive distortions.  Provider worked to develop greater insight.        PHQ 10/3/2022 11/10/2022 1/9/2023   PHQ-9 Total Score 16  14 13   Q9: Thoughts of better off dead/self-harm past 2 weeks Not at all Not at all Not at all     KASSIE-7 SCORE 10/3/2022 11/10/2022 1/9/2023   Total Score 13 (moderate anxiety) 14 (moderate anxiety) 14 (moderate anxiety)   Total Score 13 14 14   Answers for HPI/ROS submitted by the patient on 1/9/2023  If you checked off any problems, how difficult have these problems made it for you to do your work, take care of things at home, or get along with other people?: Somewhat difficult  PHQ9 TOTAL SCORE: 13  KASSIE 7 TOTAL SCORE: 14          Assessments completed prior to visit:  The following assessments were completed by patient for this visit:  PROMIS 10-Global Health (only subscores and total score):   PROMIS-10 Scores Only 12/14/2021 3/28/2022 4/25/2022 7/15/2022 11/10/2022   Global Mental Health Score 7 8 6 5 7   Global Physical Health Score 14 14 14 15 14   PROMIS TOTAL - SUBSCORES 21 22 20 20 21   Answers for HPI/ROS submitted by the patient on 10/3/2022  If you checked off any problems, how difficult have these problems made it for you to do your work, take care of things at home, or get along with other people?: Very difficult  PHQ9 TOTAL SCORE: 16  KASSIE 7 TOTAL SCORE: 13      [place] (Short version): see chart.      ASSESSMENT: Current Emotional / Mental Status (status of significant symptoms):   Risk status (Self / Other harm or suicidal ideation)   Patient denies current fears or concerns for personal safety.   Patient denies current or recent suicidal ideation or behaviors.   Patient denies current or recent homicidal ideation or behaviors.   Patient denies current or recent self injurious behavior or ideation.   Patient denies other safety concerns.   Patient reports there has been no change in risk factors since their last session.     Patient reports there has been no change in protective factors since their last session.     Recommended that patient call 911 or go to the local ED should there be a change  "in any of these risk factors.     Appearance:   Appropriate    Eye Contact:   Poor   Psychomotor Behavior: Normal    Attitude:   Cooperative    Orientation:   All   Speech    Rate / Production: Emotional Talkative    Volume:  Normal    Mood:    Anxious  Depressed  Sad    Affect:    Flat  Subdued  Tearful Worrisome    Thought Content:  Perseverative Rumination    Thought Form:  Coherent  Tangential    Insight:    Fair      Medication Review:   No changes to current psychiatric medication(s)     Medication Compliance:   Yes     Changes in Health Issues:   Yes: patient is making a healthy recovery following a medical procedure.     Chemical Use Review:   Substance Use: Chemical use reviewed, no active concerns identified      Tobacco Use: No current tobacco use.      Diagnosis:  1. Generalized anxiety disorder    2. Depression, unspecified depression type    3. Attention deficit hyperactivity disorder (ADHD), predominantly inattentive type     (Patient mentioned ADHD diagnosis in session.  Provider verified ADHD testing via chart review, patient was found to meet criteria for ADHD-predominantly inattentive type).    Collateral Reports Completed:   Not Applicable    PLAN: (Patient Tasks / Therapist Tasks / Other)    Patient will utilize crisis resources as needed.       Patient will utilize the following coping strategies to manage anxiety: Deep breathing, grounding exercises, engaging in civic duty. [place] exercise. Patient will also process and reality-check anxious thoughts after anxiety has decreased.     Patient will incorporate more deep-breathing and grounding exercises throughout the day.  Patient will utilize coping strategies discussed in session and challenge/reframe cognitive distortions (catastrophizing).        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 Patient will continue to set boundaries and reflect on the \"minimum\" he feels that " he needs to be at to be in a relationship.      Patient will utilize boundary setting strategies discussed in session.  Patient will reflect on what he would like to see in regards to change in his social life/interaction.  Patient will utilize coping strategies discussed in session.  Provider and patient will continue to explore patient's beliefs/thoughts around romantic relationships. Patient will take steps and utilize coping strategies discussed in session.      Patient will work to challenge and reframe identified cognitive distortions and negative thoughts.  Provider will work to build up patient's sense of self worth.        Provider determined a longer session was clinically necessary given patient's disclosure of significant difficulties around core beliefs and values that caused significant psychological distress.     Provider and patient will continue to talk about significant pattern of negative self talk.    Provider will review treatment plan with patient.       Leatha Pearce [facility]  1/9/23  Service Performed and Documented by [facility]-   Note reviewed and clinical supervision by LONDON Patino [facility] 1/10/2023      ______________________________________________________________________    Individual Treatment Plan    Patient's Name: Teddy Shah  YOB: 1990    Date of Creation: 9/7/21  Date Treatment Plan Last Reviewed/Revised: 1/9/23    DSM5 Diagnoses: 311 (F32.9) Unspecified Depressive Disorder  or 300.02 (F41.1) Generalized Anxiety Disorder  Psychosocial / Contextual Factors: Hostile home environment, lack of social support, current events/politics a significant stress, family conflict  PROMIS (reviewed every 90 days):   WHODAS: 24    Referral / Collaboration:  Referral to another professional/service is not indicated at this time..    Anticipated number of session for this episode of care:   Anticipation frequency of session: Biweekly  Anticipated Duration of each session: 38-52  "minutes  Treatment plan will be reviewed in 90 days or when goals have been changed.       MeasurableTreatment Goal(s) related to diagnosis / functional impairment(s)  Goal 1: Patient will successfully develop and utilize coping skills to manage depressive, anxious and ADHD sxs within 90 days. (Temporarily deferred, ongoing by patient 6/13/22)    I will know I've met my goal when I feel like I can get some of my executive function back (completing tasks).       Objective #A Patient will process experience around romantic relationship, reflect on how it has impacted his view of self, and how he would like to move forward.      Patient will work to develop a \"social life\" outside of work, and manage mental health sxs as they arise.              Patient will attend and participate in social or recreational activities    Develop and utilize self-compassion strategies.   identify the time in your life when you began to feel poorly about themselves.  Status: Restarted - Date: 1/9/23     Intervention(s)  Therapist will assign homework around utilizing self compassion strategies  teach psychoeducation around shame, guilt and healthy relationships.     Objective #B Patient will work to develop coping skills to \"turn brain off\" or switch tracks.    Patient will use thought-stopping strategy daily to reduce intrusive thoughts.  Status: Completed - Date: 6/13/22 (maintenance as needed)       Intervention(s)  Therapist will assign homework around using thought-stopping strategies and other coping skills  teach emotional regulation skills.       Patient has reviewed and agreed to the above plan.      CINDI Posada  March 14, 2022, reviewed by 6/13/22, reviewed 9/14/22, reviewed 1/9/23  Service Performed and Documented by TOSHIA-   tx plan reviewed and clinical supervision by LONDON Patino [facility] 3/29/2022, 6/13/2022, 9/14/2022, 1/10/2023        Answers for HPI/ROS submitted by the patient on 3/28/2022  KASSIE 7 TOTAL SCORE: " 13

## 2023-01-11 ENCOUNTER — MYC MEDICAL ADVICE (OUTPATIENT)
Dept: FAMILY MEDICINE | Facility: CLINIC | Age: 33
End: 2023-01-11

## 2023-01-11 RX ORDER — DEXTROAMPHETAMINE SACCHARATE, AMPHETAMINE ASPARTATE MONOHYDRATE, DEXTROAMPHETAMINE SULFATE AND AMPHETAMINE SULFATE 7.5; 7.5; 7.5; 7.5 MG/1; MG/1; MG/1; MG/1
30 CAPSULE, EXTENDED RELEASE ORAL DAILY
Qty: 30 CAPSULE | Refills: 0 | OUTPATIENT
Start: 2023-01-11

## 2023-01-23 ENCOUNTER — VIRTUAL VISIT (OUTPATIENT)
Dept: PSYCHOLOGY | Facility: CLINIC | Age: 33
End: 2023-01-23
Payer: COMMERCIAL

## 2023-01-23 DIAGNOSIS — F41.1 GENERALIZED ANXIETY DISORDER: Primary | ICD-10-CM

## 2023-01-23 DIAGNOSIS — F32.A DEPRESSION, UNSPECIFIED DEPRESSION TYPE: ICD-10-CM

## 2023-01-23 DIAGNOSIS — F90.0 ATTENTION DEFICIT HYPERACTIVITY DISORDER (ADHD), PREDOMINANTLY INATTENTIVE TYPE: ICD-10-CM

## 2023-01-23 PROCEDURE — 90834 PSYTX W PT 45 MINUTES: CPT | Mod: 95

## 2023-01-23 NOTE — PROGRESS NOTES
[facility]                                     Progress Note    Patient Name: Teddy Shah  Date: 23         Service Type: Individual      Session Start Time: 11:03am  Session End Time:  11:45am     Session Length: 38-52min    Session #: 24    Attendees: Client attended alone    Service Modality:  Video session  Yes, the patient's condition can be safely assessed and treated via synchronous audio and visual telemedicine encounter.  (Provider and Patient had to switch to phone due to technical difficulties)    Reason for Video Visit: Services only offered telehealth    Location of Originating Site: Patient's home    Distant Site: Provider Remote Setting home office    Provider verified identity through the following two step process.  Patient provided:  Patient  and Patient address                          Consent:  The patient/guardian has verbally consented to: the potential risks and benefits of telemedicine (video visit) versus in person care; bill my insurance or make self-payment for services provided; and responsibility for payment of non-covered services.                 Mode of transmission-[employer]  Interactive Complexity: No  Crisis: No        Progress Since Last Session (Related to Symptoms / Goals / Homework):   Symptoms: Worsening depressive, anxious and ADHD sxs per patient report    Homework: Completed in session      Episode of Care Goals: Satisfactory progress - ACTION (Actively working towards change); Intervened by reinforcing change plan / affirming steps taken     Current / Ongoing Stressors and Concerns:  Work, discontinued medication, cognitive dissonance around relationships       Treatment Objective(s) Addressed in This Session:   use at least   coping skills for anxiety management in the next 4 weeks  Decrease frequency and intensity of feeling down, depressed, hopeless  Identify negative self-talk and behaviors: challenge core beliefs, myths, and  actions  Patient will attend and participate in social or recreational activities    Develop and utilize self-compassion strategies.   identify the time in your life when you began to feel poorly about themselves.     Intervention:   Motivational Interviewing    MI Intervention: Expressed Empathy/Understanding, Supported Autonomy, Collaboration, Evocation and Reframe     Change Talk Expressed by the Patient: Desire to change Reasons to change Committment to change Activation    Provider Response to Change Talk: E - Evoked more info from patient about behavior change, A - Affirmed patient's thoughts, decisions, or attempts at behavior change, R - Reflected patient's change talk and S - Summarized patient's change talk statements    Provider held space as patient processed experiences using reflective listening, validation, and normalization of patient's emotional response.  Provider worked to develop greater insight into patient's experiences.     Provider assessed as patient disclosed discontinuation of ADHD medication.  Provider encouraged patient to follow-up with prescribing provider.     Provider validated patient's progress and worked to develop greater insight into effective use of coping strategies and boundary setting.    Provider and patient briefly explored patient's cognitive dissonance around pursuing a relationship.  Provider and patient agreed to continue conversation at the next session.           PHQ 10/3/2022 11/10/2022 1/9/2023   PHQ-9 Total Score 16 14 13   Q9: Thoughts of better off dead/self-harm past 2 weeks Not at all Not at all Not at all     KASSIE-7 SCORE 10/3/2022 11/10/2022 1/9/2023   Total Score 13 (moderate anxiety) 14 (moderate anxiety) 14 (moderate anxiety)   Total Score 13 14 14   Answers for HPI/ROS submitted by the patient on 1/9/2023  If you checked off any problems, how difficult have these problems made it for you to do your work, take care of things at home, or get along with  other people?: Somewhat difficult  PHQ9 TOTAL SCORE: 13  KASSIE 7 TOTAL SCORE: 14          Assessments completed prior to visit:  The following assessments were completed by patient for this visit:  PROMIS 10-Global Health (only subscores and total score):   PROMIS-10 Scores Only 12/14/2021 3/28/2022 4/25/2022 7/15/2022 11/10/2022   Global Mental Health Score 7 8 6 5 7   Global Physical Health Score 14 14 14 15 14   PROMIS TOTAL - SUBSCORES 21 22 20 20 21   Answers for HPI/ROS submitted by the patient on 10/3/2022  If you checked off any problems, how difficult have these problems made it for you to do your work, take care of things at home, or get along with other people?: Very difficult  PHQ9 TOTAL SCORE: 16  KASSIE 7 TOTAL SCORE: 13      [place] (Short version): see chart.      ASSESSMENT: Current Emotional / Mental Status (status of significant symptoms):   Risk status (Self / Other harm or suicidal ideation)   Patient denies current fears or concerns for personal safety.   Patient denies current or recent suicidal ideation or behaviors.   Patient denies current or recent homicidal ideation or behaviors.   Patient denies current or recent self injurious behavior or ideation.   Patient denies other safety concerns.   Patient reports there has been no change in risk factors since their last session.     Patient reports there has been no change in protective factors since their last session.     Recommended that patient call 911 or go to the local ED should there be a change in any of these risk factors.     Appearance:   Appropriate    Eye Contact:   Fair    Psychomotor Behavior: Normal    Attitude:   Cooperative    Orientation:   All   Speech    Rate / Production: Talkative    Volume:  Normal    Mood:    Anxious  Depressed    Affect:    Flat  Subdued    Thought Content:  Perseverative Rumination    Thought Form:  Coherent  Tangential    Insight:    Fair      Medication Review:   No changes to current psychiatric  "medication(s)     Medication Compliance:   No patient was unable to refill aderaal prescription.  Provider encouraged patient to follow-up with PCP.     Changes in Health Issues:   Yes: Patient reported migraines and dizziness which he attributed to discontinued medication.  Provider encouraged patient to follow-up with prescribing provider.     Chemical Use Review:   Substance Use: Chemical use reviewed, no active concerns identified      Tobacco Use: No current tobacco use.      Diagnosis:  1. Generalized anxiety disorder    2. Depression, unspecified depression type    3. Attention deficit hyperactivity disorder (ADHD), predominantly inattentive type     (Patient mentioned ADHD diagnosis in session.  Provider verified ADHD testing via chart review, patient was found to meet criteria for ADHD-predominantly inattentive type).    Collateral Reports Completed:   Not Applicable    PLAN: (Patient Tasks / Therapist Tasks / Other)    Patient will utilize crisis resources as needed.       Patient will utilize the following coping strategies to manage anxiety: Deep breathing, grounding exercises, engaging in civic duty. [place] exercise. Patient will also process and reality-check anxious thoughts after anxiety has decreased.     Patient will incorporate more deep-breathing and grounding exercises throughout the day.  Patient will utilize coping strategies discussed in session and challenge/reframe cognitive distortions (catastrophizing).        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 Patient will continue to set boundaries and reflect on the \"minimum\" he feels that he needs to be at to be in a relationship.      Patient will utilize boundary setting strategies discussed in session.  Patient will reflect on what he would like to see in regards to change in his social life/interaction.  Patient will utilize coping strategies discussed in session.  " Provider and patient will continue to explore patient's beliefs/thoughts around romantic relationships. Patient will take steps and utilize coping strategies discussed in session.      Patient will work to challenge and reframe identified cognitive distortions and negative thoughts.  Provider will work to build up patient's sense of self worth.        Provider determined a longer session was clinically necessary given patient's disclosure of significant difficulties around core beliefs and values that caused significant psychological distress.     Provider and patient will continue to talk about significant pattern of negative self talk.    Provider will review treatment plan with patient.     Patient will follow-up with providers as needed.  Provider and patient will discuss cognitive dissonance around pursuing a relationship.        Leatha Pearce [facility]  1/23/23  Service Performed and Documented by [facility]-   Note reviewed and clinical supervision by LONDON Patino [facility] 1/23/2023      ______________________________________________________________________    Individual Treatment Plan    Patient's Name: Teddy Shah  YOB: 1990    Date of Creation: 9/7/21  Date Treatment Plan Last Reviewed/Revised: 1/9/23    DSM5 Diagnoses: 311 (F32.9) Unspecified Depressive Disorder  or 300.02 (F41.1) Generalized Anxiety Disorder  Psychosocial / Contextual Factors: Hostile home environment, lack of social support, current events/politics a significant stress, family conflict  PROMIS (reviewed every 90 days):   WHODAS: 24    Referral / Collaboration:  Referral to another professional/service is not indicated at this time..    Anticipated number of session for this episode of care:   Anticipation frequency of session: Biweekly  Anticipated Duration of each session: 38-52 minutes  Treatment plan will be reviewed in 90 days or when goals have been changed.       MeasurableTreatment Goal(s) related to diagnosis /  "functional impairment(s)  Goal 1: Patient will successfully develop and utilize coping skills to manage depressive, anxious and ADHD sxs within 90 days. (Temporarily deferred, ongoing by patient 6/13/22)    I will know I've met my goal when I feel like I can get some of my executive function back (completing tasks).       Objective #A Patient will process experience around romantic relationship, reflect on how it has impacted his view of self, and how he would like to move forward.      Patient will work to develop a \"social life\" outside of work, and manage mental health sxs as they arise.              Patient will attend and participate in social or recreational activities    Develop and utilize self-compassion strategies.   identify the time in your life when you began to feel poorly about themselves.  Status: Restarted - Date: 1/9/23     Intervention(s)  Therapist will assign homework around utilizing self compassion strategies  teach psychoeducation around shame, guilt and healthy relationships.     Objective #B Patient will work to develop coping skills to \"turn brain off\" or switch tracks.    Patient will use thought-stopping strategy daily to reduce intrusive thoughts.  Status: Completed - Date: 6/13/22 (maintenance as needed)       Intervention(s)  Therapist will assign homework around using thought-stopping strategies and other coping skills  teach emotional regulation skills.       Patient has reviewed and agreed to the above plan.      CINDI Posada  March 14, 2022, reviewed by 6/13/22, reviewed 9/14/22, reviewed 1/9/23  Service Performed and Documented by TOSHIA-   tx plan reviewed and clinical supervision by LONDNO Patino [facility] 3/29/2022, 6/13/2022, 9/14/2022, 1/10/2023        Answers for HPI/ROS submitted by the patient on 3/28/2022  KASSIE 7 TOTAL SCORE: 13  "

## 2023-02-03 ENCOUNTER — MYC MEDICAL ADVICE (OUTPATIENT)
Dept: FAMILY MEDICINE | Facility: CLINIC | Age: 33
End: 2023-02-03
Payer: COMMERCIAL

## 2023-02-07 ENCOUNTER — VIRTUAL VISIT (OUTPATIENT)
Dept: PSYCHOLOGY | Facility: CLINIC | Age: 33
End: 2023-02-07
Payer: COMMERCIAL

## 2023-02-07 DIAGNOSIS — F90.0 ATTENTION DEFICIT HYPERACTIVITY DISORDER (ADHD), PREDOMINANTLY INATTENTIVE TYPE: ICD-10-CM

## 2023-02-07 DIAGNOSIS — F41.1 GENERALIZED ANXIETY DISORDER: Primary | ICD-10-CM

## 2023-02-07 DIAGNOSIS — F32.A DEPRESSION, UNSPECIFIED DEPRESSION TYPE: ICD-10-CM

## 2023-02-07 PROCEDURE — 90834 PSYTX W PT 45 MINUTES: CPT | Mod: 95

## 2023-02-07 NOTE — PROGRESS NOTES
[facility]                                     Progress Note    Patient Name: Teddy Shah  Date: 23         Service Type: Individual      Session Start Time: 11:03am  Session End Time:  11:45am     Session Length: 38-52min    Session #: 24    Attendees: Client attended alone    Service Modality:  Video session  Yes, the patient's condition can be safely assessed and treated via synchronous audio and visual telemedicine encounter.  (Provider and Patient had to switch to phone due to technical difficulties)    Reason for Video Visit: Services only offered telehealth    Location of Originating Site: Patient's home    Distant Site: Provider Remote Setting home office    Provider verified identity through the following two step process.  Patient provided:  Patient  and Patient address                          Consent:  The patient/guardian has verbally consented to: the potential risks and benefits of telemedicine (video visit) versus in person care; bill my insurance or make self-payment for services provided; and responsibility for payment of non-covered services.                 Mode of transmission-[employer]  Interactive Complexity: No  Crisis: No        Progress Since Last Session (Related to Symptoms / Goals / Homework):   Symptoms: Worsening depressive, anxious and ADHD sxs per patient report    Homework: Completed in session      Episode of Care Goals: Satisfactory progress - ACTION (Actively working towards change); Intervened by reinforcing change plan / affirming steps taken     Current / Ongoing Stressors and Concerns:  Work stress, parents moving, low self worth, housing       Treatment Objective(s) Addressed in This Session:   use at least   coping skills for anxiety management in the next 4 weeks  Decrease frequency and intensity of feeling down, depressed, hopeless  Identify negative self-talk and behaviors: challenge core beliefs, myths, and actions  Patient will  "attend and participate in social or recreational activities    Develop and utilize self-compassion strategies.   identify the time in your life when you began to feel poorly about themselves.     Intervention:   Motivational Interviewing    MI Intervention: Expressed Empathy/Understanding, Supported Autonomy, Collaboration, Evocation and Reframe     Change Talk Expressed by the Patient: Desire to change Reasons to change Committment to change Activation    Provider Response to Change Talk: E - Evoked more info from patient about behavior change, A - Affirmed patient's thoughts, decisions, or attempts at behavior change, R - Reflected patient's change talk and S - Summarized patient's change talk statements    Provider held space as patient processed experiences using reflective listening, validation, and normalization of patient's emotional response.  Provider worked to develop greater insight into patient's experiences. Provider validated patient.     CBT: Provider utilized CBT strategies to challenge and reframe patient's identified cognitive distortions.  Provider coached patient on challenging and reframing identified cognitive distortions and introducing more self compassion.      ACT: Provider utilized ACT strategies to allow patient to recognize what steps would be helpful for him to utilize moving forward and what would not be helpful.      DBT: Provider introduced patient to the \"coping ahead\" skill.     Solution-Focused: Provider utilized solution-focused strategies to assist patient in identifying options he could pursue: finding a new job, getting a second job, getting a roommate, looking at certificate programs      PHQ 10/3/2022 11/10/2022 1/9/2023   PHQ-9 Total Score 16 14 13   Q9: Thoughts of better off dead/self-harm past 2 weeks Not at all Not at all Not at all     KASSIE-7 SCORE 10/3/2022 11/10/2022 1/9/2023   Total Score 13 (moderate anxiety) 14 (moderate anxiety) 14 (moderate anxiety)   Total Score " 13 14 14   Answers for HPI/ROS submitted by the patient on 1/9/2023  If you checked off any problems, how difficult have these problems made it for you to do your work, take care of things at home, or get along with other people?: Somewhat difficult  PHQ9 TOTAL SCORE: 13  KASSIE 7 TOTAL SCORE: 14          Assessments completed prior to visit:  The following assessments were completed by patient for this visit:  PROMIS 10-Global Health (only subscores and total score):   PROMIS-10 Scores Only 12/14/2021 3/28/2022 4/25/2022 7/15/2022 11/10/2022   Global Mental Health Score 7 8 6 5 7   Global Physical Health Score 14 14 14 15 14   PROMIS TOTAL - SUBSCORES 21 22 20 20 21   Answers for HPI/ROS submitted by the patient on 10/3/2022  If you checked off any problems, how difficult have these problems made it for you to do your work, take care of things at home, or get along with other people?: Very difficult  PHQ9 TOTAL SCORE: 16  KASSIE 7 TOTAL SCORE: 13      Benson (Short version): see chart.      ASSESSMENT: Current Emotional / Mental Status (status of significant symptoms):   Risk status (Self / Other harm or suicidal ideation)   Patient denies current fears or concerns for personal safety.   Patient denies current or recent suicidal ideation or behaviors.   Patient denies current or recent homicidal ideation or behaviors.   Patient denies current or recent self injurious behavior or ideation.   Patient denies other safety concerns.   Patient reports there has been no change in risk factors since their last session.     Patient reports there has been no change in protective factors since their last session.     Recommended that patient call 911 or go to the local ED should there be a change in any of these risk factors.     Appearance:   Appropriate    Eye Contact:   Fair    Psychomotor Behavior: Normal    Attitude:   Cooperative    Orientation:   All   Speech    Rate / Production: Talkative    Volume:  Normal  "   Mood:    Anxious  Depressed    Affect:    Flat  Subdued    Thought Content:  Perseverative Rumination    Thought Form:  Coherent  Tangential    Insight:    Fair      Medication Review:   No changes to current psychiatric medication(s)     Medication Compliance:   No patient was unable to refill aderaal prescription.  Provider encouraged patient to follow-up with PCP.     Changes in Health Issues:   Yes: Patient reported migraines and dizziness which he attributed to discontinued medication.  Provider encouraged patient to follow-up with prescribing provider.     Chemical Use Review:   Substance Use: Chemical use reviewed, no active concerns identified      Tobacco Use: No current tobacco use.      Diagnosis:  1. Generalized anxiety disorder    2. Depression, unspecified depression type    3. Attention deficit hyperactivity disorder (ADHD), predominantly inattentive type     (Patient mentioned ADHD diagnosis in session.  Provider verified ADHD testing via chart review, patient was found to meet criteria for ADHD-predominantly inattentive type).    Collateral Reports Completed:   Not Applicable    PLAN: (Patient Tasks / Therapist Tasks / Other)    Patient will utilize crisis resources as needed.       Patient will utilize the following coping strategies to manage anxiety: Deep breathing, grounding exercises, engaging in civic duty. [place] exercise. Patient will also process and reality-check anxious thoughts after anxiety has decreased.     Patient will incorporate more deep-breathing and grounding exercises throughout the day.  Patient will utilize coping strategies discussed in session and challenge/reframe cognitive distortions (catastrophizing).        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 Patient will continue to set boundaries and reflect on the \"minimum\" he feels that he needs to be at to be in a relationship.      Patient will " utilize boundary setting strategies discussed in session.  Patient will reflect on what he would like to see in regards to change in his social life/interaction.  Patient will utilize coping strategies discussed in session.  Provider and patient will continue to explore patient's beliefs/thoughts around romantic relationships. Patient will take steps and utilize coping strategies discussed in session.      Patient will work to challenge and reframe identified cognitive distortions and negative thoughts.  Provider will work to build up patient's sense of self worth.        Provider determined a longer session was clinically necessary given patient's disclosure of significant difficulties around core beliefs and values that caused significant psychological distress.     Provider and patient will continue to talk about significant pattern of negative self talk.    Provider will review treatment plan with patient.     Patient will follow-up with providers as needed.  Provider and patient will discuss cognitive dissonance around pursuing a relationship.      Patient will utilize strategies discussed in session.  Patient and provider will discuss romantic relationships with patient.        Leatha Pearce [facility]  2/7/23  Service Performed and Documented by [facility]-   Note reviewed and clinical supervision by LONDON Patino [facility] 2/8/2023      ______________________________________________________________________    Individual Treatment Plan    Patient's Name: Teddy Shah  YOB: 1990    Date of Creation: 9/7/21  Date Treatment Plan Last Reviewed/Revised: 1/9/23    DSM5 Diagnoses: 311 (F32.9) Unspecified Depressive Disorder  or 300.02 (F41.1) Generalized Anxiety Disorder  Psychosocial / Contextual Factors: Hostile home environment, lack of social support, current events/politics a significant stress, family conflict  PROMIS (reviewed every 90 days):   WHODAS: 24    Referral / Collaboration:  Referral to  "another professional/service is not indicated at this time..    Anticipated number of session for this episode of care:   Anticipation frequency of session: Biweekly  Anticipated Duration of each session: 38-52 minutes  Treatment plan will be reviewed in 90 days or when goals have been changed.       MeasurableTreatment Goal(s) related to diagnosis / functional impairment(s)  Goal 1: Patient will successfully develop and utilize coping skills to manage depressive, anxious and ADHD sxs within 90 days. (Temporarily deferred, ongoing by patient 6/13/22)    I will know I've met my goal when I feel like I can get some of my executive function back (completing tasks).       Objective #A Patient will process experience around romantic relationship, reflect on how it has impacted his view of self, and how he would like to move forward.      Patient will work to develop a \"social life\" outside of work, and manage mental health sxs as they arise.              Patient will attend and participate in social or recreational activities    Develop and utilize self-compassion strategies.   identify the time in your life when you began to feel poorly about themselves.  Status: Restarted - Date: 1/9/23     Intervention(s)  Therapist will assign homework around utilizing self compassion strategies  teach psychoeducation around shame, guilt and healthy relationships.     Objective #B Patient will work to develop coping skills to \"turn brain off\" or switch tracks.    Patient will use thought-stopping strategy daily to reduce intrusive thoughts.  Status: Completed - Date: 6/13/22 (maintenance as needed)       Intervention(s)  Therapist will assign homework around using thought-stopping strategies and other coping skills  teach emotional regulation skills.       Patient has reviewed and agreed to the above plan.      Leatha Pearce, [facility]  March 14, 2022, reviewed by 6/13/22, reviewed 9/14/22, reviewed 1/9/23  Service Performed and " Documented by [facility]-   tx plan [facility] and clinical [facility], 6/13/2022, 9/14/2022, 1/10/2023        Answers for HPI/ROS submitted by the patient on 3/28/2022  KASSIE 7 TOTAL SCORE: 13

## 2023-03-08 ENCOUNTER — TELEPHONE (OUTPATIENT)
Dept: FAMILY MEDICINE | Facility: CLINIC | Age: 33
End: 2023-03-08
Payer: COMMERCIAL

## 2023-03-08 NOTE — TELEPHONE ENCOUNTER
Patient Quality Outreach    Patient is due for the following:   Depression  -  PHQ-9 needed    Next Steps:   No follow up needed at this time.    Type of outreach:    Sent [employer] message.      Questions for provider review:    None     Eileen Fung

## 2023-03-13 ENCOUNTER — VIRTUAL VISIT (OUTPATIENT)
Dept: PSYCHOLOGY | Facility: CLINIC | Age: 33
End: 2023-03-13
Payer: COMMERCIAL

## 2023-03-13 DIAGNOSIS — F90.0 ATTENTION DEFICIT HYPERACTIVITY DISORDER (ADHD), PREDOMINANTLY INATTENTIVE TYPE: ICD-10-CM

## 2023-03-13 DIAGNOSIS — F41.1 GENERALIZED ANXIETY DISORDER: Primary | ICD-10-CM

## 2023-03-13 DIAGNOSIS — F32.A DEPRESSION, UNSPECIFIED DEPRESSION TYPE: ICD-10-CM

## 2023-03-13 PROCEDURE — 90834 PSYTX W PT 45 MINUTES: CPT | Mod: VID

## 2023-03-13 ASSESSMENT — ANXIETY QUESTIONNAIRES
GAD7 TOTAL SCORE: 14
2. NOT BEING ABLE TO STOP OR CONTROL WORRYING: NEARLY EVERY DAY
7. FEELING AFRAID AS IF SOMETHING AWFUL MIGHT HAPPEN: NEARLY EVERY DAY
GAD7 TOTAL SCORE: 14
3. WORRYING TOO MUCH ABOUT DIFFERENT THINGS: NEARLY EVERY DAY
8. IF YOU CHECKED OFF ANY PROBLEMS, HOW DIFFICULT HAVE THESE MADE IT FOR YOU TO DO YOUR WORK, TAKE CARE OF THINGS AT HOME, OR GET ALONG WITH OTHER PEOPLE?: VERY DIFFICULT
1. FEELING NERVOUS, ANXIOUS, OR ON EDGE: NEARLY EVERY DAY
7. FEELING AFRAID AS IF SOMETHING AWFUL MIGHT HAPPEN: NEARLY EVERY DAY
4. TROUBLE RELAXING: SEVERAL DAYS
IF YOU CHECKED OFF ANY PROBLEMS ON THIS QUESTIONNAIRE, HOW DIFFICULT HAVE THESE PROBLEMS MADE IT FOR YOU TO DO YOUR WORK, TAKE CARE OF THINGS AT HOME, OR GET ALONG WITH OTHER PEOPLE: VERY DIFFICULT
GAD7 TOTAL SCORE: 14
6. BECOMING EASILY ANNOYED OR IRRITABLE: SEVERAL DAYS
5. BEING SO RESTLESS THAT IT IS HARD TO SIT STILL: NOT AT ALL

## 2023-03-13 ASSESSMENT — PATIENT HEALTH QUESTIONNAIRE - PHQ9
SUM OF ALL RESPONSES TO PHQ QUESTIONS 1-9: 12
SUM OF ALL RESPONSES TO PHQ QUESTIONS 1-9: 12
10. IF YOU CHECKED OFF ANY PROBLEMS, HOW DIFFICULT HAVE THESE PROBLEMS MADE IT FOR YOU TO DO YOUR WORK, TAKE CARE OF THINGS AT HOME, OR GET ALONG WITH OTHER PEOPLE: VERY DIFFICULT

## 2023-03-13 NOTE — PROGRESS NOTES
[facility]                                     Progress Note    Patient Name: Teddy Shah  Date: 3/13/23         Service Type: Individual      Session Start Time: 11:02am  Session End Time:  11:49am     Session Length: 38-52min    Session #: 25    Attendees: Client attended alone    Service Modality:  Video session  Yes, the patient's condition can be safely assessed and treated via synchronous audio and visual telemedicine encounter.  (Provider and Patient had to switch to phone due to technical difficulties)    Reason for Video Visit: Services only offered telehealth    Location of Originating Site: Patient's home    Distant Site: Provider Remote Setting home office    Provider verified identity through the following two step process.  Patient provided:  Patient  and Patient address                          Consent:  The patient/guardian has verbally consented to: the potential risks and benefits of telemedicine (video visit) versus in person care; bill my insurance or make self-payment for services provided; and responsibility for payment of non-covered services.                 Mode of transmission-"[employer]  Interactive Complexity: No  Crisis: No        Progress Since Last Session (Related to Symptoms / Goals / Homework):   Symptoms: Improving depressive sxs and no change in anxious sxs per PHQ-9, KASSIE-7 and patient report    Homework: Completed in session      Episode of Care Goals: Satisfactory progress - ACTION (Actively working towards change); Intervened by reinforcing change plan / affirming steps taken     Current / Ongoing Stressors and Concerns:  Work stress, parents moving, low self worth, housing, negative self talk         Treatment Objective(s) Addressed in This Session:   use at least   coping skills for anxiety management in the next 4 weeks  Decrease frequency and intensity of feeling down, depressed, hopeless  Identify negative self-talk and behaviors:  challenge core beliefs, myths, and actions  Patient will attend and participate in social or recreational activities    Develop and utilize self-compassion strategies.   identify the time in your life when you began to feel poorly about themselves.     Intervention:   Motivational Interviewing    MI Intervention: Expressed Empathy/Understanding, Supported Autonomy, Collaboration, Evocation and Reframe     Change Talk Expressed by the Patient: Desire to change Reasons to change Committment to change Activation    Provider Response to Change Talk: E - Evoked more info from patient about behavior change, A - Affirmed patient's thoughts, decisions, or attempts at behavior change, R - Reflected patient's change talk and S - Summarized patient's change talk statements    Provider held space as patient processed experiences using reflective listening, validation, and normalization of patient's emotional response.  Provider worked to develop greater insight into patient's experiences. Provider validated patient.     CBT: Provider utilized CBT strategies to challenge and reframe patient's identified cognitive distortions.  Provider coached patient on challenging and reframing identified cognitive distortions and introducing more self compassion.      ACT: Provider utilized ACT strategies to help patient focus around acceptance and committing to making changes.      DBT: Provider introduced patient to the idea of radical acceptance and approaching patient's judgment towards himself as a target behavior.        PHQ 11/10/2022 1/9/2023 3/13/2023   PHQ-9 Total Score 14 13 12   Q9: Thoughts of better off dead/self-harm past 2 weeks Not at all Not at all Not at all     KASSIE-7 SCORE 11/10/2022 1/9/2023 3/13/2023   Total Score 14 (moderate anxiety) 14 (moderate anxiety) 14 (moderate anxiety)   Total Score 14 14 14   Answers for HPI/ROS submitted by the patient on 3/13/2023  If you checked off any problems, how difficult have these  problems made it for you to do your work, take care of things at home, or get along with other people?: Very difficult  PHQ9 TOTAL SCORE: 12  KASSIE 7 TOTAL SCORE: 14              Assessments completed prior to visit:  The following assessments were completed by patient for this visit:  PROMIS 10-Global Health (only subscores and total score):   PROMIS-10 Scores Only 12/14/2021 3/28/2022 4/25/2022 7/15/2022 11/10/2022 3/13/2023   Global Mental Health Score 7 8 6 5 7 7   Global Physical Health Score 14 14 14 15 14 14   PROMIS TOTAL - SUBSCORES 21 22 20 20 21 21   Answers for HPI/ROS submitted by the patient on 10/3/2022  If you checked off any problems, how difficult have these problems made it for you to do your work, take care of things at home, or get along with other people?: Very difficult  PHQ9 TOTAL SCORE: 16  KASSIE 7 TOTAL SCORE: 13      [place] (Short version): see chart.      ASSESSMENT: Current Emotional / Mental Status (status of significant symptoms):   Risk status (Self / Other harm or suicidal ideation)   Patient denies current fears or concerns for personal safety.   Patient denies current or recent suicidal ideation or behaviors.   Patient denies current or recent homicidal ideation or behaviors.   Patient denies current or recent self injurious behavior or ideation.   Patient denies other safety concerns.   Patient reports there has been no change in risk factors since their last session.     Patient reports there has been no change in protective factors since their last session.     Recommended that patient call 911 or go to the local ED should there be a change in any of these risk factors.     Appearance:   Appropriate    Eye Contact:   Fair    Psychomotor Behavior: Normal    Attitude:   Cooperative    Orientation:   All   Speech    Rate / Production: Normal/ Responsive Emotional    Volume:  Normal    Mood:    Anxious  Depressed  Sad    Affect:    Flat  Subdued  Tearful   Thought  "Content:  Perseverative Rumination    Thought Form:  Coherent  Tangential    Insight:    Fair      Medication Review:   No changes to current psychiatric medication(s)     Medication Compliance:   No patient was unable to refill aderaal prescription.  Provider encouraged patient to follow-up with PCP.     Changes in Health Issues:   Yes: Patient reported migraines and dizziness which he attributed to discontinued medication.  Provider encouraged patient to follow-up with prescribing provider.     Chemical Use Review:   Substance Use: Chemical use reviewed, no active concerns identified      Tobacco Use: No current tobacco use.      Diagnosis:  1. Generalized anxiety disorder    2. Attention deficit hyperactivity disorder (ADHD), predominantly inattentive type    3. Depression, unspecified depression type     (Patient mentioned ADHD diagnosis in session.  Provider verified ADHD testing via chart review, patient was found to meet criteria for ADHD-predominantly inattentive type).    Collateral Reports Completed:   Not Applicable    PLAN: (Patient Tasks / Therapist Tasks / Other)    Patient will utilize crisis resources as needed.       Patient will utilize the following coping strategies to manage anxiety: Deep breathing, grounding exercises, engaging in civic duty. [place] exercise. Patient will also process and reality-check anxious thoughts after anxiety has decreased.     Patient will incorporate more deep-breathing and grounding exercises throughout the day.  Patient will utilize coping strategies discussed in session and challenge/reframe cognitive distortions (catastrophizing).        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 Patient will continue to set boundaries and reflect on the \"minimum\" he feels that he needs to be at to be in a relationship.      Patient will utilize boundary setting strategies discussed in session.  Patient will " reflect on what he would like to see in regards to change in his social life/interaction.  Patient will utilize coping strategies discussed in session.  Provider and patient will continue to explore patient's beliefs/thoughts around romantic relationships. Patient will take steps and utilize coping strategies discussed in session.      Patient will work to challenge and reframe identified cognitive distortions and negative thoughts.  Provider will work to build up patient's sense of self worth.        Provider determined a longer session was clinically necessary given patient's disclosure of significant difficulties around core beliefs and values that caused significant psychological distress.     Provider and patient will continue to talk about significant pattern of negative self talk.    Provider will review treatment plan with patient.     Patient will follow-up with providers as needed.  Provider and patient will discuss cognitive dissonance around pursuing a relationship.      Patient will utilize strategies discussed in session.  Patient and provider will discuss romantic relationships with patient.      You should suffer the consequences.       Leatha Pearce [facility]  3/13/23  Service Performed and Documented by [facility]-   Note reviewed and clinical supervision by LONDON Patino [facility] 3/13/2023      ______________________________________________________________________    Individual Treatment Plan    Patient's Name: Teddy Shah  YOB: 1990    Date of Creation: 9/7/21  Date Treatment Plan Last Reviewed/Revised: 1/9/23    DSM5 Diagnoses: 311 (F32.9) Unspecified Depressive Disorder  or 300.02 (F41.1) Generalized Anxiety Disorder  Psychosocial / Contextual Factors: Hostile home environment, lack of social support, current events/politics a significant stress, family conflict  PROMIS (reviewed every 90 days):   WHODAS: 24    Referral / Collaboration:  Referral to another professional/service is not  "indicated at this time..    Anticipated number of session for this episode of care:   Anticipation frequency of session: Biweekly  Anticipated Duration of each session: 38-52 minutes  Treatment plan will be reviewed in 90 days or when goals have been changed.       MeasurableTreatment Goal(s) related to diagnosis / functional impairment(s)  Goal 1: Patient will successfully develop and utilize coping skills to manage depressive, anxious and ADHD sxs within 90 days. (Temporarily deferred, ongoing by patient 6/13/22)    I will know I've met my goal when I feel like I can get some of my executive function back (completing tasks).       Objective #A Patient will process experience around romantic relationship, reflect on how it has impacted his view of self, and how he would like to move forward.      Patient will work to develop a \"social life\" outside of work, and manage mental health sxs as they arise.              Patient will attend and participate in social or recreational activities    Develop and utilize self-compassion strategies.   identify the time in your life when you began to feel poorly about themselves.  Status: Restarted - Date: 1/9/23     Intervention(s)  Therapist will assign homework around utilizing self compassion strategies  teach psychoeducation around shame, guilt and healthy relationships.     Objective #B Patient will work to develop coping skills to \"turn brain off\" or switch tracks.    Patient will use thought-stopping strategy daily to reduce intrusive thoughts.  Status: Completed - Date: 6/13/22 (maintenance as needed)       Intervention(s)  Therapist will assign homework around using thought-stopping strategies and other coping skills  teach emotional regulation skills.       Patient has reviewed and agreed to the above plan.      CINDI Posada  March 14, 2022, reviewed by 6/13/22, reviewed 9/14/22, reviewed 1/9/23  Service Performed and Documented by CINDI-   tx plan reviewed and " clinical supervision by LONDON Patino [facility] 3/29/2022, 6/13/2022, 9/14/2022, 1/10/2023        Answers for HPI/ROS submitted by the patient on 3/28/2022  KASSIE 7 TOTAL SCORE: 13

## 2023-03-27 ENCOUNTER — VIRTUAL VISIT (OUTPATIENT)
Dept: PSYCHOLOGY | Facility: CLINIC | Age: 33
End: 2023-03-27
Payer: COMMERCIAL

## 2023-03-27 DIAGNOSIS — F32.A DEPRESSION, UNSPECIFIED DEPRESSION TYPE: ICD-10-CM

## 2023-03-27 DIAGNOSIS — F90.0 ATTENTION DEFICIT HYPERACTIVITY DISORDER (ADHD), PREDOMINANTLY INATTENTIVE TYPE: ICD-10-CM

## 2023-03-27 DIAGNOSIS — F41.1 GENERALIZED ANXIETY DISORDER: Primary | ICD-10-CM

## 2023-03-27 PROCEDURE — 90834 PSYTX W PT 45 MINUTES: CPT | Mod: VID

## 2023-03-27 NOTE — PROGRESS NOTES
[facility]                                     Progress Note    Patient Name: Teddy Shah  Date: 3/27/23         Service Type: Individual      Session Start Time: 11:02am  Session End Time:  11:52am     Session Length: 38-52min    Session #: 26    Attendees: Client attended alone    Service Modality:  Video session  Yes, the patient's condition can be safely assessed and treated via synchronous audio and visual telemedicine encounter.  (Provider and Patient had to switch to phone due to technical difficulties)    Reason for Video Visit: Services only offered telehealth    Location of Originating Site: Patient's car    Distant Site: Provider Remote Setting home office    Provider verified identity through the following two step process.  Patient provided:  Patient  and Patient address                          Consent:  The patient/guardian has verbally consented to: the potential risks and benefits of telemedicine (video visit) versus in person care; bill my insurance or make self-payment for services provided; and responsibility for payment of non-covered services.                 Mode of transmission-[employer]  Interactive Complexity: No  Crisis: No        Progress Since Last Session (Related to Symptoms / Goals / Homework):   Symptoms: No change in anxious and depressive sxs per patient report    Homework: Completed in session      Episode of Care Goals: Satisfactory progress - ACTION (Actively working towards change); Intervened by reinforcing change plan / affirming steps taken     Current / Ongoing Stressors and Concerns:  Work stress, parents moving, low self worth, housing, negative self talk         Treatment Objective(s) Addressed in This Session:   use at least   coping skills for anxiety management in the next 4 weeks  Decrease frequency and intensity of feeling down, depressed, hopeless  Identify negative self-talk and behaviors: challenge core beliefs, myths, and  actions  Patient will attend and participate in social or recreational activities    Develop and utilize self-compassion strategies.   identify the time in your life when you began to feel poorly about themselves.     Intervention:   Motivational Interviewing    MI Intervention: Expressed Empathy/Understanding, Supported Autonomy, Collaboration, Evocation and Reframe     Change Talk Expressed by the Patient: Desire to change Reasons to change Committment to change Activation    Provider Response to Change Talk: E - Evoked more info from patient about behavior change, A - Affirmed patient's thoughts, decisions, or attempts at behavior change, R - Reflected patient's change talk and S - Summarized patient's change talk statements    Provider held space as patient processed experiences using reflective listening, validation, and normalization of patient's emotional response.  Provider worked to develop greater insight into patient's experiences. Provider validated patient.     CBT: Provider utilized CBT strategies to challenge and reframe patient's identified cognitive distortions around mental health diagnosis.  Provider coached patient on challenging and reframing identified cognitive distortions towards self compassion.    Psychoeducation: Provider taught psychoeducation on ADHD and possible coping strategies patient can utilize.      Solution-Focused: Provider utilized solution-focused strategies to identify options patient could utilize to address ADHD strategies.  Patient was agreeable to work on an improved system of organization for his work and for himself.       DBT: Provider introduced patient to the idea of radical acceptance and approaching patient's judgment towards himself as a target behavior.        PHQ 11/10/2022 1/9/2023 3/13/2023   PHQ-9 Total Score 14 13 12   Q9: Thoughts of better off dead/self-harm past 2 weeks Not at all Not at all Not at all     KASSIE-7 SCORE 11/10/2022 1/9/2023 3/13/2023   Total  Score 14 (moderate anxiety) 14 (moderate anxiety) 14 (moderate anxiety)   Total Score 14 14 14       Assessments completed prior to visit:  The following assessments were completed by patient for this visit:  PROMIS 10-Global Health (only subscores and total score):   PROMIS-10 Scores Only 12/14/2021 3/28/2022 4/25/2022 7/15/2022 11/10/2022 3/13/2023   Global Mental Health Score 7 8 6 5 7 7   Global Physical Health Score 14 14 14 15 14 14   PROMIS TOTAL - SUBSCORES 21 22 20 20 21 21     [place] (Short version): see chart.      ASSESSMENT: Current Emotional / Mental Status (status of significant symptoms):   Risk status (Self / Other harm or suicidal ideation)   Patient denies current fears or concerns for personal safety.   Patient denies current or recent suicidal ideation or behaviors.   Patient denies current or recent homicidal ideation or behaviors.   Patient denies current or recent self injurious behavior or ideation.   Patient denies other safety concerns.   Patient reports there has been no change in risk factors since their last session.     Patient reports there has been no change in protective factors since their last session.     Recommended that patient call 911 or go to the local ED should there be a change in any of these risk factors.     Appearance:   Appropriate    Eye Contact:   Fair    Psychomotor Behavior: Normal    Attitude:   Cooperative    Orientation:   All   Speech    Rate / Production: Normal/ Responsive Emotional    Volume:  Normal    Mood:    Anxious  Depressed  Sad    Affect:    Flat  Subdued    Thought Content:  Perseverative Rumination    Thought Form:  Coherent  Tangential    Insight:    Fair      Medication Review:   No changes to current psychiatric medication(s)     Medication Compliance:   No patient was unable to refill aderaal prescription.  Provider encouraged patient to follow-up with PCP.     Changes in Health Issues:   Yes: Patient reported migraines and dizziness which  "he attributed to discontinued medication.  Provider encouraged patient to follow-up with prescribing provider.     Chemical Use Review:   Substance Use: Chemical use reviewed, no active concerns identified      Tobacco Use: No current tobacco use.      Diagnosis:  1. Generalized anxiety disorder    2. Attention deficit hyperactivity disorder (ADHD), predominantly inattentive type    3. Depression, unspecified depression type     (Patient mentioned ADHD diagnosis in session.  Provider verified ADHD testing via chart review, patient was found to meet criteria for ADHD-predominantly inattentive type).    Collateral Reports Completed:   Not Applicable    PLAN: (Patient Tasks / Therapist Tasks / Other)    Patient will utilize crisis resources as needed.       Patient will utilize the following coping strategies to manage anxiety: Deep breathing, grounding exercises, engaging in civic duty. [place] exercise. Patient will also process and reality-check anxious thoughts after anxiety has decreased.     Patient will incorporate more deep-breathing and grounding exercises throughout the day.  Patient will utilize coping strategies discussed in session and challenge/reframe cognitive distortions (catastrophizing).        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 Patient will continue to set boundaries and reflect on the \"minimum\" he feels that he needs to be at to be in a relationship.      Patient will utilize boundary setting strategies discussed in session.  Patient will reflect on what he would like to see in regards to change in his social life/interaction.  Patient will utilize coping strategies discussed in session.  Provider and patient will continue to explore patient's beliefs/thoughts around romantic relationships. Patient will take steps and utilize coping strategies discussed in session.      Patient will work to challenge and reframe " identified cognitive distortions and negative thoughts.  Provider will work to build up patient's sense of self worth.        Provider determined a longer session was clinically necessary given patient's disclosure of significant difficulties around core beliefs and values that caused significant psychological distress.     Provider and patient will continue to talk about significant pattern of negative self talk.    Provider will review treatment plan with patient.     Patient will follow-up with providers as needed.  Provider and patient will discuss cognitive dissonance around pursuing a relationship.      Patient will utilize strategies discussed in session.  Patient and provider will discuss romantic relationships with patient.      Patient will utilize a daily check-list to mitigate ADHD sxs and work to improve organization in the workplace.        Leatha Pearce [facility]  3/27/23  Service Performed and Documented by [facility]-   Note reviewed and clinical supervision by LONDON Patino [facility] 3/30/2023      ______________________________________________________________________    Individual Treatment Plan    Patient's Name: Teddy Shah  YOB: 1990    Date of Creation: 9/7/21  Date Treatment Plan Last Reviewed/Revised: 1/9/23    DSM5 Diagnoses: 311 (F32.9) Unspecified Depressive Disorder  or 300.02 (F41.1) Generalized Anxiety Disorder  Psychosocial / Contextual Factors: Hostile home environment, lack of social support, current events/politics a significant stress, family conflict  PROMIS (reviewed every 90 days):   WHODAS: 24    Referral / Collaboration:  Referral to another professional/service is not indicated at this time..    Anticipated number of session for this episode of care:   Anticipation frequency of session: Biweekly  Anticipated Duration of each session: 38-52 minutes  Treatment plan will be reviewed in 90 days or when goals have been changed.       MeasurableTreatment Goal(s) related  "to diagnosis / functional impairment(s)  Goal 1: Patient will successfully develop and utilize coping skills to manage depressive, anxious and ADHD sxs within 90 days. (Temporarily deferred, ongoing by patient 6/13/22)    I will know I've met my goal when I feel like I can get some of my executive function back (completing tasks).       Objective #A Patient will process experience around romantic relationship, reflect on how it has impacted his view of self, and how he would like to move forward.      Patient will work to develop a \"social life\" outside of work, and manage mental health sxs as they arise.              Patient will attend and participate in social or recreational activities    Develop and utilize self-compassion strategies.   identify the time in your life when you began to feel poorly about themselves.  Status: Restarted - Date: 1/9/23     Intervention(s)  Therapist will assign homework around utilizing self compassion strategies  teach psychoeducation around shame, guilt and healthy relationships.     Objective #B Patient will work to develop coping skills to \"turn brain off\" or switch tracks.    Patient will use thought-stopping strategy daily to reduce intrusive thoughts.  Status: Completed - Date: 6/13/22 (maintenance as needed)       Intervention(s)  Therapist will assign homework around using thought-stopping strategies and other coping skills  teach emotional regulation skills.       Patient has reviewed and agreed to the above plan.      CINDI Posada  March 14, 2022, reviewed by 6/13/22, reviewed 9/14/22, reviewed 1/9/23  Service Performed and Documented by TOSHAI-   tx plan reviewed and clinical supervision by LONDON Patino [facility] 3/29/2022, 6/13/2022, 9/14/2022, 1/10/2023        Answers for HPI/ROS submitted by the patient on 3/28/2022  KASSIE 7 TOTAL SCORE: 13  "

## 2023-04-17 ENCOUNTER — OFFICE VISIT (OUTPATIENT)
Dept: FAMILY MEDICINE | Facility: CLINIC | Age: 33
End: 2023-04-17
Payer: COMMERCIAL

## 2023-04-17 VITALS
OXYGEN SATURATION: 97 % | TEMPERATURE: 97.8 F | HEART RATE: 95 BPM | RESPIRATION RATE: 18 BRPM | WEIGHT: 282.2 LBS | BODY MASS INDEX: 36.22 KG/M2 | DIASTOLIC BLOOD PRESSURE: 82 MMHG | HEIGHT: 74 IN | SYSTOLIC BLOOD PRESSURE: 131 MMHG

## 2023-04-17 DIAGNOSIS — E66.01 CLASS 2 SEVERE OBESITY DUE TO EXCESS CALORIES WITH SERIOUS COMORBIDITY AND BODY MASS INDEX (BMI) OF 36.0 TO 36.9 IN ADULT (H): ICD-10-CM

## 2023-04-17 DIAGNOSIS — F90.0 ADHD (ATTENTION DEFICIT HYPERACTIVITY DISORDER), INATTENTIVE TYPE: ICD-10-CM

## 2023-04-17 DIAGNOSIS — F33.1 MODERATE EPISODE OF RECURRENT MAJOR DEPRESSIVE DISORDER (H): ICD-10-CM

## 2023-04-17 DIAGNOSIS — F41.9 ANXIETY: ICD-10-CM

## 2023-04-17 DIAGNOSIS — E66.812 CLASS 2 SEVERE OBESITY DUE TO EXCESS CALORIES WITH SERIOUS COMORBIDITY AND BODY MASS INDEX (BMI) OF 36.0 TO 36.9 IN ADULT (H): ICD-10-CM

## 2023-04-17 DIAGNOSIS — Z00.00 ROUTINE HISTORY AND PHYSICAL EXAMINATION OF ADULT: Primary | ICD-10-CM

## 2023-04-17 DIAGNOSIS — L83 ACANTHOSIS NIGRICANS, ACQUIRED: ICD-10-CM

## 2023-04-17 DIAGNOSIS — R73.03 PREDIABETES: ICD-10-CM

## 2023-04-17 DIAGNOSIS — Z13.6 CARDIOVASCULAR SCREENING; LDL GOAL LESS THAN 160: ICD-10-CM

## 2023-04-17 LAB — HBA1C MFR BLD: 6 % (ref 0–5.6)

## 2023-04-17 PROCEDURE — 99395 PREV VISIT EST AGE 18-39: CPT | Performed by: NURSE PRACTITIONER

## 2023-04-17 PROCEDURE — 83036 HEMOGLOBIN GLYCOSYLATED A1C: CPT | Performed by: NURSE PRACTITIONER

## 2023-04-17 PROCEDURE — 99214 OFFICE O/P EST MOD 30 MIN: CPT | Mod: 25 | Performed by: NURSE PRACTITIONER

## 2023-04-17 PROCEDURE — 36415 COLL VENOUS BLD VENIPUNCTURE: CPT | Performed by: NURSE PRACTITIONER

## 2023-04-17 PROCEDURE — 80061 LIPID PANEL: CPT | Performed by: NURSE PRACTITIONER

## 2023-04-17 PROCEDURE — 82947 ASSAY GLUCOSE BLOOD QUANT: CPT | Performed by: NURSE PRACTITIONER

## 2023-04-17 RX ORDER — SERTRALINE HYDROCHLORIDE 100 MG/1
200 TABLET, FILM COATED ORAL DAILY
Qty: 180 TABLET | Refills: 1 | Status: SHIPPED | OUTPATIENT
Start: 2023-04-17 | End: 2023-09-05

## 2023-04-17 RX ORDER — DEXTROAMPHETAMINE SACCHARATE, AMPHETAMINE ASPARTATE MONOHYDRATE, DEXTROAMPHETAMINE SULFATE AND AMPHETAMINE SULFATE 5; 5; 5; 5 MG/1; MG/1; MG/1; MG/1
40 CAPSULE, EXTENDED RELEASE ORAL DAILY
Qty: 60 CAPSULE | Refills: 0 | Status: SHIPPED | OUTPATIENT
Start: 2023-04-17 | End: 2023-06-17

## 2023-04-17 ASSESSMENT — PATIENT HEALTH QUESTIONNAIRE - PHQ9
SUM OF ALL RESPONSES TO PHQ QUESTIONS 1-9: 10
SUM OF ALL RESPONSES TO PHQ QUESTIONS 1-9: 10
10. IF YOU CHECKED OFF ANY PROBLEMS, HOW DIFFICULT HAVE THESE PROBLEMS MADE IT FOR YOU TO DO YOUR WORK, TAKE CARE OF THINGS AT HOME, OR GET ALONG WITH OTHER PEOPLE: SOMEWHAT DIFFICULT

## 2023-04-17 NOTE — PATIENT INSTRUCTIONS
At [facility], we strive to deliver an exceptional experience to you, every time we see you. If you receive a survey, please complete it as we do value your feedback.  If you have MyChart, you can expect to receive results automatically within 24 hours of their completion.  Your provider will send a note interpreting your results as well.   If you do not have MyChart, you should receive your results in about a week by mail.    Your care team:                            Family Medicine Internal Medicine   MD Rolando Salvador MD Shantel Branch-Fleming, MD Srinivasa Vaka, MD Katya Belousova, PAAB Thomson CNP, MD ([facility]   Jacob Caicedo, MD Lolis Kimball MD Amelia Massimini APRN BUBBA Dee APRN MD Shiloh Glasgow MD          Clinic hours: Monday - Thursday 7 am-6 pm; Fridays 7 am-5 pm.   Urgent care: Monday - Friday 10 am- 8 pm; Saturday and Sunday 9 am-5 pm.    Clinic: [phone]       [facility]: Monday - Thursday 8 am - 7 pm; Friday 8 am - 6 pm  [facility]: [phone]

## 2023-04-17 NOTE — PROGRESS NOTES
Assessment & Plan     Routine history and physical examination of adult      Class 2 severe obesity due to excess calories with serious comorbidity and body mass index (BMI) of 36.0 to 36.9 in adult (H)  Benefits of weight loss reviewed in detail, encouraged him to cut back on the carbohydrates in the diet, consume more fruits and vegetables, drink plenty of water, avoid fruit juices, sodas, get 150 min moderate exercise/week.  Recheck weight in 6 months.      Moderate episode of recurrent major depressive disorder (H)  Stable, continue Zoloft and counseling  - sertraline (ZOLOFT) 100 MG tablet  Dispense: 180 tablet; Refill: 1    ADHD (attention deficit hyperactivity disorder), inattentive type  Increase Adderall to 40 mg XR daily, follow back 1 month (virtual visit OK) for recheck  - amphetamine-dextroamphetamine (ADDERALL XR) 20 MG 24 hr capsule  Dispense: 60 capsule; Refill: 0    Prediabetes  Checking a1c.  - Hemoglobin A1c  - Hemoglobin A1c    Acanthosis nigricans, acquired  Checking glucose and A1c.  - Glucose  - Glucose    Anxiety  Stable on Zoloft, continue with counseling  - sertraline (ZOLOFT) 100 MG tablet  Dispense: 180 tablet; Refill: 1    CARDIOVASCULAR SCREENING; LDL GOAL LESS THAN 160    - Lipid panel reflex to direct LDL Non-fasting  - Lipid panel reflex to direct LDL Non-fasting      Ordering of each unique test  Prescription drug management  35 minutes spent by me on the date of the encounter doing chart review, history and exam, documentation and further activities per the note       Work on weight loss  Regular exercise  See Patient Instructions    [facility]    Clinton Espinal is a 32 year old, presenting for the following health issues:  A.D.H.D         View : No data to display.              History of Present Illness       Reason for visit:  ADHD medication/Official IBS diagnosis    Teddy Shah eats 2-3 servings of fruits and  "vegetables daily.Teddy Shah consumes 0 sweetened beverage(s) daily.Teddy Shah exercises with enough effort to increase Teddy Shah's heart rate 30 to 60 minutes per day.  Teddy Shah exercises with enough effort to increase Teddy Shah's heart rate 5 days per week.   Teddy Shah is taking medications regularly.    Today's PHQ-9         PHQ-9 Total Score: 10    PHQ-9 Q9 Thoughts of better off dead/self-harm past 2 weeks :   Not at all    How difficult have these problems made it for you to do your work, take care of things at home, or get along with other people: Somewhat difficult     Medication Followup of Adderall XR    Taking Medication as prescribed: yes    Side Effects:  None    Medication Helping Symptoms:  yes  He is interested in increasing his Adderall dose, states his counselor has recommended it as well.  He has been having more difficulty completing tasks, has less ability to manage frustration/change in plans, mood has been more labile.    Prediabetes- eats at least 3-4 servings of fruits/veg, does not exercise regular.y outside of work, does a lot of cooking for his family.  Portion control continues to be an issue.  Annual Wellness Visit    Patient has been advised of split billing requirements and indicates understanding: Yes     Are you in the first 12 months of your Medicare Part B coverage?  No    Physical Health:    In general, how would you rate your overall physical health? good    Outside of work, how many days during the week do you exercise?4-5 days/week    Outside of work, approximately how many minutes a day do you exercise?30-45 minutes    If you drink alcohol do you typically have >3 drinks per day or >7 drinks per week? No    Do you usually eat at least 4 servings of fruit and vegetables a day, include whole grains & fiber and avoid regularly eating high fat or \"junk\" foods? Yes    Do you have any problems taking medications regularly? No    Do you have any side effects " from medications? none    Needs assistance for the following daily activities: no assistance needed    Which of the following safety concerns are present in your home?  none identified     Hearing impairment: No    In the past 6 months, have you been bothered by leaking of urine? no    Mental Health:    In general, how would you rate your overall mental or emotional health? good  PHQ-2 Score:      Do you feel safe in your environment? Yes    Have you ever done Advance Care Planning? (For example, a Health Directive, POLST, or a discussion with a medical provider or your loved ones about your wishes)? No, advance care planning information given to patient to review.  Patient declined advance care planning discussion at this time.    Fall risk:  Fall Risk Assessment not completed.      Do you have sleep apnea, excessive snoring or daytime drowsiness?: yes    Social History     Tobacco Use     Smoking status: Never     Smokeless tobacco: Never   Vaping Use     Vaping status: Never Used   Substance Use Topics     Alcohol use: No              View : No data to display.              Do you have a current opioid prescription? No  Do you use any other controlled substances or medications that are not prescribed by a provider? None    Current providers sharing in care for this patient include:   Patient Care Team:  Ashanti Dee APRN CNP as PCP - General (Nurse Practitioner - Family)  Ashanti Dee APRN CNP as Assigned PCP    Patient has been advised of split billing requirements and indicates understanding: Yes    I have reviewed Opioid Use Disorder and Substance Use Disorder risk factors and made any needed referrals.         Review of Systems   Constitutional, HEENT, cardiovascular, pulmonary, gi and gu systems are negative, except as otherwise noted.      Objective    /82 (BP Location: Left arm, Patient Position: Sitting, Cuff Size: Adult Large)   Pulse 95   Temp 97.8  F (36.6  C) (Oral)   Resp 18    " 1.874 m (6' 1.78\")   Wt 128 kg (282 lb 3.2 oz)   SpO2 97%   BMI 36.45 kg/m    Body mass index is 36.45 kg/m .  Physical Exam   GENERAL: healthy, alert and no distress  EYES: Eyes grossly normal to inspection, PERRL and conjunctivae and sclerae normal  HENT: ear canals and TM's normal, nose and mouth without ulcers or lesions  NECK: no adenopathy, no asymmetry, masses, or scars and thyroid normal to palpation  RESP: lungs clear to auscultation - no rales, rhonchi or wheezes  CV: regular rate and rhythm, normal S1 S2, no S3 or S4, no murmur, click or rub, no peripheral edema and peripheral pulses strong  ABDOMEN: soft, nontender, no hepatosplenomegaly, no masses and bowel sounds normal   (male): normal male genitalia without lesions or urethral discharge, no hernia  MS: no gross musculoskeletal defects noted, no edema  SKIN: acanthosis nigrans on posterior neck, no suspicious lesions or rashes  NEURO: Normal strength and tone, mentation intact and speech normal  PSYCH: mentation appears normal, affect normal/bright  LYMPH: no cervical, supraclavicular, axillary, or inguinal adenopathy    No results found for this or any previous visit (from the past 24 hour(s)).            "

## 2023-04-18 LAB
CHOLEST SERPL-MCNC: 179 MG/DL
FASTING STATUS PATIENT QL REPORTED: NO
FASTING STATUS PATIENT QL REPORTED: NO
GLUCOSE BLD-MCNC: 96 MG/DL (ref 70–99)
HDLC SERPL-MCNC: 36 MG/DL
LDLC SERPL CALC-MCNC: 96 MG/DL
NONHDLC SERPL-MCNC: 143 MG/DL
TRIGL SERPL-MCNC: 236 MG/DL

## 2023-04-19 ENCOUNTER — MYC MEDICAL ADVICE (OUTPATIENT)
Dept: FAMILY MEDICINE | Facility: CLINIC | Age: 33
End: 2023-04-19
Payer: COMMERCIAL

## 2023-04-20 NOTE — TELEPHONE ENCOUNTER
Per chart review, new IBS diagnosis was intended to be discussed. Pt asking about recommendations for IBS related diarrhea.     Debby Alonso RN

## 2023-05-08 ENCOUNTER — VIRTUAL VISIT (OUTPATIENT)
Dept: PSYCHOLOGY | Facility: CLINIC | Age: 33
End: 2023-05-08
Payer: COMMERCIAL

## 2023-05-08 DIAGNOSIS — F41.1 GENERALIZED ANXIETY DISORDER: Primary | ICD-10-CM

## 2023-05-08 DIAGNOSIS — F90.0 ATTENTION DEFICIT HYPERACTIVITY DISORDER (ADHD), PREDOMINANTLY INATTENTIVE TYPE: ICD-10-CM

## 2023-05-08 DIAGNOSIS — F32.A DEPRESSION, UNSPECIFIED DEPRESSION TYPE: ICD-10-CM

## 2023-05-08 PROCEDURE — 90834 PSYTX W PT 45 MINUTES: CPT | Mod: VID

## 2023-05-08 ASSESSMENT — COLUMBIA-SUICIDE SEVERITY RATING SCALE - C-SSRS
SUICIDE, SINCE LAST CONTACT: NO
6. HAVE YOU EVER DONE ANYTHING, STARTED TO DO ANYTHING, OR PREPARED TO DO ANYTHING TO END YOUR LIFE?: NO
TOTAL  NUMBER OF ABORTED OR SELF INTERRUPTED ATTEMPTS SINCE LAST CONTACT: NO
ATTEMPT SINCE LAST CONTACT: NO
TOTAL  NUMBER OF INTERRUPTED ATTEMPTS SINCE LAST CONTACT: NO
2. HAVE YOU ACTUALLY HAD ANY THOUGHTS OF KILLING YOURSELF?: NO
1. SINCE LAST CONTACT, HAVE YOU WISHED YOU WERE DEAD OR WISHED YOU COULD GO TO SLEEP AND NOT WAKE UP?: NO

## 2023-05-08 ASSESSMENT — ANXIETY QUESTIONNAIRES
3. WORRYING TOO MUCH ABOUT DIFFERENT THINGS: MORE THAN HALF THE DAYS
IF YOU CHECKED OFF ANY PROBLEMS ON THIS QUESTIONNAIRE, HOW DIFFICULT HAVE THESE PROBLEMS MADE IT FOR YOU TO DO YOUR WORK, TAKE CARE OF THINGS AT HOME, OR GET ALONG WITH OTHER PEOPLE: SOMEWHAT DIFFICULT
GAD7 TOTAL SCORE: 13
7. FEELING AFRAID AS IF SOMETHING AWFUL MIGHT HAPPEN: MORE THAN HALF THE DAYS
2. NOT BEING ABLE TO STOP OR CONTROL WORRYING: NEARLY EVERY DAY
1. FEELING NERVOUS, ANXIOUS, OR ON EDGE: NEARLY EVERY DAY
7. FEELING AFRAID AS IF SOMETHING AWFUL MIGHT HAPPEN: MORE THAN HALF THE DAYS
GAD7 TOTAL SCORE: 13
4. TROUBLE RELAXING: SEVERAL DAYS
8. IF YOU CHECKED OFF ANY PROBLEMS, HOW DIFFICULT HAVE THESE MADE IT FOR YOU TO DO YOUR WORK, TAKE CARE OF THINGS AT HOME, OR GET ALONG WITH OTHER PEOPLE?: SOMEWHAT DIFFICULT
GAD7 TOTAL SCORE: 13
6. BECOMING EASILY ANNOYED OR IRRITABLE: SEVERAL DAYS
5. BEING SO RESTLESS THAT IT IS HARD TO SIT STILL: SEVERAL DAYS

## 2023-05-08 NOTE — PROGRESS NOTES
[facility]                                     Progress Note    Patient Name: Teddy Shah  Date: 23         Service Type: Individual      Session Start Time: 11:03am  Session End Time:11:54am     Session Length: 38-52min    Session #: 27    Attendees: Client attended alone    Service Modality:  Video session  Yes, the patient's condition can be safely assessed and treated via synchronous audio and visual telemedicine encounter.  (Provider and Patient had to switch to phone due to technical difficulties)    Reason for Video Visit: Services only offered telehealth    Location of Originating Site: Patient's car    Distant Site: Provider Remote Setting home office    Provider verified identity through the following two step process.  Patient provided:  Patient  and Patient address                          Consent:  The patient/guardian has verbally consented to: the potential risks and benefits of telemedicine (video visit) versus in person care; bill my insurance or make self-payment for services provided; and responsibility for payment of non-covered services.                 Mode of transmission-[employer] Magpie  Interactive Complexity: No  Crisis: No        Progress Since Last Session (Related to Symptoms / Goals / Homework):   Symptoms: Minimally improving anxious sxs no change in depressive sxs per patient report and KASSIE-7    Homework: Completed in session      Episode of Care Goals: Satisfactory progress - ACTION (Actively working towards change); Intervened by reinforcing change plan / affirming steps taken     Current / Ongoing Stressors and Concerns:  Work stress, parents moving, low self worth, housing, negative self talk, aversion to change         Treatment Objective(s) Addressed in This Session:   use at least   coping skills for anxiety management in the next 4 weeks  Decrease frequency and intensity of feeling down, depressed, hopeless  Identify negative self-talk and  "behaviors: challenge core beliefs, myths, and actions  Patient will attend and participate in social or recreational activities    Develop and utilize self-compassion strategies.   identify the time in your life when you began to feel poorly about themselves.     Intervention:   Motivational Interviewing    MI Intervention: Expressed Empathy/Understanding, Supported Autonomy, Collaboration, Evocation and Reframe     Change Talk Expressed by the Patient: Desire to change Reasons to change    Provider Response to Change Talk: E - Evoked more info from patient about behavior change, A - Affirmed patient's thoughts, decisions, or attempts at behavior change, R - Reflected patient's change talk and S - Summarized patient's change talk statements    Provider held space as patient processed experiences using reflective listening, validation, and normalization of patient's emotional response.  Provider worked to develop greater insight into patient's experiences. Provider validated patient.     CBT: Provider utilized CBT strategies to challenge and reframe patient's identified cognitive distortions.     Solution-Focused: Provider utilized solution-focused strategies to identify options patient could utilize to address consistent mental health sxs.      Provider and patient explored patient's \"aversion\" to change.  Provider worked to develop greater insight, and validated and challenged patient.  Provider and patient will continue to explore obstacles that are impeding patient's ability to change.     Provider encouraged patient to prioritize mental health treatment and discussed methods patient could utilize to remind himself to reserve time for appointments.           1/9/2023    10:50 AM 3/13/2023    10:55 AM 4/17/2023    12:41 PM   PHQ   PHQ-9 Total Score 13 12 10   Q9: Thoughts of better off dead/self-harm past 2 weeks Not at all Not at all Not at all         1/9/2023    10:51 AM 3/13/2023    10:56 AM 5/8/2023    12:59 " AM   KASSIE-7 SCORE   Total Score 14 (moderate anxiety) 14 (moderate anxiety) 13 (moderate anxiety)   Total Score 14 14 13       Assessments completed prior to visit:  The following assessments were completed by patient for this visit:  PROMIS 10-Global Health (only subscores and total score):       12/14/2021    10:49 AM 3/28/2022    10:55 AM 4/25/2022    10:35 AM 7/15/2022    11:00 AM 11/10/2022     9:52 AM 3/13/2023    10:57 AM   PROMIS-10 Scores Only   Global Mental Health Score 7 8 6 5 7 7   Global Physical Health Score 14 14 14 15 14 14   PROMIS TOTAL - SUBSCORES 21 22 20 20 21 21     [place] (Short version): see chart.      ASSESSMENT: Current Emotional / Mental Status (status of significant symptoms):   Risk status (Self / Other harm or suicidal ideation)   Patient denies current fears or concerns for personal safety.   Patient denies current or recent suicidal ideation or behaviors.   Patient denies current or recent homicidal ideation or behaviors.   Patient denies current or recent self injurious behavior or ideation.   Patient denies other safety concerns.   Patient reports there has been no change in risk factors since their last session.     Patient reports there has been no change in protective factors since their last session.     Recommended that patient call 911 or go to the local ED should there be a change in any of these risk factors.     Appearance:   Appropriate    Eye Contact:   Fair    Psychomotor Behavior: Normal    Attitude:   Cooperative    Orientation:   All   Speech    Rate / Production: Normal/ Responsive    Volume:  Normal    Mood:    Anxious  Depressed    Affect:    Flat  Subdued    Thought Content:  Perseverative Rumination    Thought Form:  Coherent  Tangential    Insight:    Fair      Medication Review:   Changes to psychiatric medications, see updated Medication List in EPIC.      Medication Compliance:   Yes     Changes in Health Issues:   Yes: Patient reported migraines and  "dizziness which he attributed to discontinued medication.  Provider encouraged patient to follow-up with prescribing provider.     Chemical Use Review:   Substance Use: Chemical use reviewed, no active concerns identified      Tobacco Use: No current tobacco use.      Diagnosis:  1. Generalized anxiety disorder    2. Depression, unspecified depression type    3. Attention deficit hyperactivity disorder (ADHD), predominantly inattentive type     (Patient mentioned ADHD diagnosis in session.  Provider verified ADHD testing via chart review, patient was found to meet criteria for ADHD-predominantly inattentive type).    Collateral Reports Completed:   Not Applicable    PLAN: (Patient Tasks / Therapist Tasks / Other)    Patient will utilize crisis resources as needed.       Patient will utilize the following coping strategies to manage anxiety: Deep breathing, grounding exercises, engaging in civic duty. [place] exercise. Patient will also process and reality-check anxious thoughts after anxiety has decreased.     Patient will incorporate more deep-breathing and grounding exercises throughout the day.  Patient will utilize coping strategies discussed in session and challenge/reframe cognitive distortions (catastrophizing).        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 Patient will continue to set boundaries and reflect on the \"minimum\" he feels that he needs to be at to be in a relationship.      Patient will utilize boundary setting strategies discussed in session.  Patient will reflect on what he would like to see in regards to change in his social life/interaction.  Patient will utilize coping strategies discussed in session.  Provider and patient will continue to explore patient's beliefs/thoughts around romantic relationships. Patient will take steps and utilize coping strategies discussed in session.      Patient will work to challenge and " reframe identified cognitive distortions and negative thoughts.  Provider will work to build up patient's sense of self worth.        Provider determined a longer session was clinically necessary given patient's disclosure of significant difficulties around core beliefs and values that caused significant psychological distress.     Provider and patient will continue to talk about significant pattern of negative self talk.    Provider will review treatment plan with patient.     Patient will follow-up with providers as needed.  Provider and patient will discuss cognitive dissonance around pursuing a relationship.      Patient will utilize strategies discussed in session.  Patient and provider will discuss romantic relationships with patient.      Patient will utilize a daily check-list to mitigate ADHD sxs and work to improve organization in the workplace.    Patient will reflect on aversion to change.  Provider and patient will touch base on romantic relationship.      Leatha Pearce [facility]  5/8/23  Service Performed and Documented by [facility]-   Note reviewed and clinical supervision by LONDON Patino [facility] 5/8/2023        ______________________________________________________________________    Individual Treatment Plan    Patient's Name: Teddy Shah  YOB: 1990    Date of Creation: 9/7/21  Date Treatment Plan Last Reviewed/Revised: 5/8/23    DSM5 Diagnoses: Attention-Deficit/Hyperactivity Disorder  314.00 (F90.0) Predominantly inattentive presentation, 311 (F32.9) Unspecified Depressive Disorder  or 300.02 (F41.1) Generalized Anxiety Disorder  Psychosocial / Contextual Factors: Hostile home environment, lack of social support, current events/politics a significant stress, family conflict  PROMIS (reviewed every 90 days):   WHODAS: 24    Referral / Collaboration:  Referral to another professional/service is not indicated at this time..    Anticipated number of session for this episode of care:  "  Anticipation frequency of session: Biweekly  Anticipated Duration of each session: 38-52 minutes  Treatment plan will be reviewed in 90 days or when goals have been changed.       MeasurableTreatment Goal(s) related to diagnosis / functional impairment(s)  Goal 1: Patient will successfully develop and utilize coping skills to manage depressive, anxious and ADHD sxs within 90 days.     I will know I've met my goal when I feel like I can get some of my executive function back (completing tasks).       Objective #A Patient will process experience around romantic relationship, reflect on how it has impacted his view of self, and how he would like to move forward.      Patient will work to develop a \"social life\" outside of work, and manage mental health sxs as they arise.              Patient will attend and participate in social or recreational activities    Develop and utilize self-compassion strategies.   identify the time in your life when you began to feel poorly about themselves.  Status: Restarted - Date: 5/8/23     Intervention(s)  Therapist will assign homework around utilizing self compassion strategies  teach psychoeducation around shame, guilt and healthy relationships.     Objective #B Patient will work to develop coping skills to \"turn brain off\" or switch tracks.    Patient will use thought-stopping strategy daily to reduce intrusive thoughts.  Status: Restarted - Date: 5/8/23       Intervention(s)  Therapist will assign homework around using thought-stopping strategies and other coping skills  teach emotional regulation skills.       Patient has reviewed and agreed to the above plan.      CINDI Posada  March 14, 2022, reviewed by 6/13/22, reviewed 9/14/22, reviewed 1/9/23, reviewed 5/8/23  Service Performed and Documented by CINDI-   tx plan reviewed and clinical supervision by LONDON Patino [facility] 3/29/2022, 6/13/2022, 9/14/2022, 1/10/2023,5/8/2023    Answers for HPI/ROS submitted by the " patient on 5/8/2023  KASSIE 7 TOTAL SCORE: 13

## 2023-06-17 ENCOUNTER — MYC REFILL (OUTPATIENT)
Dept: FAMILY MEDICINE | Facility: CLINIC | Age: 33
End: 2023-06-17
Payer: COMMERCIAL

## 2023-06-17 DIAGNOSIS — F90.0 ADHD (ATTENTION DEFICIT HYPERACTIVITY DISORDER), INATTENTIVE TYPE: ICD-10-CM

## 2023-06-19 ENCOUNTER — VIRTUAL VISIT (OUTPATIENT)
Dept: PSYCHOLOGY | Facility: CLINIC | Age: 33
End: 2023-06-19
Payer: COMMERCIAL

## 2023-06-19 DIAGNOSIS — F90.0 ATTENTION DEFICIT HYPERACTIVITY DISORDER (ADHD), PREDOMINANTLY INATTENTIVE TYPE: ICD-10-CM

## 2023-06-19 DIAGNOSIS — F32.A DEPRESSION, UNSPECIFIED DEPRESSION TYPE: ICD-10-CM

## 2023-06-19 DIAGNOSIS — F41.1 GENERALIZED ANXIETY DISORDER: Primary | ICD-10-CM

## 2023-06-19 PROCEDURE — 90834 PSYTX W PT 45 MINUTES: CPT | Mod: VID

## 2023-06-19 RX ORDER — DEXTROAMPHETAMINE SACCHARATE, AMPHETAMINE ASPARTATE MONOHYDRATE, DEXTROAMPHETAMINE SULFATE AND AMPHETAMINE SULFATE 5; 5; 5; 5 MG/1; MG/1; MG/1; MG/1
40 CAPSULE, EXTENDED RELEASE ORAL DAILY
Qty: 60 CAPSULE | Refills: 0 | Status: SHIPPED | OUTPATIENT
Start: 2023-06-19 | End: 2023-08-10

## 2023-06-19 ASSESSMENT — ANXIETY QUESTIONNAIRES
3. WORRYING TOO MUCH ABOUT DIFFERENT THINGS: MORE THAN HALF THE DAYS
8. IF YOU CHECKED OFF ANY PROBLEMS, HOW DIFFICULT HAVE THESE MADE IT FOR YOU TO DO YOUR WORK, TAKE CARE OF THINGS AT HOME, OR GET ALONG WITH OTHER PEOPLE?: SOMEWHAT DIFFICULT
IF YOU CHECKED OFF ANY PROBLEMS ON THIS QUESTIONNAIRE, HOW DIFFICULT HAVE THESE PROBLEMS MADE IT FOR YOU TO DO YOUR WORK, TAKE CARE OF THINGS AT HOME, OR GET ALONG WITH OTHER PEOPLE: SOMEWHAT DIFFICULT
5. BEING SO RESTLESS THAT IT IS HARD TO SIT STILL: NOT AT ALL
2. NOT BEING ABLE TO STOP OR CONTROL WORRYING: NEARLY EVERY DAY
GAD7 TOTAL SCORE: 12
1. FEELING NERVOUS, ANXIOUS, OR ON EDGE: NEARLY EVERY DAY
6. BECOMING EASILY ANNOYED OR IRRITABLE: SEVERAL DAYS
7. FEELING AFRAID AS IF SOMETHING AWFUL MIGHT HAPPEN: MORE THAN HALF THE DAYS
GAD7 TOTAL SCORE: 12
GAD7 TOTAL SCORE: 12
4. TROUBLE RELAXING: SEVERAL DAYS
7. FEELING AFRAID AS IF SOMETHING AWFUL MIGHT HAPPEN: MORE THAN HALF THE DAYS

## 2023-06-19 NOTE — PROGRESS NOTES
[facility]                                     Progress Note    Patient Name: Teddy Shah  Date: 23         Service Type: Individual      Session Start Time: 11:03am  Session End Time: 11:52am     Session Length: 38-52min    Session #: 28    Attendees: Client attended alone    Service Modality:  Video session  Yes, the patient's condition can be safely assessed and treated via synchronous audio and visual telemedicine encounter.  (Provider and Patient had to switch to phone due to technical difficulties)    Reason for Video Visit: Services only offered telehealth    Location of Originating Site: Patient's car    Distant Site: Provider Remote Setting home office    Provider verified identity through the following two step process.  Patient provided:  Patient  and Patient address                          Consent:  The patient/guardian has verbally consented to: the potential risks and benefits of telemedicine (video visit) versus in person care; bill my insurance or make self-payment for services provided; and responsibility for payment of non-covered services.                 Mode of transmission-Quietly  Interactive Complexity: No  Crisis: No        Progress Since Last Session (Related to Symptoms / Goals / Homework):   Symptoms: Improving anxious and depressive sxs per KASSIE-7 and PHQ-9    Homework: Completed in session      Episode of Care Goals: Satisfactory progress - ACTION (Actively working towards change); Intervened by reinforcing change plan / affirming steps taken     Current / Ongoing Stressors and Concerns:  Work stress, parents moving, low self worth, housing, negative self talk, aversion to change       Treatment Objective(s) Addressed in This Session:   use at least   coping skills for anxiety management in the next 4 weeks  Decrease frequency and intensity of feeling down, depressed, hopeless  Identify negative self-talk and behaviors: challenge core beliefs,  myths, and actions  Patient will attend and participate in social or recreational activities    Develop and utilize self-compassion strategies.   identify the time in your life when you began to feel poorly about themselves.     Intervention:   Motivational Interviewing    MI Intervention: Expressed Empathy/Understanding, Supported Autonomy, Collaboration, Evocation and Reframe     Change Talk Expressed by the Patient: Desire to change Reasons to change    Provider Response to Change Talk: E - Evoked more info from patient about behavior change, A - Affirmed patient's thoughts, decisions, or attempts at behavior change, R - Reflected patient's change talk and S - Summarized patient's change talk statements    Provider held space as patient processed experiences using reflective listening, validation, and normalization of patient's emotional response.  Provider worked to develop greater insight into patient's experiences. Provider validated patient.     CBT: Provider utilized CBT strategies to challenge and reframe patient's identified cognitive distortions. Provider coached patient on challenging and reframing identified cognitive distortions.      Solution-Focused: Provider utilized solution-focused strategies to identify options patient could utilize to remember coping strategies when anxious sxs are heightened.      Psychoeducation: Provider reviewed psychoeducation around anxious sxs and coping strategies to address anxious sxs.     Provider and patient explored pattern of avoidance.  Provider challenged and validated patient.     DBT: Provider coached patient on taking a nonjudgmental stance towards himself.              3/13/2023    10:55 AM 4/17/2023    12:41 PM 6/19/2023    10:58 AM   PHQ   PHQ-9 Total Score 12 10 8   Q9: Thoughts of better off dead/self-harm past 2 weeks Not at all Not at all Not at all         3/13/2023    10:56 AM 5/8/2023    12:59 AM 6/19/2023    10:58 AM   KASSIE-7 SCORE   Total Score 14  (moderate anxiety) 13 (moderate anxiety) 12 (moderate anxiety)   Total Score 14 13 12    Answers for HPI/ROS submitted by the patient on 6/19/2023  If you checked off any problems, how difficult have these problems made it for you to do your work, take care of things at home, or get along with other people?: Somewhat difficult  PHQ9 TOTAL SCORE: 8  KASSIE 7 TOTAL SCORE: 12        Assessments completed prior to visit:  The following assessments were completed by patient for this visit:  PROMIS 10-Global Health (only subscores and total score):       12/14/2021    10:49 AM 3/28/2022    10:55 AM 4/25/2022    10:35 AM 7/15/2022    11:00 AM 11/10/2022     9:52 AM 3/13/2023    10:57 AM 5/8/2023    11:15 AM   PROMIS-10 Scores Only   Global Mental Health Score 7 8 6 5 7 7 6   Global Physical Health Score 14 14 14 15 14 14 14   PROMIS TOTAL - SUBSCORES 21 22 20 20 21 21 20     [place] (Short version): see chart.      ASSESSMENT: Current Emotional / Mental Status (status of significant symptoms):   Risk status (Self / Other harm or suicidal ideation)   Patient denies current fears or concerns for personal safety.   Patient denies current or recent suicidal ideation or behaviors.   Patient denies current or recent homicidal ideation or behaviors.   Patient denies current or recent self injurious behavior or ideation.   Patient denies other safety concerns.   Patient reports there has been no change in risk factors since their last session.     Patient reports there has been no change in protective factors since their last session.     Recommended that patient call 911 or go to the local ED should there be a change in any of these risk factors.     Appearance:   Appropriate    Eye Contact:   Fair    Psychomotor Behavior: Normal    Attitude:   Cooperative    Orientation:   All   Speech    Rate / Production: Normal/ Responsive    Volume:  Normal    Mood:    Anxious  Depressed    Affect:    Flat  Subdued    Thought  "Content:  Perseverative Rumination    Thought Form:  Coherent  Tangential    Insight:    Fair      Medication Review:   Changes to psychiatric medications, see updated Medication List in EPIC.      Medication Compliance:   Yes     Changes in Health Issues:   None reported     Chemical Use Review:   Substance Use: Chemical use reviewed, no active concerns identified      Tobacco Use: No current tobacco use.      Diagnosis:  1. Generalized anxiety disorder    2. Depression, unspecified depression type    3. Attention deficit hyperactivity disorder (ADHD), predominantly inattentive type     (Patient mentioned ADHD diagnosis in session.  Provider verified ADHD testing via chart review, patient was found to meet criteria for ADHD-predominantly inattentive type).    Collateral Reports Completed:   Not Applicable    PLAN: (Patient Tasks / Therapist Tasks / Other)    Patient will utilize crisis resources as needed.       Patient will utilize the following coping strategies to manage anxiety: Deep breathing, grounding exercises, engaging in civic duty. [place] exercise. Patient will also process and reality-check anxious thoughts after anxiety has decreased.     Patient will incorporate more deep-breathing and grounding exercises throughout the day.  Patient will utilize coping strategies discussed in session and challenge/reframe cognitive distortions (catastrophizing).        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 Patient will continue to set boundaries and reflect on the \"minimum\" he feels that he needs to be at to be in a relationship.      Patient will utilize boundary setting strategies discussed in session.  Patient will reflect on what he would like to see in regards to change in his social life/interaction.  Patient will utilize coping strategies discussed in session.  Provider and patient will continue to explore patient's beliefs/thoughts " around romantic relationships. Patient will take steps and utilize coping strategies discussed in session.      Patient will work to challenge and reframe identified cognitive distortions and negative thoughts.  Provider will work to build up patient's sense of self worth.        Provider determined a longer session was clinically necessary given patient's disclosure of significant difficulties around core beliefs and values that caused significant psychological distress.     Provider and patient will continue to talk about significant pattern of negative self talk.    Provider will review treatment plan with patient.     Patient will follow-up with providers as needed.  Provider and patient will discuss cognitive dissonance around pursuing a relationship.      Patient will utilize strategies discussed in session.  Patient and provider will discuss romantic relationships with patient.      Patient will utilize a daily check-list to mitigate ADHD sxs and work to improve organization in the workplace.    Patient will reflect on aversion to change.  Provider and patient will touch base on romantic relationship.    Patient stated that he would write a list with coping strategies that would be easily accessible and work to increase self compassion.  Provider and patient will explore patient's difficulty with self-compassion strategies.        Leatha Pearce TOSHIA  6/19/23    Service Performed and Documented by TOSHIA-   Note reviewed and clinical supervision by LONDON Patino [facility] 6/22/2023      ______________________________________________________________________    Individual Treatment Plan    Patient's Name: Teddy Shah  YOB: 1990    Date of Creation: 9/7/21  Date Treatment Plan Last Reviewed/Revised: 5/8/23    DSM5 Diagnoses: Attention-Deficit/Hyperactivity Disorder  314.00 (F90.0) Predominantly inattentive presentation, 311 (F32.9) Unspecified Depressive Disorder  or 300.02 (F41.1) Generalized  "Anxiety Disorder  Psychosocial / Contextual Factors: Hostile home environment, lack of social support, current events/politics a significant stress, family conflict  PROMIS (reviewed every 90 days):   WHODAS: 24    Referral / Collaboration:  Referral to another professional/service is not indicated at this time..    Anticipated number of session for this episode of care:   Anticipation frequency of session: Biweekly  Anticipated Duration of each session: 38-52 minutes  Treatment plan will be reviewed in 90 days or when goals have been changed.       MeasurableTreatment Goal(s) related to diagnosis / functional impairment(s)  Goal 1: Patient will successfully develop and utilize coping skills to manage depressive, anxious and ADHD sxs within 90 days.     I will know I've met my goal when I feel like I can get some of my executive function back (completing tasks).       Objective #A Patient will process experience around romantic relationship, reflect on how it has impacted his view of self, and how he would like to move forward.      Patient will work to develop a \"social life\" outside of work, and manage mental health sxs as they arise.              Patient will attend and participate in social or recreational activities    Develop and utilize self-compassion strategies.   identify the time in your life when you began to feel poorly about themselves.  Status: Restarted - Date: 5/8/23     Intervention(s)  Therapist will assign homework around utilizing self compassion strategies  teach psychoeducation around shame, guilt and healthy relationships.     Objective #B Patient will work to develop coping skills to \"turn brain off\" or switch tracks.    Patient will use thought-stopping strategy daily to reduce intrusive thoughts.  Status: Restarted - Date: 5/8/23       Intervention(s)  Therapist will assign homework around using thought-stopping strategies and other coping skills  teach emotional regulation skills. "       Patient has reviewed and agreed to the above plan.      Leatha Pearce, CINDI  March 14, 2022, reviewed by 6/13/22, reviewed 9/14/22, reviewed 1/9/23, reviewed 5/8/23  Service Performed and Documented by CINDI-   tx plan reviewed and clinical supervision [facility]/2022, 6/13/2022, 9/14/2022, 1/10/2023,5/8/2023    Answers for HPI/ROS submitted by the patient on 5/8/2023  KASSIE 7 TOTAL SCORE: 13

## 2023-06-19 NOTE — TELEPHONE ENCOUNTER
Refilled. I reviewed  website- prescription pattern NOT consistent with potential divergence and IS consistent with our available records

## 2023-07-07 ENCOUNTER — VIRTUAL VISIT (OUTPATIENT)
Dept: PSYCHOLOGY | Facility: CLINIC | Age: 33
End: 2023-07-07
Payer: COMMERCIAL

## 2023-07-07 DIAGNOSIS — F90.0 ATTENTION DEFICIT HYPERACTIVITY DISORDER (ADHD), PREDOMINANTLY INATTENTIVE TYPE: ICD-10-CM

## 2023-07-07 DIAGNOSIS — F41.1 GENERALIZED ANXIETY DISORDER: Primary | ICD-10-CM

## 2023-07-07 DIAGNOSIS — F32.A DEPRESSION, UNSPECIFIED DEPRESSION TYPE: ICD-10-CM

## 2023-07-07 PROCEDURE — 90834 PSYTX W PT 45 MINUTES: CPT | Mod: VID

## 2023-07-07 ASSESSMENT — ANXIETY QUESTIONNAIRES
6. BECOMING EASILY ANNOYED OR IRRITABLE: SEVERAL DAYS
IF YOU CHECKED OFF ANY PROBLEMS ON THIS QUESTIONNAIRE, HOW DIFFICULT HAVE THESE PROBLEMS MADE IT FOR YOU TO DO YOUR WORK, TAKE CARE OF THINGS AT HOME, OR GET ALONG WITH OTHER PEOPLE: VERY DIFFICULT
1. FEELING NERVOUS, ANXIOUS, OR ON EDGE: MORE THAN HALF THE DAYS
GAD7 TOTAL SCORE: 11
7. FEELING AFRAID AS IF SOMETHING AWFUL MIGHT HAPPEN: MORE THAN HALF THE DAYS
4. TROUBLE RELAXING: NOT AT ALL
GAD7 TOTAL SCORE: 11
5. BEING SO RESTLESS THAT IT IS HARD TO SIT STILL: NOT AT ALL
2. NOT BEING ABLE TO STOP OR CONTROL WORRYING: NEARLY EVERY DAY
3. WORRYING TOO MUCH ABOUT DIFFERENT THINGS: NEARLY EVERY DAY

## 2023-07-07 ASSESSMENT — PATIENT HEALTH QUESTIONNAIRE - PHQ9
10. IF YOU CHECKED OFF ANY PROBLEMS, HOW DIFFICULT HAVE THESE PROBLEMS MADE IT FOR YOU TO DO YOUR WORK, TAKE CARE OF THINGS AT HOME, OR GET ALONG WITH OTHER PEOPLE: SOMEWHAT DIFFICULT
SUM OF ALL RESPONSES TO PHQ QUESTIONS 1-9: 9
SUM OF ALL RESPONSES TO PHQ QUESTIONS 1-9: 9

## 2023-07-07 NOTE — PROGRESS NOTES
[facility]                                     Progress Note    Patient Name: Teddy hSah  Date: 23         Service Type: Individual      Session Start Time: 10:06am  Session End Time: 10:46am     Session Length: 38-52min    Session #: 29    Attendees: Client attended alone    Service Modality:  Video session  Yes, the patient's condition can be safely assessed and treated via synchronous audio and visual telemedicine encounter.  (Provider and Patient had to switch to phone due to technical difficulties)    Reason for Video Visit: Services only offered telehealth    Location of Originating Site: Patient's car    Distant Site: Provider Remote Setting home office    Provider verified identity through the following two step process.  Patient provided:  Patient  and Patient address                          Consent:  The patient/guardian has verbally consented to: the potential risks and benefits of telemedicine (video visit) versus in person care; bill my insurance or make self-payment for services provided; and responsibility for payment of non-covered services.                 Mode of transmission-[employer]  Interactive Complexity: No  Crisis: No        Progress Since Last Session (Related to Symptoms / Goals / Homework):   Symptoms: Worsening depressive sxs and improving anxious sxs per PHQ-9 and KASSIE-7.  No change in sxs per patient report.     Homework: Completed in session      Episode of Care Goals: Satisfactory progress - ACTION (Actively working towards change); Intervened by reinforcing change plan / affirming steps taken     Current / Ongoing Stressors and Concerns:  Work stress, parents moving, low self worth, housing, negative self talk, aversion to change       Treatment Objective(s) Addressed in This Session:   use at least   coping skills for anxiety management in the next 4 weeks  Decrease frequency and intensity of feeling down, depressed, hopeless  Identify  negative self-talk and behaviors: challenge core beliefs, myths, and actions  Patient will attend and participate in social or recreational activities    Develop and utilize self-compassion strategies.   identify the time in your life when you began to feel poorly about themselves.     Intervention:   Motivational Interviewing    MI Intervention: Expressed Empathy/Understanding, Supported Autonomy, Collaboration, Evocation and Reframe     Change Talk Expressed by the Patient: Desire to change Reasons to change Committment to change Activation    Provider Response to Change Talk: E - Evoked more info from patient about behavior change, A - Affirmed patient's thoughts, decisions, or attempts at behavior change, R - Reflected patient's change talk and S - Summarized patient's change talk statements    Provider held space as patient processed experiences using reflective listening, validation, and normalization of patient's emotional response.  Provider worked to develop greater insight into patient's experiences. Provider validated patient.     CBT: Provider utilized CBT strategies to challenge and reframe patient's identified cognitive distortions. Provider coached patient on challenging and reframing identified cognitive distortions.      Provider reviewed coping strategies and coached patient on using coping strategies to manage anxious sxs. Provider utilized solution-focused strategies to identify which coping strategies patient could utilize in specific situations patient brought up.    Provider coached patient on strategies to increase gratitude and self-compassion.  Provider validated patient.      Provider worked to build rapport and strengthen the therapeutic alliance.              4/17/2023    12:41 PM 6/19/2023    10:58 AM 7/7/2023    10:01 AM   PHQ   PHQ-9 Total Score 10 8 9   Q9: Thoughts of better off dead/self-harm past 2 weeks Not at all Not at all Not at all         5/8/2023    12:59 AM 6/19/2023     10:58 AM 7/7/2023    10:01 AM   KASSIE-7 SCORE   Total Score 13 (moderate anxiety) 12 (moderate anxiety) 11 (moderate anxiety)   Total Score 13 12 11    11    Answers for HPI/ROS submitted by the patient on 7/7/2023  If you checked off any problems, how difficult have these problems made it for you to do your work, take care of things at home, or get along with other people?: Somewhat difficult  PHQ9 TOTAL SCORE: 9  KASSIE 7 TOTAL SCORE: 11            Assessments completed prior to visit:  The following assessments were completed by patient for this visit:  PROMIS 10-Global Health (only subscores and total score):       12/14/2021    10:49 AM 3/28/2022    10:55 AM 4/25/2022    10:35 AM 7/15/2022    11:00 AM 11/10/2022     9:52 AM 3/13/2023    10:57 AM 5/8/2023    11:15 AM   PROMIS-10 Scores Only   Global Mental Health Score 7 8 6 5 7 7 6   Global Physical Health Score 14 14 14 15 14 14 14   PROMIS TOTAL - SUBSCORES 21 22 20 20 21 21 20     [place] (Short version): see chart.      ASSESSMENT: Current Emotional / Mental Status (status of significant symptoms):   Risk status (Self / Other harm or suicidal ideation)   Patient denies current fears or concerns for personal safety.   Patient denies current or recent suicidal ideation or behaviors.   Patient denies current or recent homicidal ideation or behaviors.   Patient denies current or recent self injurious behavior or ideation.   Patient denies other safety concerns.   Patient reports there has been no change in risk factors since their last session.     Patient reports there has been no change in protective factors since their last session.     Recommended that patient call 911 or go to the local ED should there be a change in any of these risk factors.     Appearance:   Appropriate    Eye Contact:   Fair    Psychomotor Behavior: Normal    Attitude:   Cooperative    Orientation:   All   Speech    Rate / Production: Normal/ Responsive    Volume:  Normal  "   Mood:    Anxious  Depressed    Affect:    Flat  Subdued    Thought Content:  Clear  Rumination    Thought Form:  Coherent  Tangential    Insight:    Fair      Medication Review:   Changes to psychiatric medications, see updated Medication List in EPIC.      Medication Compliance:   Yes     Changes in Health Issues:   None reported     Chemical Use Review:   Substance Use: Chemical use reviewed, no active concerns identified      Tobacco Use: No current tobacco use.      Diagnosis:  1. Generalized anxiety disorder    2. Depression, unspecified depression type    3. Attention deficit hyperactivity disorder (ADHD), predominantly inattentive type     (Patient mentioned ADHD diagnosis in session.  Provider verified ADHD testing via chart review, patient was found to meet criteria for ADHD-predominantly inattentive type).    Collateral Reports Completed:   Not Applicable    PLAN: (Patient Tasks / Therapist Tasks / Other)    Patient will utilize crisis resources as needed.       Patient will utilize the following coping strategies to manage anxiety: Deep breathing, grounding exercises, engaging in civic duty. [place] exercise. Patient will also process and reality-check anxious thoughts after anxiety has decreased.     Patient will incorporate more deep-breathing and grounding exercises throughout the day.  Patient will utilize coping strategies discussed in session and challenge/reframe cognitive distortions (catastrophizing).        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 Patient will continue to set boundaries and reflect on the \"minimum\" he feels that he needs to be at to be in a relationship.      Patient will utilize boundary setting strategies discussed in session.  Patient will reflect on what he would like to see in regards to change in his social life/interaction.  Patient will utilize coping strategies discussed in session.  Provider " and patient will continue to explore patient's beliefs/thoughts around romantic relationships. Patient will take steps and utilize coping strategies discussed in session.      Patient will work to challenge and reframe identified cognitive distortions and negative thoughts.  Provider will work to build up patient's sense of self worth.        Provider determined a longer session was clinically necessary given patient's disclosure of significant difficulties around core beliefs and values that caused significant psychological distress.     Provider and patient will continue to talk about significant pattern of negative self talk.    Provider will review treatment plan with patient.     Patient will follow-up with providers as needed.  Provider and patient will discuss cognitive dissonance around pursuing a relationship.      Patient will utilize strategies discussed in session.  Patient and provider will discuss romantic relationships with patient.      Patient will utilize a daily check-list to mitigate ADHD sxs and work to improve organization in the workplace.    Patient will reflect on aversion to change.  Provider and patient will touch base on romantic relationship.    Patient stated that he would write a list with coping strategies that would be easily accessible and work to increase self compassion and use of coping strategies.  Patient will begin to use a gratitude journal.        Leatha Pearce [facility]  7/7/23    Service Performed and Documented by [facility]-   Note reviewed and clinical supervision by LONDON Patino [facility] 7/14/2023  ______________________________________________________________________    Individual Treatment Plan    Patient's Name: Teddy Shah  YOB: 1990    Date of Creation: 9/7/21  Date Treatment Plan Last Reviewed/Revised: 5/8/23    DSM5 Diagnoses: Attention-Deficit/Hyperactivity Disorder  314.00 (F90.0) Predominantly inattentive presentation, 311 (F32.9) Unspecified  "Depressive Disorder  or 300.02 (F41.1) Generalized Anxiety Disorder  Psychosocial / Contextual Factors: Hostile home environment, lack of social support, current events/politics a significant stress, family conflict  PROMIS (reviewed every 90 days):   WHODAS: 24    Referral / Collaboration:  Referral to another professional/service is not indicated at this time..    Anticipated number of session for this episode of care:   Anticipation frequency of session: Biweekly  Anticipated Duration of each session: 38-52 minutes  Treatment plan will be reviewed in 90 days or when goals have been changed.       MeasurableTreatment Goal(s) related to diagnosis / functional impairment(s)  Goal 1: Patient will successfully develop and utilize coping skills to manage depressive, anxious and ADHD sxs within 90 days.     I will know I've met my goal when I feel like I can get some of my executive function back (completing tasks).       Objective #A Patient will process experience around romantic relationship, reflect on how it has impacted his view of self, and how he would like to move forward.      Patient will work to develop a \"social life\" outside of work, and manage mental health sxs as they arise.              Patient will attend and participate in social or recreational activities    Develop and utilize self-compassion strategies.   identify the time in your life when you began to feel poorly about themselves.  Status: Restarted - Date: 5/8/23     Intervention(s)  Therapist will assign homework around utilizing self compassion strategies  teach psychoeducation around shame, guilt and healthy relationships.     Objective #B Patient will work to develop coping skills to \"turn brain off\" or switch tracks.    Patient will use thought-stopping strategy daily to reduce intrusive thoughts.  Status: Restarted - Date: 5/8/23       Intervention(s)  Therapist will assign homework around using thought-stopping strategies and other coping " skills  teach emotional regulation skills.       Patient has reviewed and agreed to the above plan.      Leatha Pearce, CINDI  March 14, 2022, reviewed by 6/13/22, reviewed 9/14/22, reviewed 1/9/23, reviewed 5/8/23  Service Performed and Documented by [facility]-   tx plan reviewed and clinical [facility], 6/13/2022, 9/14/2022, 1/10/2023,5/8/2023    Answers for HPI/ROS submitted by the patient on 5/8/2023  KASSIE 7 TOTAL SCORE: 13

## 2023-07-28 ENCOUNTER — VIRTUAL VISIT (OUTPATIENT)
Dept: PSYCHOLOGY | Facility: CLINIC | Age: 33
End: 2023-07-28
Payer: COMMERCIAL

## 2023-07-28 DIAGNOSIS — F32.A DEPRESSION, UNSPECIFIED DEPRESSION TYPE: ICD-10-CM

## 2023-07-28 DIAGNOSIS — F90.0 ATTENTION DEFICIT HYPERACTIVITY DISORDER (ADHD), PREDOMINANTLY INATTENTIVE TYPE: ICD-10-CM

## 2023-07-28 DIAGNOSIS — F41.1 GENERALIZED ANXIETY DISORDER: Primary | ICD-10-CM

## 2023-07-28 PROCEDURE — 90834 PSYTX W PT 45 MINUTES: CPT | Mod: VID

## 2023-07-28 ASSESSMENT — ANXIETY QUESTIONNAIRES
1. FEELING NERVOUS, ANXIOUS, OR ON EDGE: NEARLY EVERY DAY
5. BEING SO RESTLESS THAT IT IS HARD TO SIT STILL: NOT AT ALL
3. WORRYING TOO MUCH ABOUT DIFFERENT THINGS: MORE THAN HALF THE DAYS
GAD7 TOTAL SCORE: 13
GAD7 TOTAL SCORE: 13
4. TROUBLE RELAXING: SEVERAL DAYS
7. FEELING AFRAID AS IF SOMETHING AWFUL MIGHT HAPPEN: NEARLY EVERY DAY
IF YOU CHECKED OFF ANY PROBLEMS ON THIS QUESTIONNAIRE, HOW DIFFICULT HAVE THESE PROBLEMS MADE IT FOR YOU TO DO YOUR WORK, TAKE CARE OF THINGS AT HOME, OR GET ALONG WITH OTHER PEOPLE: VERY DIFFICULT
2. NOT BEING ABLE TO STOP OR CONTROL WORRYING: NEARLY EVERY DAY
6. BECOMING EASILY ANNOYED OR IRRITABLE: SEVERAL DAYS

## 2023-07-28 ASSESSMENT — PATIENT HEALTH QUESTIONNAIRE - PHQ9
10. IF YOU CHECKED OFF ANY PROBLEMS, HOW DIFFICULT HAVE THESE PROBLEMS MADE IT FOR YOU TO DO YOUR WORK, TAKE CARE OF THINGS AT HOME, OR GET ALONG WITH OTHER PEOPLE: SOMEWHAT DIFFICULT
SUM OF ALL RESPONSES TO PHQ QUESTIONS 1-9: 10
SUM OF ALL RESPONSES TO PHQ QUESTIONS 1-9: 10

## 2023-07-28 NOTE — PROGRESS NOTES
[facility]                                     Progress Note    Patient Name: Teddy Shah  Date: 23         Service Type: Individual      Session Start Time: 11:04am  Session End Time: 11:52am     Session Length: 38-52min    Session #: 30    Attendees: Client attended alone    Service Modality:  Video session  Yes, the patient's condition can be safely assessed and treated via synchronous audio and visual telemedicine encounter.  (Provider and Patient had to switch to phone due to technical difficulties)    Reason for Video Visit: Services only offered telehealth    Location of Originating Site: Patient's car    Distant Site: Provider Remote Setting home office    Provider verified identity through the following two step process.  Patient provided:  Patient  and Patient address                          Consent:  The patient/guardian has verbally consented to: the potential risks and benefits of telemedicine (video visit) versus in person care; bill my insurance or make self-payment for services provided; and responsibility for payment of non-covered services.                 Mode of transmission-[facility]  Interactive Complexity: No  Crisis: No        Progress Since Last Session (Related to Symptoms / Goals / Homework):   Symptoms: Worsening anxious and depressive sxs per patient report, KASSIE-7 and PHQ-9.    Homework: Completed in session      Episode of Care Goals: Satisfactory progress - ACTION (Actively working towards change); Intervened by reinforcing change plan / affirming steps taken     Current / Ongoing Stressors and Concerns:  Work stress, parents moving, low self worth, housing, negative self talk, aversion to change, interpersonal relationships, motivation for change       Treatment Objective(s) Addressed in This Session:   use at least   coping skills for anxiety management in the next 4 weeks  Decrease frequency and intensity of feeling down, depressed,  hopeless  Identify negative self-talk and behaviors: challenge core beliefs, myths, and actions  Patient will attend and participate in social or recreational activities    Develop and utilize self-compassion strategies.   identify the time in your life when you began to feel poorly about themselves.     Intervention:   Motivational Interviewing    MI Intervention: Expressed Empathy/Understanding, Supported Autonomy, Collaboration, Evocation, and Reframe     Change Talk Expressed by the Patient: Desire to change Reasons to change Committment to change Activation    Provider Response to Change Talk: E - Evoked more info from patient about behavior change, A - Affirmed patient's thoughts, decisions, or attempts at behavior change, R - Reflected patient's change talk, and S - Summarized patient's change talk statements    Provider held space as patient processed experiences using reflective listening, validation, and normalization of patient's emotional response.  Provider worked to develop greater insight into patient's experiences. Provider validated patient.     CBT: Provider utilized CBT strategies to challenge and reframe patient's identified cognitive distortions. Provider worked to develop greater insight.     Provider held space as patient disclosed career goal.  Provider worked to develop greater insight around patient's increased motivation for change.  Provider validated and challenged patient.      Provider highlighted and validated patient's use of coping strategies.  Provider continued to  patient on strategies to increase gratitude and self-compassion.      Provider worked to build rapport and strengthen the therapeutic alliance.      Provider and patient discussed direction of treatment.             6/19/2023    10:58 AM 7/7/2023    10:01 AM 7/28/2023    10:51 AM   PHQ   PHQ-9 Total Score 8 9 10   Q9: Thoughts of better off dead/self-harm past 2 weeks Not at all Not at all Not at all          6/19/2023    10:58 AM 7/7/2023    10:01 AM 7/28/2023    10:52 AM   KASSIE-7 SCORE   Total Score 12 (moderate anxiety) 11 (moderate anxiety) 13 (moderate anxiety)   Total Score 12 11    11 13    Answers for HPI/ROS submitted by the patient on 7/7/2023  If you checked off any problems, how difficult have these problems made it for you to do your work, take care of things at home, or get along with other people?: Somewhat difficult  PHQ9 TOTAL SCORE: 9  KASSIE 7 TOTAL SCORE: 11            Assessments completed prior to visit:  The following assessments were completed by patient for this visit:  PROMIS 10-Global Health (only subscores and total score):       12/14/2021    10:49 AM 3/28/2022    10:55 AM 4/25/2022    10:35 AM 7/15/2022    11:00 AM 11/10/2022     9:52 AM 3/13/2023    10:57 AM 5/8/2023    11:15 AM   PROMIS-10 Scores Only   Global Mental Health Score 7 8 6 5 7 7 6   Global Physical Health Score 14 14 14 15 14 14 14   PROMIS TOTAL - SUBSCORES 21 22 20 20 21 21 20     [place] (Short version): see chart.      ASSESSMENT: Current Emotional / Mental Status (status of significant symptoms):   Risk status (Self / Other harm or suicidal ideation)   Patient denies current fears or concerns for personal safety.   Patient denies current or recent suicidal ideation or behaviors.   Patient denies current or recent homicidal ideation or behaviors.   Patient denies current or recent self injurious behavior or ideation.   Patient denies other safety concerns.   Patient reports there has been no change in risk factors since their last session.     Patient reports there has been no change in protective factors since their last session.     Recommended that patient call 911 or go to the local ED should there be a change in any of these risk factors.     Appearance:   Appropriate    Eye Contact:   Fair    Psychomotor Behavior: Normal    Attitude:   Cooperative    Orientation:   All   Speech    Rate / Production: Normal/  Responsive    Volume:  Normal    Mood:    Anxious  Depressed    Affect:    Flat  Subdued    Thought Content:  Clear  Rumination    Thought Form:  Coherent  Tangential    Insight:    Fair      Medication Review:   Changes to psychiatric medications, see updated Medication List in EPIC.      Medication Compliance:   Yes     Changes in Health Issues:   None reported     Chemical Use Review:   Substance Use: Chemical use reviewed, no active concerns identified      Tobacco Use: No current tobacco use.      Diagnosis:  1. Generalized anxiety disorder    2. Depression, unspecified depression type    3. Attention deficit hyperactivity disorder (ADHD), predominantly inattentive type       (Patient mentioned ADHD diagnosis in session.  Provider verified ADHD testing via chart review, patient was found to meet criteria for ADHD-predominantly inattentive type).    Collateral Reports Completed:   Not Applicable    PLAN: (Patient Tasks / Therapist Tasks / Other)    Patient will utilize crisis resources as needed.       Patient will utilize the following coping strategies to manage anxiety: Deep breathing, grounding exercises, engaging in civic duty. [place] exercise. Patient will also process and reality-check anxious thoughts after anxiety has decreased.     Patient will incorporate more deep-breathing and grounding exercises throughout the day.  Patient will utilize coping strategies discussed in session and challenge/reframe cognitive distortions (catastrophizing).        Patient will work to challenge and reframe identified cognitive distortions and negative thoughts.    Provider and patient will continue to talk about significant pattern of negative self talk.        Patient will utilize a daily check-list to mitigate ADHD sxs and work to improve organization in the workplace.    Patient stated that he would write a list with coping strategies that would be easily accessible and work to increase self compassion and use of  "coping strategies.  Patient will begin to use a gratitude journal.  Provider and patient will continue to discuss possible career change and patient's interest in a romantic relationship at the next session.    Provider will review treatment plan with patient.    Leatha Pearce [facility]  7/28/23  Service Performed and Documented by [facility]-   Note reviewed and clinical supervision by LONDON Patino [facility] 7/28/2023    ______________________________________________________________________    Individual Treatment Plan    Patient's Name: Teddy Shah  YOB: 1990    Date of Creation: 9/7/21  Date Treatment Plan Last Reviewed/Revised: 5/8/23    DSM5 Diagnoses: Attention-Deficit/Hyperactivity Disorder  314.00 (F90.0) Predominantly inattentive presentation, 311 (F32.9) Unspecified Depressive Disorder  or 300.02 (F41.1) Generalized Anxiety Disorder  Psychosocial / Contextual Factors: Hostile home environment, lack of social support, current events/politics a significant stress, family conflict  PROMIS (reviewed every 90 days):   WHODAS: 24    Referral / Collaboration:  Referral to another professional/service is not indicated at this time..    Anticipated number of session for this episode of care:   Anticipation frequency of session: Biweekly  Anticipated Duration of each session: 38-52 minutes  Treatment plan will be reviewed in 90 days or when goals have been changed.       MeasurableTreatment Goal(s) related to diagnosis / functional impairment(s)  Goal 1: Patient will successfully develop and utilize coping skills to manage depressive, anxious and ADHD sxs within 90 days.     I will know I've met my goal when I feel like I can get some of my executive function back (completing tasks).       Objective #A Patient will process experience around romantic relationship, reflect on how it has impacted his view of self, and how he would like to move forward.      Patient will work to develop a \"social life\" outside " "of work, and manage mental health sxs as they arise.              Patient will attend and participate in social or recreational activities    Develop and utilize self-compassion strategies.   identify the time in your life when you began to feel poorly about themselves.  Status: Restarted - Date: 5/8/23      Intervention(s)  Therapist will assign homework around utilizing self compassion strategies  teach psychoeducation around shame, guilt and healthy relationships.     Objective #B Patient will work to develop coping skills to \"turn brain off\" or switch tracks.    Patient will use thought-stopping strategy daily to reduce intrusive thoughts.  Status: Restarted - Date: 5/8/23        Intervention(s)  Therapist will assign homework around using thought-stopping strategies and other coping skills  teach emotional regulation skills.       Patient has reviewed and agreed to the above plan.      CINDI Posada  March 14, 2022, reviewed by 6/13/22, reviewed 9/14/22, reviewed 1/9/23, reviewed 5/8/23  Service Performed and Documented by [facility]-   tx plan reviewed and clinical supervision by LONDON Patino [facility] 3/29/2022, 6/13/2022, 9/14/2022, 1/10/2023,5/8/2023    Answers submitted by the patient for this visit:  Patient Health Questionnaire (Submitted on 7/28/2023)  If you checked off any problems, how difficult have these problems made it for you to do your work, take care of things at home, or get along with other people?: Somewhat difficult  PHQ9 TOTAL SCORE: 10  KASSIE-7 (Submitted on 7/28/2023)  KASSIE 7 TOTAL SCORE: 13    "

## 2023-08-10 ENCOUNTER — MYC REFILL (OUTPATIENT)
Dept: FAMILY MEDICINE | Facility: CLINIC | Age: 33
End: 2023-08-10
Payer: COMMERCIAL

## 2023-08-10 DIAGNOSIS — F90.0 ADHD (ATTENTION DEFICIT HYPERACTIVITY DISORDER), INATTENTIVE TYPE: ICD-10-CM

## 2023-08-14 RX ORDER — DEXTROAMPHETAMINE SACCHARATE, AMPHETAMINE ASPARTATE MONOHYDRATE, DEXTROAMPHETAMINE SULFATE AND AMPHETAMINE SULFATE 5; 5; 5; 5 MG/1; MG/1; MG/1; MG/1
40 CAPSULE, EXTENDED RELEASE ORAL DAILY
Qty: 60 CAPSULE | Refills: 0 | Status: SHIPPED | OUTPATIENT
Start: 2023-08-14 | End: 2023-10-27

## 2023-08-15 ENCOUNTER — VIRTUAL VISIT (OUTPATIENT)
Dept: PSYCHOLOGY | Facility: CLINIC | Age: 33
End: 2023-08-15
Payer: COMMERCIAL

## 2023-08-15 DIAGNOSIS — F32.A DEPRESSION, UNSPECIFIED DEPRESSION TYPE: ICD-10-CM

## 2023-08-15 DIAGNOSIS — F41.1 GENERALIZED ANXIETY DISORDER: Primary | ICD-10-CM

## 2023-08-15 DIAGNOSIS — F90.0 ATTENTION DEFICIT HYPERACTIVITY DISORDER (ADHD), PREDOMINANTLY INATTENTIVE TYPE: ICD-10-CM

## 2023-08-15 PROCEDURE — 90834 PSYTX W PT 45 MINUTES: CPT | Mod: VID

## 2023-08-15 NOTE — PROGRESS NOTES
[facility]                                     Progress Note    Patient Name: Teddy Shah  Date: 8/15/23         Service Type: Individual      Session Start Time: 2:34pm  Session End Time: 3:20pm     Session Length: 38-52min    Session #: 31    Attendees: Client attended alone    Service Modality:  Video session  Yes, the patient's condition can be safely assessed and treated via synchronous audio and visual telemedicine encounter.  (Provider and Patient had to switch to phone due to technical difficulties)    Reason for Video Visit: Services only offered telehealth    Location of Originating Site: Patient's car    Distant Site: Provider Remote Setting home office    Provider verified identity through the following two step process.  Patient provided:  Patient  and Patient address                          Consent:  The patient/guardian has verbally consented to: the potential risks and benefits of telemedicine (video visit) versus in person care; bill my insurance or make self-payment for services provided; and responsibility for payment of non-covered services.                 Mode of transmission-[employer]  Interactive Complexity: No  Crisis: No        Progress Since Last Session (Related to Symptoms / Goals / Homework):   Symptoms: Improving anxious and depressive sxs per patient report    Homework: Completed in session      Episode of Care Goals: Satisfactory progress - ACTION (Actively working towards change); Intervened by reinforcing change plan / affirming steps taken     Current / Ongoing Stressors and Concerns:  Work stress, parents moving, low self worth, housing, negative self talk, aversion to change, interpersonal relationships, motivation for change       Treatment Objective(s) Addressed in This Session:   use at least   coping skills for anxiety management in the next 4 weeks  Decrease frequency and intensity of feeling down, depressed, hopeless  Identify negative  self-talk and behaviors: challenge core beliefs, myths, and actions  Patient will attend and participate in social or recreational activities    Develop and utilize self-compassion strategies.   identify the time in your life when you began to feel poorly about themselves.     Intervention:   Motivational Interviewing    MI Intervention: Expressed Empathy/Understanding, Supported Autonomy, Collaboration, Evocation, and Reframe     Change Talk Expressed by the Patient: Desire to change Reasons to change Committment to change Activation    Provider Response to Change Talk: E - Evoked more info from patient about behavior change, A - Affirmed patient's thoughts, decisions, or attempts at behavior change, R - Reflected patient's change talk, and S - Summarized patient's change talk statements    Provider held space as patient processed experiences using reflective listening, validation, and normalization of patient's emotional response.  Provider worked to develop greater insight into patient's experiences. Provider worked to develop greater insight around patient's decreased levels of stress related to work.  Provider validated patient's use of boundary setting.      Provider reviewed treatment plan with patient.     Provider held space as patient processed thoughts and emotions around trying to begin a romantic relationship.  Provider worked to develop greater insight.      CBT: Provider utilized CBT strategies to challenge and reframe patient's identified cognitive distortions related to dating and black and white thinking. Provider coached patient on challenging and reframing identified cognitive distortions.    Solution-Focused: Provider utilized a solution-focused approach to identify a realistic step patient was agreeable to taking to work towards goal.  Patient was agreeable to considering different ways he could socialize with others.     Provider worked to build rapport and strengthen the therapeutic alliance.               6/19/2023    10:58 AM 7/7/2023    10:01 AM 7/28/2023    10:51 AM   PHQ   PHQ-9 Total Score 8 9 10   Q9: Thoughts of better off dead/self-harm past 2 weeks Not at all Not at all Not at all         6/19/2023    10:58 AM 7/7/2023    10:01 AM 7/28/2023    10:52 AM   KASSIE-7 SCORE   Total Score 12 (moderate anxiety) 11 (moderate anxiety) 13 (moderate anxiety)   Total Score 12 11    11 13    Answers for HPI/ROS submitted by the patient on 7/7/2023  If you checked off any problems, how difficult have these problems made it for you to do your work, take care of things at home, or get along with other people?: Somewhat difficult  PHQ9 TOTAL SCORE: 9  KASSIE 7 TOTAL SCORE: 11      Assessments completed prior to visit:  The following assessments were completed by patient for this visit:  PROMIS 10-Global Health (only subscores and total score):       3/28/2022    10:55 AM 4/25/2022    10:35 AM 7/15/2022    11:00 AM 11/10/2022     9:52 AM 3/13/2023    10:57 AM 5/8/2023    11:15 AM 8/15/2023     1:47 PM   PROMIS-10 Scores Only   Global Mental Health Score 8 6 5 7 7 6 9   Global Physical Health Score 14 14 15 14 14 14 15   PROMIS TOTAL - SUBSCORES 22 20 20 21 21 20 24     [place] (Short version): see chart.      ASSESSMENT: Current Emotional / Mental Status (status of significant symptoms):   Risk status (Self / Other harm or suicidal ideation)   Patient denies current fears or concerns for personal safety.   Patient denies current or recent suicidal ideation or behaviors.   Patient denies current or recent homicidal ideation or behaviors.   Patient denies current or recent self injurious behavior or ideation.   Patient denies other safety concerns.   Patient reports there has been no change in risk factors since their last session.     Patient reports there has been no change in protective factors since their last session.     Recommended that patient call 911 or go to the local ED should there be a change in any of  these risk factors.     Appearance:   Appropriate    Eye Contact:   Fair    Psychomotor Behavior: Normal    Attitude:   Cooperative    Orientation:   All   Speech    Rate / Production: Normal/ Responsive    Volume:  Normal    Mood:    Anxious  Depressed    Affect:    Flat  Subdued    Thought Content:  Clear  Rumination    Thought Form:  Coherent  Tangential    Insight:    Fair      Medication Review:   Changes to psychiatric medications, see updated Medication List in EPIC.      Medication Compliance:   Yes     Changes in Health Issues:   None reported     Chemical Use Review:   Substance Use: Chemical use reviewed, no active concerns identified      Tobacco Use: No current tobacco use.      Diagnosis:  1. Generalized anxiety disorder    2. Depression, unspecified depression type    3. Attention deficit hyperactivity disorder (ADHD), predominantly inattentive type     (Patient mentioned ADHD diagnosis in session.  Provider verified ADHD testing via chart review, patient was found to meet criteria for ADHD-predominantly inattentive type).    Collateral Reports Completed:   Not Applicable    PLAN: (Patient Tasks / Therapist Tasks / Other)    Patient will utilize crisis resources as needed.       Patient will utilize the following coping strategies to manage anxiety: Deep breathing, grounding exercises, engaging in civic duty. [place] exercise. Patient will also process and reality-check anxious thoughts after anxiety has decreased.     Patient will incorporate more deep-breathing and grounding exercises throughout the day.  Patient will utilize coping strategies discussed in session and challenge/reframe cognitive distortions (catastrophizing).        Patient will work to challenge and reframe identified cognitive distortions and negative thoughts.    Provider and patient will continue to talk about significant pattern of negative self talk.        Patient will utilize a daily check-list to mitigate ADHD sxs and work  to improve organization in the workplace.    Patient stated that he would write a list with coping strategies that would be easily accessible and work to increase self compassion and use of coping strategies.  Patient will begin to use a gratitude journal.  Provider and patient will continue to discuss possible career change and patient's interest in a romantic relationship at the next session.    Provider will assess with BETSY and Oj. Patient will begin to look for places/events where he can socialize with others.     Leatha Pearce [facility]  8/15/23  Service Performed and Documented by [facility]-   Note reviewed and clinical supervision by LONDON [facility] 8/16/2023    ______________________________________________________________________    Individual Treatment Plan    Patient's Name: Teddy Shah  YOB: 1990    Date of Creation: 9/7/21  Date Treatment Plan Last Reviewed/Revised: 8/15/23    DSM5 Diagnoses: Attention-Deficit/Hyperactivity Disorder  314.00 (F90.0) Predominantly inattentive presentation, 311 (F32.9) Unspecified Depressive Disorder  or 300.02 (F41.1) Generalized Anxiety Disorder  Psychosocial / Contextual Factors: Hostile home environment, lack of social support, current events/politics a significant stress, family conflict  PROMIS (reviewed every 90 days):   WHODAS: 24    Referral / Collaboration:  Referral to another professional/service is not indicated at this time..    Anticipated number of session for this episode of care:   Anticipation frequency of session: Biweekly  Anticipated Duration of each session: 38-52 minutes  Treatment plan will be reviewed in 90 days or when goals have been changed.       MeasurableTreatment Goal(s) related to diagnosis / functional impairment(s)  Goal 1: Patient will successfully develop and utilize coping skills to manage depressive, anxious and ADHD sxs within 90 days.     I will know I've met my goal when I feel like I can get some of my  "executive function back (completing tasks).       Objective #A Patient will process experience around romantic relationship, reflect on how it has impacted his view of self, and how he would like to move forward.      Patient will work to develop a \"social life\" outside of work, and manage mental health sxs as they arise.              Patient will attend and participate in social or recreational activities    Develop and utilize self-compassion strategies.   identify the time in your life when you began to feel poorly about themselves.  Status: Restarted - Date: 8/15/23      Intervention(s)  Therapist will assign homework around utilizing self compassion strategies  teach psychoeducation around shame, guilt and healthy relationships.     Objective #B Patient will work to develop coping skills to \"turn brain off\" or switch tracks.    Patient will use thought-stopping strategy daily to reduce intrusive thoughts.  Status: Restarted - Date: 8/15/23        Intervention(s)  Therapist will assign homework around using thought-stopping strategies and other coping skills  teach emotional regulation skills.       Patient has reviewed and agreed to the above plan.      CINDI Posada  March 14, 2022, reviewed by 6/13/22, reviewed 9/14/22, reviewed 1/9/23, reviewed 5/8/23, reviewed 8/15/23  Service Performed and Documented by [facility]-   tx plan reviewed and clinical supervision by LONDON Patino [facility] 3/29/2022, 6/13/2022, 9/14/2022, 1/10/2023,5/8/2023, 8/16/2023      Answers submitted by the patient for this visit:  Patient Health Questionnaire (Submitted on 7/28/2023)  If you checked off any problems, how difficult have these problems made it for you to do your work, take care of things at home, or get along with other people?: Somewhat difficult  PHQ9 TOTAL SCORE: 10  KASSIE-7 (Submitted on 7/28/2023)  KASSIE 7 TOTAL SCORE: 13  "

## 2023-09-01 ENCOUNTER — VIRTUAL VISIT (OUTPATIENT)
Dept: PSYCHOLOGY | Facility: CLINIC | Age: 33
End: 2023-09-01
Payer: COMMERCIAL

## 2023-09-01 DIAGNOSIS — F33.1 MODERATE EPISODE OF RECURRENT MAJOR DEPRESSIVE DISORDER (H): ICD-10-CM

## 2023-09-01 DIAGNOSIS — F90.0 ATTENTION DEFICIT HYPERACTIVITY DISORDER (ADHD), PREDOMINANTLY INATTENTIVE TYPE: ICD-10-CM

## 2023-09-01 DIAGNOSIS — F32.A DEPRESSION, UNSPECIFIED DEPRESSION TYPE: ICD-10-CM

## 2023-09-01 DIAGNOSIS — F41.9 ANXIETY: ICD-10-CM

## 2023-09-01 DIAGNOSIS — F41.1 GENERALIZED ANXIETY DISORDER: Primary | ICD-10-CM

## 2023-09-01 PROCEDURE — 90834 PSYTX W PT 45 MINUTES: CPT | Mod: VID

## 2023-09-01 ASSESSMENT — ANXIETY QUESTIONNAIRES
1. FEELING NERVOUS, ANXIOUS, OR ON EDGE: NEARLY EVERY DAY
GAD7 TOTAL SCORE: 9
7. FEELING AFRAID AS IF SOMETHING AWFUL MIGHT HAPPEN: SEVERAL DAYS
2. NOT BEING ABLE TO STOP OR CONTROL WORRYING: MORE THAN HALF THE DAYS
5. BEING SO RESTLESS THAT IT IS HARD TO SIT STILL: NOT AT ALL
6. BECOMING EASILY ANNOYED OR IRRITABLE: NOT AT ALL
GAD7 TOTAL SCORE: 9
4. TROUBLE RELAXING: SEVERAL DAYS
3. WORRYING TOO MUCH ABOUT DIFFERENT THINGS: MORE THAN HALF THE DAYS
IF YOU CHECKED OFF ANY PROBLEMS ON THIS QUESTIONNAIRE, HOW DIFFICULT HAVE THESE PROBLEMS MADE IT FOR YOU TO DO YOUR WORK, TAKE CARE OF THINGS AT HOME, OR GET ALONG WITH OTHER PEOPLE: SOMEWHAT DIFFICULT

## 2023-09-01 ASSESSMENT — COLUMBIA-SUICIDE SEVERITY RATING SCALE - C-SSRS
2. HAVE YOU ACTUALLY HAD ANY THOUGHTS OF KILLING YOURSELF?: NO
ATTEMPT SINCE LAST CONTACT: NO
6. HAVE YOU EVER DONE ANYTHING, STARTED TO DO ANYTHING, OR PREPARED TO DO ANYTHING TO END YOUR LIFE?: NO
TOTAL  NUMBER OF ABORTED OR SELF INTERRUPTED ATTEMPTS SINCE LAST CONTACT: NO
SUICIDE, SINCE LAST CONTACT: NO
1. SINCE LAST CONTACT, HAVE YOU WISHED YOU WERE DEAD OR WISHED YOU COULD GO TO SLEEP AND NOT WAKE UP?: NO
TOTAL  NUMBER OF INTERRUPTED ATTEMPTS SINCE LAST CONTACT: NO

## 2023-09-05 RX ORDER — SERTRALINE HYDROCHLORIDE 100 MG/1
200 TABLET, FILM COATED ORAL DAILY
Qty: 180 TABLET | Refills: 0 | Status: SHIPPED | OUTPATIENT
Start: 2023-09-05 | End: 2024-02-22

## 2023-09-15 ENCOUNTER — VIRTUAL VISIT (OUTPATIENT)
Dept: PSYCHOLOGY | Facility: CLINIC | Age: 33
End: 2023-09-15
Payer: COMMERCIAL

## 2023-09-15 DIAGNOSIS — F41.1 GENERALIZED ANXIETY DISORDER: Primary | ICD-10-CM

## 2023-09-15 DIAGNOSIS — F32.A DEPRESSION, UNSPECIFIED DEPRESSION TYPE: ICD-10-CM

## 2023-09-15 DIAGNOSIS — F90.0 ATTENTION DEFICIT HYPERACTIVITY DISORDER (ADHD), PREDOMINANTLY INATTENTIVE TYPE: ICD-10-CM

## 2023-09-15 PROCEDURE — 90837 PSYTX W PT 60 MINUTES: CPT | Mod: VID

## 2023-09-15 ASSESSMENT — ANXIETY QUESTIONNAIRES
GAD7 TOTAL SCORE: 11
4. TROUBLE RELAXING: NOT AT ALL
3. WORRYING TOO MUCH ABOUT DIFFERENT THINGS: MORE THAN HALF THE DAYS
5. BEING SO RESTLESS THAT IT IS HARD TO SIT STILL: NOT AT ALL
6. BECOMING EASILY ANNOYED OR IRRITABLE: SEVERAL DAYS
GAD7 TOTAL SCORE: 11
7. FEELING AFRAID AS IF SOMETHING AWFUL MIGHT HAPPEN: MORE THAN HALF THE DAYS
IF YOU CHECKED OFF ANY PROBLEMS ON THIS QUESTIONNAIRE, HOW DIFFICULT HAVE THESE PROBLEMS MADE IT FOR YOU TO DO YOUR WORK, TAKE CARE OF THINGS AT HOME, OR GET ALONG WITH OTHER PEOPLE: SOMEWHAT DIFFICULT
1. FEELING NERVOUS, ANXIOUS, OR ON EDGE: NEARLY EVERY DAY
2. NOT BEING ABLE TO STOP OR CONTROL WORRYING: NEARLY EVERY DAY

## 2023-09-15 ASSESSMENT — PATIENT HEALTH QUESTIONNAIRE - PHQ9
SUM OF ALL RESPONSES TO PHQ QUESTIONS 1-9: 9
SUM OF ALL RESPONSES TO PHQ QUESTIONS 1-9: 9
10. IF YOU CHECKED OFF ANY PROBLEMS, HOW DIFFICULT HAVE THESE PROBLEMS MADE IT FOR YOU TO DO YOUR WORK, TAKE CARE OF THINGS AT HOME, OR GET ALONG WITH OTHER PEOPLE: SOMEWHAT DIFFICULT

## 2023-09-15 NOTE — PROGRESS NOTES
[facility]                                     Progress Note    Patient Name: Teddy Shah  Date: 9/15/23         Service Type: Individual      Session Start Time: 11:02am  Session End Time: 11:56am     Session Length: 53+ min    Session #: 33    Attendees: Client attended alone    Service Modality:  Video session  Yes, the patient's condition can be safely assessed and treated via synchronous audio and visual telemedicine encounter.  (Provider and Patient had to switch to phone due to technical difficulties)    Reason for Video Visit: Services only offered telehealth    Location of Originating Site: Patient's home    Distant Site: Provider Remote Setting home office    Provider verified identity through the following two step process.  Patient provided:  Patient  and Patient address                          Consent:  The patient/guardian has verbally consented to: the potential risks and benefits of telemedicine (video visit) versus in person care; bill my insurance or make self-payment for services provided; and responsibility for payment of non-covered services.                 Mode of transmission-[employer]  Interactive Complexity: No  Crisis: No  Extended Session (53+ minutes):   - Patient's presenting concerns require more intensive intervention than could be completed within the usual service      Progress Since Last Session (Related to Symptoms / Goals / Homework):   Symptoms: Worsening anxious and depressive sxs per KASSIE-7, PHQ-9 and patient report    Homework: Completed in session      Episode of Care Goals: Satisfactory progress - ACTION (Actively working towards change); Intervened by reinforcing change plan / affirming steps taken     Current / Ongoing Stressors and Concerns:  Work stress, parents moving, low self worth, housing, negative self talk, aversion to change, interpersonal relationships, motivation for change, emotional expression       Treatment Objective(s)  Addressed in This Session:   use at least   coping skills for anxiety management in the next 4 weeks  Decrease frequency and intensity of feeling down, depressed, hopeless  Identify negative self-talk and behaviors: challenge core beliefs, myths, and actions  Patient will attend and participate in social or recreational activities    Develop and utilize self-compassion strategies.   identify the time in your life when you began to feel poorly about themselves.     Intervention:   Motivational Interviewing    MI Intervention: Expressed Empathy/Understanding, Supported Autonomy, Collaboration, Evocation, and Reframe     Change Talk Expressed by the Patient: Desire to change Reasons to change Committment to change Activation    Provider Response to Change Talk: E - Evoked more info from patient about behavior change, A - Affirmed patient's thoughts, decisions, or attempts at behavior change, R - Reflected patient's change talk, and S - Summarized patient's change talk statements    Provider held space as patient processed experiences using reflective listening, validation, and normalization of patient's emotional response.  Provider worked to develop greater insight into patient's experiences. Provider validated patient.     Provider assessed patient's sxs related to trauma using the PCL-5 PTSD checklist.  Provider worked to develop greater insight around patient's sxs and patient's understanding of how trauma can continue to make an impact.  Provider validated patient.  Provider does not feel that a PTSD diagnosis is clinically relevant at this time but will continue to assess.     Provider held space as patient processed work difficulties  Pro[place] worked to develop greater insight, validating and challenging patient.    Emotion-Focused and Psychodynamic: Provider utilized an emotion-focused strategy to develop greater insight around patient's emotional response and expression.  Provider held space as patient  processed how his experiences with primary caregivers have impacted his view of his emotions and how he expresses emotions in relation to others.  Provider validated patient.      CBT: Provider utilized CBT strategies to challenge and reframe patient's identified cognitive distortions. Provider coached patient on challenging and reframing identified cognitive distortions.    DBT: Provider continued to  patient on taking a nonjudgmental stance towards himself.      Extended Session (53+ minutes):   - Patient's presenting concerns require more intensive intervention than could be completed within the usual service            7/28/2023    10:51 AM 9/1/2023    10:52 AM 9/15/2023    10:52 AM   PHQ   PHQ-9 Total Score 10 8 9   Q9: Thoughts of better off dead/self-harm past 2 weeks Not at all Not at all Not at all         7/28/2023    10:52 AM 9/1/2023    10:53 AM 9/15/2023    10:52 AM   KASSIE-7 SCORE   Total Score 13 (moderate anxiety) 9 (mild anxiety) 11 (moderate anxiety)   Total Score 13 9 11    Answers for HPI/ROS submitted by the patient on 7/7/2023  If you checked off any problems, how difficult have these problems made it for you to do your work, take care of things at home, or get along with other people?: Somewhat difficult  PHQ9 TOTAL SCORE: 9  KASSIE 7 TOTAL SCORE: 11      Assessments completed prior to visit:  The following assessments were completed by patient for this visit:  PROMIS 10-Global Health (only subscores and total score):       11/10/2022     9:52 AM 3/13/2023    10:57 AM 5/8/2023    11:15 AM 8/15/2023     1:47 PM 9/1/2023    10:54 AM 9/1/2023    11:06 AM 9/15/2023    10:53 AM   PROMIS-10 Scores Only   Global Mental Health Score 7 7 6 9 8 9 7   Global Physical Health Score 14 14 14 15 14 14 14   PROMIS TOTAL - SUBSCORES 21 21 20 24 22 23 21     [place] (Short version): see chart.      ASSESSMENT: Current Emotional / Mental Status (status of significant symptoms):   Risk status (Self / Other harm  or suicidal ideation)   Patient denies current fears or concerns for personal safety.   Patient denies current or recent suicidal ideation or behaviors.   Patient denies current or recent homicidal ideation or behaviors.   Patient denies current or recent self injurious behavior or ideation.   Patient denies other safety concerns.   Patient reports there has been no change in risk factors since their last session.     Patient reports there has been no change in protective factors since their last session.     Recommended that patient call 911 or go to the local ED should there be a change in any of these risk factors.     Appearance:   Appropriate    Eye Contact:   Fair    Psychomotor Behavior: Normal    Attitude:   Cooperative    Orientation:   All   Speech    Rate / Production: Normal/ Responsive    Volume:  Normal    Mood:    Anxious  Depressed    Affect:    Flat  Subdued    Thought Content:  Clear  Rumination    Thought Form:  Coherent  Tangential    Insight:    Fair      Medication Review:   Changes to psychiatric medications, see updated Medication List in EPIC.      Medication Compliance:   Yes     Changes in Health Issues:   None reported     Chemical Use Review:   Substance Use: Chemical use reviewed, no active concerns identified      Tobacco Use: No current tobacco use.      Diagnosis:  1. Generalized anxiety disorder    2. Depression, unspecified depression type    3. Attention deficit hyperactivity disorder (ADHD), predominantly inattentive type       (Patient mentioned ADHD diagnosis in session.  Provider verified ADHD testing via chart review, patient was found to meet criteria for ADHD-predominantly inattentive type).    Collateral Reports Completed:   Not Applicable    PLAN: (Patient Tasks / Therapist Tasks / Other)    Patient will utilize crisis resources as needed.       Patient will utilize the following coping strategies to manage anxiety: Deep breathing, grounding exercises, engaging in civic  duty. [place] exercise. Patient will also process and reality-check anxious thoughts after anxiety has decreased.     Patient will incorporate more deep-breathing and grounding exercises throughout the day.  Patient will utilize coping strategies discussed in session and challenge/reframe cognitive distortions (catastrophizing).        Patient will work to challenge and reframe identified cognitive distortions and negative thoughts.    Provider and patient will continue to talk about significant pattern of negative self talk.        Patient will utilize a daily check-list to mitigate ADHD sxs and work to improve organization in the workplace.    Patient stated that he would write a list with coping strategies that would be easily accessible and work to increase self compassion and use of coping strategies.  Patient will begin to use a gratitude journal.  Provider and patient will continue to discuss possible career change and patient's interest in a romantic relationship at the next session.    Patient will begin to look for places/events where he can socialize with others.  Provider and patient will explore trauma hx and sxs.      Emotional process:     Leatha Pearce [facility]  9/15/23  Service Performed and Documented by CINDI-   Note reviewed and clinical supervision by LONDON Patino [facility] 9/18/2023  ______________________________________________________________________    Individual Treatment Plan    Patient's Name: Teddy Shah  YOB: 1990    Date of Creation: 9/7/21  Date Treatment Plan Last Reviewed/Revised: 8/15/23    DSM5 Diagnoses: Attention-Deficit/Hyperactivity Disorder  314.00 (F90.0) Predominantly inattentive presentation, 311 (F32.9) Unspecified Depressive Disorder  or 300.02 (F41.1) Generalized Anxiety Disorder  Psychosocial / Contextual Factors: Hostile home environment, lack of social support, current events/politics a significant stress, family conflict  PROMIS (reviewed every 90  "days):   WHODAS: 24    Referral / Collaboration:  Referral to another professional/service is not indicated at this time..    Anticipated number of session for this episode of care:   Anticipation frequency of session: Biweekly  Anticipated Duration of each session: 38-52 minutes  Treatment plan will be reviewed in 90 days or when goals have been changed.       MeasurableTreatment Goal(s) related to diagnosis / functional impairment(s)  Goal 1: Patient will successfully develop and utilize coping skills to manage depressive, anxious and ADHD sxs within 90 days.     I will know I've met my goal when I feel like I can get some of my executive function back (completing tasks).       Objective #A Patient will process experience around romantic relationship, reflect on how it has impacted his view of self, and how he would like to move forward.      Patient will work to develop a \"social life\" outside of work, and manage mental health sxs as they arise.              Patient will attend and participate in social or recreational activities    Develop and utilize self-compassion strategies.   identify the time in your life when you began to feel poorly about themselves.  Status: Restarted - Date: 8/15/23      Intervention(s)  Therapist will assign homework around utilizing self compassion strategies  teach psychoeducation around shame, guilt and healthy relationships.     Objective #B Patient will work to develop coping skills to \"turn brain off\" or switch tracks.    Patient will use thought-stopping strategy daily to reduce intrusive thoughts.  Status: Restarted - Date: 8/15/23        Intervention(s)  Therapist will assign homework around using thought-stopping strategies and other coping skills  teach emotional regulation skills.       Patient has reviewed and agreed to the above plan.      Leatha Pearce, CINDI  March 14, 2022, reviewed by 6/13/22, reviewed 9/14/22, reviewed 1/9/23, reviewed 5/8/23, reviewed " 8/15/23  Service Performed and Documented by ATILIO rojas plan reviewed and clinical supervision [facility], 6/13/2022, 9/14/2022, 1/10/2023,5/8/2023, 8/16/2023    Answers submitted by the patient for this visit:  Patient Health Questionnaire (Submitted on 9/15/2023)  If you checked off any problems, how difficult have these problems made it for you to do your work, take care of things at home, or get along with other people?: Somewhat difficult  PHQ9 TOTAL SCORE: 9  KASSIE-7 (Submitted on 9/15/2023)  KASSIE 7 TOTAL SCORE: 11

## 2023-10-03 NOTE — TELEPHONE ENCOUNTER
Requested Prescriptions   Pending Prescriptions Disp Refills     amphetamine-dextroamphetamine (ADDERALL XR) 30 MG 24 hr capsule 30 capsule 0     Sig: Take 1 capsule (30 mg) by mouth daily       There is no refill protocol information for this order        3 months supply sent on 2/27/2020. Should have prescription on file at pharmacy.            Robby Lindsey RN, BSN, PHN      
Never
0

## 2023-10-06 ENCOUNTER — VIRTUAL VISIT (OUTPATIENT)
Dept: PSYCHOLOGY | Facility: CLINIC | Age: 33
End: 2023-10-06
Payer: COMMERCIAL

## 2023-10-06 DIAGNOSIS — F32.A DEPRESSION, UNSPECIFIED DEPRESSION TYPE: ICD-10-CM

## 2023-10-06 DIAGNOSIS — F41.1 GENERALIZED ANXIETY DISORDER: Primary | ICD-10-CM

## 2023-10-06 DIAGNOSIS — F90.0 ATTENTION DEFICIT HYPERACTIVITY DISORDER (ADHD), PREDOMINANTLY INATTENTIVE TYPE: ICD-10-CM

## 2023-10-06 PROCEDURE — 90834 PSYTX W PT 45 MINUTES: CPT | Mod: VID

## 2023-10-06 ASSESSMENT — ANXIETY QUESTIONNAIRES
7. FEELING AFRAID AS IF SOMETHING AWFUL MIGHT HAPPEN: NEARLY EVERY DAY
3. WORRYING TOO MUCH ABOUT DIFFERENT THINGS: MORE THAN HALF THE DAYS
2. NOT BEING ABLE TO STOP OR CONTROL WORRYING: NEARLY EVERY DAY
6. BECOMING EASILY ANNOYED OR IRRITABLE: SEVERAL DAYS
GAD7 TOTAL SCORE: 12
1. FEELING NERVOUS, ANXIOUS, OR ON EDGE: NEARLY EVERY DAY
IF YOU CHECKED OFF ANY PROBLEMS ON THIS QUESTIONNAIRE, HOW DIFFICULT HAVE THESE PROBLEMS MADE IT FOR YOU TO DO YOUR WORK, TAKE CARE OF THINGS AT HOME, OR GET ALONG WITH OTHER PEOPLE: VERY DIFFICULT
4. TROUBLE RELAXING: NOT AT ALL
5. BEING SO RESTLESS THAT IT IS HARD TO SIT STILL: NOT AT ALL
GAD7 TOTAL SCORE: 12

## 2023-10-06 NOTE — PROGRESS NOTES
[facility]                                     Progress Note    Patient Name: Teddy Shah  Date: 10/6/23         Service Type: Individual      Session Start Time: 11:03am  Session End Time: 11:51am     Session Length: 38-52min    Session #: 34    Attendees: Client attended alone    Service Modality:  Video session  Yes, the patient's condition can be safely assessed and treated via synchronous audio and visual telemedicine encounter.  (Provider and Patient had to switch to phone due to technical difficulties)    Reason for Video Visit: Services only offered telehealth    Location of Originating Site: Patient's home    Distant Site: Provider Remote Setting home office    Provider verified identity through the following two step process.  Patient provided:  Patient  and Patient address                          Consent:  The patient/guardian has verbally consented to: the potential risks and benefits of telemedicine (video visit) versus in person care; bill my insurance or make self-payment for services provided; and responsibility for payment of non-covered services.                 Mode of transmission-Penxy  Interactive Complexity: No  Crisis: No      Progress Since Last Session (Related to Symptoms / Goals / Homework):   Symptoms: Improving depressive sxs and no change in anxious sxs per patient report    Homework: Completed in session      Episode of Care Goals: Satisfactory progress - ACTION (Actively working towards change); Intervened by reinforcing change plan / affirming steps taken     Current / Ongoing Stressors and Concerns:  Work stress, parents moving, low self worth, housing, negative self talk, aversion to change, interpersonal relationships, motivation for change, emotional expression       Treatment Objective(s) Addressed in This Session:   use at least   coping skills for anxiety management in the next 4 weeks  Decrease frequency and intensity of feeling down,  depressed, hopeless  Identify negative self-talk and behaviors: challenge core beliefs, myths, and actions  Patient will attend and participate in social or recreational activities    Develop and utilize self-compassion strategies.   identify the time in your life when you began to feel poorly about themselves.     Intervention:   Motivational Interviewing    MI Intervention: Expressed Empathy/Understanding, Supported Autonomy, Collaboration, Evocation, and Reframe     Change Talk Expressed by the Patient: Desire to change Reasons to change Committment to change Activation    Provider Response to Change Talk: E - Evoked more info from patient about behavior change, A - Affirmed patient's thoughts, decisions, or attempts at behavior change, R - Reflected patient's change talk, and S - Summarized patient's change talk statements    Provider held space as patient processed experiences using reflective listening, validation, and normalization of patient's emotional response.  Provider worked to develop greater insight into patient's experiences. Provider validated patient.     Provider held space as patient disclosed significant step around socializing.  Provider focused on validating patient, and worked to develop greater insight around how patient managed his anxious response and why he chose to prioritize taking this step.  Provider worked to develop greater insight around patient's emotional response.      Provider coached patient on using a grounding exercise to de-escalate anxious sxs, and rationalize through anxious thoughts.  Provider validated patient's rationalization.      Psychoeducation: Provider taught psychoeducation around anxiety, PTSD and CPTSD.      CBT: Provider utilized CBT strategies to challenge and reframe patient's identified cognitive distortions. Provider coached patient on challenging and reframing identified cognitive distortions.    Provider and patient briefly discussed direction of  treatment.           9/1/2023    10:52 AM 9/15/2023    10:52 AM 10/6/2023    11:01 AM   PHQ   PHQ-9 Total Score 8 9 8   Q9: Thoughts of better off dead/self-harm past 2 weeks Not at all Not at all Not at all         9/1/2023    10:53 AM 9/15/2023    10:52 AM 10/6/2023    11:02 AM   KASSIE-7 SCORE   Total Score 9 (mild anxiety) 11 (moderate anxiety) 12 (moderate anxiety)   Total Score 9 11 12    Answers for HPI/ROS submitted by the patient on 7/7/2023  If you checked off any problems, how difficult have these problems made it for you to do your work, take care of things at home, or get along with other people?: Somewhat difficult  PHQ9 TOTAL SCORE: 9  KASSIE 7 TOTAL SCORE: 11      Assessments completed prior to visit:  The following assessments were completed by patient for this visit:  PROMIS 10-Global Health (only subscores and total score):       11/10/2022     9:52 AM 3/13/2023    10:57 AM 5/8/2023    11:15 AM 8/15/2023     1:47 PM 9/1/2023    10:54 AM 9/1/2023    11:06 AM 9/15/2023    10:53 AM   PROMIS-10 Scores Only   Global Mental Health Score 7 7 6 9 8 9 7   Global Physical Health Score 14 14 14 15 14 14 14   PROMIS TOTAL - SUBSCORES 21 21 20 24 22 23 21     [place] (Short version): see chart.      ASSESSMENT: Current Emotional / Mental Status (status of significant symptoms):   Risk status (Self / Other harm or suicidal ideation)   Patient denies current fears or concerns for personal safety.   Patient denies current or recent suicidal ideation or behaviors.   Patient denies current or recent homicidal ideation or behaviors.   Patient denies current or recent self injurious behavior or ideation.   Patient denies other safety concerns.   Patient reports there has been no change in risk factors since their last session.     Patient reports there has been no change in protective factors since their last session.     Recommended that patient call 911 or go to the local ED should there be a change in any of these risk  factors.     Appearance:   Appropriate    Eye Contact:   Fair    Psychomotor Behavior: Normal    Attitude:   Cooperative    Orientation:   All   Speech    Rate / Production: Normal/ Responsive    Volume:  Normal    Mood:    Anxious  Depressed    Affect:    Flat  Subdued    Thought Content:  Clear  Rumination    Thought Form:  Coherent  Tangential    Insight:    Fair      Medication Review:   Changes to psychiatric medications, see updated Medication List in EPIC.      Medication Compliance:   Yes     Changes in Health Issues:   None reported     Chemical Use Review:   Substance Use: Chemical use reviewed, no active concerns identified      Tobacco Use: No current tobacco use.      Diagnosis:  1. Generalized anxiety disorder    2. Depression, unspecified depression type    3. Attention deficit hyperactivity disorder (ADHD), predominantly inattentive type     (Patient mentioned ADHD diagnosis in session.  Provider verified ADHD testing via chart review, patient was found to meet criteria for ADHD-predominantly inattentive type).  R/O Post Traumatic Stress Disorder    Collateral Reports Completed:   Not Applicable    PLAN: (Patient Tasks / Therapist Tasks / Other)    Patient will utilize crisis resources as needed.       Patient will utilize the following coping strategies to manage anxiety: Deep breathing, grounding exercises, engaging in civic duty. [place] exercise. Patient will also process and reality-check anxious thoughts after anxiety has decreased.     Patient will incorporate more deep-breathing and grounding exercises throughout the day.  Patient will utilize coping strategies discussed in session and challenge/reframe cognitive distortions (catastrophizing).        Patient will work to challenge and reframe identified cognitive distortions and negative thoughts.    Provider and patient will continue to talk about significant pattern of negative self talk.        Patient will utilize a daily check-list to  mitigate ADHD sxs and work to improve organization in the workplace.    Patient stated that he would write a list with coping strategies that would be easily accessible and work to increase self compassion and use of coping strategies.  Patient will begin to use a gratitude journal.  Provider and patient will continue to discuss possible career change and patient's interest in a romantic relationship at the next session.    Patient will begin to look for places/events where he can socialize with others.  Provider and patient will explore trauma hx and sxs.  Emotional processing.     Provider and patient will discuss potential PTSD diagnosis and CPTSD.  Provider will administer the PCL-5 PTSD Checklist again if needed.      Leatha Pearce [facility]  10/6/23  Service Performed and Documented by [facility]-   Note reviewed and clinical supervision by LONDON Patino [facility] 10/9/2023  ______________________________________________________________________    Individual Treatment Plan    Patient's Name: Teddy Shah  YOB: 1990    Date of Creation: 9/7/21  Date Treatment Plan Last Reviewed/Revised: 8/15/23    DSM5 Diagnoses: Attention-Deficit/Hyperactivity Disorder  314.00 (F90.0) Predominantly inattentive presentation, 311 (F32.9) Unspecified Depressive Disorder  or 300.02 (F41.1) Generalized Anxiety Disorder  Psychosocial / Contextual Factors: Hostile home environment, lack of social support, current events/politics a significant stress, family conflict  PROMIS (reviewed every 90 days):   WHODAS: 24    Referral / Collaboration:  Referral to another professional/service is not indicated at this time..    Anticipated number of session for this episode of care:   Anticipation frequency of session: Biweekly  Anticipated Duration of each session: 38-52 minutes  Treatment plan will be reviewed in 90 days or when goals have been changed.       MeasurableTreatment Goal(s) related to diagnosis / functional  "impairment(s)  Goal 1: Patient will successfully develop and utilize coping skills to manage depressive, anxious and ADHD sxs within 90 days.     I will know I've met my goal when I feel like I can get some of my executive function back (completing tasks).       Objective #A Patient will process experience around romantic relationship, reflect on how it has impacted his view of self, and how he would like to move forward.      Patient will work to develop a \"social life\" outside of work, and manage mental health sxs as they arise.              Patient will attend and participate in social or recreational activities    Develop and utilize self-compassion strategies.   identify the time in your life when you began to feel poorly about themselves.  Status: Restarted - Date: 8/15/23      Intervention(s)  Therapist will assign homework around utilizing self compassion strategies  teach psychoeducation around shame, guilt and healthy relationships.     Objective #B Patient will work to develop coping skills to \"turn brain off\" or switch tracks.    Patient will use thought-stopping strategy daily to reduce intrusive thoughts.  Status: Restarted - Date: 8/15/23        Intervention(s)  Therapist will assign homework around using thought-stopping strategies and other coping skills  teach emotional regulation skills.       Patient has reviewed and agreed to the above plan.      CINDI Posada  March 14, 2022, reviewed by 6/13/22, reviewed 9/14/22, reviewed 1/9/23, reviewed 5/8/23, reviewed 8/15/23  Service Performed and Documented by CINDI-   tx plan reviewed and clinical supervision [facility], 6/13/2022, 9/14/2022, 1/10/2023,5/8/2023, 8/16/2023    Answers submitted by the patient for this visit:  Patient Health Questionnaire (Submitted on 9/15/2023)  If you checked off any problems, how difficult have these problems made it for you to do your work, take care of things at home, or get along with " other people?: Somewhat difficult  PHQ9 TOTAL SCORE: 9  KASSIE-7 (Submitted on 9/15/2023)  KASSIE 7 TOTAL SCORE: 11

## 2023-10-24 ENCOUNTER — E-VISIT (OUTPATIENT)
Dept: FAMILY MEDICINE | Facility: CLINIC | Age: 33
End: 2023-10-24
Payer: COMMERCIAL

## 2023-10-24 DIAGNOSIS — R22.1 LUMP IN NECK: Primary | ICD-10-CM

## 2023-10-24 PROCEDURE — 99207 PR NON-BILLABLE SERV PER CHARTING: CPT | Performed by: NURSE PRACTITIONER

## 2023-10-25 NOTE — PATIENT INSTRUCTIONS
Thank you for choosing us for your care. I think an in-clinic visit would be best next steps based on your symptoms. Please schedule a clinic appointment; you won t be charged for this eVisit.      You can schedule an appointment right here in [facility], or call [phone]

## 2023-10-27 ENCOUNTER — OFFICE VISIT (OUTPATIENT)
Dept: FAMILY MEDICINE | Facility: CLINIC | Age: 33
End: 2023-10-27
Payer: COMMERCIAL

## 2023-10-27 VITALS
OXYGEN SATURATION: 95 % | RESPIRATION RATE: 16 BRPM | HEIGHT: 72 IN | SYSTOLIC BLOOD PRESSURE: 128 MMHG | TEMPERATURE: 97.4 F | BODY MASS INDEX: 38.95 KG/M2 | WEIGHT: 287.6 LBS | HEART RATE: 100 BPM | DIASTOLIC BLOOD PRESSURE: 83 MMHG

## 2023-10-27 DIAGNOSIS — F90.0 ADHD (ATTENTION DEFICIT HYPERACTIVITY DISORDER), INATTENTIVE TYPE: ICD-10-CM

## 2023-10-27 DIAGNOSIS — R22.1 LUMP IN NECK: Primary | ICD-10-CM

## 2023-10-27 PROCEDURE — 90746 HEPB VACCINE 3 DOSE ADULT IM: CPT | Performed by: FAMILY MEDICINE

## 2023-10-27 PROCEDURE — 90472 IMMUNIZATION ADMIN EACH ADD: CPT | Performed by: FAMILY MEDICINE

## 2023-10-27 PROCEDURE — 90651 9VHPV VACCINE 2/3 DOSE IM: CPT | Performed by: FAMILY MEDICINE

## 2023-10-27 PROCEDURE — 91320 SARSCV2 VAC 30MCG TRS-SUC IM: CPT | Performed by: FAMILY MEDICINE

## 2023-10-27 PROCEDURE — 90480 ADMN SARSCOV2 VAC 1/ONLY CMP: CPT | Performed by: FAMILY MEDICINE

## 2023-10-27 PROCEDURE — 99213 OFFICE O/P EST LOW 20 MIN: CPT | Mod: 25 | Performed by: FAMILY MEDICINE

## 2023-10-27 PROCEDURE — 90471 IMMUNIZATION ADMIN: CPT | Performed by: FAMILY MEDICINE

## 2023-10-27 PROCEDURE — 90686 IIV4 VACC NO PRSV 0.5 ML IM: CPT | Performed by: FAMILY MEDICINE

## 2023-10-27 RX ORDER — DEXTROAMPHETAMINE SACCHARATE, AMPHETAMINE ASPARTATE MONOHYDRATE, DEXTROAMPHETAMINE SULFATE AND AMPHETAMINE SULFATE 5; 5; 5; 5 MG/1; MG/1; MG/1; MG/1
40 CAPSULE, EXTENDED RELEASE ORAL DAILY
Qty: 60 CAPSULE | Refills: 0 | Status: SHIPPED | OUTPATIENT
Start: 2023-10-27 | End: 2023-12-11

## 2023-10-27 ASSESSMENT — PATIENT HEALTH QUESTIONNAIRE - PHQ9: SUM OF ALL RESPONSES TO PHQ QUESTIONS 1-9: 8

## 2023-10-27 NOTE — NURSING NOTE
Prior to immunization administration, verified patients identity using patient s name and date of birth. Please see Immunization Activity for additional information.     Screening Questionnaire for Adult Immunization    Are you sick today?   No   Do you have allergies to medications, food, a vaccine component or latex?   Yes   Have you ever had a serious reaction after receiving a vaccination?   No   Do you have a long-term health problem with heart, lung, kidney, or metabolic disease (e.g., diabetes), asthma, a blood disorder, no spleen, complement component deficiency, a cochlear implant, or a spinal fluid leak?  Are you on long-term aspirin therapy?   No   Do you have cancer, leukemia, HIV/AIDS, or any other immune system problem?   No   Do you have a parent, brother, or sister with an immune system problem?   No   In the past 3 months, have you taken medications that affect  your immune system, such as prednisone, other steroids, or anticancer drugs; drugs for the treatment of rheumatoid arthritis, Crohn s disease, or psoriasis; or have you had radiation treatments?   No   Have you had a seizure, or a brain or other nervous system problem?   No   During the past year, have you received a transfusion of blood or blood    products, or been given immune (gamma) globulin or antiviral drug?   No   For women: Are you pregnant or is there a chance you could become       pregnant during the next month?   No   Have you received any vaccinations in the past 4 weeks?   No     Patient instructed to remain in clinic for 15 minutes afterwards, and to report any adverse reactions.     Screening performed by Swati Arce MA on 10/27/2023 at 10:39 AM.

## 2023-10-27 NOTE — PROGRESS NOTES
Assessment & Plan     Lump in neck  -Teddy noticed lump in left side of his neck for the past 3 days.  Noticed after taking shower incidentally.  Non tender.  -Denied fever/recent URI/any viral illnesses/earaches/dental procedures.  -Family history of lymphoma, hence Teddy is very concerned.  -On palpation, ill-defined cystic structure beneath left ear in neck region, nontender.  No other cervical lymph nodes palpable.    PLAN:  - US Head Neck Soft Tissue; Future    ADHD (attention deficit hyperactivity disorder), inattentive type  -Feels better with Adderall  XR 40 mg daily.  Wanted refill.  -Notify Teddy that I will refill this time.  Further refills will be taken over by PCP.    - amphetamine-dextroamphetamine (ADDERALL XR) 20 MG 24 hr capsule; Take 2 capsules (40 mg) by mouth daily         BMI:   Estimated body mass index is 39.01 kg/m  as calculated from the following:    Height as of this encounter: 1.829 m (6').    Weight as of this encounter: 130.5 kg (287 lb 9.6 oz).           Shiloh Randhawa MD  [facility]    Clinton Espinal is a 32 year old, presenting for the following health issues:  Mass and A.D.H.D      10/27/2023     9:49 AM   Additional Questions   Roomed by shilpa   Accompanied by self         10/27/2023     9:49 AM   Patient Reported Additional Medications   Patient reports taking the following new medications no       Mass    A.D.H.D    History of Present Illness       Reason for visit:  Hard lump in neck  Symptom onset:  1-3 days ago  Symptoms include:  Hard, non-painful lump in neck, concerns about it being cancerous.  Symptom intensity:  Mild  Symptom progression:  Staying the same  Had these symptoms before:  No    Teddy Shah eats 2-3 servings of fruits and vegetables daily.Teddy Shah consumes 0 sweetened beverage(s) daily.Teddy Shah exercises with enough effort to increase Teddy SANCHEZ Jahosvaldo's heart rate 20 to 29 minutes per day.  Teddy SANCHEZ  Alyssa exercises with enough effort to increase Teddy Shah's heart rate 5 days per week.   Teddy Shah is taking medications regularly.     Family h/o lymphoma      Medication Followup of Adderall XR 20 mg   Taking Medication as prescribed: yes  Side Effects:  None  Medication Helping Symptoms:  yes        Review of Systems   Constitutional, HEENT, cardiovascular, pulmonary, gi and gu systems are negative, except as otherwise noted.      Objective    /83 (BP Location: Left arm, Patient Position: Sitting, Cuff Size: Adult Regular)   Pulse 100   Temp 97.4  F (36.3  C) (Tympanic)   Resp 16   Ht 1.829 m (6')   Wt 130.5 kg (287 lb 9.6 oz)   SpO2 95%   BMI 39.01 kg/m    Body mass index is 39.01 kg/m .  Physical Exam   GENERAL: healthy, alert and no distress  NECK: no adenopathy, no asymmetry, masses, or scars and thyroid normal to palpation  RESP: lungs clear to auscultation - no rales, rhonchi or wheezes  CV: regular rate and rhythm, normal S1 S2, no S3 or S4, no murmur, click or rub, no peripheral edema and peripheral pulses strong  ABDOMEN: soft, nontender, no hepatosplenomegaly, no masses and bowel sounds normal  MS: no gross musculoskeletal defects noted, no edema

## 2023-11-03 ENCOUNTER — ANCILLARY PROCEDURE (OUTPATIENT)
Dept: ULTRASOUND IMAGING | Facility: CLINIC | Age: 33
End: 2023-11-03
Attending: FAMILY MEDICINE
Payer: COMMERCIAL

## 2023-11-03 DIAGNOSIS — R22.1 LUMP IN NECK: ICD-10-CM

## 2023-11-03 PROCEDURE — 76536 US EXAM OF HEAD AND NECK: CPT | Mod: TC | Performed by: INTERNAL MEDICINE

## 2023-11-10 ENCOUNTER — VIRTUAL VISIT (OUTPATIENT)
Dept: PSYCHOLOGY | Facility: CLINIC | Age: 33
End: 2023-11-10
Payer: COMMERCIAL

## 2023-11-10 DIAGNOSIS — F32.A DEPRESSION, UNSPECIFIED DEPRESSION TYPE: ICD-10-CM

## 2023-11-10 DIAGNOSIS — F90.0 ATTENTION DEFICIT HYPERACTIVITY DISORDER (ADHD), PREDOMINANTLY INATTENTIVE TYPE: ICD-10-CM

## 2023-11-10 DIAGNOSIS — F41.1 GENERALIZED ANXIETY DISORDER: Primary | ICD-10-CM

## 2023-11-10 PROCEDURE — 90834 PSYTX W PT 45 MINUTES: CPT | Mod: VID

## 2023-11-10 ASSESSMENT — ANXIETY QUESTIONNAIRES
1. FEELING NERVOUS, ANXIOUS, OR ON EDGE: MORE THAN HALF THE DAYS
4. TROUBLE RELAXING: NOT AT ALL
2. NOT BEING ABLE TO STOP OR CONTROL WORRYING: MORE THAN HALF THE DAYS
IF YOU CHECKED OFF ANY PROBLEMS ON THIS QUESTIONNAIRE, HOW DIFFICULT HAVE THESE PROBLEMS MADE IT FOR YOU TO DO YOUR WORK, TAKE CARE OF THINGS AT HOME, OR GET ALONG WITH OTHER PEOPLE: SOMEWHAT DIFFICULT
3. WORRYING TOO MUCH ABOUT DIFFERENT THINGS: MORE THAN HALF THE DAYS
7. FEELING AFRAID AS IF SOMETHING AWFUL MIGHT HAPPEN: MORE THAN HALF THE DAYS
6. BECOMING EASILY ANNOYED OR IRRITABLE: NOT AT ALL
5. BEING SO RESTLESS THAT IT IS HARD TO SIT STILL: NOT AT ALL
GAD7 TOTAL SCORE: 8
GAD7 TOTAL SCORE: 8

## 2023-11-10 NOTE — PROGRESS NOTES
[facility]                                     Progress Note    Patient Name: Teddy Shah  Date: 11/10/23         Service Type: Individual      Session Start Time: 11:03am  Session End Time: 11:51am     Session Length: 38-52min    Session #: 35    Attendees: Client attended alone    Service Modality:  Video session  Yes, the patient's condition can be safely assessed and treated via synchronous audio and visual telemedicine encounter.  (Provider and Patient had to switch to phone due to technical difficulties)    Reason for Video Visit: Services only offered telehealth    Location of Originating Site: Patient's home    Distant Site: Provider Remote Setting home office    Provider verified identity through the following two step process.  Patient provided:  Patient  and Patient address                          Consent:  The patient/guardian has verbally consented to: the potential risks and benefits of telemedicine (video visit) versus in person care; bill my insurance or make self-payment for services provided; and responsibility for payment of non-covered services.                 Mode of transmission-Devtoo  Interactive Complexity: No  Crisis: No      Progress Since Last Session (Related to Symptoms / Goals / Homework):   Symptoms: Worsening depressive sxs and improving anxious sxs per PHQ-9 and KASSIE-7, no change in anxious and depressive sxs per patient report    Homework: Completed in session      Episode of Care Goals: Satisfactory progress - ACTION (Actively working towards change); Intervened by reinforcing change plan / affirming steps taken     Current / Ongoing Stressors and Concerns:  Work stress, parents moving, low self worth, housing, negative self talk, aversion to change, interpersonal relationships, motivation for change, emotional expression       Treatment Objective(s) Addressed in This Session:   use at least   coping skills for anxiety management in the next  4 weeks  Decrease frequency and intensity of feeling down, depressed, hopeless  Identify negative self-talk and behaviors: challenge core beliefs, myths, and actions  Patient will attend and participate in social or recreational activities    Develop and utilize self-compassion strategies.   identify the time in your life when you began to feel poorly about themselves.     Intervention:   Motivational Interviewing    MI Intervention: Expressed Empathy/Understanding, Supported Autonomy, Collaboration, Evocation, and Reframe     Change Talk Expressed by the Patient: Desire to change Reasons to change Committment to change Activation    Provider Response to Change Talk: E - Evoked more info from patient about behavior change, A - Affirmed patient's thoughts, decisions, or attempts at behavior change, R - Reflected patient's change talk, and S - Summarized patient's change talk statements    Provider held space as patient processed experiences using reflective listening, validation, and normalization of patient's emotional response.  Provider worked to develop greater insight into patient's experiences. Provider validated patient.     Provider held space as patient disclosed significant work difficulties and a medical scare.  Provider worked to develop greater insight around the coping strategies patient used to manage his anxious response.  Provider validated patient's use of support from coworkers and from his pets.      Provider worked to develop greater insight around patient's emotional response.  Provider focused on validating and patient and normalizing patient's emotional response.  Provider engaged patient in future-forward thinking regarding his career path.           10/6/2023    11:01 AM 10/27/2023     9:51 AM 11/10/2023     9:25 AM   PHQ   PHQ-9 Total Score 8 8 10   Q9: Thoughts of better off dead/self-harm past 2 weeks Not at all Not at all Not at all         9/15/2023    10:52 AM 10/6/2023    11:02 AM  11/10/2023     9:26 AM   KASSIE-7 SCORE   Total Score 11 (moderate anxiety) 12 (moderate anxiety) 8 (mild anxiety)   Total Score 11 12 8    Answers for HPI/ROS submitted by the patient on 7/7/2023  If you checked off any problems, how difficult have these problems made it for you to do your work, take care of things at home, or get along with other people?: Somewhat difficult  PHQ9 TOTAL SCORE: 9  KASSIE 7 TOTAL SCORE: 11      Assessments completed prior to visit:  The following assessments were completed by patient for this visit:  PROMIS 10-Global Health (only subscores and total score):       11/10/2022     9:52 AM 3/13/2023    10:57 AM 5/8/2023    11:15 AM 8/15/2023     1:47 PM 9/1/2023    10:54 AM 9/1/2023    11:06 AM 9/15/2023    10:53 AM   PROMIS-10 Scores Only   Global Mental Health Score 7 7 6 9 8 9 7   Global Physical Health Score 14 14 14 15 14 14 14   PROMIS TOTAL - SUBSCORES 21 21 20 24 22 23 21     [place] (Short version): see chart.      ASSESSMENT: Current Emotional / Mental Status (status of significant symptoms):   Risk status (Self / Other harm or suicidal ideation)   Patient denies current fears or concerns for personal safety.   Patient denies current or recent suicidal ideation or behaviors.   Patient denies current or recent homicidal ideation or behaviors.   Patient denies current or recent self injurious behavior or ideation.   Patient denies other safety concerns.   Patient reports there has been no change in risk factors since their last session.     Patient reports there has been no change in protective factors since their last session.     Recommended that patient call 911 or go to the local ED should there be a change in any of these risk factors.     Appearance:   Appropriate    Eye Contact:   Fair    Psychomotor Behavior: Normal    Attitude:   Cooperative    Orientation:   All   Speech    Rate / Production: Normal/ Responsive    Volume:  Normal    Mood:    Anxious  Depressed  "   Affect:    Flat  Subdued    Thought Content:  Clear  Rumination    Thought Form:  Coherent  Tangential    Insight:    Fair      Medication Review:   No current psychiatric medications prescribed     Medication Compliance:   Yes     Changes in Health Issues:   Yes: patient found a \"lump\" on the back of his neck that was determined to be benign.      Chemical Use Review:   Substance Use: Chemical use reviewed, no active concerns identified      Tobacco Use: No current tobacco use.      Diagnosis:  1. Generalized anxiety disorder    2. Depression, unspecified depression type    3. Attention deficit hyperactivity disorder (ADHD), predominantly inattentive type     (Patient mentioned ADHD diagnosis in session.  Provider verified ADHD testing via chart review, patient was found to meet criteria for ADHD-predominantly inattentive type).  R/O Post Traumatic Stress Disorder    Collateral Reports Completed:   Not Applicable    PLAN: (Patient Tasks / Therapist Tasks / Other)    Patient will utilize crisis resources as needed.       Patient will utilize the following coping strategies to manage anxiety: Deep breathing, grounding exercises, engaging in civic duty. [place] exercise. Patient will also process and reality-check anxious thoughts after anxiety has decreased.     Patient will incorporate more deep-breathing and grounding exercises throughout the day.  Patient will utilize coping strategies discussed in session and challenge/reframe cognitive distortions (catastrophizing).        Patient will work to challenge and reframe identified cognitive distortions and negative thoughts.    Provider and patient will continue to talk about significant pattern of negative self talk.        Patient will utilize a daily check-list to mitigate ADHD sxs and work to improve organization in the workplace.    Patient stated that he would write a list with coping strategies that would be easily accessible and work to increase self " compassion and use of coping strategies.  Patient will begin to use a gratitude journal.  Provider and patient will continue to discuss possible career change and patient's interest in a romantic relationship at the next session.    Patient will begin to look for places/events where he can socialize with others.  Provider and patient will explore trauma hx and sxs.  Emotional processing.     Provider and patient will discuss potential PTSD diagnosis and CPTSD.  Provider will administer the PCL-5 PTSD Checklist again if needed.  Provider will review treatment plan with patient.     Leatha Pearce [facility]  11/10/23  Service Performed and Documented by [facility]-   Note reviewed and clinical supervision by LONDON Patino [facility] 11/13/2023    ______________________________________________________________________    Individual Treatment Plan    Patient's Name: Teddy Shah  YOB: 1990    Date of Creation: 9/7/21  Date Treatment Plan Last Reviewed/Revised: 8/15/23    DSM5 Diagnoses: Attention-Deficit/Hyperactivity Disorder  314.00 (F90.0) Predominantly inattentive presentation, 311 (F32.9) Unspecified Depressive Disorder  or 300.02 (F41.1) Generalized Anxiety Disorder  Psychosocial / Contextual Factors: Hostile home environment, lack of social support, current events/politics a significant stress, family conflict  PROMIS (reviewed every 90 days):   WHODAS: 24    Referral / Collaboration:  Referral to another professional/service is not indicated at this time..    Anticipated number of session for this episode of care:   Anticipation frequency of session: Biweekly  Anticipated Duration of each session: 38-52 minutes  Treatment plan will be reviewed in 90 days or when goals have been changed.       MeasurableTreatment Goal(s) related to diagnosis / functional impairment(s)  Goal 1: Patient will successfully develop and utilize coping skills to manage depressive, anxious and ADHD sxs within 90 days.     I will  "know I've met my goal when I feel like I can get some of my executive function back (completing tasks).       Objective #A Patient will process experience around romantic relationship, reflect on how it has impacted his view of self, and how he would like to move forward.      Patient will work to develop a \"social life\" outside of work, and manage mental health sxs as they arise.              Patient will attend and participate in social or recreational activities    Develop and utilize self-compassion strategies.   identify the time in your life when you began to feel poorly about themselves.  Status: Restarted - Date: 8/15/23      Intervention(s)  Therapist will assign homework around utilizing self compassion strategies  teach psychoeducation around shame, guilt and healthy relationships.     Objective #B Patient will work to develop coping skills to \"turn brain off\" or switch tracks.    Patient will use thought-stopping strategy daily to reduce intrusive thoughts.  Status: Restarted - Date: 8/15/23        Intervention(s)  Therapist will assign homework around using thought-stopping strategies and other coping skills  teach emotional regulation skills.       Patient has reviewed and agreed to the above plan.      Leatha Pearce [facility]  March 14, 2022, reviewed by 6/13/22, reviewed 9/14/22, reviewed 1/9/23, reviewed 5/8/23, reviewed 8/15/23  Service Performed and Documented by [facility]-   tx plan reviewed and clinical supervision by LONDON Patino [facility] 3/29/2022, 6/13/2022, 9/14/2022, 1/10/2023,5/8/2023, 8/16/2023    Answers submitted by the patient for this visit:  Patient Health Questionnaire (Submitted on 11/10/2023)  If you checked off any problems, how difficult have these problems made it for you to do your work, take care of things at home, or get along with other people?: Somewhat difficult  PHQ9 TOTAL SCORE: 10  KASSIE-7 (Submitted on 11/10/2023)  KASSIE 7 TOTAL SCORE: 8    "

## 2023-12-01 ENCOUNTER — VIRTUAL VISIT (OUTPATIENT)
Dept: PSYCHOLOGY | Facility: CLINIC | Age: 33
End: 2023-12-01
Payer: COMMERCIAL

## 2023-12-01 DIAGNOSIS — F90.0 ATTENTION DEFICIT HYPERACTIVITY DISORDER (ADHD), PREDOMINANTLY INATTENTIVE TYPE: ICD-10-CM

## 2023-12-01 DIAGNOSIS — F32.A DEPRESSION, UNSPECIFIED DEPRESSION TYPE: ICD-10-CM

## 2023-12-01 DIAGNOSIS — F41.1 GENERALIZED ANXIETY DISORDER: Primary | ICD-10-CM

## 2023-12-01 PROCEDURE — 90834 PSYTX W PT 45 MINUTES: CPT | Mod: VID

## 2023-12-01 ASSESSMENT — PATIENT HEALTH QUESTIONNAIRE - PHQ9
SUM OF ALL RESPONSES TO PHQ QUESTIONS 1-9: 10
10. IF YOU CHECKED OFF ANY PROBLEMS, HOW DIFFICULT HAVE THESE PROBLEMS MADE IT FOR YOU TO DO YOUR WORK, TAKE CARE OF THINGS AT HOME, OR GET ALONG WITH OTHER PEOPLE: SOMEWHAT DIFFICULT
SUM OF ALL RESPONSES TO PHQ QUESTIONS 1-9: 10

## 2023-12-01 ASSESSMENT — COLUMBIA-SUICIDE SEVERITY RATING SCALE - C-SSRS
1. SINCE LAST CONTACT, HAVE YOU WISHED YOU WERE DEAD OR WISHED YOU COULD GO TO SLEEP AND NOT WAKE UP?: NO
2. HAVE YOU ACTUALLY HAD ANY THOUGHTS OF KILLING YOURSELF?: NO
TOTAL  NUMBER OF INTERRUPTED ATTEMPTS SINCE LAST CONTACT: NO
ATTEMPT SINCE LAST CONTACT: NO
TOTAL  NUMBER OF ABORTED OR SELF INTERRUPTED ATTEMPTS SINCE LAST CONTACT: NO
6. HAVE YOU EVER DONE ANYTHING, STARTED TO DO ANYTHING, OR PREPARED TO DO ANYTHING TO END YOUR LIFE?: NO
SUICIDE, SINCE LAST CONTACT: NO

## 2023-12-01 NOTE — PROGRESS NOTES
[facility]                                     Progress Note    Patient Name: Teddy Shah  Date: 23         Service Type: Individual      Session Start Time: 11:05am  Session End Time: 11:54am     Session Length: 38-52 min    Session #: 1 (New treatment plan)    Attendees: Client attended alone    Service Modality:  Video session  Yes, the patient's condition can be safely assessed and treated via synchronous audio and visual telemedicine encounter.  (Provider and Patient had to switch to phone due to technical difficulties)    Reason for Video Visit: Services only offered telehealth    Location of Originating Site: Patient's home    Distant Site: Provider Remote Setting home office    Provider verified identity through the following two step process.  Patient provided:  Patient  and Patient address                          Consent:  The patient/guardian has verbally consented to: the potential risks and benefits of telemedicine (video visit) versus in person care; bill my insurance or make self-payment for services provided; and responsibility for payment of non-covered services.                 Mode of transmission-Clutch  Interactive Complexity: No  Crisis: No  Extended Session (53+ minutes):   - Patient's presenting concerns require more intensive intervention than could be completed within the usual service      Progress Since Last Session (Related to Symptoms / Goals / Homework):   Symptoms: Improving anxious and depressive sxs per patient report, no change in depressive sxs per PHQ-9    Homework: Completed in session      Episode of Care Goals: Satisfactory progress - ACTION (Actively working towards change); Intervened by reinforcing change plan / affirming steps taken     Current / Ongoing Stressors and Concerns:  Work stress, parents moving, low self worth, housing, negative self talk, aversion to change, interpersonal relationships, motivation for change,  emotional expression       Treatment Objective(s) Addressed in This Session:   use at least   coping skills for anxiety management in the next 4 weeks  Decrease frequency and intensity of feeling down, depressed, hopeless  Identify negative self-talk and behaviors: challenge core beliefs, myths, and actions  Patient will attend and participate in social or recreational activities    Develop and utilize self-compassion strategies.   identify the time in your life when you began to feel poorly about themselves.     Intervention:   Motivational Interviewing    MI Intervention: Expressed Empathy/Understanding, Supported Autonomy, Collaboration, Evocation, and Reframe     Change Talk Expressed by the Patient: Desire to change Reasons to change Committment to change Activation    Provider Response to Change Talk: E - Evoked more info from patient about behavior change, A - Affirmed patient's thoughts, decisions, or attempts at behavior change, R - Reflected patient's change talk, and S - Summarized patient's change talk statements    Provider held space as patient processed experiences using reflective listening, validation, and normalization of patient's emotional response.  Provider worked to develop greater insight into patient's experiences. Provider validated patient and highlighted patient's success in increasing social interactions outside of work.       Provider reviewed treatment plan with patient.  Provider worked to highlight patient progress, in order to validate patient's success and increase self compassion.  Provider discussed the direction of treatment.      Psychoeducation: Provider taught psychoeducation around PTSD diagnosis and provided screening for PCL-5 for a second time in order to develop greater insight around possible-trauma related sxs at patient's request.  Provider worked to increase patient's emotional recognition/identification.  Provider determined that patient does not fit PTSD criteria  at this time, but will include a rule-out for an unspecified-trauma related disorder.  Provider will continue to assess.    Provider held space as patient disclosed ongoing emotional disregulation related to difficult experiences he had regarding [employer] growing up, that he fears will be significantly triggered by the upcoming presidential election.  Provider offered support and normalized patient's emotional response.  Provider worked to develop greater insight, validating and challenging patient.      Extended Session (53+ minutes):   - Patient's presenting concerns require more intensive intervention than could be completed within the usual service          10/27/2023     9:51 AM 11/10/2023     9:25 AM 12/1/2023    10:54 AM   PHQ   PHQ-9 Total Score 8 10 10   Q9: Thoughts of better off dead/self-harm past 2 weeks Not at all Not at all Not at all         9/15/2023    10:52 AM 10/6/2023    11:02 AM 11/10/2023     9:26 AM   KASSIE-7 SCORE   Total Score 11 (moderate anxiety) 12 (moderate anxiety) 8 (mild anxiety)   Total Score 11 12 8       Assessments completed prior to visit:  The following assessments were completed by patient for this visit:  PROMIS 10-Global Health (only subscores and total score):       3/13/2023    10:57 AM 5/8/2023    11:15 AM 8/15/2023     1:47 PM 9/1/2023    10:54 AM 9/1/2023    11:06 AM 9/15/2023    10:53 AM 12/1/2023    11:24 AM   PROMIS-10 Scores Only   Global Mental Health Score 7 6 9 8 9 7 8   Global Physical Health Score 14 14 15 14 14 14 13   PROMIS TOTAL - SUBSCORES 21 20 24 22 23 21 21     [place] (Short version): see chart.      ASSESSMENT: Current Emotional / Mental Status (status of significant symptoms):   Risk status (Self / Other harm or suicidal ideation)   Patient denies current fears or concerns for personal safety.   Patient denies current or recent suicidal ideation or behaviors.   Patient denies current or recent homicidal ideation or behaviors.   Patient denies current  or recent self injurious behavior or ideation.   Patient denies other safety concerns.   Patient reports there has been no change in risk factors since their last session.     Patient reports there has been no change in protective factors since their last session.     Recommended that patient call 911 or go to the local ED should there be a change in any of these risk factors.     Appearance:   Appropriate    Eye Contact:   Fair    Psychomotor Behavior: Normal    Attitude:   Cooperative    Orientation:   All   Speech    Rate / Production: Normal/ Responsive    Volume:  Normal    Mood:    Anxious  Depressed    Affect:    Flat  Subdued    Thought Content:  Clear  Rumination    Thought Form:  Coherent  Tangential    Insight:    Fair      Medication Review:   No changes to current psychiatric medication(s) Provider will continue to assess.      Medication Compliance:   Yes Provider will continue to assess.      Changes in Health Issues:   None reported     Chemical Use Review:   Substance Use: Chemical use reviewed, no active concerns identified      Tobacco Use: No current tobacco use.      Diagnosis:  1. Generalized anxiety disorder    2. Depression, unspecified depression type    3. Attention deficit hyperactivity disorder (ADHD), predominantly inattentive type    R/O Other Specified Trauma Disorder    Collateral Reports Completed:   Not Applicable    PLAN: (Patient Tasks / Therapist Tasks / Other)    Patient will utilize crisis resources as needed.       Patient will utilize the following coping strategies to manage anxiety: Deep breathing, grounding exercises, engaging in civic duty. [place] exercise. Patient will also process and reality-check anxious thoughts after anxiety has decreased.     Patient will incorporate more deep-breathing and grounding exercises throughout the day.  Patient will utilize coping strategies discussed in session and challenge/reframe cognitive distortions (catastrophizing).         Patient will work to challenge and reframe identified cognitive distortions and negative thoughts.    Provider and patient will continue to talk about significant pattern of negative self talk.        Patient will utilize a daily check-list to mitigate ADHD sxs and work to improve organization in the workplace.    Patient stated that he would write a list with coping strategies that would be easily accessible and work to increase self compassion and use of coping strategies.  Patient will begin to use a gratitude journal.  Provider and patient will continue to discuss possible career change and patient's interest in a romantic relationship at the next session.    Patient will continue to look for places/events where he can socialize with others.      Provider and patient will discuss patients' concerns related to upcoming current events and ongoing difficulties with [employer] from childhood.  Provider and patient will discuss medication at the next session.    Leatha Pearce [facility]  12/1/23  Service Performed and Documented by [facility]-   Note reviewed and clinical supervision by LONDON Patino [facility] 12/4/2023  ______________________________________________________________________    Individual Treatment Plan    Patient's Name: Teddy Shah  YOB: 1990    Date of Creation: 9/7/21  Date Treatment Plan Last Reviewed/Revised: 12/1/23    DSM5 Diagnoses: Attention-Deficit/Hyperactivity Disorder  314.00 (F90.0) Predominantly inattentive presentation, 311 (F32.9) Unspecified Depressive Disorder  or 300.02 (F41.1) Generalized Anxiety Disorder  Psychosocial / Contextual Factors: Hostile home environment, lack of social support, current events/politics a significant stress, family conflict  PROMIS (reviewed every 90 days): 21    Referral / Collaboration:  Referral to another professional/service is not indicated at this time..    Anticipated number of session for this episode of care:   Anticipation frequency  "of session: Biweekly  Anticipated Duration of each session: 38-52 minutes  Treatment plan will be reviewed in 90 days or when goals have been changed.       MeasurableTreatment Goal(s) related to diagnosis / functional impairment(s)  Goal 1: Patient will successfully develop and utilize coping skills to manage depressive, anxious and ADHD sxs within 90 days.     I will know I've met my goal when I feel like I can get some of my executive function back (completing tasks).       Objective #A Patient will process experience around romantic relationship, reflect on how it has impacted his view of self, and how he would like to move forward.      Patient will work to develop a \"social life\" outside of work, and manage mental health sxs as they arise.              Patient will attend and participate in social or recreational activities    Develop and utilize self-compassion strategies.   identify the time in your life when you began to feel poorly about themselves.  Status: Continued - Date(s): 12/1/23    Intervention(s)  Therapist will assign homework around utilizing self compassion strategies  teach psychoeducation around shame, guilt and healthy relationships.     Objective #B Patient will work to develop coping skills to \"turn brain off\" or switch tracks.    Patient will use thought-stopping strategy daily to reduce intrusive thoughts.  Status: Continued - Date(s): 12/1/23      Intervention(s)  Therapist will assign homework around using thought-stopping strategies and other coping skills  teach emotional regulation skills.       Patient has reviewed and agreed to the above plan.      CINDI Posada  March 14, 2022, reviewed by 6/13/22, reviewed 9/14/22, reviewed 1/9/23, reviewed 5/8/23, reviewed 8/15/23, reviewed 12/1/23  Service Performed and Documented by [facility]-   tx [facility]/2022, 6/13/2022, 9/14/2022, 1/10/2023,5/8/2023, 8/16/2023, " 12/4/2023        Answers submitted by the patient for this visit:  Patient Health Questionnaire (Submitted on 12/1/2023)  If you checked off any problems, how difficult have these problems made it for you to do your work, take care of things at home, or get along with other people?: Somewhat difficult  PHQ9 TOTAL SCORE: 10

## 2023-12-11 ENCOUNTER — MYC REFILL (OUTPATIENT)
Dept: FAMILY MEDICINE | Facility: CLINIC | Age: 33
End: 2023-12-11
Payer: COMMERCIAL

## 2023-12-11 DIAGNOSIS — F90.0 ADHD (ATTENTION DEFICIT HYPERACTIVITY DISORDER), INATTENTIVE TYPE: ICD-10-CM

## 2023-12-11 RX ORDER — DEXTROAMPHETAMINE SACCHARATE, AMPHETAMINE ASPARTATE MONOHYDRATE, DEXTROAMPHETAMINE SULFATE AND AMPHETAMINE SULFATE 5; 5; 5; 5 MG/1; MG/1; MG/1; MG/1
40 CAPSULE, EXTENDED RELEASE ORAL DAILY
Qty: 60 CAPSULE | Refills: 0 | Status: SHIPPED | OUTPATIENT
Start: 2023-12-11 | End: 2024-01-15

## 2023-12-17 ENCOUNTER — MYC MEDICAL ADVICE (OUTPATIENT)
Dept: FAMILY MEDICINE | Facility: CLINIC | Age: 33
End: 2023-12-17
Payer: COMMERCIAL

## 2024-01-05 ENCOUNTER — VIRTUAL VISIT (OUTPATIENT)
Dept: PSYCHOLOGY | Facility: CLINIC | Age: 34
End: 2024-01-05
Payer: COMMERCIAL

## 2024-01-05 DIAGNOSIS — F32.A DEPRESSION, UNSPECIFIED DEPRESSION TYPE: ICD-10-CM

## 2024-01-05 DIAGNOSIS — F90.0 ATTENTION DEFICIT HYPERACTIVITY DISORDER (ADHD), PREDOMINANTLY INATTENTIVE TYPE: ICD-10-CM

## 2024-01-05 DIAGNOSIS — F41.1 GENERALIZED ANXIETY DISORDER: Primary | ICD-10-CM

## 2024-01-05 PROCEDURE — 90834 PSYTX W PT 45 MINUTES: CPT | Mod: 95

## 2024-01-05 ASSESSMENT — ANXIETY QUESTIONNAIRES
2. NOT BEING ABLE TO STOP OR CONTROL WORRYING: NEARLY EVERY DAY
GAD7 TOTAL SCORE: 11
4. TROUBLE RELAXING: SEVERAL DAYS
3. WORRYING TOO MUCH ABOUT DIFFERENT THINGS: MORE THAN HALF THE DAYS
5. BEING SO RESTLESS THAT IT IS HARD TO SIT STILL: NOT AT ALL
8. IF YOU CHECKED OFF ANY PROBLEMS, HOW DIFFICULT HAVE THESE MADE IT FOR YOU TO DO YOUR WORK, TAKE CARE OF THINGS AT HOME, OR GET ALONG WITH OTHER PEOPLE?: SOMEWHAT DIFFICULT
7. FEELING AFRAID AS IF SOMETHING AWFUL MIGHT HAPPEN: NEARLY EVERY DAY
7. FEELING AFRAID AS IF SOMETHING AWFUL MIGHT HAPPEN: NEARLY EVERY DAY
IF YOU CHECKED OFF ANY PROBLEMS ON THIS QUESTIONNAIRE, HOW DIFFICULT HAVE THESE PROBLEMS MADE IT FOR YOU TO DO YOUR WORK, TAKE CARE OF THINGS AT HOME, OR GET ALONG WITH OTHER PEOPLE: SOMEWHAT DIFFICULT
1. FEELING NERVOUS, ANXIOUS, OR ON EDGE: MORE THAN HALF THE DAYS
GAD7 TOTAL SCORE: 11
6. BECOMING EASILY ANNOYED OR IRRITABLE: NOT AT ALL
GAD7 TOTAL SCORE: 11

## 2024-01-05 NOTE — PROGRESS NOTES
[facility]                                     Progress Note    Patient Name: Teddy Shah  Date: 24         Service Type: Individual      Session Start Time: 11:09am  Session End Time: 11:51am     Session Length: 38-52 min    Session #: 2    Attendees: Client attended alone    Service Modality:  Video session  Yes, the patient's condition can be safely assessed and treated via synchronous audio and visual telemedicine encounter.  (Provider and Patient had to switch to phone due to technical difficulties)    Reason for Video Visit: Services only offered telehealth    Location of Originating Site: Patient's home    Distant Site: Provider Remote Setting home office    Provider verified identity through the following two step process.  Patient provided:  Patient  and Patient address                          Consent:  The patient/guardian has verbally consented to: the potential risks and benefits of telemedicine (video visit) versus in person care; bill my insurance or make self-payment for services provided; and responsibility for payment of non-covered services.                 Mode of transmission-[employer]  Interactive Complexity: No  Crisis: No     Progress Since Last Session (Related to Symptoms / Goals / Homework):   Symptoms: Improving depressive sxs per patient report and PHQ-9, worsening anxious sxs per KASSIE-7    Homework: Completed in session      Episode of Care Goals: Satisfactory progress - ACTION (Actively working towards change); Intervened by reinforcing change plan / affirming steps taken     Current / Ongoing Stressors and Concerns:  Work stress, parents moving, low self worth, housing, negative self talk, aversion to change, interpersonal relationships, motivation for change, emotional expression       Treatment Objective(s) Addressed in This Session:   use at least   coping skills for anxiety management in the next 4 weeks  Decrease frequency and intensity of  feeling down, depressed, hopeless  Identify negative self-talk and behaviors: challenge core beliefs, myths, and actions  Patient will attend and participate in social or recreational activities    Develop and utilize self-compassion strategies.   identify the time in your life when you began to feel poorly about themselves.     Intervention:   Motivational Interviewing    MI Intervention: Expressed Empathy/Understanding, Supported Autonomy, Collaboration, Evocation, Reflections: simple and complex, and Reframe     Change Talk Expressed by the Patient: Desire to change Reasons to change Committment to change Activation    Provider Response to Change Talk: E - Evoked more info from patient about behavior change, A - Affirmed patient's thoughts, decisions, or attempts at behavior change, R - Reflected patient's change talk, and S - Summarized patient's change talk statements    Provider held space as patient processed experiences using reflective listening, validation, and normalization of patient's emotional response.  Provider worked to develop greater insight into patient's experiences. Provider validated patient and highlighted patient's success in increasing social interactions outside of work.       Provider and patient discussed ongoing difficulties with work, focusing on validating patient.  Provider worked to develop greater insight and normalized patient's emotional response as patient processed longstanding difficulties with upper management.  Provider worked to highlight patient's progress and intentionality in working towards his career goal.      CBT: Provider utilized CBT strategies to challenge and reframe identified cognitive distortions.  Provider worked to develop greater insight around cognitive distortions.  Provider coached patient on challenging and reframing identified cognitive distortions.           11/10/2023     9:25 AM 12/1/2023    10:54 AM 1/5/2024    11:07 AM   PHQ   PHQ-9 Total Score 10  10 9   Q9: Thoughts of better off dead/self-harm past 2 weeks Not at all Not at all Not at all         10/6/2023    11:02 AM 11/10/2023     9:26 AM 1/5/2024    11:08 AM   KASSIE-7 SCORE   Total Score 12 (moderate anxiety) 8 (mild anxiety) 11 (moderate anxiety)   Total Score 12 8 11       Assessments completed prior to visit:  The following assessments were completed by patient for this visit:  PROMIS 10-Global Health (only subscores and total score):       5/8/2023    11:15 AM 8/15/2023     1:47 PM 9/1/2023    10:54 AM 9/1/2023    11:06 AM 9/15/2023    10:53 AM 12/1/2023    11:24 AM 1/5/2024    11:08 AM   PROMIS-10 Scores Only   Global Mental Health Score 6 9 8 9 7 8 7   Global Physical Health Score 14 15 14 14 14 13 14   PROMIS TOTAL - SUBSCORES 20 24 22 23 21 21 21     [place] (Short version): see chart.      ASSESSMENT: Current Emotional / Mental Status (status of significant symptoms):   Risk status (Self / Other harm or suicidal ideation)   Patient denies current fears or concerns for personal safety.   Patient denies current or recent suicidal ideation or behaviors.   Patient denies current or recent homicidal ideation or behaviors.   Patient denies current or recent self injurious behavior or ideation.   Patient denies other safety concerns.   Patient reports there has been no change in risk factors since their last session.     Patient reports there has been no change in protective factors since their last session.     Recommended that patient call 911 or go to the local ED should there be a change in any of these risk factors.     Appearance:   Appropriate    Eye Contact:   Fair    Psychomotor Behavior: Normal    Attitude:   Cooperative    Orientation:   All   Speech    Rate / Production: Normal/ Responsive    Volume:  Normal    Mood:    Anxious  Depressed    Affect:    Flat  Subdued    Thought Content:  Clear  Rumination    Thought Form:  Coherent  Tangential    Insight:    Fair      Medication Review:   No  changes to current psychiatric medication(s) Provider will continue to assess.      Medication Compliance:   Yes Provider will continue to assess.      Changes in Health Issues:   None reported     Chemical Use Review:   Substance Use: Chemical use reviewed, no active concerns identified      Tobacco Use: No current tobacco use.      Diagnosis:  1. Generalized anxiety disorder    2. Attention deficit hyperactivity disorder (ADHD), predominantly inattentive type    3. Depression, unspecified depression type    R/O Other Specified Trauma Disorder    Collateral Reports Completed:   Not Applicable    PLAN: (Patient Tasks / Therapist Tasks / Other)    Patient will utilize crisis resources as needed.       Patient will utilize the following coping strategies to manage anxiety: Deep breathing, grounding exercises, engaging in civic duty. [place] exercise. Patient will also process and reality-check anxious thoughts after anxiety has decreased.     Patient will incorporate more deep-breathing and grounding exercises throughout the day.  Patient will utilize coping strategies discussed in session and challenge/reframe cognitive distortions (catastrophizing).        Patient will work to challenge and reframe identified cognitive distortions and negative thoughts.    Provider and patient will continue to talk about significant pattern of negative self talk.        Patient will utilize a daily check-list to mitigate ADHD sxs and work to improve organization in the workplace.    Patient stated that he would write a list with coping strategies that would be easily accessible and work to increase self compassion and use of coping strategies.  Patient will begin to use a gratitude journal.  Provider and patient will continue to discuss possible career change and patient's interest in a romantic relationship at the next session.    Patient will continue to look for places/events where he can socialize with others.      Provider and  "patient will discuss patients' concerns related to upcoming current events and ongoing difficulties with [employer] from childhood.  Provider and patient will discuss medication at the next session.    Leatha Pearce, LICSW  1/5/24  ______________________________________________________________________    Individual Treatment Plan    Patient's Name: Teddy Shah  YOB: 1990    Date of Creation: 9/7/21  Date Treatment Plan Last Reviewed/Revised: 12/1/23    DSM5 Diagnoses: Attention-Deficit/Hyperactivity Disorder  314.00 (F90.0) Predominantly inattentive presentation, 311 (F32.9) Unspecified Depressive Disorder  or 300.02 (F41.1) Generalized Anxiety Disorder  Psychosocial / Contextual Factors: Hostile home environment, lack of social support, current events/politics a significant stress, family conflict  PROMIS (reviewed every 90 days): 21    Referral / Collaboration:  Referral to another professional/service is not indicated at this time..    Anticipated number of session for this episode of care:   Anticipation frequency of session: Biweekly  Anticipated Duration of each session: 38-52 minutes  Treatment plan will be reviewed in 90 days or when goals have been changed.       MeasurableTreatment Goal(s) related to diagnosis / functional impairment(s)  Goal 1: Patient will successfully develop and utilize coping skills to manage depressive, anxious and ADHD sxs within 90 days.     I will know I've met my goal when I feel like I can get some of my executive function back (completing tasks).       Objective #A Patient will process experience around romantic relationship, reflect on how it has impacted his view of self, and how he would like to move forward.      Patient will work to develop a \"social life\" outside of work, and manage mental health sxs as they arise.              Patient will attend and participate in social or recreational activities    Develop and utilize self-compassion strategies. " "  identify the time in your life when you began to feel poorly about themselves.  Status: Continued - Date(s): 12/1/23    Intervention(s)  Therapist will assign homework around utilizing self compassion strategies  teach psychoeducation around shame, guilt and healthy relationships.     Objective #B Patient will work to develop coping skills to \"turn brain off\" or switch tracks.    Patient will use thought-stopping strategy daily to reduce intrusive thoughts.  Status: Continued - Date(s): 12/1/23      Intervention(s)  Therapist will assign homework around using thought-stopping strategies and other coping skills  teach emotional regulation skills.       Patient has reviewed and agreed to the above plan.      Leatha Pearce, [facility]  March 14, 2022, reviewed by 6/13/22, reviewed 9/14/22, reviewed 1/9/23, reviewed 5/8/23, reviewed 8/15/23, reviewed 12/1/23    tx plan reviewed and clinical supervision by OLNDON Patino [facility] 3/29/2022, 6/13/2022, 9/14/2022, 1/10/2023,5/8/2023, 8/16/2023, 12/4/2023      Answers submitted by the patient for this visit:  Patient Health Questionnaire (Submitted on 1/5/2024)  If you checked off any problems, how difficult have these problems made it for you to do your work, take care of things at home, or get along with other people?: Somewhat difficult  PHQ9 TOTAL SCORE: 9  KASSIE-7 (Submitted on 1/5/2024)  KASSIE 7 TOTAL SCORE: 11    "

## 2024-01-15 ENCOUNTER — MYC REFILL (OUTPATIENT)
Dept: FAMILY MEDICINE | Facility: CLINIC | Age: 34
End: 2024-01-15
Payer: COMMERCIAL

## 2024-01-15 DIAGNOSIS — F90.0 ADHD (ATTENTION DEFICIT HYPERACTIVITY DISORDER), INATTENTIVE TYPE: ICD-10-CM

## 2024-01-16 RX ORDER — DEXTROAMPHETAMINE SACCHARATE, AMPHETAMINE ASPARTATE MONOHYDRATE, DEXTROAMPHETAMINE SULFATE AND AMPHETAMINE SULFATE 5; 5; 5; 5 MG/1; MG/1; MG/1; MG/1
40 CAPSULE, EXTENDED RELEASE ORAL DAILY
Qty: 60 CAPSULE | Refills: 0 | Status: SHIPPED | OUTPATIENT
Start: 2024-01-16 | End: 2024-03-05

## 2024-02-02 ENCOUNTER — VIRTUAL VISIT (OUTPATIENT)
Dept: PSYCHOLOGY | Facility: CLINIC | Age: 34
End: 2024-02-02
Payer: COMMERCIAL

## 2024-02-02 DIAGNOSIS — F32.A DEPRESSION, UNSPECIFIED DEPRESSION TYPE: ICD-10-CM

## 2024-02-02 DIAGNOSIS — F90.0 ATTENTION DEFICIT HYPERACTIVITY DISORDER (ADHD), PREDOMINANTLY INATTENTIVE TYPE: ICD-10-CM

## 2024-02-02 DIAGNOSIS — F41.1 GENERALIZED ANXIETY DISORDER: Primary | ICD-10-CM

## 2024-02-02 PROCEDURE — 90834 PSYTX W PT 45 MINUTES: CPT | Mod: 95

## 2024-02-02 ASSESSMENT — ANXIETY QUESTIONNAIRES
2. NOT BEING ABLE TO STOP OR CONTROL WORRYING: NEARLY EVERY DAY
7. FEELING AFRAID AS IF SOMETHING AWFUL MIGHT HAPPEN: NEARLY EVERY DAY
3. WORRYING TOO MUCH ABOUT DIFFERENT THINGS: MORE THAN HALF THE DAYS
GAD7 TOTAL SCORE: 13
8. IF YOU CHECKED OFF ANY PROBLEMS, HOW DIFFICULT HAVE THESE MADE IT FOR YOU TO DO YOUR WORK, TAKE CARE OF THINGS AT HOME, OR GET ALONG WITH OTHER PEOPLE?: VERY DIFFICULT
IF YOU CHECKED OFF ANY PROBLEMS ON THIS QUESTIONNAIRE, HOW DIFFICULT HAVE THESE PROBLEMS MADE IT FOR YOU TO DO YOUR WORK, TAKE CARE OF THINGS AT HOME, OR GET ALONG WITH OTHER PEOPLE: VERY DIFFICULT
GAD7 TOTAL SCORE: 13
7. FEELING AFRAID AS IF SOMETHING AWFUL MIGHT HAPPEN: NEARLY EVERY DAY
6. BECOMING EASILY ANNOYED OR IRRITABLE: SEVERAL DAYS
5. BEING SO RESTLESS THAT IT IS HARD TO SIT STILL: NOT AT ALL
1. FEELING NERVOUS, ANXIOUS, OR ON EDGE: NEARLY EVERY DAY
GAD7 TOTAL SCORE: 13
4. TROUBLE RELAXING: SEVERAL DAYS

## 2024-02-02 ASSESSMENT — PATIENT HEALTH QUESTIONNAIRE - PHQ9
SUM OF ALL RESPONSES TO PHQ QUESTIONS 1-9: 5
10. IF YOU CHECKED OFF ANY PROBLEMS, HOW DIFFICULT HAVE THESE PROBLEMS MADE IT FOR YOU TO DO YOUR WORK, TAKE CARE OF THINGS AT HOME, OR GET ALONG WITH OTHER PEOPLE: SOMEWHAT DIFFICULT
SUM OF ALL RESPONSES TO PHQ QUESTIONS 1-9: 5

## 2024-02-02 NOTE — PROGRESS NOTES
[facility]                                     Progress Note    Patient Name: Teddy Shah  Date: 24         Service Type: Individual      Session Start Time: 11:03am  Session End Time: 11:54am     Session Length: 38-52 min    Session #: 3    Attendees: Client attended alone    Service Modality:  Video session  Yes, the patient's condition can be safely assessed and treated via synchronous audio and visual telemedicine encounter.  (Provider and Patient had to switch to phone due to technical difficulties)    Reason for Video Visit: Services only offered telehealth    Location of Originating Site: Patient's home    Distant Site: Provider Remote Setting home office    Provider verified identity through the following two step process.  Patient provided:  Patient  and Patient address                          Consent:  The patient/guardian has verbally consented to: the potential risks and benefits of telemedicine (video visit) versus in person care; bill my insurance or make self-payment for services provided; and responsibility for payment of non-covered services.                 Mode of transmission-[employer]  Interactive Complexity: No  Crisis: No     Progress Since Last Session (Related to Symptoms / Goals / Homework):   Symptoms: Worsening anxious sxs and improving depressive sxs per KASSIE-7 and PHQ-9 and patient report    Homework: Completed in session      Episode of Care Goals: Satisfactory progress - ACTION (Actively working towards change); Intervened by reinforcing change plan / affirming steps taken     Current / Ongoing Stressors and Concerns:  Work stress, parents moving, low self worth, housing, negative self talk, aversion to change, interpersonal relationships, motivation for change, emotional expression       Treatment Objective(s) Addressed in This Session:   use at least   coping skills for anxiety management in the next 4 weeks  Decrease frequency and intensity  of feeling down, depressed, hopeless  Identify negative self-talk and behaviors: challenge core beliefs, myths, and actions  Patient will attend and participate in social or recreational activities    Develop and utilize self-compassion strategies.   identify the time in your life when you began to feel poorly about themselves.     Intervention:   Motivational Interviewing    MI Intervention: Expressed Empathy/Understanding, Supported Autonomy, Collaboration, Evocation, Reflections: simple and complex, and Reframe     Change Talk Expressed by the Patient: Desire to change Reasons to change Committment to change Activation    Provider Response to Change Talk: E - Evoked more info from patient about behavior change, A - Affirmed patient's thoughts, decisions, or attempts at behavior change, R - Reflected patient's change talk, and S - Summarized patient's change talk statements    Provider held space as patient processed experiences using reflective listening, validation, and normalization of patient's emotional response.  Provider worked to develop greater insight into patient's experiences. Provider validated patient.    Provider assessed patient's heightened mental health sxs.  Provider held space as patient processed significant stressors related to politics and current events and his emotional response to them.  Provider validated and challenged patient.      CBT: Provider utilized CBT strategies to challenge and reframe identified cognitive distortions.  Provider worked to develop greater insight around cognitive distortions.  Provider coached patient on challenging and reframing identified cognitive distortions.     Psychoeducation: Provider reviewed psychoeducation around anxiety and intrusive thoughts.  Provider worked to develop greater insight.    Provider reviewed coping strategies that patient could utilize to manage stress.  Provider utilized solution-focused strategies to identify options patient could  utilize to manage anxiety: distraction, setting boundaries around triggers, reframing and focusing on change, and observing/dismissing intrusive thoughts.  Patient was agreeable to setting boundaries around triggers and intrusive thoughts.      DBT: Provider taught patient the mindfulness strategy of observing thoughts.  Provider coached patient on managing intrusive thoughts using observation.           12/1/2023    10:54 AM 1/5/2024    11:07 AM 2/2/2024    10:17 AM   PHQ   PHQ-9 Total Score 10 9 5   Q9: Thoughts of better off dead/self-harm past 2 weeks Not at all Not at all Not at all         11/10/2023     9:26 AM 1/5/2024    11:08 AM 2/2/2024    10:17 AM   KASSIE-7 SCORE   Total Score 8 (mild anxiety) 11 (moderate anxiety) 13 (moderate anxiety)   Total Score 8 11 13       Assessments completed prior to visit:  The following assessments were completed by patient for this visit:  PROMIS 10-Global Health (only subscores and total score):       8/15/2023     1:47 PM 9/1/2023    10:54 AM 9/1/2023    11:06 AM 9/15/2023    10:53 AM 12/1/2023    11:24 AM 1/5/2024    11:08 AM 2/2/2024    10:18 AM   PROMIS-10 Scores Only   Global Mental Health Score 9 8 9 7 8 7 7   Global Physical Health Score 15 14 14 14 13 14 14   PROMIS TOTAL - SUBSCORES 24 22 23 21 21 21 21     [place] (Short version): see chart.      ASSESSMENT: Current Emotional / Mental Status (status of significant symptoms):   Risk status (Self / Other harm or suicidal ideation)   Patient denies current fears or concerns for personal safety.   Patient denies current or recent suicidal ideation or behaviors.   Patient denies current or recent homicidal ideation or behaviors.   Patient denies current or recent self injurious behavior or ideation.   Patient denies other safety concerns.   Patient reports there has been no change in risk factors since their last session.     Patient reports there has been no change in protective factors since their last session.      Recommended that patient call 911 or go to the local ED should there be a change in any of these risk factors.     Appearance:   Appropriate    Eye Contact:   Fair    Psychomotor Behavior: Normal    Attitude:   Cooperative    Orientation:   All   Speech    Rate / Production: Normal/ Responsive    Volume:  Normal    Mood:    Anxious  Depressed    Affect:    Flat  Subdued    Thought Content:  Clear  Rumination    Thought Form:  Coherent  Tangential    Insight:    Fair      Medication Review:   No changes to current psychiatric medication(s) Provider will continue to assess.      Medication Compliance:   Yes Provider will continue to assess.      Changes in Health Issues:   None reported     Chemical Use Review:   Substance Use: Chemical use reviewed, no active concerns identified      Tobacco Use: No current tobacco use.      Diagnosis:  1. Generalized anxiety disorder    2. Attention deficit hyperactivity disorder (ADHD), predominantly inattentive type    3. Depression, unspecified depression type    R/O Other Specified Trauma Disorder    Collateral Reports Completed:   Not Applicable    PLAN: (Patient Tasks / Therapist Tasks / Other)    Patient will utilize crisis resources as needed.       Patient will utilize the following coping strategies to manage anxiety: Deep breathing, grounding exercises, engaging in civic duty. [place] exercise. Patient will also process and reality-check anxious thoughts after anxiety has decreased.     Patient will incorporate more deep-breathing and grounding exercises throughout the day.  Patient will utilize coping strategies discussed in session and challenge/reframe cognitive distortions (catastrophizing).        Patient will work to challenge and reframe identified cognitive distortions and negative thoughts.    Provider and patient will continue to talk about significant pattern of negative self talk.        Patient will utilize a daily check-list to mitigate ADHD sxs and work  to improve organization in the workplace.    Patient stated that he would write a list with coping strategies that would be easily accessible and work to increase self compassion and use of coping strategies.  Patient will begin to use a gratitude journal.  Provider and patient will continue to discuss possible career change and patient's interest in a romantic relationship at the next session.    Patient will continue to look for places/events where he can socialize with others.      Provider and patient will discuss patients' concerns related to upcoming current events and ongoing difficulties with [employer] from childhood.  Provider and patient will discuss medication at the next session.    Leatha Pearce, [facility]  2/2/24  ______________________________________________________________________    Individual Treatment Plan    Patient's Name: Teddy Shah  YOB: 1990    Date of Creation: 9/7/21  Date Treatment Plan Last Reviewed/Revised: 12/1/23    DSM5 Diagnoses: Attention-Deficit/Hyperactivity Disorder  314.00 (F90.0) Predominantly inattentive presentation, 311 (F32.9) Unspecified Depressive Disorder  or 300.02 (F41.1) Generalized Anxiety Disorder  Psychosocial / Contextual Factors: Hostile home environment, lack of social support, current events/politics a significant stress, family conflict  PROMIS (reviewed every 90 days): 21    Referral / Collaboration:  Referral to another professional/service is not indicated at this time..    Anticipated number of session for this episode of care:   Anticipation frequency of session: Biweekly  Anticipated Duration of each session: 38-52 minutes  Treatment plan will be reviewed in 90 days or when goals have been changed.       MeasurableTreatment Goal(s) related to diagnosis / functional impairment(s)  Goal 1: Patient will successfully develop and utilize coping skills to manage depressive, anxious and ADHD sxs within 90 days.     I will know I've met my goal  "when I feel like I can get some of my executive function back (completing tasks).       Objective #A Patient will process experience around romantic relationship, reflect on how it has impacted his view of self, and how he would like to move forward.      Patient will work to develop a \"social life\" outside of work, and manage mental health sxs as they arise.              Patient will attend and participate in social or recreational activities    Develop and utilize self-compassion strategies.   identify the time in your life when you began to feel poorly about themselves.  Status: Continued - Date(s): 12/1/23    Intervention(s)  Therapist will assign homework around utilizing self compassion strategies  teach psychoeducation around shame, guilt and healthy relationships.     Objective #B Patient will work to develop coping skills to \"turn brain off\" or switch tracks.    Patient will use thought-stopping strategy daily to reduce intrusive thoughts.  Status: Continued - Date(s): 12/1/23      Intervention(s)  Therapist will assign homework around using thought-stopping strategies and other coping skills  teach emotional regulation skills.       Patient has reviewed and agreed to the above plan.      Leatha Pearce, [facility]  March 14, 2022, reviewed by 6/13/22, reviewed 9/14/22, reviewed 1/9/23, reviewed 5/8/23, reviewed 8/15/23, reviewed 12/1/23    tx plan reviewed and clinical supervision by LONDON Patino [facility] 3/29/2022, 6/13/2022, 9/14/2022, 1/10/2023,5/8/2023, 8/16/2023, 12/4/2023    Answers submitted by the patient for this visit:  Patient Health Questionnaire (Submitted on 2/2/2024)  If you checked off any problems, how difficult have these problems made it for you to do your work, take care of things at home, or get along with other people?: Somewhat difficult  PHQ9 TOTAL SCORE: 5  KASSIE-7 (Submitted on 2/2/2024)  KASSIE 7 TOTAL SCORE: 13    "

## 2024-02-22 ENCOUNTER — VIRTUAL VISIT (OUTPATIENT)
Dept: FAMILY MEDICINE | Facility: CLINIC | Age: 34
End: 2024-02-22
Payer: COMMERCIAL

## 2024-02-22 DIAGNOSIS — K21.00 GASTROESOPHAGEAL REFLUX DISEASE WITH ESOPHAGITIS, UNSPECIFIED WHETHER HEMORRHAGE: Primary | ICD-10-CM

## 2024-02-22 DIAGNOSIS — F41.9 ANXIETY: ICD-10-CM

## 2024-02-22 DIAGNOSIS — Z13.6 CARDIOVASCULAR SCREENING; LDL GOAL LESS THAN 160: ICD-10-CM

## 2024-02-22 DIAGNOSIS — L71.9 ROSACEA: ICD-10-CM

## 2024-02-22 DIAGNOSIS — R73.03 PREDIABETES: ICD-10-CM

## 2024-02-22 DIAGNOSIS — R59.0 CERVICAL LYMPHADENOPATHY: ICD-10-CM

## 2024-02-22 DIAGNOSIS — F33.1 MODERATE EPISODE OF RECURRENT MAJOR DEPRESSIVE DISORDER (H): ICD-10-CM

## 2024-02-22 PROCEDURE — 99214 OFFICE O/P EST MOD 30 MIN: CPT | Mod: 95 | Performed by: NURSE PRACTITIONER

## 2024-02-22 RX ORDER — METRONIDAZOLE 7.5 MG/G
GEL TOPICAL 2 TIMES DAILY
Qty: 45 G | Refills: 1 | Status: SHIPPED | OUTPATIENT
Start: 2024-02-22

## 2024-02-22 RX ORDER — SERTRALINE HYDROCHLORIDE 100 MG/1
200 TABLET, FILM COATED ORAL DAILY
Qty: 180 TABLET | Refills: 1 | Status: SHIPPED | OUTPATIENT
Start: 2024-02-22 | End: 2024-08-19

## 2024-02-22 NOTE — PROGRESS NOTES
"    Instructions Relayed to Patient by Virtual Roomer:     Patient is active on [employer]:   Relayed following to patient: \"It looks like you are active on [employer], are you able to join the visit this way? If not, do you need us to send you a link now or would you like your provider to send a link via text or email when they are ready to initiate the visit?\"    Reminded patient to ensure they were logged on to virtual visit by arrival time listed. Documented in appointment notes if patient had flexibility to initiate visit sooner than arrival time. If pediatric virtual visit, ensured pediatric patient along with parent/guardian will be present for video visit.     Patient offered the website [email or web address] and/or phone number to [employer] Help line: [phone]    Teddy is a 33 year old who is being evaluated via a billable video visit.      How would you like to obtain your AVS? [employer]  If the video visit is dropped, the invitation should be resent by: Text to cell phone: [phone]  Will anyone else be joining your video visit? No        GERD- ongoing since childhood, took  Zantac as a child but not taking anything for GERD since [place]. Symptoms are worse with spicy foods, licorice, and basil./ He doesn't drink alcohol or smoke.    IBS- has been having loose stools 3-4 times/day which is problematic at work. He complains of mild bloating and fecal urgency. He has been consuming more fiber in the diet which helps minimally.    Lump in neck- has had since November, had US as below.  Lump is persistent but hasn't gotten any larger . He does have rosacea which causes him to have a rash and dry skin but he is not using anything for it.  He's used Metrogel in the past with good symptom relief. He denies recent URI symptoms, no cough, sore throat, ear pain, facial pain. He has chronic nasal congestion but notes his symptoms are unchanged, no fever, chills, night seats, weight loss, " fatigue.    Exam Date Exam Time Accession # Performing Department Results    11/3/23  9:37 AM [id number] [facility]      PACS Images     Show images for US Head Neck Soft Tissue     Study Result    Narrative & Impression   ULTRASOUND HEAD AND NECK SOFT TISSUE  11/3/2023 9:37 AM     HISTORY: Lump in neck     COMPARISON: None.     FINDINGS: Mildly prominent bilateral cervical lymph nodes are seen. A  1.5 x 0.3 x 1.1 cm lymph node with fatty hilum in left level 5  corresponds to the patient's palpable abnormality. Additional  prominent lymph nodes are seen on the right at level 3 measuring 1.3 x  0.5 x 1.0 cm with retention of normal fatty hilum. A left level 6  lymph node measures 2.8 x 0.7 x 1.3 cm and demonstrates cortical  thickening with retention of normal fatty hilum.                                                                      IMPRESSION:    Mildly prominent bilateral cervical lymph nodes,  nonspecific but favored reactive. If the patient's palpable  abnormality persists or clinically enlarges, a follow-up exam is  recommended.     PETER TOSCANO MD           Assessment & Plan     Gastroesophageal reflux disease with esophagitis, unspecified whether hemorrhage  Checking labs/h Pylori, OK to start using Prilosec AFTER he has  collected the stool for H pylori, reviewed triggers/avoidance, benefits of weight loss, will treat H pylori if positive.  - REVIEW OF HEALTH MAINTENANCE PROTOCOL ORDERS  - Helicobacter pylori Antigen Stool  - omeprazole (PRILOSEC) 20 MG DR capsule  Dispense: 90 capsule; Refill: 0  - CBC with platelets    Rosacea  Restarting Metronidazole gel- start usign daily, even every other day if facial sensitivity from it and gradually increase to BID dosing, REviewed potential side effects, follow back 2 months.  - metroNIDAZOLE (METROGEL) 0.75 % external gel  Dispense: 45 g; Refill: 1    Cervical lymphadenopathy  Reviewed US results. As he has  currently untreated rosacea, will restart Metrogel, reviewed skin care in detail, follow back 2 months.  If adenopathy is still present, will get repeat US and refer to Gen Surgery.    Prediabetes  Due for labs.  He is eating a salad daily now but hasn't lost any weight. 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  - Hemoglobin A1c  - Comprehensive metabolic panel (BMP + Alb, Alk Phos, ALT, AST, Total. Bili, TP)    Moderate episode of recurrent major depressive disorder (H)  Stable on Zoloft, continue current management  - sertraline (ZOLOFT) 100 MG tablet  Dispense: 180 tablet; Refill: 1    Anxiety  Stable on Zoloft, has some financial stressors but is managing OK. He is not interested in counseling at this time.  - sertraline (ZOLOFT) 100 MG tablet  Dispense: 180 tablet; Refill: 1    CARDIOVASCULAR SCREENING; LDL GOAL LESS THAN 160    - Lipid panel reflex to direct LDL Fasting      Ordering of each unique test  Prescription drug management        BMI  Estimated body mass index is 39.01 kg/m  as calculated from the following:    Height as of 10/27/23: 1.829 m (6').    Weight as of 10/27/23: 130.5 kg (287 lb 9.6 oz).   Weight management plan: Discussed healthy diet and exercise guidelines      Work on weight loss  Regular exercise  See Patient Instructions    Subjective   Teddy is a 33 year old, presenting for the following health issues:      Attempted to room pt but they declined.  Was unable to get any further information.  No chief complaint on file.    History of Present Illness       Reason for visit:  Adderall followup    Teddy Shah eats 2-3 servings of fruits and vegetables daily.Teddy Shah consumes 0 sweetened beverage(s) daily.Teddy Shah exercises with enough effort to increase Teddy Shah's heart rate 30 to 60 minutes per day.  Teddy Shah exercises with enough effort to increase Teddy Shah's heart  rate 5 days per week.   Teddy Shah is taking medications regularly.       Depression and Anxiety Follow-Up  How are you doing with your depression since your last visit? No change  How are you doing with your anxiety since your last visit?  No change  Are you having other symptoms that might be associated with depression or anxiety? Yes:  financial concerns- looking for a different job in the same field that hopefully pays better.  Have you had a significant life event? No   Do you have any concerns with your use of alcohol or other drugs? No    Social History     Tobacco Use    Smoking status: Never    Smokeless tobacco: Never   Vaping Use    Vaping Use: Never used   Substance Use Topics    Alcohol use: No    Drug use: No         12/1/2023    10:54 AM 1/5/2024    11:07 AM 2/2/2024    10:17 AM   PHQ   PHQ-9 Total Score 10 9 5   Q9: Thoughts of better off dead/self-harm past 2 weeks Not at all Not at all Not at all         11/10/2023     9:26 AM 1/5/2024    11:08 AM 2/2/2024    10:17 AM   KASSIE-7 SCORE   Total Score 8 (mild anxiety) 11 (moderate anxiety) 13 (moderate anxiety)   Total Score 8 11 13         Suicide Assessment Five-step Evaluation and Treatment (SAFE-T)    How many servings of fruits and vegetables do you eat daily?  2-3  On average, how many sweetened beverages do you drink each day (Examples: soda, juice, sweet tea, etc.  Do NOT count diet or artificially sweetened beverages)?   0  How many days per week do you exercise enough to make your heart beat faster? 4  How many minutes a day do you exercise enough to make your heart beat faster? 20 - 29  How many days per week do you miss taking your medication? 0          Objective           Vitals:  No vitals were obtained today due to virtual visit.    Physical Exam   GENERAL: alert and no distress  EYES: Eyes grossly normal to inspection.  No discharge or erythema, or obvious scleral/conjunctival abnormalities.  HENT: Normal cephalic/atraumatic.   External ears, nose and mouth without ulcers or lesions.  No nasal drainage visible.  NECK: No asymmetry, visible masses or scars  RESP: No audible wheeze, cough, or visible cyanosis.    SKIN: Visible skin clear. No significant rash, abnormal pigmentation or lesions.  NEURO: Cranial nerves grossly intact.  Mentation and speech appropriate for age.  PSYCH: Appropriate affect, tone, and pace of words    No results found for this or any previous visit (from the past 24 hour(s)).      Video-Visit Details    Type of service:  Video Visit     Originating Location (pt. Location): Home    Distant Location (provider location):  Off-site  Platform used for Video Visit: Bernabe  Signed Electronically by: AB Hinds CNP

## 2024-02-23 ENCOUNTER — LAB (OUTPATIENT)
Dept: LAB | Facility: CLINIC | Age: 34
End: 2024-02-23
Payer: COMMERCIAL

## 2024-02-23 DIAGNOSIS — K21.00 GASTROESOPHAGEAL REFLUX DISEASE WITH ESOPHAGITIS, UNSPECIFIED WHETHER HEMORRHAGE: ICD-10-CM

## 2024-02-23 DIAGNOSIS — Z13.6 CARDIOVASCULAR SCREENING; LDL GOAL LESS THAN 160: ICD-10-CM

## 2024-02-23 DIAGNOSIS — R73.03 PREDIABETES: ICD-10-CM

## 2024-02-23 LAB
ALBUMIN SERPL BCG-MCNC: 4.7 G/DL (ref 3.5–5.2)
ALP SERPL-CCNC: 105 U/L (ref 40–150)
ALT SERPL W P-5'-P-CCNC: 67 U/L (ref 0–70)
ANION GAP SERPL CALCULATED.3IONS-SCNC: 11 MMOL/L (ref 7–15)
AST SERPL W P-5'-P-CCNC: 38 U/L (ref 0–45)
BILIRUB SERPL-MCNC: 0.4 MG/DL
BUN SERPL-MCNC: 20.7 MG/DL (ref 6–20)
CALCIUM SERPL-MCNC: 9.3 MG/DL (ref 8.6–10)
CHLORIDE SERPL-SCNC: 102 MMOL/L (ref 98–107)
CHOLEST SERPL-MCNC: 183 MG/DL
CREAT SERPL-MCNC: 0.87 MG/DL (ref 0.51–1.17)
DEPRECATED HCO3 PLAS-SCNC: 25 MMOL/L (ref 22–29)
EGFRCR SERPLBLD CKD-EPI 2021: >90 ML/MIN/1.73M2
ERYTHROCYTE [DISTWIDTH] IN BLOOD BY AUTOMATED COUNT: 13.4 % (ref 10–15)
FASTING STATUS PATIENT QL REPORTED: YES
GLUCOSE SERPL-MCNC: 122 MG/DL (ref 70–99)
HBA1C MFR BLD: 6.1 % (ref 0–5.6)
HCT VFR BLD AUTO: 48 % (ref 35–53)
HDLC SERPL-MCNC: 34 MG/DL
HGB BLD-MCNC: 15.6 G/DL (ref 11.7–17.7)
LDLC SERPL CALC-MCNC: 123 MG/DL
MCH RBC QN AUTO: 27.2 PG (ref 26.5–33)
MCHC RBC AUTO-ENTMCNC: 32.5 G/DL (ref 31.5–36.5)
MCV RBC AUTO: 84 FL (ref 78–100)
NONHDLC SERPL-MCNC: 149 MG/DL
PLATELET # BLD AUTO: 214 10E3/UL (ref 150–450)
POTASSIUM SERPL-SCNC: 4.9 MMOL/L (ref 3.4–5.3)
PROT SERPL-MCNC: 7.4 G/DL (ref 6.4–8.3)
RBC # BLD AUTO: 5.74 10E6/UL (ref 3.8–5.9)
SODIUM SERPL-SCNC: 138 MMOL/L (ref 135–145)
TRIGL SERPL-MCNC: 129 MG/DL
WBC # BLD AUTO: 6.1 10E3/UL (ref 4–11)

## 2024-02-23 PROCEDURE — 83036 HEMOGLOBIN GLYCOSYLATED A1C: CPT

## 2024-02-23 PROCEDURE — 85027 COMPLETE CBC AUTOMATED: CPT

## 2024-02-23 PROCEDURE — 80053 COMPREHEN METABOLIC PANEL: CPT

## 2024-02-23 PROCEDURE — 80061 LIPID PANEL: CPT

## 2024-02-23 PROCEDURE — 36415 COLL VENOUS BLD VENIPUNCTURE: CPT

## 2024-02-26 PROCEDURE — 87338 HPYLORI STOOL AG IA: CPT

## 2024-02-28 LAB — H PYLORI AG STL QL IA: NEGATIVE

## 2024-03-05 ENCOUNTER — MYC REFILL (OUTPATIENT)
Dept: FAMILY MEDICINE | Facility: CLINIC | Age: 34
End: 2024-03-05
Payer: COMMERCIAL

## 2024-03-05 DIAGNOSIS — F90.0 ADHD (ATTENTION DEFICIT HYPERACTIVITY DISORDER), INATTENTIVE TYPE: ICD-10-CM

## 2024-03-06 RX ORDER — DEXTROAMPHETAMINE SACCHARATE, AMPHETAMINE ASPARTATE MONOHYDRATE, DEXTROAMPHETAMINE SULFATE AND AMPHETAMINE SULFATE 5; 5; 5; 5 MG/1; MG/1; MG/1; MG/1
40 CAPSULE, EXTENDED RELEASE ORAL DAILY
Qty: 60 CAPSULE | Refills: 0 | Status: SHIPPED | OUTPATIENT
Start: 2024-03-06 | End: 2024-04-14

## 2024-03-08 ENCOUNTER — MYC MEDICAL ADVICE (OUTPATIENT)
Dept: FAMILY MEDICINE | Facility: CLINIC | Age: 34
End: 2024-03-08
Payer: COMMERCIAL

## 2024-03-08 ENCOUNTER — VIRTUAL VISIT (OUTPATIENT)
Dept: PSYCHOLOGY | Facility: CLINIC | Age: 34
End: 2024-03-08
Payer: COMMERCIAL

## 2024-03-08 DIAGNOSIS — F90.0 ATTENTION DEFICIT HYPERACTIVITY DISORDER (ADHD), PREDOMINANTLY INATTENTIVE TYPE: ICD-10-CM

## 2024-03-08 DIAGNOSIS — F41.1 GENERALIZED ANXIETY DISORDER: Primary | ICD-10-CM

## 2024-03-08 DIAGNOSIS — F32.A DEPRESSION, UNSPECIFIED DEPRESSION TYPE: ICD-10-CM

## 2024-03-08 PROCEDURE — 90834 PSYTX W PT 45 MINUTES: CPT | Mod: 95

## 2024-03-08 ASSESSMENT — ANXIETY QUESTIONNAIRES
IF YOU CHECKED OFF ANY PROBLEMS ON THIS QUESTIONNAIRE, HOW DIFFICULT HAVE THESE PROBLEMS MADE IT FOR YOU TO DO YOUR WORK, TAKE CARE OF THINGS AT HOME, OR GET ALONG WITH OTHER PEOPLE: VERY DIFFICULT
GAD7 TOTAL SCORE: 14
7. FEELING AFRAID AS IF SOMETHING AWFUL MIGHT HAPPEN: NEARLY EVERY DAY
GAD7 TOTAL SCORE: 14
7. FEELING AFRAID AS IF SOMETHING AWFUL MIGHT HAPPEN: NEARLY EVERY DAY
4. TROUBLE RELAXING: SEVERAL DAYS
5. BEING SO RESTLESS THAT IT IS HARD TO SIT STILL: NOT AT ALL
8. IF YOU CHECKED OFF ANY PROBLEMS, HOW DIFFICULT HAVE THESE MADE IT FOR YOU TO DO YOUR WORK, TAKE CARE OF THINGS AT HOME, OR GET ALONG WITH OTHER PEOPLE?: VERY DIFFICULT
GAD7 TOTAL SCORE: 14
6. BECOMING EASILY ANNOYED OR IRRITABLE: SEVERAL DAYS
1. FEELING NERVOUS, ANXIOUS, OR ON EDGE: NEARLY EVERY DAY
3. WORRYING TOO MUCH ABOUT DIFFERENT THINGS: NEARLY EVERY DAY
2. NOT BEING ABLE TO STOP OR CONTROL WORRYING: NEARLY EVERY DAY

## 2024-03-08 ASSESSMENT — PATIENT HEALTH QUESTIONNAIRE - PHQ9
10. IF YOU CHECKED OFF ANY PROBLEMS, HOW DIFFICULT HAVE THESE PROBLEMS MADE IT FOR YOU TO DO YOUR WORK, TAKE CARE OF THINGS AT HOME, OR GET ALONG WITH OTHER PEOPLE: VERY DIFFICULT
SUM OF ALL RESPONSES TO PHQ QUESTIONS 1-9: 15
SUM OF ALL RESPONSES TO PHQ QUESTIONS 1-9: 15

## 2024-03-08 NOTE — PROGRESS NOTES
[facility]                                     Progress Note    Patient Name: Teddy Shah  Date: 3/8/24         Service Type: Individual      Session Start Time: 12:39pm  Session End Time: 1:24pm     Session Length: 38-52 min    Session #: 4    Attendees: Client attended alone    Service Modality:  Video session  Yes, the patient's condition can be safely assessed and treated via synchronous audio and visual telemedicine encounter.  (Provider and Patient had to switch to phone due to technical difficulties)    Reason for Video Visit: Services only offered telehealth    Location of Originating Site: Patient's home    Distant Site: Provider Remote Setting home office    Provider verified identity through the following two step process.  Patient provided:  Patient  and Patient address                          Consent:  The patient/guardian has verbally consented to: the potential risks and benefits of telemedicine (video visit) versus in person care; bill my insurance or make self-payment for services provided; and responsibility for payment of non-covered services.                 Mode of transmission-[employer]  Interactive Complexity: No  Crisis: No     Progress Since Last Session (Related to Symptoms / Goals / Homework):   Symptoms: Worsening anxious sxs and improving depressive sxs per KASSIE-7 and PHQ-9 and patient report    Homework: Completed in session      Episode of Care Goals: Satisfactory progress - ACTION (Actively working towards change); Intervened by reinforcing change plan / affirming steps taken     Current / Ongoing Stressors and Concerns:  Work stress, parents moving, low self worth, housing, negative self talk, aversion to change, interpersonal relationships, motivation for change, emotional expression, current events/politics, rumination       Treatment Objective(s) Addressed in This Session:   use at least   coping skills for anxiety management in the next 4  weeks  Decrease frequency and intensity of feeling down, depressed, hopeless  Identify negative self-talk and behaviors: challenge core beliefs, myths, and actions  Patient will attend and participate in social or recreational activities    Develop and utilize self-compassion strategies.   identify the time in your life when you began to feel poorly about themselves.     Intervention:   Motivational Interviewing    MI Intervention: Expressed Empathy/Understanding, Supported Autonomy, Collaboration, Evocation, Reflections: simple and complex, and Reframe     Change Talk Expressed by the Patient: Desire to change Reasons to change Committment to change Activation    Provider Response to Change Talk: E - Evoked more info from patient about behavior change, A - Affirmed patient's thoughts, decisions, or attempts at behavior change, R - Reflected patient's change talk, and S - Summarized patient's change talk statements    Provider held space as patient processed experiences and stressors using reflective listening, validation, and normalization of patient's emotional response.  Provider worked to develop greater insight around patient's heightened depressive and anxious sxs and contributing factors/stressors.    Provider held space as patient processed significant stressors related to politics and current events and his emotional response to them.  Provider worked to develop greater insight around patient's emotional response, focusing on providing supportive counseling.  Provider validated and challenged patient.    CBT: Provider utilized CBT strategies to challenge and reframe identified cognitive distortions.  Provider worked to increase self compassion.    Provider reviewed coping strategies that patient could utilize to manage stress, including the following: distraction, setting boundaries around triggers, reframing and focusing on change, and observing/dismissing intrusive thoughts.  Provider coached patient on  using these strategies, and stressed the importance of practicing strategies consistently.  Provider worked to increase motivation around using coping strategies.     Provider and patient explored patient's progress around pursuing new employment.  Provider worked to develop greater insight, highlighting patient's success and providing validation.          1/5/2024    11:07 AM 2/2/2024    10:17 AM 3/8/2024    11:42 AM   PHQ   PHQ-9 Total Score 9 5 15   Q9: Thoughts of better off dead/self-harm past 2 weeks Not at all Not at all Not at all         1/5/2024    11:08 AM 2/2/2024    10:17 AM 3/8/2024    11:43 AM   KASSIE-7 SCORE   Total Score 11 (moderate anxiety) 13 (moderate anxiety) 14 (moderate anxiety)   Total Score 11 13 14       Assessments completed prior to visit:  The following assessments were completed by patient for this visit:  PROMIS 10-Global Health (only subscores and total score):       8/15/2023     1:47 PM 9/1/2023    10:54 AM 9/1/2023    11:06 AM 9/15/2023    10:53 AM 12/1/2023    11:24 AM 1/5/2024    11:08 AM 2/2/2024    10:18 AM   PROMIS-10 Scores Only   Global Mental Health Score 9 8 9 7 8 7 7   Global Physical Health Score 15 14 14 14 13 14 14   PROMIS TOTAL - SUBSCORES 24 22 23 21 21 21 21     [place] (Short version): see chart.      ASSESSMENT: Current Emotional / Mental Status (status of significant symptoms):   Risk status (Self / Other harm or suicidal ideation)   Patient denies current fears or concerns for personal safety.   Patient denies current or recent suicidal ideation or behaviors.   Patient denies current or recent homicidal ideation or behaviors.   Patient denies current or recent self injurious behavior or ideation.   Patient denies other safety concerns.   Patient reports there has been no change in risk factors since their last session.     Patient reports there has been no change in protective factors since their last session.     Recommended that patient call 911 or go to the  local ED should there be a change in any of these risk factors.     Appearance:   Appropriate    Eye Contact:   Fair    Psychomotor Behavior: Normal    Attitude:   Cooperative    Orientation:   All   Speech    Rate / Production: Normal/ Responsive    Volume:  Normal    Mood:    Anxious  Depressed    Affect:    Flat  Subdued    Thought Content:  Clear  Rumination    Thought Form:  Coherent  Tangential    Insight:    Fair      Medication Review:   No changes to current psychiatric medication(s) Provider will continue to assess.      Medication Compliance:   Yes Provider will continue to assess.      Changes in Health Issues:   None reported     Chemical Use Review:   Substance Use: Chemical use reviewed, no active concerns identified      Tobacco Use: No current tobacco use.      Diagnosis:  1. Generalized anxiety disorder    2. Attention deficit hyperactivity disorder (ADHD), predominantly inattentive type    3. Depression, unspecified depression type      R/O Other Specified Trauma Disorder    Collateral Reports Completed:   Not Applicable    PLAN: (Patient Tasks / Therapist Tasks / Other)    Patient will utilize crisis resources as needed.       Patient will utilize the following coping strategies to manage anxiety: Deep breathing, grounding exercises, engaging in civic duty. [place]ia exercise. Patient will also process and reality-check anxious thoughts after anxiety has decreased.     Patient will incorporate more deep-breathing and grounding exercises throughout the day.  Patient will utilize coping strategies discussed in session and challenge/reframe cognitive distortions (catastrophizing).        Patient will work to challenge and reframe identified cognitive distortions and negative thoughts.    Provider and patient will continue to talk about significant pattern of negative self talk.        Patient will utilize a daily check-list to mitigate ADHD sxs and work to improve organization in the  workplace.    Patient stated that he would write a list with coping strategies that would be easily accessible and work to increase self compassion and use of coping strategies.  Patient will begin to use a gratitude journal.  Provider and patient will continue to discuss possible career change and patient's interest in a romantic relationship at the next session.    Patient will continue to look for places/events where he can socialize with others.      Provider and patient will discuss patients' concerns related to upcoming current events and ongoing difficulties with Yarsanism from childhood.  Provider and patient will discuss medication at the next session.  Provider will review treatment plan with patient.     Leatha Pearce, [facility]  3/8/24  ______________________________________________________________________    Individual Treatment Plan    Patient's Name: Teddy Shah  YOB: 1990    Date of Creation: 9/7/21  Date Treatment Plan Last Reviewed/Revised: 3/8/24    DSM5 Diagnoses: Attention-Deficit/Hyperactivity Disorder  314.00 (F90.0) Predominantly inattentive presentation, 311 (F32.9) Unspecified Depressive Disorder  or 300.02 (F41.1) Generalized Anxiety Disorder  Psychosocial / Contextual Factors: Hostile home environment, lack of social support, current events/politics a significant stress, family conflict  PROMIS (reviewed every 90 days): 21    Referral / Collaboration:  Referral to another professional/service is not indicated at this time..    Anticipated number of session for this episode of care:   Anticipation frequency of session: Biweekly  Anticipated Duration of each session: 38-52 minutes  Treatment plan will be reviewed in 90 days or when goals have been changed.       MeasurableTreatment Goal(s) related to diagnosis / functional impairment(s)  Goal 1: Patient will successfully develop and utilize coping skills to manage depressive, anxious and ADHD sxs within 90 days.     I will know  "I've met my goal when I feel like I can get some of my executive function back (completing tasks).       Objective #A Patient will process experience around romantic relationship, reflect on how it has impacted his view of self, and how he would like to move forward.      Patient will work to develop a \"social life\" outside of work, and manage mental health sxs as they arise.              Patient will attend and participate in social or recreational activities    Develop and utilize self-compassion strategies.   identify the time in your life when you began to feel poorly about themselves.  Status: Continued - Date(s): 3/8/24    Intervention(s)  Therapist will assign homework around utilizing self compassion strategies  teach psychoeducation around shame, guilt and healthy relationships.     Objective #B Patient will work to develop coping skills to \"turn brain off\" or switch tracks.    Patient will use thought-stopping strategy daily to reduce intrusive thoughts.  Status: Continued - Date(s): 3/8/24      Intervention(s)  Therapist will assign homework around using thought-stopping strategies and other coping skills  teach emotional regulation skills.       Patient has reviewed and agreed to the above plan.      Leatha Pearce, [facility]  March 14, 2022, reviewed by 6/13/22, reviewed 9/14/22, reviewed 1/9/23, reviewed 5/8/23, reviewed 8/15/23, reviewed 12/1/23, reviewed 3/8/24    tx plan reviewed and clinical supervision by LONDON Patino [facility] 3/29/2022, 6/13/2022, 9/14/2022, 1/10/2023,5/8/2023, 8/16/2023, 12/4/2023    Answers submitted by the patient for this visit:  Patient Health Questionnaire (Submitted on 3/8/2024)  If you checked off any problems, how difficult have these problems made it for you to do your work, take care of things at home, or get along with other people?: Very difficult  PHQ9 TOTAL SCORE: 15  KASSIE-7 (Submitted on 3/8/2024)  KASSIE 7 TOTAL SCORE: 14    "

## 2024-03-13 ENCOUNTER — TELEPHONE (OUTPATIENT)
Dept: FAMILY MEDICINE | Facility: CLINIC | Age: 34
End: 2024-03-13
Payer: COMMERCIAL

## 2024-03-13 NOTE — TELEPHONE ENCOUNTER
amphetamine-dextroamphetamine (ADDERALL XR) 20 MG 24 hr capsule is on back order from . We are unable to get most strengths of the generic Addereall XR capsules. May need to lli outside of [facility] to see if any other pharmacy is able to get in stock.    .a

## 2024-03-14 NOTE — TELEPHONE ENCOUNTER
Please call patient and notify him of message below. Please have patient call other pharmacies in his area to see where they have this in stock & let us know which pharmacy to resend prescription to.     Kyara [place], DONALDN, RN   [facility]

## 2024-04-05 ENCOUNTER — VIRTUAL VISIT (OUTPATIENT)
Dept: PSYCHOLOGY | Facility: CLINIC | Age: 34
End: 2024-04-05
Payer: COMMERCIAL

## 2024-04-05 DIAGNOSIS — F41.1 GENERALIZED ANXIETY DISORDER: Primary | ICD-10-CM

## 2024-04-05 DIAGNOSIS — F90.0 ATTENTION DEFICIT HYPERACTIVITY DISORDER (ADHD), PREDOMINANTLY INATTENTIVE TYPE: ICD-10-CM

## 2024-04-05 DIAGNOSIS — F32.A DEPRESSION, UNSPECIFIED DEPRESSION TYPE: ICD-10-CM

## 2024-04-05 PROCEDURE — 90837 PSYTX W PT 60 MINUTES: CPT | Mod: 95

## 2024-04-05 ASSESSMENT — PATIENT HEALTH QUESTIONNAIRE - PHQ9
SUM OF ALL RESPONSES TO PHQ QUESTIONS 1-9: 6
SUM OF ALL RESPONSES TO PHQ QUESTIONS 1-9: 6
10. IF YOU CHECKED OFF ANY PROBLEMS, HOW DIFFICULT HAVE THESE PROBLEMS MADE IT FOR YOU TO DO YOUR WORK, TAKE CARE OF THINGS AT HOME, OR GET ALONG WITH OTHER PEOPLE: SOMEWHAT DIFFICULT

## 2024-04-05 ASSESSMENT — ANXIETY QUESTIONNAIRES
GAD7 TOTAL SCORE: 9
7. FEELING AFRAID AS IF SOMETHING AWFUL MIGHT HAPPEN: NEARLY EVERY DAY
GAD7 TOTAL SCORE: 9
7. FEELING AFRAID AS IF SOMETHING AWFUL MIGHT HAPPEN: NEARLY EVERY DAY
6. BECOMING EASILY ANNOYED OR IRRITABLE: SEVERAL DAYS
IF YOU CHECKED OFF ANY PROBLEMS ON THIS QUESTIONNAIRE, HOW DIFFICULT HAVE THESE PROBLEMS MADE IT FOR YOU TO DO YOUR WORK, TAKE CARE OF THINGS AT HOME, OR GET ALONG WITH OTHER PEOPLE: SOMEWHAT DIFFICULT
GAD7 TOTAL SCORE: 9
2. NOT BEING ABLE TO STOP OR CONTROL WORRYING: MORE THAN HALF THE DAYS
1. FEELING NERVOUS, ANXIOUS, OR ON EDGE: MORE THAN HALF THE DAYS
4. TROUBLE RELAXING: NOT AT ALL
8. IF YOU CHECKED OFF ANY PROBLEMS, HOW DIFFICULT HAVE THESE MADE IT FOR YOU TO DO YOUR WORK, TAKE CARE OF THINGS AT HOME, OR GET ALONG WITH OTHER PEOPLE?: SOMEWHAT DIFFICULT
5. BEING SO RESTLESS THAT IT IS HARD TO SIT STILL: NOT AT ALL
3. WORRYING TOO MUCH ABOUT DIFFERENT THINGS: SEVERAL DAYS
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
## 2024-04-14 ENCOUNTER — MYC REFILL (OUTPATIENT)
Dept: FAMILY MEDICINE | Facility: CLINIC | Age: 34
End: 2024-04-14
Payer: COMMERCIAL

## 2024-04-14 DIAGNOSIS — F90.0 ADHD (ATTENTION DEFICIT HYPERACTIVITY DISORDER), INATTENTIVE TYPE: ICD-10-CM

## 2024-04-15 RX ORDER — DEXTROAMPHETAMINE SACCHARATE, AMPHETAMINE ASPARTATE MONOHYDRATE, DEXTROAMPHETAMINE SULFATE AND AMPHETAMINE SULFATE 5; 5; 5; 5 MG/1; MG/1; MG/1; MG/1
40 CAPSULE, EXTENDED RELEASE ORAL DAILY
Qty: 60 CAPSULE | Refills: 0 | Status: SHIPPED | OUTPATIENT
Start: 2024-04-15 | End: 2024-06-11

## 2024-05-03 ENCOUNTER — VIRTUAL VISIT (OUTPATIENT)
Dept: PSYCHOLOGY | Facility: CLINIC | Age: 34
End: 2024-05-03
Payer: COMMERCIAL

## 2024-05-03 DIAGNOSIS — F90.0 ATTENTION DEFICIT HYPERACTIVITY DISORDER (ADHD), PREDOMINANTLY INATTENTIVE TYPE: ICD-10-CM

## 2024-05-03 DIAGNOSIS — F41.1 GENERALIZED ANXIETY DISORDER: Primary | ICD-10-CM

## 2024-05-03 DIAGNOSIS — F32.A DEPRESSION, UNSPECIFIED DEPRESSION TYPE: ICD-10-CM

## 2024-05-03 PROCEDURE — 90834 PSYTX W PT 45 MINUTES: CPT | Mod: 95
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
## 2024-05-09 ENCOUNTER — VIRTUAL VISIT (OUTPATIENT)
Dept: PSYCHOLOGY | Facility: CLINIC | Age: 34
End: 2024-05-09
Payer: COMMERCIAL

## 2024-05-09 DIAGNOSIS — F90.0 ATTENTION DEFICIT HYPERACTIVITY DISORDER (ADHD), PREDOMINANTLY INATTENTIVE TYPE: ICD-10-CM

## 2024-05-09 DIAGNOSIS — F41.1 GENERALIZED ANXIETY DISORDER: Primary | ICD-10-CM

## 2024-05-09 DIAGNOSIS — F32.A DEPRESSION, UNSPECIFIED DEPRESSION TYPE: ICD-10-CM

## 2024-05-09 PROCEDURE — 90834 PSYTX W PT 45 MINUTES: CPT | Mod: 95

## 2024-05-09 ASSESSMENT — PATIENT HEALTH QUESTIONNAIRE - PHQ9
SUM OF ALL RESPONSES TO PHQ QUESTIONS 1-9: 4
10. IF YOU CHECKED OFF ANY PROBLEMS, HOW DIFFICULT HAVE THESE PROBLEMS MADE IT FOR YOU TO DO YOUR WORK, TAKE CARE OF THINGS AT HOME, OR GET ALONG WITH OTHER PEOPLE: SOMEWHAT DIFFICULT
SUM OF ALL RESPONSES TO PHQ QUESTIONS 1-9: 4

## 2024-05-09 NOTE — PROGRESS NOTES
[facility]                                     Progress Note    Patient Name: Teddy Shah  Date: 24         Service Type: Individual      Session Start Time: 11:04am  Session End Time: 11:54am     Session Length: 38-52min    Session #: 7    Attendees: Client attended alone    Service Modality:  Video session  Yes, the patient's condition can be safely assessed and treated via synchronous audio and visual telemedicine encounter.  (Provider and Patient had to switch to phone due to technical difficulties)    Reason for Video Visit: Services only offered telehealth    Location of Originating Site: Patient's home/patient's car    Distant Site: Provider Remote Setting home office    Provider verified identity through the following two step process.  Patient provided:  Patient  and Patient address                            Consent:  The patient/guardian has verbally consented to: the potential risks and benefits of telemedicine (video visit) versus in person care; bill my insurance or make self-payment for services provided; and responsibility for payment of non-covered services.                 Mode of transmission-Stublisher  Interactive Complexity: No  Crisis: No     Progress Since Last Session (Related to Symptoms / Goals / Homework):   Symptoms: Improving anxious and depressive sxs per patient report and PHQ-9    Homework: Completed in session      Episode of Care Goals: Satisfactory progress - ACTION (Actively working towards change); Intervened by reinforcing change plan / affirming steps taken     Current / Ongoing Stressors and Concerns:  Work stress, parents moving, low self worth, housing, negative self talk, aversion to change, interpersonal relationships, motivation for change, emotional expression, current events/politics, rumination, desire for relationship/physical connection, sexuality, guilt/shame     Treatment Objective(s) Addressed in This Session:   use at least    coping skills for anxiety management in the next 4 weeks  Decrease frequency and intensity of feeling down, depressed, hopeless  Identify negative self-talk and behaviors: challenge core beliefs, myths, and actions  Patient will attend and participate in social or recreational activities    Develop and utilize self-compassion strategies.   identify the time in your life when you began to feel poorly about themselves.     Intervention:   Motivational Interviewing    MI Intervention: Expressed Empathy/Understanding, Supported Autonomy, Collaboration, Evocation, Reflections: simple and complex, and Reframe     Change Talk Expressed by the Patient: Desire to change Reasons to change Committment to change Activation    Provider Response to Change Talk: E - Evoked more info from patient about behavior change, A - Affirmed patient's thoughts, decisions, or attempts at behavior change, R - Reflected patient's change talk, and S - Summarized patient's change talk statements    Provider held space as patient processed experiences and stressors using reflective listening, validation, and normalization of patient's emotional response.      Provider and patient reviewed previous work in session around pursuing a romantic relationship.  Provider and patient continued to explore patient's interest in experiencing a romantic relationship.     Psychodynamic: Worked to develop greater insight around how patient's experiences in perceiving relationships have impacted his view of self and the role that he would want to play in a relationship.  Worked to develop greater insight around patient's underlying beliefs and values in relationships.  Provider validated and challenged patient.    Healthy Relationship:  Safety (physical and emotional)  Commitment    CBT: Used CBT strategies to challenge and reframe patient's identified cognitive distortions.  Provider worked to highlight how patient's expectations relationships and  negative/critical thinking may be impacting his ability to pursue a relationship.   Worked to increase self compassion.      Assessments:      3/8/2024    11:42 AM 4/5/2024     8:39 AM 5/9/2024    10:40 AM   PHQ   PHQ-9 Total Score 15 6 4   Q9: Thoughts of better off dead/self-harm past 2 weeks Not at all Not at all Not at all         2/2/2024    10:17 AM 3/8/2024    11:43 AM 4/5/2024     8:40 AM   KASSIE-7 SCORE   Total Score 13 (moderate anxiety) 14 (moderate anxiety) 9 (mild anxiety)   Total Score 13 14 9       Assessments completed prior to visit:  The following assessments were completed by patient for this visit:  PROMIS 10-Global Health (only subscores and total score):       9/1/2023    10:54 AM 9/1/2023    11:06 AM 9/15/2023    10:53 AM 12/1/2023    11:24 AM 1/5/2024    11:08 AM 2/2/2024    10:18 AM 5/2/2024    10:43 PM   PROMIS-10 Scores Only   Global Mental Health Score 8 9 7 8 7 7 8    8   Global Physical Health Score 14 14 14 13 14 14 15    15   PROMIS TOTAL - SUBSCORES 22 23 21 21 21 21 23    23     Colver (Short version): see chart.      ASSESSMENT: Current Emotional / Mental Status (status of significant symptoms):   Risk status (Self / Other harm or suicidal ideation)   Patient denies current fears or concerns for personal safety.   Patient denies current or recent suicidal ideation or behaviors.   Patient denies current or recent homicidal ideation or behaviors.   Patient denies current or recent self injurious behavior or ideation.   Patient denies other safety concerns.   Patient reports there has been no change in risk factors since their last session.     Patient reports there has been no change in protective factors since their last session.     Recommended that patient call 911 or go to the local ED should there be a change in any of these risk factors.     Appearance:   Appropriate    Eye Contact:   Fair    Psychomotor Behavior: Normal    Attitude:   Cooperative     Orientation:   All   Speech    Rate / Production: Normal/ Responsive    Volume:  Normal    Mood:    Anxious  Depressed    Affect:    Flat  Subdued    Thought Content:  Clear  Rumination    Thought Form:  Coherent  Tangential    Insight:    Fair      Medication Review:   No changes to current psychiatric medication(s) Provider will continue to assess.      Medication Compliance:   Yes Provider will continue to assess.      Changes in Health Issues:   None reported     Chemical Use Review:   Substance Use: Chemical use reviewed, no active concerns identified      Tobacco Use: No current tobacco use.      Diagnosis:  1. Generalized anxiety disorder    2. Attention deficit hyperactivity disorder (ADHD), predominantly inattentive type    3. Depression, unspecified depression type    R/O Other Specified Trauma Disorder    Collateral Reports Completed:   Not Applicable    PLAN: (Patient Tasks / Therapist Tasks / Other)    Patient will utilize crisis resources as needed.       Patient will utilize the following coping strategies to manage anxiety: Deep breathing, grounding exercises, engaging in civic duty. [place] exercise. Patient will also process and reality-check anxious thoughts after anxiety has decreased.     Patient will incorporate more deep-breathing and grounding exercises throughout the day.  Patient will utilize coping strategies discussed in session and challenge/reframe cognitive distortions (catastrophizing).        Patient will work to challenge and reframe identified cognitive distortions and negative thoughts.    Provider and patient will continue to talk about significant pattern of negative self talk.        Patient will utilize a daily check-list to mitigate ADHD sxs and work to improve organization in the workplace.    Patient stated that he would write a list with coping strategies that would be easily accessible and work to increase self compassion and use of coping strategies.  Patient will  "begin to use a gratitude journal.  Provider and patient will continue to discuss possible career change.    Patient will utilize strategies discussed in session.      Provider and patient will discuss dating and relationships. Provider and patient will discuss medication at the next session.  Provider will review treatment plan with patient.     Leatha Pearce, [facility]  5/9/24  ______________________________________________________________________    Individual Treatment Plan    Patient's Name: Teddy Shah  YOB: 1990    Date of Creation: 9/7/21  Date Treatment Plan Last Reviewed/Revised: 3/8/24    DSM5 Diagnoses: Attention-Deficit/Hyperactivity Disorder  314.00 (F90.0) Predominantly inattentive presentation, 311 (F32.9) Unspecified Depressive Disorder  or 300.02 (F41.1) Generalized Anxiety Disorder  Psychosocial / Contextual Factors: Hostile home environment, lack of social support, current events/politics a significant stress, family conflict  PROMIS (reviewed every 90 days): 21    Referral / Collaboration:  Referral to another professional/service is not indicated at this time..    Anticipated number of session for this episode of care:   Anticipation frequency of session: Biweekly  Anticipated Duration of each session: 38-52 minutes  Treatment plan will be reviewed in 90 days or when goals have been changed.       MeasurableTreatment Goal(s) related to diagnosis / functional impairment(s)  Goal 1: Patient will successfully develop and utilize coping skills to manage depressive, anxious and ADHD sxs within 90 days.     I will know I've met my goal when I feel like I can get some of my executive function back (completing tasks).       Objective #A Patient will process experience around romantic relationship, reflect on how it has impacted his view of self, and how he would like to move forward.      Patient will work to develop a \"social life\" outside of work, and manage mental health sxs as they " "arise.              Patient will attend and participate in social or recreational activities    Develop and utilize self-compassion strategies.   identify the time in your life when you began to feel poorly about themselves.  Status: Continued - Date(s): 3/8/24    Intervention(s)  Therapist will assign homework around utilizing self compassion strategies  teach psychoeducation around shame, guilt and healthy relationships.     Objective #B Patient will work to develop coping skills to \"turn brain off\" or switch tracks.    Patient will use thought-stopping strategy daily to reduce intrusive thoughts.  Status: Continued - Date(s): 3/8/24      Intervention(s)  Therapist will assign homework around using thought-stopping strategies and other coping skills  teach emotional regulation skills.       Patient has reviewed and agreed to the above plan.      Leatha Pearce, [facility]  March 14, 2022, reviewed by 6/13/22, reviewed 9/14/22, reviewed 1/9/23, reviewed 5/8/23, reviewed 8/15/23, reviewed 12/1/23, reviewed 3/8/24    tx plan reviewed and clinical supervision by LONDON Patino [facility] 3/29/2022, 6/13/2022, 9/14/2022, 1/10/2023,5/8/2023, 8/16/2023, 12/4/2023  Answers submitted by the patient for this visit:  Patient Health Questionnaire (Submitted on 5/9/2024)  If you checked off any problems, how difficult have these problems made it for you to do your work, take care of things at home, or get along with other people?: Somewhat difficult  PHQ9 TOTAL SCORE: 4    "

## 2024-05-15 ENCOUNTER — NURSE TRIAGE (OUTPATIENT)
Dept: FAMILY MEDICINE | Facility: CLINIC | Age: 34
End: 2024-05-15
Payer: COMMERCIAL

## 2024-05-15 NOTE — TELEPHONE ENCOUNTER
This writer attempted to contact patient on 05/15/24      Reason for call triage neck lump and left message.      If patient calls back:   Route to RN line, triage neck lump symptoms from [facility] 5/15 - patient last seen by Lindsey 2/22 virtually and discussed this neck lump. While lump not growing, has changed and getting harder. Want to be sure no s/s infection or issues swallowing/breathing. Schedule follow up as appropriate      Paulette Perez, RN, BSN  [facility]

## 2024-05-16 NOTE — TELEPHONE ENCOUNTER
"Patient was called in response to Compliance [employer] message.  Patient stated the hard ball was about the size of a pea and located about 1\" below the ear.  Patient stated he had noticed it a few months ago but it is now hard.  A few months ago had an Ultrasound done.  Area is painful to the touch but not very tender the patient states. Notes it is a burning sensation after it is palpated but if he brushes it, it does not hurt.  Denies fever, trouble with breathing or swallowing, no outward appearance in the area such as redness.  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    Recommended disposition: See in office in 2 weeks.  Appointment made with same day provider for next week when patient had a day off.  Patient verbalized understanding of appt on 5/23/24.  Discussed when to call back and patient verbalized understanding.        Reason for Disposition   Normal-sized node (i.e., < 1 cm, < 1/2 in) present > 2 weeks, but patient is worried about cancer    Additional Information   Negative: Sounds like a life-threatening emergency to the triager   Negative: Sore throat is main symptom and has swollen node in the neck that is < 1 inch (2.5 cm) in size   Negative: Node is in the neck and causes difficulty breathing   Negative: Patient sounds very sick or weak to the triager   Negative: Node is in the neck and can't swallow fluids   Negative: Fever > 103 F (39.4 C)   Negative: Lump or swelling in groin and pulsating (like heartbeat)   Negative: Single large node and size > 1 inch (2.5 cm)   Negative: Overlying skin is red   Negative: Rapid increase in size of node over several hours   Negative: Tender node in the groin and has a sore, scratch, cut, or painful red area on that leg   Negative: Tender node in the armpit and has a sore, scratch, cut, or " "painful red area on that arm   Negative: Tender node in the neck and also has a sore throat with minimal/no runny nose or cough   Negative: Fever present > 3 days (72 hours)   Negative: Large nodes at multiple locations   Negative: Very tender to the touch but no fever   Negative: Patient wants to be seen   Negative: Large node present > 2 weeks    Answer Assessment - Initial Assessment Questions  1. LOCATION: \"Where is the swollen node located?\" \"Is the matching node on the other side of the body also swollen?\"       Left side under ear about 1\"  2. SIZE: \"How big is the node?\" (e.g., inches or centimeters; or compared to common objects such as pea, bean, marble, golf ball)       Pea sized, harder since started  3. ONSET: \"When did the swelling start?\"       A while ago, Had US back in February/march, Hardening noticed the other night  4. NECK NODES: \"Is there a sore throat, runny nose or other symptoms of a cold?\"       Always congested due to work environment  5. GROIN OR ARMPIT NODES: \"Is there a sore, scratch, cut or painful red area on that arm or leg?\"       None under ear  6. FEVER: \"Do you have a fever?\" If Yes, ask: \"What is it, how was it measured, and when did it start?\"       No  7. 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 OTHER SYMPTOMS: \"Do you have any other symptoms?\"      Not fatigued, no other bruising or swelling anywhere  9. PREGNANCY: \"Is there any chance you are pregnant?\" \"When was your last menstrual period?\"      NA    Protocols used: Lymph Nodes - [id number]    "

## 2024-05-20 DIAGNOSIS — K21.00 GASTROESOPHAGEAL REFLUX DISEASE WITH ESOPHAGITIS, UNSPECIFIED WHETHER HEMORRHAGE: ICD-10-CM

## 2024-05-23 ENCOUNTER — OFFICE VISIT (OUTPATIENT)
Dept: FAMILY MEDICINE | Facility: CLINIC | Age: 34
End: 2024-05-23
Payer: COMMERCIAL

## 2024-05-23 VITALS
HEIGHT: 72 IN | TEMPERATURE: 97.4 F | DIASTOLIC BLOOD PRESSURE: 84 MMHG | RESPIRATION RATE: 16 BRPM | BODY MASS INDEX: 38.9 KG/M2 | SYSTOLIC BLOOD PRESSURE: 127 MMHG | WEIGHT: 287.2 LBS | HEART RATE: 90 BPM | OXYGEN SATURATION: 96 %

## 2024-05-23 DIAGNOSIS — F41.9 ANXIETY: ICD-10-CM

## 2024-05-23 DIAGNOSIS — R22.1 LUMP IN NECK: Primary | ICD-10-CM

## 2024-05-23 DIAGNOSIS — J30.89 NON-SEASONAL ALLERGIC RHINITIS, UNSPECIFIED TRIGGER: ICD-10-CM

## 2024-05-23 PROCEDURE — 99204 OFFICE O/P NEW MOD 45 MIN: CPT | Performed by: PHYSICIAN ASSISTANT

## 2024-05-23 ASSESSMENT — PAIN SCALES - GENERAL: PAINLEVEL: NO PAIN (0)

## 2024-05-23 NOTE — PROGRESS NOTES
Assessment & Plan     Lump in neck  Reviewed past ultrasound as well as lab work from February 2024 visit.  Suspect ongoing lymph node however has been present for some time, recommended reimaging.  Follow-up will depend on results.  We did briefly discuss possibility of needing regular follow-up, biopsy, further imaging.  - US Head Neck Soft Tissue; Future    Anxiety  At baseline, somewhat triggered with current lump.    Non-seasonal allergic rhinitis, unspecified trigger  Chronic congestion more from exposures at work he believes.  No recent change in this.  No medications.    Patient reports previous diagnosis of meningioma to his head that had been followed up, I do not have records of this.  He is due for follow-up he believes.  Will contact previous following provider to schedule and have documentation shared with PCP.                Subjective   Teddy is a 33 year old, presenting for the following health issues:  Mass (neck)        5/23/2024     9:44 AM   Additional Questions   Roomed by Miah     History of Present Illness       Reason for visit:  Unidentified mass in neck  Symptom onset:  1-2 weeks ago  Symptoms include:  Feels much harder  Symptom intensity:  Moderate  Symptom progression:  Staying the same    Teddy eats 2-3 servings of fruits and vegetables daily.Teddy consumes 0 sweetened beverage(s) daily.Teddy exercises with enough effort to increase Teddy's heart rate 30 to 60 minutes per day.  Teddy exercises with enough effort to increase Teddy's heart rate 5 days per week.   Teddy is taking medications regularly.         Has had a lump on his left neck for some time.  Seen for this 10/27/2023, ultrasound done which showed bilateral cervical lymph nodes, favored reactive.  Recommended follow-up if not resolving.  Saw PCP 2/22/2024, recommended treating rosacea and if adenopathy not resolving repeating ultrasound.  Patient states he just felt his left neck again after remembering and noticed that the  lump was still there and actually feels harder, thinks the size is about the same.  Very minimal pain only after touching.  No difficulty swallowing, sore throat.  No fever or recent illness.  Has baseline congestion he feels like from his work environment.    States that years ago he had an meningioma found on his brain.  He states that he was undergoing follow-up imaging for this but unsure of the system.  Was supposed to follow-up at the beginning of the COVID pandemic, but did not because of this.  No headaches, visual change, dizziness.          Objective    /84 (BP Location: Left arm, Patient Position: Sitting, Cuff Size: Adult Large)   Pulse 90   Temp 97.4  F (36.3  C) (Tympanic)   Resp 16   Ht 1.829 m (6')   Wt 130.3 kg (287 lb 3.2 oz)   SpO2 96%   BMI 38.95 kg/m    Body mass index is 38.95 kg/m .  Physical Exam   GENERAL: alert and no distress  EYES: Eyes grossly normal to inspection, PERRL and conjunctivae and sclerae normal  HENT: normal cephalic/atraumatic, ear canals and TM's normal, nasal mucosa edematous , oropharynx clear, and oral mucous membranes moist  NECK: Supple.  Single firm mobile mass to left posterior cervical chain, no other palpable lymphadenopathy.            Signed Electronically by: Lizbeth Houser PA-C

## 2024-06-02 ENCOUNTER — HEALTH MAINTENANCE LETTER (OUTPATIENT)
Age: 34
End: 2024-06-02

## 2024-06-06 ASSESSMENT — ANXIETY QUESTIONNAIRES
GAD7 TOTAL SCORE: 13
IF YOU CHECKED OFF ANY PROBLEMS ON THIS QUESTIONNAIRE, HOW DIFFICULT HAVE THESE PROBLEMS MADE IT FOR YOU TO DO YOUR WORK, TAKE CARE OF THINGS AT HOME, OR GET ALONG WITH OTHER PEOPLE: VERY DIFFICULT
4. TROUBLE RELAXING: SEVERAL DAYS
2. NOT BEING ABLE TO STOP OR CONTROL WORRYING: NEARLY EVERY DAY
GAD7 TOTAL SCORE: 13
1. FEELING NERVOUS, ANXIOUS, OR ON EDGE: NEARLY EVERY DAY
3. WORRYING TOO MUCH ABOUT DIFFERENT THINGS: MORE THAN HALF THE DAYS
7. FEELING AFRAID AS IF SOMETHING AWFUL MIGHT HAPPEN: NEARLY EVERY DAY
GAD7 TOTAL SCORE: 13
8. IF YOU CHECKED OFF ANY PROBLEMS, HOW DIFFICULT HAVE THESE MADE IT FOR YOU TO DO YOUR WORK, TAKE CARE OF THINGS AT HOME, OR GET ALONG WITH OTHER PEOPLE?: VERY DIFFICULT
7. FEELING AFRAID AS IF SOMETHING AWFUL MIGHT HAPPEN: NEARLY EVERY DAY
6. BECOMING EASILY ANNOYED OR IRRITABLE: SEVERAL DAYS
5. BEING SO RESTLESS THAT IT IS HARD TO SIT STILL: NOT AT ALL

## 2024-06-06 ASSESSMENT — PATIENT HEALTH QUESTIONNAIRE - PHQ9
SUM OF ALL RESPONSES TO PHQ QUESTIONS 1-9: 9
10. IF YOU CHECKED OFF ANY PROBLEMS, HOW DIFFICULT HAVE THESE PROBLEMS MADE IT FOR YOU TO DO YOUR WORK, TAKE CARE OF THINGS AT HOME, OR GET ALONG WITH OTHER PEOPLE: SOMEWHAT DIFFICULT
SUM OF ALL RESPONSES TO PHQ QUESTIONS 1-9: 9

## 2024-06-07 ENCOUNTER — ANCILLARY PROCEDURE (OUTPATIENT)
Dept: ULTRASOUND IMAGING | Facility: CLINIC | Age: 34
End: 2024-06-07
Attending: PHYSICIAN ASSISTANT
Payer: COMMERCIAL

## 2024-06-07 ENCOUNTER — VIRTUAL VISIT (OUTPATIENT)
Dept: PSYCHOLOGY | Facility: CLINIC | Age: 34
End: 2024-06-07
Payer: COMMERCIAL

## 2024-06-07 DIAGNOSIS — F41.1 GENERALIZED ANXIETY DISORDER: Primary | ICD-10-CM

## 2024-06-07 DIAGNOSIS — F32.A DEPRESSION, UNSPECIFIED DEPRESSION TYPE: ICD-10-CM

## 2024-06-07 DIAGNOSIS — F90.0 ATTENTION DEFICIT HYPERACTIVITY DISORDER (ADHD), PREDOMINANTLY INATTENTIVE TYPE: ICD-10-CM

## 2024-06-07 DIAGNOSIS — R22.1 LUMP IN NECK: ICD-10-CM

## 2024-06-07 PROCEDURE — 76536 US EXAM OF HEAD AND NECK: CPT | Mod: TC | Performed by: RADIOLOGY

## 2024-06-07 PROCEDURE — 90834 PSYTX W PT 45 MINUTES: CPT | Mod: 95

## 2024-06-07 NOTE — PROGRESS NOTES
[facility]                                     Progress Note    Patient Name: Teddy Shah  Date: 24         Service Type: Individual      Session Start Time: 11:03am  Session End Time:  11:50am     Session Length: 38-52min    Session #: 8    Attendees: Client attended alone    Service Modality:  Video session  Yes, the patient's condition can be safely assessed and treated via synchronous audio and visual telemedicine encounter.  (Provider and Patient had to switch to phone due to technical difficulties)    Reason for Video Visit: Services only offered telehealth    Location of Originating Site: Patient's home/patient's car    Distant Site: Provider Remote Setting home office    Provider verified identity through the following two step process.  Patient provided:  Patient  and Patient address                            Consent:  The patient/guardian has verbally consented to: the potential risks and benefits of telemedicine (video visit) versus in person care; bill my insurance or make self-payment for services provided; and responsibility for payment of non-covered services.                 Mode of transmission-[place]  Interactive Complexity: No  Crisis: No     Progress Since Last Session (Related to Symptoms / Goals / Homework):   Symptoms: Worsening anxious and depressive sxs per PHQ-9 and KASSIE-7    Homework: Completed in session      Episode of Care Goals: Satisfactory progress - ACTION (Actively working towards change); Intervened by reinforcing change plan / affirming steps taken     Current / Ongoing Stressors and Concerns:  Work stress, parents moving, low self worth, housing, negative self talk, aversion to change, interpersonal relationships, motivation for change, emotional expression, current events/politics, rumination, desire for relationship/physical connection, sexuality, guilt/shame     Treatment Objective(s) Addressed in This Session:   use at least    coping skills for anxiety management in the next 4 weeks  Decrease frequency and intensity of feeling down, depressed, hopeless  Identify negative self-talk and behaviors: challenge core beliefs, myths, and actions  Patient will attend and participate in social or recreational activities    Develop and utilize self-compassion strategies.   identify the time in your life when you began to feel poorly about themselves.     Intervention:   Motivational Interviewing    MI Intervention: Expressed Empathy/Understanding, Supported Autonomy, Collaboration, Evocation, Reflections: simple and complex, and Reframe     Change Talk Expressed by the Patient: Desire to change Reasons to change Committment to change Activation    Provider Response to Change Talk: E - Evoked more info from patient about behavior change, A - Affirmed patient's thoughts, decisions, or attempts at behavior change, R - Reflected patient's change talk, and S - Summarized patient's change talk statements    Provider held space as patient processed experiences and stressors using reflective listening, validation, and normalization of patient's emotional response.   Provider worked to develop greater insight around patient's heightened anxious and depressive sxs and impactful factors.  Provided validation.  Reviewed and encouraged patient to use coping strategies previously discussed.     Provider held space as patient processed experience with job application and ongoing difficulties at work.  Worked to develop greater insight around patient's emotional response.  Validated and challenged patient.  Normalized patient's emotional response  and provided supportive counseling.      Provider and patient explored patient's frustration around negative experiences with medical providers.  Provided validation and continued to encourage patient to advocate for himself and discuss concerns (specifically knee pain) with providers.  Encouraged patient to call Patient  Relations if he had any concerns he would like to further address.     Assessments:      4/5/2024     8:39 AM 5/9/2024    10:40 AM 6/6/2024     6:19 PM   PHQ   PHQ-9 Total Score 6 4 9   Q9: Thoughts of better off dead/self-harm past 2 weeks Not at all Not at all Not at all         3/8/2024    11:43 AM 4/5/2024     8:40 AM 6/6/2024     6:19 PM   KASSIE-7 SCORE   Total Score 14 (moderate anxiety) 9 (mild anxiety) 13 (moderate anxiety)   Total Score 14 9 13       Assessments completed prior to visit:  The following assessments were completed by patient for this visit:  PROMIS 10-Global Health (only subscores and total score):       9/1/2023    11:06 AM 9/15/2023    10:53 AM 12/1/2023    11:24 AM 1/5/2024    11:08 AM 2/2/2024    10:18 AM 5/2/2024    10:43 PM 6/6/2024     6:20 PM   PROMIS-10 Scores Only   Global Mental Health Score 9 7 8 7 7 8    8 8   Global Physical Health Score 14 14 13 14 14 15    15 13   PROMIS TOTAL - SUBSCORES 23 21 21 21 21 23    23 21     [place] (Short version): see chart.      ASSESSMENT: Current Emotional / Mental Status (status of significant symptoms):   Risk status (Self / Other harm or suicidal ideation)   Patient denies current fears or concerns for personal safety.   Patient denies current or recent suicidal ideation or behaviors.   Patient denies current or recent homicidal ideation or behaviors.   Patient denies current or recent self injurious behavior or ideation.   Patient denies other safety concerns.   Patient reports there has been no change in risk factors since their last session.     Patient reports there has been no change in protective factors since their last session.     Recommended that patient call 911 or go to the local ED should there be a change in any of these risk factors.     Appearance:   Appropriate    Eye Contact:   Fair    Psychomotor Behavior: Normal    Attitude:   Cooperative    Orientation:   All   Speech    Rate / Production: Normal/  Responsive    Volume:  Normal    Mood:    Anxious  Depressed    Affect:    Flat  Subdued    Thought Content:  Clear  Rumination    Thought Form:  Coherent  Tangential    Insight:    Fair      Medication Review:   No changes to current psychiatric medication(s) Provider will continue to assess.      Medication Compliance:   Yes Provider will continue to assess.      Changes in Health Issues:   None reported     Chemical Use Review:   Substance Use: Chemical use reviewed, no active concerns identified      Tobacco Use: No current tobacco use.      Diagnosis:  1. Generalized anxiety disorder    2. Attention deficit hyperactivity disorder (ADHD), predominantly inattentive type    3. Depression, unspecified depression type      R/O Other Specified Trauma Disorder    Collateral Reports Completed:   Not Applicable    PLAN: (Patient Tasks / Therapist Tasks / Other)    Patient will utilize crisis resources as needed.       Patient will utilize the following coping strategies to manage anxiety: Deep breathing, grounding exercises, engaging in civic duty. [place] exercise. Patient will also process and reality-check anxious thoughts after anxiety has decreased.     Patient will incorporate more deep-breathing and grounding exercises throughout the day.  Patient will utilize coping strategies discussed in session and challenge/reframe cognitive distortions (catastrophizing).        Patient will work to challenge and reframe identified cognitive distortions and negative thoughts.    Provider and patient will continue to talk about significant pattern of negative self talk.        Patient will utilize a daily check-list to mitigate ADHD sxs and work to improve organization in the workplace.    Patient stated that he would write a list with coping strategies that would be easily accessible and work to increase self compassion and use of coping strategies.  Patient will begin to use a gratitude journal.  Provider and patient will  "continue to discuss possible career change.    Patient will utilize strategies discussed in session.      Provider and patient will discuss dating and relationships. Provider and patient will discuss medication at the next session.  Provider will review treatment plan with patient.     Leatha Pearce, [facility]  6/7/24  ______________________________________________________________________    Individual Treatment Plan    Patient's Name: Teddy Shah  YOB: 1990    Date of Creation: 9/7/21  Date Treatment Plan Last Reviewed/Revised: 3/8/24    DSM5 Diagnoses: Attention-Deficit/Hyperactivity Disorder  314.00 (F90.0) Predominantly inattentive presentation, 311 (F32.9) Unspecified Depressive Disorder  or 300.02 (F41.1) Generalized Anxiety Disorder  Psychosocial / Contextual Factors: Hostile home environment, lack of social support, current events/politics a significant stress, family conflict  PROMIS (reviewed every 90 days): 21    Referral / Collaboration:  Referral to another professional/service is not indicated at this time..    Anticipated number of session for this episode of care:   Anticipation frequency of session: Biweekly  Anticipated Duration of each session: 38-52 minutes  Treatment plan will be reviewed in 90 days or when goals have been changed.       MeasurableTreatment Goal(s) related to diagnosis / functional impairment(s)  Goal 1: Patient will successfully develop and utilize coping skills to manage depressive, anxious and ADHD sxs within 90 days.     I will know I've met my goal when I feel like I can get some of my executive function back (completing tasks).       Objective #A Patient will process experience around romantic relationship, reflect on how it has impacted his view of self, and how he would like to move forward.      Patient will work to develop a \"social life\" outside of work, and manage mental health sxs as they arise.              Patient will attend and participate in " "social or recreational activities    Develop and utilize self-compassion strategies.   identify the time in your life when you began to feel poorly about themselves.  Status: Continued - Date(s): 3/8/24    Intervention(s)  Therapist will assign homework around utilizing self compassion strategies  teach psychoeducation around shame, guilt and healthy relationships.     Objective #B Patient will work to develop coping skills to \"turn brain off\" or switch tracks.    Patient will use thought-stopping strategy daily to reduce intrusive thoughts.  Status: Continued - Date(s): 3/8/24      Intervention(s)  Therapist will assign homework around using thought-stopping strategies and other coping skills  teach emotional regulation skills.       Patient has reviewed and agreed to the above plan.      Leatha Pearce, [facility]  March 14, 2022, reviewed by 6/13/22, reviewed 9/14/22, reviewed 1/9/23, reviewed 5/8/23, reviewed 8/15/23, reviewed 12/1/23, reviewed 3/8/24    tx plan reviewed and clinical supervision by LONDON Patino [facility] 3/29/2022, 6/13/2022, 9/14/2022, 1/10/2023,5/8/2023, 8/16/2023, 12/4/2023    Answers submitted by the patient for this visit:  Patient Health Questionnaire (Submitted on 6/6/2024)  If you checked off any problems, how difficult have these problems made it for you to do your work, take care of things at home, or get along with other people?: Somewhat difficult  PHQ9 TOTAL SCORE: 9  KASSIE-7 (Submitted on 6/6/2024)  KASSIE 7 TOTAL SCORE: 13    "

## 2024-06-11 ENCOUNTER — MYC REFILL (OUTPATIENT)
Dept: FAMILY MEDICINE | Facility: CLINIC | Age: 34
End: 2024-06-11
Payer: COMMERCIAL

## 2024-06-11 DIAGNOSIS — F90.0 ADHD (ATTENTION DEFICIT HYPERACTIVITY DISORDER), INATTENTIVE TYPE: ICD-10-CM

## 2024-06-12 RX ORDER — DEXTROAMPHETAMINE SACCHARATE, AMPHETAMINE ASPARTATE MONOHYDRATE, DEXTROAMPHETAMINE SULFATE AND AMPHETAMINE SULFATE 5; 5; 5; 5 MG/1; MG/1; MG/1; MG/1
40 CAPSULE, EXTENDED RELEASE ORAL DAILY
Qty: 60 CAPSULE | Refills: 0 | Status: SHIPPED | OUTPATIENT
Start: 2024-06-12 | End: 2024-07-21

## 2024-06-14 ENCOUNTER — VIRTUAL VISIT (OUTPATIENT)
Dept: PSYCHOLOGY | Facility: CLINIC | Age: 34
End: 2024-06-14
Payer: COMMERCIAL

## 2024-06-14 DIAGNOSIS — F90.0 ATTENTION DEFICIT HYPERACTIVITY DISORDER (ADHD), PREDOMINANTLY INATTENTIVE TYPE: ICD-10-CM

## 2024-06-14 DIAGNOSIS — F41.1 GENERALIZED ANXIETY DISORDER: Primary | ICD-10-CM

## 2024-06-14 DIAGNOSIS — F32.A DEPRESSION, UNSPECIFIED DEPRESSION TYPE: ICD-10-CM

## 2024-06-14 PROCEDURE — 90834 PSYTX W PT 45 MINUTES: CPT | Mod: 95

## 2024-06-14 NOTE — PROGRESS NOTES
[facility]                                     Progress Note    Patient Name: Teddy Shah  Date: 24         Service Type: Individual      Session Start Time: 11:03am  Session End Time:  11:50am     Session Length: 38-52min    Session #: 9    Attendees: Client attended alone    Service Modality:  Video session  Yes, the patient's condition can be safely assessed and treated via synchronous audio and visual telemedicine encounter.  (Provider and Patient had to switch to phone due to technical difficulties)    Reason for Video Visit: Services only offered telehealth    Location of Originating Site: Patient's home/patient's car    Distant Site: Provider Remote Setting home office    Provider verified identity through the following two step process.  Patient provided:  Patient  and Patient address                            Consent:  The patient/guardian has verbally consented to: the potential risks and benefits of telemedicine (video visit) versus in person care; bill my insurance or make self-payment for services provided; and responsibility for payment of non-covered services.                 Mode of transmission-[employer]  Interactive Complexity: No  Crisis: No     Progress Since Last Session (Related to Symptoms / Goals / Homework):   Symptoms: No change in depressive sxs and worsening anxious sxs per KASSIE-7 and patient report    Homework: Completed in session      Episode of Care Goals: Satisfactory progress - ACTION (Actively working towards change); Intervened by reinforcing change plan / affirming steps taken     Current / Ongoing Stressors and Concerns:  Work stress, parents moving, low self worth, housing, negative self talk, aversion to change, interpersonal relationships, motivation for change, emotional expression, current events/politics, rumination, desire for relationship/physical connection, sexuality, guilt/shame     Treatment Objective(s) Addressed in This  Session:   use at least   coping skills for anxiety management in the next 4 weeks  Decrease frequency and intensity of feeling down, depressed, hopeless  Identify negative self-talk and behaviors: challenge core beliefs, myths, and actions  Patient will attend and participate in social or recreational activities    Develop and utilize self-compassion strategies.   identify the time in your life when you began to feel poorly about themselves.     Intervention:   Motivational Interviewing    MI Intervention: Expressed Empathy/Understanding, Supported Autonomy, Collaboration, Evocation, Reflections: simple and complex, and Reframe     Change Talk Expressed by the Patient: Desire to change Reasons to change Committment to change Activation    Provider Response to Change Talk: E - Evoked more info from patient about behavior change, A - Affirmed patient's thoughts, decisions, or attempts at behavior change, R - Reflected patient's change talk, and S - Summarized patient's change talk statements    Provider held space as patient processed experiences and stressors, including experiences at work and around upcoming phone interview, using reflective listening, validation, and normalization of patient's emotional response.   Provider worked to develop greater insight around patient's heightened anxious and impactful factors.  Provided validation.      Provider held space as patient processed anxiety around phone interview. Worked to develop greater insight around patient's emotional response.  Validated and challenged patient.  Provided supportive counseling and worked to identify and worked to develop greater insight around what patient would like to communicate in his interview responses.    DBT: Provider coached patient on interpersonal effectiveness and assertive communication.    Provider engaged patient in identifying strategies patient can use to manage anxiety around phone interview.  Provider validated patient's  use of Coping Ahead skill and intentions to ensure his environment and emotional status were the best it could be for the interview.  Reviewed and encouraged patient to use coping strategies previously discussed.     Assessments:      5/9/2024    10:40 AM 6/6/2024     6:19 PM 6/14/2024     1:31 AM   PHQ   PHQ-9 Total Score 4 9 9   Q9: Thoughts of better off dead/self-harm past 2 weeks Not at all Not at all Not at all         3/8/2024    11:43 AM 4/5/2024     8:40 AM 6/6/2024     6:19 PM   KASSIE-7 SCORE   Total Score 14 (moderate anxiety) 9 (mild anxiety) 13 (moderate anxiety)   Total Score 14 9 13       Assessments completed prior to visit:  The following assessments were completed by patient for this visit:  PROMIS 10-Global Health (only subscores and total score):       9/15/2023    10:53 AM 12/1/2023    11:24 AM 1/5/2024    11:08 AM 2/2/2024    10:18 AM 5/2/2024    10:43 PM 6/6/2024     6:20 PM 6/14/2024     1:32 AM   PROMIS-10 Scores Only   Global Mental Health Score 7 8 7 7 8    8 8 8   Global Physical Health Score 14 13 14 14 15    15 13 13   PROMIS TOTAL - SUBSCORES 21 21 21 21 23    23 21 21     [place] (Short version): see chart.      ASSESSMENT: Current Emotional / Mental Status (status of significant symptoms):   Risk status (Self / Other harm or suicidal ideation)   Patient denies current fears or concerns for personal safety.   Patient denies current or recent suicidal ideation or behaviors.   Patient denies current or recent homicidal ideation or behaviors.   Patient denies current or recent self injurious behavior or ideation.   Patient denies other safety concerns.   Patient reports there has been no change in risk factors since their last session.     Patient reports there has been no change in protective factors since their last session.     Recommended that patient call 911 or go to the local ED should there be a change in any of these risk factors.     Appearance:   Appropriate    Eye  Contact:   Fair    Psychomotor Behavior: Normal    Attitude:   Cooperative    Orientation:   All   Speech    Rate / Production: Normal/ Responsive    Volume:  Normal    Mood:    Anxious  Depressed    Affect:    Flat  Subdued    Thought Content:  Clear  Rumination    Thought Form:  Coherent  Tangential    Insight:    Fair      Medication Review:   No changes to current psychiatric medication(s) Provider will continue to assess.      Medication Compliance:   Yes Provider will continue to assess.      Changes in Health Issues:   None reported     Chemical Use Review:   Substance Use: Chemical use reviewed, no active concerns identified      Tobacco Use: No current tobacco use.      Diagnosis:  1. Generalized anxiety disorder    2. Attention deficit hyperactivity disorder (ADHD), predominantly inattentive type    3. Depression, unspecified depression type    R/O Other Specified Trauma Disorder    Collateral Reports Completed:   Not Applicable    PLAN: (Patient Tasks / Therapist Tasks / Other)    Patient will utilize crisis resources as needed.       Patient will utilize the following coping strategies to manage anxiety: Deep breathing, grounding exercises, engaging in civic duty. [place] exercise. Patient will also process and reality-check anxious thoughts after anxiety has decreased.     Patient will incorporate more deep-breathing and grounding exercises throughout the day.  Patient will utilize coping strategies discussed in session and challenge/reframe cognitive distortions (catastrophizing).        Patient will work to challenge and reframe identified cognitive distortions and negative thoughts.    Provider and patient will continue to talk about significant pattern of negative self talk.        Patient will utilize a daily check-list to mitigate ADHD sxs and work to improve organization in the workplace.    Patient stated that he would write a list with coping strategies that would be easily accessible and work  to increase self compassion and use of coping strategies.  Patient will begin to use a gratitude journal.  Provider and patient will continue to discuss possible career change.    Patient will utilize strategies discussed in session.      Provider and patient will discuss dating and relationships. Provider and patient will discuss medication at the next session.  Provider will review treatment plan with patient.     [facility]atha Pearce, [facility]  6/14/24  ______________________________________________________________________    Individual Treatment Plan    Patient's Name: Teddy Shah  YOB: 1990    Date of Creation: 9/7/21  Date Treatment Plan Last Reviewed/Revised: 6/14/24    DSM5 Diagnoses: Attention-Deficit/Hyperactivity Disorder  314.00 (F90.0) Predominantly inattentive presentation, 311 (F32.9) Unspecified Depressive Disorder  or 300.02 (F41.1) Generalized Anxiety Disorder  Psychosocial / Contextual Factors: Hostile home environment, lack of social support, current events/politics a significant stress, family conflict  PROMIS (reviewed every 90 days): 21    Referral / Collaboration:  Referral to another professional/service is not indicated at this time..    Anticipated number of session for this episode of care:   Anticipation frequency of session: Biweekly  Anticipated Duration of each session: 38-52 minutes  Treatment plan will be reviewed in 90 days or when goals have been changed.       MeasurableTreatment Goal(s) related to diagnosis / functional impairment(s)  Goal 1: Patient will successfully develop and utilize coping skills to manage depressive, anxious and ADHD sxs within 90 days.     I will know I've met my goal when I feel like I can get some of my executive function back (completing tasks).       Objective #A Patient will process experience around romantic relationship, reflect on how it has impacted his view of self, and how he would like to move forward.      Patient will work to develop  "a \"social life\" outside of work, and manage mental health sxs as they arise.              Patient will attend and participate in social or recreational activities    Develop and utilize self-compassion strategies.   identify the time in your life when you began to feel poorly about themselves.  Status: Continued - Date(s): 6/14/24    Intervention(s)  Therapist will assign homework around utilizing self compassion strategies  teach psychoeducation around shame, guilt and healthy relationships.     Objective #B Patient will work to develop coping skills to \"turn brain off\" or switch tracks.    Patient will use thought-stopping strategy daily to reduce intrusive thoughts.  Status: Continued - Date(s):  6/14/24      Intervention(s)  Therapist will assign homework around using thought-stopping strategies and other coping skills  teach emotional regulation skills.       Patient has reviewed and agreed to the above plan.      Leatha Pearce, [facility]  March 14, 2022, reviewed by 6/13/22, reviewed 9/14/22, reviewed 1/9/23, reviewed 5/8/23, reviewed 8/15/23, reviewed 12/1/23, reviewed 3/8/24, reviewed 6/14/24    tx plan reviewed and clinical supervision by LONDON Patino [facility] 3/29/2022, 6/13/2022, 9/14/2022, 1/10/2023,5/8/2023, 8/16/2023, 12/4/2023    Answers submitted by the patient for this visit:  Patient Health Questionnaire (Submitted on 6/14/2024)  If you checked off any problems, how difficult have these problems made it for you to do your work, take care of things at home, or get along with other people?: Somewhat difficult  PHQ9 TOTAL SCORE: 9    "

## 2024-06-27 ASSESSMENT — ANXIETY QUESTIONNAIRES
GAD7 TOTAL SCORE: 14
1. FEELING NERVOUS, ANXIOUS, OR ON EDGE: NEARLY EVERY DAY
8. IF YOU CHECKED OFF ANY PROBLEMS, HOW DIFFICULT HAVE THESE MADE IT FOR YOU TO DO YOUR WORK, TAKE CARE OF THINGS AT HOME, OR GET ALONG WITH OTHER PEOPLE?: VERY DIFFICULT
2. NOT BEING ABLE TO STOP OR CONTROL WORRYING: NEARLY EVERY DAY
7. FEELING AFRAID AS IF SOMETHING AWFUL MIGHT HAPPEN: NEARLY EVERY DAY
4. TROUBLE RELAXING: SEVERAL DAYS
6. BECOMING EASILY ANNOYED OR IRRITABLE: SEVERAL DAYS
5. BEING SO RESTLESS THAT IT IS HARD TO SIT STILL: SEVERAL DAYS
GAD7 TOTAL SCORE: 14
7. FEELING AFRAID AS IF SOMETHING AWFUL MIGHT HAPPEN: NEARLY EVERY DAY
GAD7 TOTAL SCORE: 14
IF YOU CHECKED OFF ANY PROBLEMS ON THIS QUESTIONNAIRE, HOW DIFFICULT HAVE THESE PROBLEMS MADE IT FOR YOU TO DO YOUR WORK, TAKE CARE OF THINGS AT HOME, OR GET ALONG WITH OTHER PEOPLE: VERY DIFFICULT
3. WORRYING TOO MUCH ABOUT DIFFERENT THINGS: MORE THAN HALF THE DAYS

## 2024-06-28 ENCOUNTER — VIRTUAL VISIT (OUTPATIENT)
Dept: PSYCHOLOGY | Facility: CLINIC | Age: 34
End: 2024-06-28
Payer: COMMERCIAL

## 2024-06-28 DIAGNOSIS — F32.A DEPRESSION, UNSPECIFIED DEPRESSION TYPE: ICD-10-CM

## 2024-06-28 DIAGNOSIS — F41.1 GENERALIZED ANXIETY DISORDER: Primary | ICD-10-CM

## 2024-06-28 DIAGNOSIS — F90.0 ATTENTION DEFICIT HYPERACTIVITY DISORDER (ADHD), PREDOMINANTLY INATTENTIVE TYPE: ICD-10-CM

## 2024-06-28 PROCEDURE — 90834 PSYTX W PT 45 MINUTES: CPT | Mod: 95

## 2024-06-28 NOTE — PROGRESS NOTES
[facility]                                     Progress Note    Patient Name: Teddy Shah  Date: 24         Service Type: Individual      Session Start Time: 11:07am  Session End Time:  11:46am     Session Length: 38-52min    Session #: 10    Attendees: Client attended alone    Service Modality:  Video session  Yes, the patient's condition can be safely assessed and treated via synchronous audio and visual telemedicine encounter.  (Provider and Patient had to switch to phone due to technical difficulties)    Reason for Video Visit: Services only offered telehealth    Location of Originating Site: Patient's home/patient's car    Distant Site: Provider Remote Setting home office    Provider verified identity through the following two step process.  Patient provided:  Patient  and Patient address                            Consent:  The patient/guardian has verbally consented to: the potential risks and benefits of telemedicine (video visit) versus in person care; bill my insurance or make self-payment for services provided; and responsibility for payment of non-covered services.                 Mode of transmission-BangTango  Interactive Complexity: No  Crisis: No     Progress Since Last Session (Related to Symptoms / Goals / Homework):   Symptoms: Improving anxious and depressive sxs per PHQ-9, KASSIE-7 and patient report    Homework: Completed in session      Episode of Care Goals: Satisfactory progress - ACTION (Actively working towards change); Intervened by reinforcing change plan / affirming steps taken     Current / Ongoing Stressors and Concerns:  Work stress, parents moving, low self worth, housing, negative self talk, aversion to change, interpersonal relationships, motivation for change, emotional expression, current events/politics, rumination, desire for relationship/physical connection, sexuality, guilt/shame     Treatment Objective(s) Addressed in This  Session:   use at least   coping skills for anxiety management in the next 4 weeks  Decrease frequency and intensity of feeling down, depressed, hopeless  Identify negative self-talk and behaviors: challenge core beliefs, myths, and actions  Patient will attend and participate in social or recreational activities    Develop and utilize self-compassion strategies.   identify the time in your life when you began to feel poorly about themselves.     Intervention:   Motivational Interviewing    MI Intervention: Expressed Empathy/Understanding, Supported Autonomy, Collaboration, Evocation, Reflections: simple and complex, and Reframe     Change Talk Expressed by the Patient: Desire to change Reasons to change Committment to change Activation    Provider Response to Change Talk: E - Evoked more info from patient about behavior change, A - Affirmed patient's thoughts, decisions, or attempts at behavior change, R - Reflected patient's change talk, and S - Summarized patient's change talk statements    Provider held space as patient processed experiences and stressors, including experiences at work and upcoming in-person interview, using reflective listening, validation, and normalization of patient's emotional response.   Provider focused on increasing self compassion by validating patient's success in his phone interview, and working to highlight patient's progress over time.    Provider held space as patient processed ongoing work stressors.  Worked to develop greater insight around patient's emotional response and impactful factors.  Provider validated patient's efforts to further strengthen social connections and access support.    DBT: Provider continued to  patient on interpersonal effectiveness.    Provider and patient discussed the direction of treatment.  Provider and patient will keep the next scheduled appointment and see how patient's schedule may change.  Patient was agreeable to continue communication  around scheduling with provider as needed.      Assessments:      6/6/2024     6:19 PM 6/14/2024     1:31 AM 6/27/2024    10:45 PM   PHQ   PHQ-9 Total Score 9 9 10   Q9: Thoughts of better off dead/self-harm past 2 weeks Not at all Not at all Not at all         4/5/2024     8:40 AM 6/6/2024     6:19 PM 6/27/2024    10:46 PM   KASSIE-7 SCORE   Total Score 9 (mild anxiety) 13 (moderate anxiety) 14 (moderate anxiety)   Total Score 9 13 14       Assessments completed prior to visit:  The following assessments were completed by patient for this visit:  PROMIS 10-Global Health (only subscores and total score):       12/1/2023    11:24 AM 1/5/2024    11:08 AM 2/2/2024    10:18 AM 5/2/2024    10:43 PM 6/6/2024     6:20 PM 6/14/2024     1:32 AM 6/27/2024    10:46 PM   PROMIS-10 Scores Only   Global Mental Health Score 8 7 7 8    8 8 8 9   Global Physical Health Score 13 14 14 15    15 13 13 14   PROMIS TOTAL - SUBSCORES 21 21 21 23    23 21 21 23     Pitcher (Short version): see chart.      ASSESSMENT: Current Emotional / Mental Status (status of significant symptoms):   Risk status (Self / Other harm or suicidal ideation)   Patient denies current fears or concerns for personal safety.   Patient denies current or recent suicidal ideation or behaviors.   Patient denies current or recent homicidal ideation or behaviors.   Patient denies current or recent self injurious behavior or ideation.   Patient denies other safety concerns.   Patient reports there has been no change in risk factors since their last session.     Patient reports there has been no change in protective factors since their last session.     Recommended that patient call 911 or go to the local ED should there be a change in any of these risk factors.     Appearance:   Appropriate    Eye Contact:   Fair    Psychomotor Behavior: Normal    Attitude:   Cooperative    Orientation:   All   Speech    Rate / Production: Normal/ Responsive    Volume:  Normal     Mood:    Anxious  Depressed    Affect:    Flat  Subdued    Thought Content:  Clear  Rumination    Thought Form:  Coherent  Tangential    Insight:    Fair      Medication Review:   No changes to current psychiatric medication(s) Provider will continue to assess.      Medication Compliance:   Yes Provider will continue to assess.      Changes in Health Issues:   None reported     Chemical Use Review:   Substance Use: Chemical use reviewed, no active concerns identified      Tobacco Use: No current tobacco use.      Diagnosis:  1. Generalized anxiety disorder    2. Attention deficit hyperactivity disorder (ADHD), predominantly inattentive type    3. Depression, unspecified depression type    R/O Other Specified Trauma Disorder    Collateral Reports Completed:   Not Applicable    PLAN: (Patient Tasks / Therapist Tasks / Other)    Patient will utilize crisis resources as needed.       Patient will utilize the following coping strategies to manage anxiety: Deep breathing, grounding exercises, engaging in civic duty. Redig exercise. Patient will also process and reality-check anxious thoughts after anxiety has decreased.     Patient will incorporate more deep-breathing and grounding exercises throughout the day.  Patient will utilize coping strategies discussed in session and challenge/reframe cognitive distortions (catastrophizing).        Patient will work to challenge and reframe identified cognitive distortions and negative thoughts.    Provider and patient will continue to talk about significant pattern of negative self talk.        Patient will utilize a daily check-list to mitigate ADHD sxs and work to improve organization in the workplace.    Patient stated that he would write a list with coping strategies that would be easily accessible and work to increase self compassion and use of coping strategies.  Patient will begin to use a gratitude journal.  Provider and patient will continue to discuss possible  "career change.    Patient will utilize strategies discussed in session.      Provider and patient will discuss dating and relationships. Provider and patient will discuss medication at the next session.  Provider will review treatment plan with patient.     Leatha Pearce, [facility]  6/28/24  ______________________________________________________________________    Individual Treatment Plan    Patient's Name: Teddy Shah  YOB: 1990    Date of Creation: 9/7/21  Date Treatment Plan Last Reviewed/Revised: 6/14/24    DSM5 Diagnoses: Attention-Deficit/Hyperactivity Disorder  314.00 (F90.0) Predominantly inattentive presentation, 311 (F32.9) Unspecified Depressive Disorder  or 300.02 (F41.1) Generalized Anxiety Disorder  Psychosocial / Contextual Factors: Hostile home environment, lack of social support, current events/politics a significant stress, family conflict  PROMIS (reviewed every 90 days): 21    Referral / Collaboration:  Referral to another professional/service is not indicated at this time..    Anticipated number of session for this episode of care:   Anticipation frequency of session: Biweekly  Anticipated Duration of each session: 38-52 minutes  Treatment plan will be reviewed in 90 days or when goals have been changed.       MeasurableTreatment Goal(s) related to diagnosis / functional impairment(s)  Goal 1: Patient will successfully develop and utilize coping skills to manage depressive, anxious and ADHD sxs within 90 days.     I will know I've met my goal when I feel like I can get some of my executive function back (completing tasks).       Objective #A Patient will process experience around romantic relationship, reflect on how it has impacted his view of self, and how he would like to move forward.      Patient will work to develop a \"social life\" outside of work, and manage mental health sxs as they arise.              Patient will attend and participate in social or recreational " "activities    Develop and utilize self-compassion strategies.   identify the time in your life when you began to feel poorly about themselves.  Status: Continued - Date(s): 6/14/24    Intervention(s)  Therapist will assign homework around utilizing self compassion strategies  teach psychoeducation around shame, guilt and healthy relationships.     Objective #B Patient will work to develop coping skills to \"turn brain off\" or switch tracks.    Patient will use thought-stopping strategy daily to reduce intrusive thoughts.  Status: Continued - Date(s):  6/14/24      Intervention(s)  Therapist will assign homework around using thought-stopping strategies and other coping skills  teach emotional regulation skills.       Patient has reviewed and agreed to the above plan.      Leatha Pearce, [facility]  March 14, 2022, reviewed by 6/13/22, reviewed 9/14/22, reviewed 1/9/23, reviewed 5/8/23, reviewed 8/15/23, reviewed 12/1/23, reviewed 3/8/24, reviewed 6/14/24    tx plan reviewed and clinical supervision by LONDON Patino [facility] 3/29/2022, 6/13/2022, 9/14/2022, 1/10/2023,5/8/2023, 8/16/2023, 12/4/2023      Answers submitted by the patient for this visit:  Patient Health Questionnaire (Submitted on 6/27/2024)  If you checked off any problems, how difficult have these problems made it for you to do your work, take care of things at home, or get along with other people?: Somewhat difficult  PHQ9 TOTAL SCORE: 10  KASSIE-7 (Submitted on 6/27/2024)  KASSIE 7 TOTAL SCORE: 14    "

## 2024-07-19 ENCOUNTER — VIRTUAL VISIT (OUTPATIENT)
Dept: PSYCHOLOGY | Facility: CLINIC | Age: 34
End: 2024-07-19
Payer: COMMERCIAL

## 2024-07-19 DIAGNOSIS — F32.A DEPRESSION, UNSPECIFIED DEPRESSION TYPE: ICD-10-CM

## 2024-07-19 DIAGNOSIS — F90.0 ATTENTION DEFICIT HYPERACTIVITY DISORDER (ADHD), PREDOMINANTLY INATTENTIVE TYPE: ICD-10-CM

## 2024-07-19 DIAGNOSIS — F41.1 GENERALIZED ANXIETY DISORDER: Primary | ICD-10-CM

## 2024-07-19 PROCEDURE — 90834 PSYTX W PT 45 MINUTES: CPT | Mod: 95

## 2024-07-19 ASSESSMENT — PATIENT HEALTH QUESTIONNAIRE - PHQ9
10. IF YOU CHECKED OFF ANY PROBLEMS, HOW DIFFICULT HAVE THESE PROBLEMS MADE IT FOR YOU TO DO YOUR WORK, TAKE CARE OF THINGS AT HOME, OR GET ALONG WITH OTHER PEOPLE: VERY DIFFICULT
SUM OF ALL RESPONSES TO PHQ QUESTIONS 1-9: 12
SUM OF ALL RESPONSES TO PHQ QUESTIONS 1-9: 12

## 2024-07-19 ASSESSMENT — ANXIETY QUESTIONNAIRES
7. FEELING AFRAID AS IF SOMETHING AWFUL MIGHT HAPPEN: NEARLY EVERY DAY
IF YOU CHECKED OFF ANY PROBLEMS ON THIS QUESTIONNAIRE, HOW DIFFICULT HAVE THESE PROBLEMS MADE IT FOR YOU TO DO YOUR WORK, TAKE CARE OF THINGS AT HOME, OR GET ALONG WITH OTHER PEOPLE: VERY DIFFICULT
8. IF YOU CHECKED OFF ANY PROBLEMS, HOW DIFFICULT HAVE THESE MADE IT FOR YOU TO DO YOUR WORK, TAKE CARE OF THINGS AT HOME, OR GET ALONG WITH OTHER PEOPLE?: VERY DIFFICULT
1. FEELING NERVOUS, ANXIOUS, OR ON EDGE: NEARLY EVERY DAY
GAD7 TOTAL SCORE: 13
6. BECOMING EASILY ANNOYED OR IRRITABLE: SEVERAL DAYS
4. TROUBLE RELAXING: SEVERAL DAYS
7. FEELING AFRAID AS IF SOMETHING AWFUL MIGHT HAPPEN: NEARLY EVERY DAY
GAD7 TOTAL SCORE: 13
2. NOT BEING ABLE TO STOP OR CONTROL WORRYING: NEARLY EVERY DAY
5. BEING SO RESTLESS THAT IT IS HARD TO SIT STILL: NOT AT ALL
GAD7 TOTAL SCORE: 13
3. WORRYING TOO MUCH ABOUT DIFFERENT THINGS: MORE THAN HALF THE DAYS

## 2024-07-19 NOTE — PROGRESS NOTES
[facility]                                     Progress Note    Patient Name: Teddy Shah  Date: 24         Service Type: Individual      Session Start Time:  11:04am  Session End Time:  11:48am     Session Length: 38-52min    Session #: 11    Attendees: Client attended alone    Service Modality:  Video session  Yes, the patient's condition can be safely assessed and treated via synchronous audio and visual telemedicine encounter.  (Provider and Patient had to switch to phone due to technical difficulties)    Reason for Video Visit: Services only offered telehealth    Location of Originating Site: Patient's home/patient's car    Distant Site: Provider Remote Setting home office    Provider verified identity through the following two step process.  Patient provided:  Patient  and Patient address                            Consent:  The patient/guardian has verbally consented to: the potential risks and benefits of telemedicine (video visit) versus in person care; bill my insurance or make self-payment for services provided; and responsibility for payment of non-covered services.                 Mode of transmission-Xendo  Interactive Complexity: No  Crisis: No     Progress Since Last Session (Related to Symptoms / Goals / Homework):   Symptoms: Worsening depressive sxs and improving anxious sxs per PHQ-9 and KASSIE-7    Homework: Completed in session      Episode of Care Goals: Satisfactory progress - ACTION (Actively working towards change); Intervened by reinforcing change plan / affirming steps taken     Current / Ongoing Stressors and Concerns:  Work stress, parents moving, low self worth, housing, negative self talk, aversion to change, interpersonal relationships, motivation for change, emotional expression, current events/politics, rumination, desire for relationship/physical connection, sexuality, guilt/shame, job search, grandparent's passing     Treatment  Objective(s) Addressed in This Session:   use at least   coping skills for anxiety management in the next 4 weeks  Decrease frequency and intensity of feeling down, depressed, hopeless  Identify negative self-talk and behaviors: challenge core beliefs, myths, and actions  Patient will attend and participate in social or recreational activities    Develop and utilize self-compassion strategies.   identify the time in your life when you began to feel poorly about themselves.     Intervention:   Motivational Interviewing    MI Intervention: Expressed Empathy/Understanding, Supported Autonomy, Collaboration, Evocation, Reflections: simple and complex, and Reframe     Change Talk Expressed by the Patient: Desire to change Reasons to change Committment to change Activation    Provider Response to Change Talk: E - Evoked more info from patient about behavior change, A - Affirmed patient's thoughts, decisions, or attempts at behavior change, R - Reflected patient's change talk, and S - Summarized patient's change talk statements    Provider held space as patient processed experiences and stressors, including loss of job opportunity and grandma's failing health, using reflective listening, validation, and normalization of patient's emotional response.   Provider focused on providing supportive counseling.      Provider held space as patient processed the ups and downs of his job search experience.  Worked to develop greater insight around patient's emotional response and impactful factors.  Provider worked to increase self compassion.      Provider held space as patient process grandma's failing health.  Worked to develop greater insight around patient's emotional response.  Explored emotions, thoughts and feelings.  Briefly discussed patient's fear of death.  Provider worked to decrease feelings of shame and guilt.  Coached patient on redirecting thoughts.     Provider and patient discussed the direction of treatment.        Assessments:      6/14/2024     1:31 AM 6/27/2024    10:45 PM 7/19/2024    10:56 AM   PHQ   PHQ-9 Total Score 9 10 12   Q9: Thoughts of better off dead/self-harm past 2 weeks Not at all Not at all Not at all         6/6/2024     6:19 PM 6/27/2024    10:46 PM 7/19/2024    10:57 AM   KASSIE-7 SCORE   Total Score 13 (moderate anxiety) 14 (moderate anxiety) 13 (moderate anxiety)   Total Score 13 14 13       Assessments completed prior to visit:  The following assessments were completed by patient for this visit:  PROMIS 10-Global Health (only subscores and total score):       12/1/2023    11:24 AM 1/5/2024    11:08 AM 2/2/2024    10:18 AM 5/2/2024    10:43 PM 6/6/2024     6:20 PM 6/14/2024     1:32 AM 6/27/2024    10:46 PM   PROMIS-10 Scores Only   Global Mental Health Score 8 7 7 8    8 8 8 9   Global Physical Health Score 13 14 14 15    15 13 13 14   PROMIS TOTAL - SUBSCORES 21 21 21 23    23 21 21 23     Nicholas (Short version): see chart.      ASSESSMENT: Current Emotional / Mental Status (status of significant symptoms):   Risk status (Self / Other harm or suicidal ideation)   Patient denies current fears or concerns for personal safety.   Patient denies current or recent suicidal ideation or behaviors.   Patient denies current or recent homicidal ideation or behaviors.   Patient denies current or recent self injurious behavior or ideation.   Patient denies other safety concerns.   Patient reports there has been no change in risk factors since their last session.     Patient reports there has been no change in protective factors since their last session.     Recommended that patient call 911 or go to the local ED should there be a change in any of these risk factors.     Appearance:   Appropriate    Eye Contact:   Fair    Psychomotor Behavior: Normal    Attitude:   Cooperative    Orientation:   All   Speech    Rate / Production: Normal/ Responsive    Volume:  Normal    Mood:    Anxious  Depressed     Affect:    Flat  Subdued    Thought Content:  Clear  Rumination    Thought Form:  Coherent  Tangential    Insight:    Fair      Medication Review:   No changes to current psychiatric medication(s) Provider will continue to assess.      Medication Compliance:   Yes Provider will continue to assess.      Changes in Health Issues:   None reported     Chemical Use Review:   Substance Use: Chemical use reviewed, no active concerns identified      Tobacco Use: No current tobacco use.      Diagnosis:  1. Generalized anxiety disorder    2. Attention deficit hyperactivity disorder (ADHD), predominantly inattentive type    3. Depression, unspecified depression type      R/O Other Specified Trauma Disorder    Collateral Reports Completed:   Not Applicable    PLAN: (Patient Tasks / Therapist Tasks / Other)    Patient will utilize crisis resources as needed.       Patient will utilize the following coping strategies to manage anxiety: Deep breathing, grounding exercises, engaging in civic duty. [place]mage exercise. Patient will also process and reality-check anxious thoughts after anxiety has decreased.     Patient will incorporate more deep-breathing and grounding exercises throughout the day.  Patient will utilize coping strategies discussed in session and challenge/reframe cognitive distortions (catastrophizing).        Patient will work to challenge and reframe identified cognitive distortions and negative thoughts.    Provider and patient will continue to talk about significant pattern of negative self talk.        Patient will utilize a daily check-list to mitigate ADHD sxs and work to improve organization in the workplace.    Patient stated that he would write a list with coping strategies that would be easily accessible and work to increase self compassion and use of coping strategies.  Patient will begin to use a gratitude journal.  Provider and patient will continue to discuss possible career change.    Patient will  "utilize strategies discussed in session.      Provider and patient will discuss dating and relationships. Provider and patient will discuss medication at the next session.  Provider will review treatment plan with patient.     Leatha Pearce, [facility]  7/19/24  ______________________________________________________________________    Individual Treatment Plan    Patient's Name: Teddy Shah  YOB: 1990    Date of Creation: 9/7/21  Date Treatment Plan Last Reviewed/Revised: 6/14/24    DSM5 Diagnoses: Attention-Deficit/Hyperactivity Disorder  314.00 (F90.0) Predominantly inattentive presentation, 311 (F32.9) Unspecified Depressive Disorder  or 300.02 (F41.1) Generalized Anxiety Disorder  Psychosocial / Contextual Factors: Hostile home environment, lack of social support, current events/politics a significant stress, family conflict  PROMIS (reviewed every 90 days): 21    Referral / Collaboration:  Referral to another professional/service is not indicated at this time..    Anticipated number of session for this episode of care:   Anticipation frequency of session: Biweekly  Anticipated Duration of each session: 38-52 minutes  Treatment plan will be reviewed in 90 days or when goals have been changed.       MeasurableTreatment Goal(s) related to diagnosis / functional impairment(s)  Goal 1: Patient will successfully develop and utilize coping skills to manage depressive, anxious and ADHD sxs within 90 days.     I will know I've met my goal when I feel like I can get some of my executive function back (completing tasks).       Objective #A Patient will process experience around romantic relationship, reflect on how it has impacted his view of self, and how he would like to move forward.      Patient will work to develop a \"social life\" outside of work, and manage mental health sxs as they arise.              Patient will attend and participate in social or recreational activities    Develop and utilize " "self-compassion strategies.   identify the time in your life when you began to feel poorly about themselves.  Status: Continued - Date(s): 6/14/24    Intervention(s)  Therapist will assign homework around utilizing self compassion strategies  teach psychoeducation around shame, guilt and healthy relationships.     Objective #B Patient will work to develop coping skills to \"turn brain off\" or switch tracks.    Patient will use thought-stopping strategy daily to reduce intrusive thoughts.  Status: Continued - Date(s):  6/14/24      Intervention(s)  Therapist will assign homework around using thought-stopping strategies and other coping skills  teach emotional regulation skills.       Patient has reviewed and agreed to the above plan.      Leatha Pearce, [facility]  March 14, 2022, reviewed by 6/13/22, reviewed 9/14/22, reviewed 1/9/23, reviewed 5/8/23, reviewed 8/15/23, reviewed 12/1/23, reviewed 3/8/24, reviewed 6/14/24    tx plan reviewed and clinical supervision by LONDON Patino [facility] 3/29/2022, 6/13/2022, 9/14/2022, 1/10/2023,5/8/2023, 8/16/2023, 12/4/2023    Answers submitted by the patient for this visit:  Patient Health Questionnaire (Submitted on 7/19/2024)  If you checked off any problems, how difficult have these problems made it for you to do your work, take care of things at home, or get along with other people?: Very difficult  PHQ9 TOTAL SCORE: 12  KASSIE-7 (Submitted on 7/19/2024)  KASSIE 7 TOTAL SCORE: 13    "

## 2024-07-21 ENCOUNTER — MYC REFILL (OUTPATIENT)
Dept: FAMILY MEDICINE | Facility: CLINIC | Age: 34
End: 2024-07-21
Payer: COMMERCIAL

## 2024-07-21 DIAGNOSIS — F90.0 ADHD (ATTENTION DEFICIT HYPERACTIVITY DISORDER), INATTENTIVE TYPE: ICD-10-CM

## 2024-07-22 RX ORDER — DEXTROAMPHETAMINE SACCHARATE, AMPHETAMINE ASPARTATE MONOHYDRATE, DEXTROAMPHETAMINE SULFATE AND AMPHETAMINE SULFATE 5; 5; 5; 5 MG/1; MG/1; MG/1; MG/1
40 CAPSULE, EXTENDED RELEASE ORAL DAILY
Qty: 60 CAPSULE | Refills: 0 | Status: SHIPPED | OUTPATIENT
Start: 2024-07-22

## 2024-08-03 ENCOUNTER — MYC MEDICAL ADVICE (OUTPATIENT)
Dept: FAMILY MEDICINE | Facility: CLINIC | Age: 34
End: 2024-08-03
Payer: COMMERCIAL

## 2024-08-03 DIAGNOSIS — R73.09 ELEVATED GLUCOSE: Primary | ICD-10-CM

## 2024-08-15 DIAGNOSIS — K21.00 GASTROESOPHAGEAL REFLUX DISEASE WITH ESOPHAGITIS, UNSPECIFIED WHETHER HEMORRHAGE: ICD-10-CM

## 2024-08-16 DIAGNOSIS — F41.9 ANXIETY: ICD-10-CM

## 2024-08-16 DIAGNOSIS — F33.1 MODERATE EPISODE OF RECURRENT MAJOR DEPRESSIVE DISORDER (H): ICD-10-CM

## 2024-08-19 RX ORDER — SERTRALINE HYDROCHLORIDE 100 MG/1
200 TABLET, FILM COATED ORAL DAILY
Qty: 180 TABLET | Refills: 0 | Status: SHIPPED | OUTPATIENT
Start: 2024-08-19

## 2024-08-30 ENCOUNTER — VIRTUAL VISIT (OUTPATIENT)
Dept: PSYCHOLOGY | Facility: CLINIC | Age: 34
End: 2024-08-30
Payer: COMMERCIAL

## 2024-08-30 DIAGNOSIS — F90.0 ATTENTION DEFICIT HYPERACTIVITY DISORDER (ADHD), PREDOMINANTLY INATTENTIVE TYPE: ICD-10-CM

## 2024-08-30 DIAGNOSIS — F32.A DEPRESSION, UNSPECIFIED DEPRESSION TYPE: ICD-10-CM

## 2024-08-30 DIAGNOSIS — F41.1 GENERALIZED ANXIETY DISORDER: Primary | ICD-10-CM

## 2024-08-30 PROCEDURE — 90834 PSYTX W PT 45 MINUTES: CPT | Mod: 95

## 2024-08-30 ASSESSMENT — ANXIETY QUESTIONNAIRES
7. FEELING AFRAID AS IF SOMETHING AWFUL MIGHT HAPPEN: NEARLY EVERY DAY
4. TROUBLE RELAXING: SEVERAL DAYS
GAD7 TOTAL SCORE: 11
3. WORRYING TOO MUCH ABOUT DIFFERENT THINGS: MORE THAN HALF THE DAYS
2. NOT BEING ABLE TO STOP OR CONTROL WORRYING: MORE THAN HALF THE DAYS
8. IF YOU CHECKED OFF ANY PROBLEMS, HOW DIFFICULT HAVE THESE MADE IT FOR YOU TO DO YOUR WORK, TAKE CARE OF THINGS AT HOME, OR GET ALONG WITH OTHER PEOPLE?: SOMEWHAT DIFFICULT
GAD7 TOTAL SCORE: 11
7. FEELING AFRAID AS IF SOMETHING AWFUL MIGHT HAPPEN: NEARLY EVERY DAY
5. BEING SO RESTLESS THAT IT IS HARD TO SIT STILL: NOT AT ALL
GAD7 TOTAL SCORE: 11
1. FEELING NERVOUS, ANXIOUS, OR ON EDGE: MORE THAN HALF THE DAYS
IF YOU CHECKED OFF ANY PROBLEMS ON THIS QUESTIONNAIRE, HOW DIFFICULT HAVE THESE PROBLEMS MADE IT FOR YOU TO DO YOUR WORK, TAKE CARE OF THINGS AT HOME, OR GET ALONG WITH OTHER PEOPLE: SOMEWHAT DIFFICULT
6. BECOMING EASILY ANNOYED OR IRRITABLE: SEVERAL DAYS

## 2024-08-30 ASSESSMENT — PATIENT HEALTH QUESTIONNAIRE - PHQ9
10. IF YOU CHECKED OFF ANY PROBLEMS, HOW DIFFICULT HAVE THESE PROBLEMS MADE IT FOR YOU TO DO YOUR WORK, TAKE CARE OF THINGS AT HOME, OR GET ALONG WITH OTHER PEOPLE: SOMEWHAT DIFFICULT
SUM OF ALL RESPONSES TO PHQ QUESTIONS 1-9: 6
SUM OF ALL RESPONSES TO PHQ QUESTIONS 1-9: 6

## 2024-08-30 NOTE — PROGRESS NOTES
[facility]                                     Progress Note    Patient Name: Teddy Shah  Date: 24         Service Type: Individual      Session Start Time:  10:04am  Session End Time:  10:49am     Session Length: 38-52min    Session #: 12    Attendees: Client attended alone    Service Modality:  Video session  Yes, the patient's condition can be safely assessed and treated via synchronous audio and visual telemedicine encounter.  (Provider and Patient had to switch to phone due to technical difficulties)    Reason for Video Visit: Services only offered telehealth    Location of Originating Site: Patient's home/patient's car    Distant Site: Provider Remote Setting home office    Provider verified identity through the following two step process.  Patient provided:  Patient  and Patient address                            Consent:  The patient/guardian has verbally consented to: the potential risks and benefits of telemedicine (video visit) versus in person care; bill my insurance or make self-payment for services provided; and responsibility for payment of non-covered services.                 Mode of transmission-Cswitch  Interactive Complexity: No  Crisis: No     Progress Since Last Session (Related to Symptoms / Goals / Homework):   Symptoms: Improving anxious and depressive and KASSIE-7 and PHQ-9 and patient report    Homework: Completed in session      Episode of Care Goals: Satisfactory progress - ACTION (Actively working towards change); Intervened by reinforcing change plan / affirming steps taken     Current / Ongoing Stressors and Concerns:  Work stress, parents moving, low self worth, housing, negative self talk, aversion to change, interpersonal relationships, motivation for change, emotional expression, current events/politics, rumination, desire for relationship/physical connection, sexuality, guilt/shame, job search, election     Treatment Objective(s) Addressed  in This Session:   use at least   coping skills for anxiety management in the next 4 weeks  Decrease frequency and intensity of feeling down, depressed, hopeless  Identify negative self-talk and behaviors: challenge core beliefs, myths, and actions  Patient will attend and participate in social or recreational activities    Develop and utilize self-compassion strategies.   identify the time in your life when you began to feel poorly about themselves.     Intervention:   Motivational Interviewing    MI Intervention: Expressed Empathy/Understanding, Supported Autonomy, Collaboration, Evocation, Reflections: simple and complex, and Reframe     Change Talk Expressed by the Patient: Desire to change Reasons to change Committment to change Activation    Provider Response to Change Talk: E - Evoked more info from patient about behavior change, A - Affirmed patient's thoughts, decisions, or attempts at behavior change, R - Reflected patient's change talk, and S - Summarized patient's change talk statements    Provider held space as patient processed experiences and stressors using reflective listening, validation, and normalization of patient's emotional response.   Provider focused on providing supportive counseling.  Provider worked to develop greater insight around patient's improving mental health sxs and contributing factors.  Provider vlaidated patient's continued engagement with social [place] and interests.     Provider held space as patient continued to process job search experience.  Worked to develop greater insight around patient's emotional response and impactful factors.  Normalized patient's emotional response and worked to decrease shame and self judgment.  Provider worked to increase self compassion.        CBT: Provider used CBT strategies to challenge and reframe patient's identified cognitive distortions.    Provider worked to build rapport and strengthen the therapeutic alliance.  Discussed the direction  of treatment.       Assessments:      6/27/2024    10:45 PM 7/19/2024    10:56 AM 8/30/2024     7:30 AM   PHQ   PHQ-9 Total Score 10 12 6   Q9: Thoughts of better off dead/self-harm past 2 weeks Not at all Not at all Not at all         6/27/2024    10:46 PM 7/19/2024    10:57 AM 8/30/2024     7:31 AM   KASSIE-7 SCORE   Total Score 14 (moderate anxiety) 13 (moderate anxiety) 11 (moderate anxiety)   Total Score 14 13 11       Assessments completed prior to visit:  The following assessments were completed by patient for this visit:  PROMIS 10-Global Health (only subscores and total score):       12/1/2023    11:24 AM 1/5/2024    11:08 AM 2/2/2024    10:18 AM 5/2/2024    10:43 PM 6/6/2024     6:20 PM 6/14/2024     1:32 AM 6/27/2024    10:46 PM   PROMIS-10 Scores Only   Global Mental Health Score 8 7 7 8    8 8 8 9   Global Physical Health Score 13 14 14 15    15 13 13 14   PROMIS TOTAL - SUBSCORES 21 21 21 23    23 21 21 23     [place] (Short version): see chart.      ASSESSMENT: Current Emotional / Mental Status (status of significant symptoms):   Risk status (Self / Other harm or suicidal ideation)   Patient denies current fears or concerns for personal safety.   Patient denies current or recent suicidal ideation or behaviors.   Patient denies current or recent homicidal ideation or behaviors.   Patient denies current or recent self injurious behavior or ideation.   Patient denies other safety concerns.   Patient reports there has been no change in risk factors since their last session.     Patient reports there has been no change in protective factors since their last session.     Recommended that patient call 911 or go to the local ED should there be a change in any of these risk factors.     Appearance:   Appropriate    Eye Contact:   Fair    Psychomotor Behavior: Normal    Attitude:   Cooperative    Orientation:   All   Speech    Rate / Production: Normal/ Responsive    Volume:  Normal    Mood:    Anxious  Depressed     Affect:    Flat  Subdued    Thought Content:  Clear  Rumination    Thought Form:  Coherent  Tangential    Insight:    Fair      Medication Review:   No changes to current psychiatric medication(s)     Medication Compliance:   Yes      Changes in Health Issues:   None reported     Chemical Use Review:   Substance Use: Chemical use reviewed, no active concerns identified      Tobacco Use: No current tobacco use.      Diagnosis:  1. Generalized anxiety disorder    2. Attention deficit hyperactivity disorder (ADHD), predominantly inattentive type    3. Depression, unspecified depression type    R/O Other Specified Trauma Disorder    Collateral Reports Completed:   Not Applicable    PLAN: (Patient Tasks / Therapist Tasks / Other)    Patient will utilize crisis resources as needed.       Patient will utilize the following coping strategies to manage anxiety: Deep breathing, grounding exercises, engaging in civic duty. [place] exercise. Patient will also process and reality-check anxious thoughts after anxiety has decreased.     Patient will incorporate more deep-breathing and grounding exercises throughout the day.  Patient will utilize coping strategies discussed in session and challenge/reframe cognitive distortions (catastrophizing).        Patient will work to challenge and reframe identified cognitive distortions and negative thoughts.    Provider and patient will continue to talk about significant pattern of negative self talk.        Patient will utilize a daily check-list to mitigate ADHD sxs and work to improve organization in the workplace.    Patient stated that he would write a list with coping strategies that would be easily accessible and work to increase self compassion and use of coping strategies.  Patient will begin to use a gratitude journal.  Provider and patient will continue to discuss possible career change.    Patient will utilize strategies discussed in session.      Provider and patient will  "discuss dating and relationships, specifically finding someone in a similar position as myself. Provider and patient will discuss medication at the next session.  Provider will review treatment plan with patient.     Leatha Pearce, [facility]  8/30/24  ______________________________________________________________________    Individual Treatment Plan    Patient's Name: Teddy Shah  YOB: 1990    Date of Creation: 9/7/21  Date Treatment Plan Last Reviewed/Revised: 6/14/24    DSM5 Diagnoses: Attention-Deficit/Hyperactivity Disorder  314.00 (F90.0) Predominantly inattentive presentation, 311 (F32.9) Unspecified Depressive Disorder  or 300.02 (F41.1) Generalized Anxiety Disorder  Psychosocial / Contextual Factors: Hostile home environment, lack of social support, current events/politics a significant stress, family conflict  PROMIS (reviewed every 90 days): 21    Referral / Collaboration:  Referral to another professional/service is not indicated at this time..    Anticipated number of session for this episode of care:   Anticipation frequency of session: Biweekly  Anticipated Duration of each session: 38-52 minutes  Treatment plan will be reviewed in 90 days or when goals have been changed.       MeasurableTreatment Goal(s) related to diagnosis / functional impairment(s)  Goal 1: Patient will successfully develop and utilize coping skills to manage depressive, anxious and ADHD sxs within 90 days.     I will know I've met my goal when I feel like I can get some of my executive function back (completing tasks).       Objective #A Patient will process experience around romantic relationship, reflect on how it has impacted his view of self, and how he would like to move forward.      Patient will work to develop a \"social life\" outside of work, and manage mental health sxs as they arise.              Patient will attend and participate in social or recreational activities    Develop and utilize self-compassion " "strategies.   identify the time in your life when you began to feel poorly about themselves.  Status: Continued - Date(s): 6/14/24    Intervention(s)  Therapist will assign homework around utilizing self compassion strategies  teach psychoeducation around shame, guilt and healthy relationships.     Objective #B Patient will work to develop coping skills to \"turn brain off\" or switch tracks.    Patient will use thought-stopping strategy daily to reduce intrusive thoughts.  Status: Continued - Date(s):  6/14/24      Intervention(s)  Therapist will assign homework around using thought-stopping strategies and other coping skills  teach emotional regulation skills.       Patient has reviewed and agreed to the above plan.      Leatha Pearce, [facility]  March 14, 2022, reviewed by 6/13/22, reviewed 9/14/22, reviewed 1/9/23, reviewed 5/8/23, reviewed 8/15/23, reviewed 12/1/23, reviewed 3/8/24, reviewed 6/14/24    tx plan reviewed and clinical supervision by LONDON Patino [facility] 3/29/2022, 6/13/2022, 9/14/2022, 1/10/2023,5/8/2023, 8/16/2023, 12/4/2023    Answers submitted by the patient for this visit:  Patient Health Questionnaire (Submitted on 8/30/2024)  If you checked off any problems, how difficult have these problems made it for you to do your work, take care of things at home, or get along with other people?: Somewhat difficult  PHQ9 TOTAL SCORE: 6  Patient Health Questionnaire (G7) (Submitted on 8/30/2024)  KASSIE 7 TOTAL SCORE: 11    "

## 2024-09-13 ENCOUNTER — VIRTUAL VISIT (OUTPATIENT)
Dept: PSYCHOLOGY | Facility: CLINIC | Age: 34
End: 2024-09-13
Payer: COMMERCIAL

## 2024-09-13 DIAGNOSIS — F32.A DEPRESSION, UNSPECIFIED DEPRESSION TYPE: ICD-10-CM

## 2024-09-13 DIAGNOSIS — F41.1 GENERALIZED ANXIETY DISORDER: Primary | ICD-10-CM

## 2024-09-13 DIAGNOSIS — F90.0 ATTENTION DEFICIT HYPERACTIVITY DISORDER (ADHD), PREDOMINANTLY INATTENTIVE TYPE: ICD-10-CM

## 2024-09-13 PROCEDURE — 90834 PSYTX W PT 45 MINUTES: CPT | Mod: 95

## 2024-09-13 ASSESSMENT — PATIENT HEALTH QUESTIONNAIRE - PHQ9
10. IF YOU CHECKED OFF ANY PROBLEMS, HOW DIFFICULT HAVE THESE PROBLEMS MADE IT FOR YOU TO DO YOUR WORK, TAKE CARE OF THINGS AT HOME, OR GET ALONG WITH OTHER PEOPLE: SOMEWHAT DIFFICULT
SUM OF ALL RESPONSES TO PHQ QUESTIONS 1-9: 5
SUM OF ALL RESPONSES TO PHQ QUESTIONS 1-9: 5

## 2024-09-13 NOTE — PROGRESS NOTES
[facility]                                     Progress Note    Patient Name: Teddy Shah  Date: 24         Service Type: Individual      Session Start Time:  11:02am  Session End Time:  11:42am     Session Length: 38-52min    Session #: 13    Attendees: Client attended alone    Service Modality:  Video session  Yes, the patient's condition can be safely assessed and treated via synchronous audio and visual telemedicine encounter.  (Provider and Patient had to switch to phone due to technical difficulties)    Reason for Video Visit: Services only offered telehealth    Location of Originating Site: Patient's home/patient's car    Distant Site: Provider Remote Setting home office    Provider verified identity through the following two step process.  Patient provided:  Patient  and Patient address                            Consent:  The patient/guardian has verbally consented to: the potential risks and benefits of telemedicine (video visit) versus in person care; bill my insurance or make self-payment for services provided; and responsibility for payment of non-covered services.                 Mode of transmission-[employer]  Interactive Complexity: No  Crisis: No     Progress Since Last Session (Related to Symptoms / Goals / Homework):   Symptoms: Improving depressive sxs per PHQ-9, no change in depressive sxs and worsening anxious sxs per patient report    Homework: Completed in session      Episode of Care Goals: Satisfactory progress - ACTION (Actively working towards change); Intervened by reinforcing change plan / affirming steps taken     Current / Ongoing Stressors and Concerns:  Work stress, parents moving, low self worth, housing, negative self talk, aversion to change, interpersonal relationships, motivation for change, emotional expression, current events/politics, rumination, desire for relationship/physical connection, sexuality, guilt/shame, job search,  election, housing       Treatment Objective(s) Addressed in This Session:   use at least   coping skills for anxiety management in the next 4 weeks  Decrease frequency and intensity of feeling down, depressed, hopeless  Identify negative self-talk and behaviors: challenge core beliefs, myths, and actions  Patient will attend and participate in social or recreational activities    Develop and utilize self-compassion strategies.   identify the time in your life when you began to feel poorly about themselves.     Intervention:   Motivational Interviewing    MI Intervention: Expressed Empathy/Understanding, Supported Autonomy, Collaboration, Evocation, Reflections: simple and complex, and Reframe     Change Talk Expressed by the Patient: Desire to change Reasons to change Committment to change Activation    Provider Response to Change Talk: E - Evoked more info from patient about behavior change, A - Affirmed patient's thoughts, decisions, or attempts at behavior change, R - Reflected patient's change talk, and S - Summarized patient's change talk statements    Provider held space as patient processed stressors and experiences using reflective listening, and normalization of patient's emotional response.  Provider worked to develop greater insight around patient's mental health status and exacerbating factors.  Provided validation and supportive counseling.      Provider held space as patient continued to process job search experience and concerns around housing.  Worked to develop greater insight around patient's emotional response, thought patterns and behaviors.  Normalized patient's emotional response.  Validated and challenged patient.  Provider worked to increase self compassion.      ACT: Provider coached patient on working towards acceptance of his circumstances and shifting focus to the steps patient can take moving forward.       Assessments:      7/19/2024    10:56 AM 8/30/2024     7:30 AM 9/13/2024    10:48  AM   PHQ   PHQ-9 Total Score 12 6 5   Q9: Thoughts of better off dead/self-harm past 2 weeks Not at all Not at all Not at all         6/27/2024    10:46 PM 7/19/2024    10:57 AM 8/30/2024     7:31 AM   KASSIE-7 SCORE   Total Score 14 (moderate anxiety) 13 (moderate anxiety) 11 (moderate anxiety)   Total Score 14 13 11       Assessments completed prior to visit:  The following assessments were completed by patient for this visit:  PROMIS 10-Global Health (only subscores and total score):       12/1/2023    11:24 AM 1/5/2024    11:08 AM 2/2/2024    10:18 AM 5/2/2024    10:43 PM 6/6/2024     6:20 PM 6/14/2024     1:32 AM 6/27/2024    10:46 PM   PROMIS-10 Scores Only   Global Mental Health Score 8 7 7 8    8 8 8 9   Global Physical Health Score 13 14 14 15    15 13 13 14   PROMIS TOTAL - SUBSCORES 21 21 21 23    23 21 21 23     [place] (Short version): see chart.      ASSESSMENT: Current Emotional / Mental Status (status of significant symptoms):   Risk status (Self / Other harm or suicidal ideation)   Patient denies current fears or concerns for personal safety.   Patient denies current or recent suicidal ideation or behaviors.   Patient denies current or recent homicidal ideation or behaviors.   Patient denies current or recent self injurious behavior or ideation.   Patient denies other safety concerns.   Patient reports there has been no change in risk factors since their last session.     Patient reports there has been no change in protective factors since their last session.     Recommended that patient call 911 or go to the local ED should there be a change in any of these risk factors.     Appearance:   Appropriate    Eye Contact:   Fair    Psychomotor Behavior: Normal    Attitude:   Cooperative    Orientation:   All   Speech    Rate / Production: Normal/ Responsive    Volume:  Normal    Mood:    Anxious  Depressed    Affect:    Flat  Subdued    Thought Content:  Clear  Rumination    Thought Form:  Coherent   Tangential    Insight:    Fair      Medication Review:   No changes to current psychiatric medication(s)     Medication Compliance:   Yes      Changes in Health Issues:   None reported     Chemical Use Review:   Substance Use: Chemical use reviewed, no active concerns identified      Tobacco Use: No current tobacco use.      Diagnosis:  1. Generalized anxiety disorder    2. Depression, unspecified depression type    3. Attention deficit hyperactivity disorder (ADHD), predominantly inattentive type      R/O Other Specified Trauma Disorder    Collateral Reports Completed:   Not Applicable    PLAN: (Patient Tasks / Therapist Tasks / Other)    Patient will utilize crisis resources as needed.       Patient will utilize the following coping strategies to manage anxiety: Deep breathing, grounding exercises, engaging in civic duty. [place] exercise. Patient will also process and reality-check anxious thoughts after anxiety has decreased.     Patient will incorporate more deep-breathing and grounding exercises throughout the day.  Patient will utilize coping strategies discussed in session and challenge/reframe cognitive distortions (catastrophizing).        Patient will work to challenge and reframe identified cognitive distortions and negative thoughts.    Provider and patient will continue to talk about significant pattern of negative self talk.        Patient will utilize a daily check-list to mitigate ADHD sxs and work to improve organization in the workplace.    Patient stated that he would write a list with coping strategies that would be easily accessible and work to increase self compassion and use of coping strategies.  Patient will begin to use a gratitude journal.  Provider and patient will continue to discuss possible career change.    Patient will utilize strategies discussed in session.      Provider and patient will discuss dating and relationships, specifically finding someone in a similar position as  "myself. Provider and patient will discuss medication at the next session.  Provider will review treatment plan with patient.     Leatha Pearce, LICSW 9/13/24  ______________________________________________________________________    Individual Treatment Plan    Patient's Name: Teddy Shah  YOB: 1990    Date of Creation: 9/7/21  Date Treatment Plan Last Reviewed/Revised: 6/14/24    DSM5 Diagnoses: Attention-Deficit/Hyperactivity Disorder  314.00 (F90.0) Predominantly inattentive presentation, 311 (F32.9) Unspecified Depressive Disorder  or 300.02 (F41.1) Generalized Anxiety Disorder  Psychosocial / Contextual Factors: Hostile home environment, lack of social support, current events/politics a significant stress, family conflict  PROMIS (reviewed every 90 days): 21    Referral / Collaboration:  Referral to another professional/service is not indicated at this time..    Anticipated number of session for this episode of care:   Anticipation frequency of session: Biweekly  Anticipated Duration of each session: 38-52 minutes  Treatment plan will be reviewed in 90 days or when goals have been changed.       MeasurableTreatment Goal(s) related to diagnosis / functional impairment(s)  Goal 1: Patient will successfully develop and utilize coping skills to manage depressive, anxious and ADHD sxs within 90 days.     I will know I've met my goal when I feel like I can get some of my executive function back (completing tasks).       Objective #A Patient will process experience around romantic relationship, reflect on how it has impacted his view of self, and how he would like to move forward.      Patient will work to develop a \"social life\" outside of work, and manage mental health sxs as they arise.              Patient will attend and participate in social or recreational activities    Develop and utilize self-compassion strategies.   identify the time in your life when you began to feel poorly about " "themselves.  Status: Continued - Date(s): 6/14/24    Intervention(s)  Therapist will assign homework around utilizing self compassion strategies  teach psychoeducation around shame, guilt and healthy relationships.     Objective #B Patient will work to develop coping skills to \"turn brain off\" or switch tracks.    Patient will use thought-stopping strategy daily to reduce intrusive thoughts.  Status: Continued - Date(s):  6/14/24      Intervention(s)  Therapist will assign homework around using thought-stopping strategies and other coping skills  teach emotional regulation skills.       Patient has reviewed and agreed to the above plan.      Leatha Pearce, [facility]  March 14, 2022, reviewed by 6/13/22, reviewed 9/14/22, reviewed 1/9/23, reviewed 5/8/23, reviewed 8/15/23, reviewed 12/1/23, reviewed 3/8/24, reviewed 6/14/24    tx plan reviewed and clinical supervision by LONDON Patino [facility] 3/29/2022, 6/13/2022, 9/14/2022, 1/10/2023,5/8/2023, 8/16/2023, 12/4/2023  Answers submitted by the patient for this visit:  Patient Health Questionnaire (Submitted on 9/13/2024)  If you checked off any problems, how difficult have these problems made it for you to do your work, take care of things at home, or get along with other people?: Somewhat difficult  PHQ9 TOTAL SCORE: 5    "

## 2024-10-11 NOTE — PROGRESS NOTES
[facility]                                     Progress Note    Patient Name: Teddy Shah  Date: 23         Service Type: Individual      Session Start Time: 11:03am  Session End Time: 11:48am     Session Length: 38-52min    Session #: 32    Attendees: Client attended alone    Service Modality:  Video session  Yes, the patient's condition can be safely assessed and treated via synchronous audio and visual telemedicine encounter.  (Provider and Patient had to switch to phone due to technical difficulties)    Reason for Video Visit: Services only offered telehealth    Location of Originating Site: Patient's car    Distant Site: Provider Remote Setting home office    Provider verified identity through the following two step process.  Patient provided:  Patient  and Patient address                          Consent:  The patient/guardian has verbally consented to: the potential risks and benefits of telemedicine (video visit) versus in person care; bill my insurance or make self-payment for services provided; and responsibility for payment of non-covered services.                 Mode of transmission-Vobi  Interactive Complexity: No  Crisis: No        Progress Since Last Session (Related to Symptoms / Goals / Homework):   Symptoms: No change in anxious, depressive and ADHD sxs per patient report, improving anxious and depressive sxs per KASSIE-7 and PHQ-9.    Homework: Completed in session      Episode of Care Goals: Satisfactory progress - ACTION (Actively working towards change); Intervened by reinforcing change plan / affirming steps taken     Current / Ongoing Stressors and Concerns:  Work stress, parents moving, low self worth, housing, negative self talk, aversion to change, interpersonal relationships, motivation for change       Treatment Objective(s) Addressed in This Session:   use at least   coping skills for anxiety management in the next 4 weeks  Decrease frequency  and intensity of feeling down, depressed, hopeless  Identify negative self-talk and behaviors: challenge core beliefs, myths, and actions  Patient will attend and participate in social or recreational activities    Develop and utilize self-compassion strategies.   identify the time in your life when you began to feel poorly about themselves.     Intervention:   Motivational Interviewing    MI Intervention: Expressed Empathy/Understanding, Supported Autonomy, Collaboration, Evocation, and Reframe     Change Talk Expressed by the Patient: Desire to change Reasons to change Committment to change Activation    Provider Response to Change Talk: E - Evoked more info from patient about behavior change, A - Affirmed patient's thoughts, decisions, or attempts at behavior change, R - Reflected patient's change talk, and S - Summarized patient's change talk statements    Provider held space as patient processed experiences using reflective listening, validation, and normalization of patient's emotional response.  Provider worked to develop greater insight into patient's experiences. Provider validated patient.     Provider held space as patient processed difficult thoughts and emotions around wanting an emotional relationship and increased connection.  Provider worked to develop greater insight.  Provider worked to validate patient and normalize patient's emotional response to increase self compassion and decrease feelings of shame.      CBT: Provider utilized CBT strategies to challenge and reframe patient's identified cognitive distortions. Provider coached patient on challenging and reframing identified cognitive distortions.    Provider and patient agreed that patient would again try to engage more socially and explore different social environments.      Psychoeducation: Provider taught psychoeducation around trauma.  Provider worked to develop greater insight.  Patient was agreeable to further discuss trauma and difficult  experiences at the next session.    Provider worked to build rapport and strengthen the therapeutic alliance.              7/7/2023    10:01 AM 7/28/2023    10:51 AM 9/1/2023    10:52 AM   PHQ   PHQ-9 Total Score 9 10 8   Q9: Thoughts of better off dead/self-harm past 2 weeks Not at all Not at all Not at all         7/7/2023    10:01 AM 7/28/2023    10:52 AM 9/1/2023    10:53 AM   KASSIE-7 SCORE   Total Score 11 (moderate anxiety) 13 (moderate anxiety) 9 (mild anxiety)   Total Score 11    11 13 9    Answers for HPI/ROS submitted by the patient on 7/7/2023  If you checked off any problems, how difficult have these problems made it for you to do your work, take care of things at home, or get along with other people?: Somewhat difficult  PHQ9 TOTAL SCORE: 9  KASSIE 7 TOTAL SCORE: 11      Assessments completed prior to visit:  The following assessments were completed by patient for this visit:  PROMIS 10-Global Health (only subscores and total score):       4/25/2022    10:35 AM 7/15/2022    11:00 AM 11/10/2022     9:52 AM 3/13/2023    10:57 AM 5/8/2023    11:15 AM 8/15/2023     1:47 PM 9/1/2023    10:54 AM   PROMIS-10 Scores Only   Global Mental Health Score 6 5 7 7 6 9 8   Global Physical Health Score 14 15 14 14 14 15 14   PROMIS TOTAL - SUBSCORES 20 20 21 21 20 24 22     [place] (Short version): see chart.      ASSESSMENT: Current Emotional / Mental Status (status of significant symptoms):   Risk status (Self / Other harm or suicidal ideation)   Patient denies current fears or concerns for personal safety.   Patient denies current or recent suicidal ideation or behaviors.   Patient denies current or recent homicidal ideation or behaviors.   Patient denies current or recent self injurious behavior or ideation.   Patient denies other safety concerns.   Patient reports there has been no change in risk factors since their last session.     Patient reports there has been no change in protective factors since their last  session.     Recommended that patient call 911 or go to the local ED should there be a change in any of these risk factors.     Appearance:   Appropriate    Eye Contact:   Fair    Psychomotor Behavior: Normal    Attitude:   Cooperative    Orientation:   All   Speech    Rate / Production: Normal/ Responsive    Volume:  Normal    Mood:    Anxious  Depressed    Affect:    Flat  Subdued    Thought Content:  Clear  Rumination    Thought Form:  Coherent  Tangential    Insight:    Fair      Medication Review:   Changes to psychiatric medications, see updated Medication List in EPIC.      Medication Compliance:   Yes     Changes in Health Issues:   None reported     Chemical Use Review:   Substance Use: Chemical use reviewed, no active concerns identified      Tobacco Use: No current tobacco use.      Diagnosis:  1. Generalized anxiety disorder    2. Depression, unspecified depression type    3. Attention deficit hyperactivity disorder (ADHD), predominantly inattentive type     (Patient mentioned ADHD diagnosis in session.  Provider verified ADHD testing via chart review, patient was found to meet criteria for ADHD-predominantly inattentive type).    Collateral Reports Completed:   Not Applicable    PLAN: (Patient Tasks / Therapist Tasks / Other)    Patient will utilize crisis resources as needed.       Patient will utilize the following coping strategies to manage anxiety: Deep breathing, grounding exercises, engaging in civic duty. [place] exercise. Patient will also process and reality-check anxious thoughts after anxiety has decreased.     Patient will incorporate more deep-breathing and grounding exercises throughout the day.  Patient will utilize coping strategies discussed in session and challenge/reframe cognitive distortions (catastrophizing).        Patient will work to challenge and reframe identified cognitive distortions and negative thoughts.    Provider and patient will continue to talk about significant  pattern of negative self talk.        Patient will utilize a daily check-list to mitigate ADHD sxs and work to improve organization in the workplace.    Patient stated that he would write a list with coping strategies that would be easily accessible and work to increase self compassion and use of coping strategies.  Patient will begin to use a gratitude journal.  Provider and patient will continue to discuss possible career change and patient's interest in a romantic relationship at the next session.    Patient will begin to look for places/events where he can socialize with others.  Provider and patient will explore trauma hx and sxs.      Leatha Pearce [facility]  9/1/23  Service Performed and Documented by [facility]-   Note reviewed and clinical supervision by LONDON Patino [facility] 9/5/2023  ______________________________________________________________________    Individual Treatment Plan    Patient's Name: Teddy Shah  YOB: 1990    Date of Creation: 9/7/21  Date Treatment Plan Last Reviewed/Revised: 8/15/23    DSM5 Diagnoses: Attention-Deficit/Hyperactivity Disorder  314.00 (F90.0) Predominantly inattentive presentation, 311 (F32.9) Unspecified Depressive Disorder  or 300.02 (F41.1) Generalized Anxiety Disorder  Psychosocial / Contextual Factors: Hostile home environment, lack of social support, current events/politics a significant stress, family conflict  PROMIS (reviewed every 90 days):   WHODAS: 24    Referral / Collaboration:  Referral to another professional/service is not indicated at this time..    Anticipated number of session for this episode of care:   Anticipation frequency of session: Biweekly  Anticipated Duration of each session: 38-52 minutes  Treatment plan will be reviewed in 90 days or when goals have been changed.       MeasurableTreatment Goal(s) related to diagnosis / functional impairment(s)  Goal 1: Patient will successfully develop and utilize coping skills to manage  "depressive, anxious and ADHD sxs within 90 days.     I will know I've met my goal when I feel like I can get some of my executive function back (completing tasks).       Objective #A Patient will process experience around romantic relationship, reflect on how it has impacted his view of self, and how he would like to move forward.      Patient will work to develop a \"social life\" outside of work, and manage mental health sxs as they arise.              Patient will attend and participate in social or recreational activities    Develop and utilize self-compassion strategies.   identify the time in your life when you began to feel poorly about themselves.  Status: Restarted - Date: 8/15/23      Intervention(s)  Therapist will assign homework around utilizing self compassion strategies  teach psychoeducation around shame, guilt and healthy relationships.     Objective #B Patient will work to develop coping skills to \"turn brain off\" or switch tracks.    Patient will use thought-stopping strategy daily to reduce intrusive thoughts.  Status: Restarted - Date: 8/15/23        Intervention(s)  Therapist will assign homework around using thought-stopping strategies and other coping skills  teach emotional regulation skills.       Patient has reviewed and agreed to the above plan.      Leatha Pearce TOSHIA  March 14, 2022, reviewed by 6/13/22, reviewed 9/14/22, reviewed 1/9/23, reviewed 5/8/23, reviewed 8/15/23  Service Performed and Documented by [facility]-   tx plan reviewed and clinical supervision by LONDON Patino [facility] 3/29/2022, 6/13/2022, 9/14/2022, 1/10/2023,5/8/2023, 8/16/2023  Answers submitted by the patient for this visit:  Patient Health Questionnaire (Submitted on 9/1/2023)  If you checked off any problems, how difficult have these problems made it for you to do your work, take care of things at home, or get along with other people?: Somewhat difficult  PHQ9 TOTAL SCORE: 8  KASSIE-7 (Submitted on 9/1/2023)  KASSIE 7 " TOTAL SCORE: 9     4/28 error points indicating normal cognitive functioning/intact

## 2024-10-18 ENCOUNTER — VIRTUAL VISIT (OUTPATIENT)
Dept: PSYCHOLOGY | Facility: CLINIC | Age: 34
End: 2024-10-18
Payer: COMMERCIAL

## 2024-10-18 DIAGNOSIS — F90.0 ATTENTION DEFICIT HYPERACTIVITY DISORDER (ADHD), PREDOMINANTLY INATTENTIVE TYPE: ICD-10-CM

## 2024-10-18 DIAGNOSIS — F41.1 GENERALIZED ANXIETY DISORDER: Primary | ICD-10-CM

## 2024-10-18 DIAGNOSIS — F32.A DEPRESSION, UNSPECIFIED DEPRESSION TYPE: ICD-10-CM

## 2024-10-18 PROCEDURE — 90834 PSYTX W PT 45 MINUTES: CPT | Mod: 95

## 2024-10-18 ASSESSMENT — ANXIETY QUESTIONNAIRES
6. BECOMING EASILY ANNOYED OR IRRITABLE: SEVERAL DAYS
4. TROUBLE RELAXING: SEVERAL DAYS
5. BEING SO RESTLESS THAT IT IS HARD TO SIT STILL: NOT AT ALL
3. WORRYING TOO MUCH ABOUT DIFFERENT THINGS: MORE THAN HALF THE DAYS
IF YOU CHECKED OFF ANY PROBLEMS ON THIS QUESTIONNAIRE, HOW DIFFICULT HAVE THESE PROBLEMS MADE IT FOR YOU TO DO YOUR WORK, TAKE CARE OF THINGS AT HOME, OR GET ALONG WITH OTHER PEOPLE: VERY DIFFICULT
7. FEELING AFRAID AS IF SOMETHING AWFUL MIGHT HAPPEN: NEARLY EVERY DAY
2. NOT BEING ABLE TO STOP OR CONTROL WORRYING: NEARLY EVERY DAY
1. FEELING NERVOUS, ANXIOUS, OR ON EDGE: MORE THAN HALF THE DAYS
GAD7 TOTAL SCORE: 12
8. IF YOU CHECKED OFF ANY PROBLEMS, HOW DIFFICULT HAVE THESE MADE IT FOR YOU TO DO YOUR WORK, TAKE CARE OF THINGS AT HOME, OR GET ALONG WITH OTHER PEOPLE?: VERY DIFFICULT
7. FEELING AFRAID AS IF SOMETHING AWFUL MIGHT HAPPEN: NEARLY EVERY DAY

## 2024-10-18 NOTE — PROGRESS NOTES
[facility]                                     Progress Note    Patient Name: Teddy Shah  Date: 10/18/24         Service Type: Individual      Session Start Time:  10:08am  Session End Time:  10:59am     Session Length: 38-52min    Session #: 14    Attendees: Client attended alone    Service Modality:  Video session  Yes, the patient's condition can be safely assessed and treated via synchronous audio and visual telemedicine encounter.  (Provider and Patient had to switch to phone due to technical difficulties)    Reason for Video Visit: Services only offered telehealth    Location of Originating Site: Patient's home/patient's car    Distant Site: Provider Remote Setting home office    Provider verified identity through the following two step process.  Patient provided:  Patient  and Patient address                            Consent:  The patient/guardian has verbally consented to: the potential risks and benefits of telemedicine (video visit) versus in person care; bill my insurance or make self-payment for services provided; and responsibility for payment of non-covered services.                 Mode of transmission-Portable Zoo  Interactive Complexity: No  Crisis: No     Progress Since Last Session (Related to Symptoms / Goals / Homework):   Symptoms: Worsening anxious and depressive sxs per KASSIE-7, PHQ-9 and patient report    Homework: Completed in session      Episode of Care Goals: Satisfactory progress - ACTION (Actively working towards change); Intervened by reinforcing change plan / affirming steps taken     Current / Ongoing Stressors and Concerns:  Work stress, parents moving, low self worth, housing, negative self talk, aversion to change, interpersonal relationships, motivation for change, emotional expression, current events/politics, rumination, desire for relationship/physical connection, sexuality, guilt/shame, job search, election, housing       Treatment  Objective(s) Addressed in This Session:   use at least   coping skills for anxiety management in the next 4 weeks  Decrease frequency and intensity of feeling down, depressed, hopeless  Identify negative self-talk and behaviors: challenge core beliefs, myths, and actions  Patient will attend and participate in social or recreational activities    Develop and utilize self-compassion strategies.   identify the time in your life when you began to feel poorly about themselves.     Intervention:   Motivational Interviewing    MI Intervention: Expressed Empathy/Understanding, Supported Autonomy, Collaboration, Evocation, Reflections: simple and complex, and Reframe     Change Talk Expressed by the Patient: Desire to change Reasons to change Committment to change Activation    Provider Response to Change Talk: E - Evoked more info from patient about behavior change, A - Affirmed patient's thoughts, decisions, or attempts at behavior change, R - Reflected patient's change talk, and S - Summarized patient's change talk statements    Provider held space as patient processed stressors and experiences using reflective listening, and normalization of patient's emotional response.  Provider worked to develop greater insight around patient's mental health status and exacerbating factors.  Provided validation and supportive counseling.      Provider held space as patient processed stress around politics and current events, and work dynamics.  Discussed experience with high anxiety at work. Worked to develop greater insight around patient's emotional response, thought patterns and behaviors.  Normalized patient's emotional response.  Validated and challenged patient.  Provider worked to increase self compassion.      Psychoeducation: Provider taught psychoeducation on panic attacks and anxiety.  Reviewed diagnostic criteria for panic attack specifier and worked to develop greater insight around patient's sxs.      ACT: Provider  continued to  patient on working towards acceptance of his circumstances and shifting focus to the steps patient can take moving forward.       Assessments:      8/30/2024     7:30 AM 9/13/2024    10:48 AM 10/18/2024    10:02 AM   PHQ   PHQ-9 Total Score 6 5 6   Q9: Thoughts of better off dead/self-harm past 2 weeks Not at all Not at all Not at all         7/19/2024    10:57 AM 8/30/2024     7:31 AM 10/18/2024    10:02 AM   KASSIE-7 SCORE   Total Score 13 (moderate anxiety) 11 (moderate anxiety) 12 (moderate anxiety)   Total Score 13 11 12       Assessments completed prior to visit:  The following assessments were completed by patient for this visit:  PROMIS 10-Global Health (only subscores and total score):       1/5/2024    11:08 AM 2/2/2024    10:18 AM 5/2/2024    10:43 PM 6/6/2024     6:20 PM 6/14/2024     1:32 AM 6/27/2024    10:46 PM 10/18/2024    10:03 AM   PROMIS-10 Scores Only   Global Mental Health Score 7 7 8    8 8 8 9 7   Global Physical Health Score 14 14 15    15 13 13 14 14   PROMIS TOTAL - SUBSCORES 21 21 23    23 21 21 23 21     [place] (Short version): see chart.      ASSESSMENT: Current Emotional / Mental Status (status of significant symptoms):   Risk status (Self / Other harm or suicidal ideation)   Patient denies current fears or concerns for personal safety.   Patient denies current or recent suicidal ideation or behaviors.   Patient denies current or recent homicidal ideation or behaviors.   Patient denies current or recent self injurious behavior or ideation.   Patient denies other safety concerns.   Patient reports there has been no change in risk factors since their last session.     Patient reports there has been no change in protective factors since their last session.     Recommended that patient call 911 or go to the local ED should there be a change in any of these risk factors     Appearance:   Appropriate    Eye Contact:   Fair    Psychomotor Behavior: Normal     Attitude:   Cooperative    Orientation:   All   Speech    Rate / Production: Normal/ Responsive    Volume:  Normal    Mood:    Anxious  Depressed    Affect:    Flat  Subdued    Thought Content:  Clear  Rumination    Thought Form:  Coherent  Tangential    Insight:    Fair      Medication Review:   No changes to current psychiatric medication(s)     Medication Compliance:   Yes      Changes in Health Issues:   None reported     Chemical Use Review:   Substance Use: Chemical use reviewed, no active concerns identified      Tobacco Use: No current tobacco use.      Diagnosis:  1. Generalized anxiety disorder    2. Depression, unspecified depression type    3. Attention deficit hyperactivity disorder (ADHD), predominantly inattentive type    R/O Other Specified Trauma Disorder    Collateral Reports Completed:   Not Applicable    PLAN: (Patient Tasks / Therapist Tasks / Other)    Patient will utilize crisis resources as needed.       Patient will utilize the following coping strategies to manage anxiety: Deep breathing, grounding exercises, engaging in civic duty. [place] exercise. Patient will also process and reality-check anxious thoughts after anxiety has decreased.     Patient will incorporate more deep-breathing and grounding exercises throughout the day.  Patient will utilize coping strategies discussed in session and challenge/reframe cognitive distortions (catastrophizing).        Patient will work to challenge and reframe identified cognitive distortions and negative thoughts.    Provider and patient will continue to talk about significant pattern of negative self talk.        Patient will utilize a daily check-list to mitigate ADHD sxs and work to improve organization in the workplace.    Patient stated that he would write a list with coping strategies that would be easily accessible and work to increase self compassion and use of coping strategies.  Patient will begin to use a gratitude journal.  Provider  "and patient will continue to discuss possible career change.    Patient will utilize strategies discussed in session.      Provider and patient will discuss dating and relationships, specifically finding someone in a similar position as myself. Provider and patient will discuss medication at the next session.  Provider will review treatment plan with patient.     Leatha Pearce, [facility] 10/18/24  ______________________________________________________________________    Individual Treatment Plan    Patient's Name: Teddy Shah  YOB: 1990    Date of Creation: 9/7/21  Date Treatment Plan Last Reviewed/Revised: 10/18/24    DSM5 Diagnoses: Attention-Deficit/Hyperactivity Disorder  314.00 (F90.0) Predominantly inattentive presentation, 311 (F32.9) Unspecified Depressive Disorder  or 300.02 (F41.1) Generalized Anxiety Disorder  Psychosocial / Contextual Factors: Hostile home environment, lack of social support, current events/politics a significant stress, family conflict  PROMIS (reviewed every 90 days): 21    Referral / Collaboration:  Referral to another professional/service is not indicated at this time..    Anticipated number of session for this episode of care:   Anticipation frequency of session: Biweekly  Anticipated Duration of each session: 38-52 minutes  Treatment plan will be reviewed in 90 days or when goals have been changed.       MeasurableTreatment Goal(s) related to diagnosis / functional impairment(s)  Goal 1: Patient will successfully develop and utilize coping skills to manage depressive, anxious and ADHD sxs within 90 days.     I will know I've met my goal when I feel like I can get some of my executive function back (completing tasks).       Objective #A Patient will process experience around romantic relationship, reflect on how it has impacted his view of self, and how he would like to move forward.      Patient will work to develop a \"social life\" outside of work, and manage mental " "health sxs as they arise.              Patient will attend and participate in social or recreational activities    Develop and utilize self-compassion strategies.   identify the time in your life when you began to feel poorly about themselves.  Status: Continued - Date(s): 10/18/24    Intervention(s)  Therapist will assign homework around utilizing self compassion strategies  teach psychoeducation around shame, guilt and healthy relationships.     Objective #B Patient will work to develop coping skills to \"turn brain off\" or switch tracks.    Patient will use thought-stopping strategy daily to reduce intrusive thoughts.  Status: Continued - Date(s):  10/18/24      Intervention(s)  Therapist will assign homework around using thought-stopping strategies and other coping skills  teach emotional regulation skills.       Patient has reviewed and agreed to the above plan.      Leatha Pearce, [facility]  March 14, 2022, reviewed by 6/13/22, reviewed 9/14/22, reviewed 1/9/23, reviewed 5/8/23, reviewed 8/15/23, reviewed 12/1/23, reviewed 3/8/24, reviewed 6/14/24, reviewed 10/18/24    tx plan reviewed and clinical supervision by LONDON Patino [facility] 3/29/2022, 6/13/2022, 9/14/2022, 1/10/2023,5/8/2023, 8/16/2023, 12/4/2023    Answers submitted by the patient for this visit:  Patient Health Questionnaire (Submitted on 10/18/2024)  If you checked off any problems, how difficult have these problems made it for you to do your work, take care of things at home, or get along with other people?: Somewhat difficult  PHQ9 TOTAL SCORE: 6  Patient Health Questionnaire (G7) (Submitted on 10/18/2024)  KASSIE 7 TOTAL SCORE: 12    "

## 2024-11-15 ENCOUNTER — VIRTUAL VISIT (OUTPATIENT)
Dept: PSYCHOLOGY | Facility: CLINIC | Age: 34
End: 2024-11-15
Payer: COMMERCIAL

## 2024-11-15 DIAGNOSIS — F90.0 ATTENTION DEFICIT HYPERACTIVITY DISORDER (ADHD), PREDOMINANTLY INATTENTIVE TYPE: ICD-10-CM

## 2024-11-15 DIAGNOSIS — F41.1 GENERALIZED ANXIETY DISORDER: Primary | ICD-10-CM

## 2024-11-15 DIAGNOSIS — F32.A DEPRESSION, UNSPECIFIED DEPRESSION TYPE: ICD-10-CM

## 2024-11-15 PROCEDURE — 90832 PSYTX W PT 30 MINUTES: CPT | Mod: 95

## 2024-11-15 NOTE — PROGRESS NOTES
[facility]                                     Progress Note    Patient Name: Teddy Shah  Date: 11/15/24         Service Type: Individual      Session Start Time:  11:02am  Session End Time:  11:31am     Session Length: 16-37min    Session #: 15    Attendees: Client attended alone    Service Modality:  Video session  Yes, the patient's condition can be safely assessed and treated via synchronous audio and visual telemedicine encounter.  (Provider and Patient had to switch to phone due to technical difficulties)    Reason for Video Visit: Services only offered telehealth    Location of Originating Site: Patient's home    Distant Site: Provider Remote Setting home office    Provider verified identity through the following two step process.  Patient provided:  Patient  and Patient address                            Consent:  The patient/guardian has verbally consented to: the potential risks and benefits of telemedicine (video visit) versus in person care; bill my insurance or make self-payment for services provided; and responsibility for payment of non-covered services.                 Mode of transmission-[employer]  Interactive Complexity: No  Crisis: No     Progress Since Last Session (Related to Symptoms / Goals / Homework):   Symptoms: Worsening anxious and depressive sxs per patient report    Homework: Completed in session      Episode of Care Goals: Satisfactory progress - ACTION (Actively working towards change); Intervened by reinforcing change plan / affirming steps taken     Current / Ongoing Stressors and Concerns:  Work stress, parents moving, low self worth, housing, negative self talk, aversion to change, interpersonal relationships, motivation for change, emotional expression, current events/politics, rumination, desire for relationship/physical connection, sexuality, guilt/shame, job search, election, housing       Treatment Objective(s) Addressed in This  Session:   use at least   coping skills for anxiety management in the next 4 weeks  Decrease frequency and intensity of feeling down, depressed, hopeless  Identify negative self-talk and behaviors: challenge core beliefs, myths, and actions  Patient will attend and participate in social or recreational activities    Develop and utilize self-compassion strategies.   identify the time in your life when you began to feel poorly about themselves.     Intervention:   Motivational Interviewing    MI Intervention: Expressed Empathy/Understanding, Supported Autonomy, Collaboration, Evocation, Reflections: simple and complex, and Reframe     Change Talk Expressed by the Patient: Desire to change Reasons to change Committment to change Activation    Provider Response to Change Talk: E - Evoked more info from patient about behavior change, A - Affirmed patient's thoughts, decisions, or attempts at behavior change, R - Reflected patient's change talk, and S - Summarized patient's change talk statements    Provider held space as patient processed stressors and experiences using reflective listening, and normalization of patient's emotional response.  Provider worked to develop greater insight around patient's mental health status and exacerbating factors.  Provided validation and supportive counseling.  Provider assessed for safety, no safety concerns identified.     Provider held space as patient processed stress around the election, work dynamics, and family dynamics.  Discussed experience with high anxiety at work. Worked to develop greater insight around patient's emotional response, thought patterns and behaviors.  Normalized patient's emotional response.  Provided supportive counseling.  Validated patient's use of boundary setting.  Worked to highlight patient's sources of support.     Provider and patient discussed patient's interest in pursuing medication for anxious sxs.  Provided validation and encoruaged patient to  follow-up with PCP and discuss concerns related to medication with provider.      Psychoeducation: Provider taught psychoeducation on medication management.      Provider and patient discussed the direction of treatment.       Assessments:      8/30/2024     7:30 AM 9/13/2024    10:48 AM 10/18/2024    10:02 AM   PHQ   PHQ-9 Total Score 6 5 6   Q9: Thoughts of better off dead/self-harm past 2 weeks Not at all  Not at all  Not at all        Patient-reported         7/19/2024    10:57 AM 8/30/2024     7:31 AM 10/18/2024    10:02 AM   KASSIE-7 SCORE   Total Score 13 (moderate anxiety) 11 (moderate anxiety) 12 (moderate anxiety)   Total Score 13 11 12       Assessments completed prior to visit:  The following assessments were completed by patient for this visit:  PROMIS 10-Global Health (only subscores and total score):       1/5/2024    11:08 AM 2/2/2024    10:18 AM 5/2/2024    10:43 PM 6/6/2024     6:20 PM 6/14/2024     1:32 AM 6/27/2024    10:46 PM 10/18/2024    10:03 AM   PROMIS-10 Scores Only   Global Mental Health Score 7 7 8    8 8 8 9 7   Global Physical Health Score 14 14 15    15 13 13 14 14   PROMIS TOTAL - SUBSCORES 21 21 23    23 21 21 23 21     [place]ams (Short version): see chart.      ASSESSMENT: Current Emotional / Mental Status (status of significant symptoms):   Risk status (Self / Other harm or suicidal ideation)   Patient denies current fears or concerns for personal safety.   Patient denies current or recent suicidal ideation or behaviors.   Patient denies current or recent homicidal ideation or behaviors.   Patient denies current or recent self injurious behavior or ideation.   Patient denies other safety concerns.   Patient reports there has been no change in risk factors since their last session.     Patient reports there has been no change in protective factors since their last session.     Recommended that patient call 911 or go to the local ED should there be a change in any of these risk  factors     Appearance:   Appropriate    Eye Contact:   Fair    Psychomotor Behavior: Normal    Attitude:   Cooperative    Orientation:   All   Speech    Rate / Production: Normal/ Responsive    Volume:  Normal    Mood:    Anxious  Depressed    Affect:    Flat  Subdued    Thought Content:  Clear  Rumination    Thought Form:  Coherent  Tangential    Insight:    Fair      Medication Review:   No changes to current psychiatric medication(s)     Medication Compliance:   Yes      Changes in Health Issues:   None reported     Chemical Use Review:   Substance Use: Chemical use reviewed, no active concerns identified      Tobacco Use: No current tobacco use.      Diagnosis:  1. Generalized anxiety disorder    2. Depression, unspecified depression type    3. Attention deficit hyperactivity disorder (ADHD), predominantly inattentive type    R/O Other Specified Trauma Disorder    Collateral Reports Completed:   Not Applicable    PLAN: (Patient Tasks / Therapist Tasks / Other)    Patient will utilize crisis resources as needed.       Patient will utilize the following coping strategies to manage anxiety: Deep breathing, grounding exercises, engaging in civic duty. [place] exercise. Patient will also process and reality-check anxious thoughts after anxiety has decreased.     Patient will incorporate more deep-breathing and grounding exercises throughout the day.  Patient will utilize coping strategies discussed in session and challenge/reframe cognitive distortions (catastrophizing).        Patient will work to challenge and reframe identified cognitive distortions and negative thoughts.    Provider and patient will continue to talk about significant pattern of negative self talk.        Patient will utilize a daily check-list to mitigate ADHD sxs and work to improve organization in the workplace.    Patient stated that he would write a list with coping strategies that would be easily accessible and work to increase self  compassion and use of coping strategies.  Patient will begin to use a gratitude journal.  Provider and patient will continue to discuss possible career change.    Patient will utilize strategies discussed in session.      Provider and patient will discuss dating and relationships, specifically finding someone in a similar position as myself. Provider and patient will discuss medication at the next session.  Provider will review treatment plan with patient.     Leatha Pearce, [facility] 11/15/24  ______________________________________________________________________    Individual Treatment Plan    Patient's Name: Teddy Shah  YOB: 1990    Date of Creation: 9/7/21  Date Treatment Plan Last Reviewed/Revised: 10/18/24    DSM5 Diagnoses: Attention-Deficit/Hyperactivity Disorder  314.00 (F90.0) Predominantly inattentive presentation, 311 (F32.9) Unspecified Depressive Disorder  or 300.02 (F41.1) Generalized Anxiety Disorder  Psychosocial / Contextual Factors: Hostile home environment, lack of social support, current events/politics a significant stress, family conflict  PROMIS (reviewed every 90 days): 21    Referral / Collaboration:  Referral to another professional/service is not indicated at this time..    Anticipated number of session for this episode of care:   Anticipation frequency of session: Biweekly  Anticipated Duration of each session: 38-52 minutes  Treatment plan will be reviewed in 90 days or when goals have been changed.       MeasurableTreatment Goal(s) related to diagnosis / functional impairment(s)  Goal 1: Patient will successfully develop and utilize coping skills to manage depressive, anxious and ADHD sxs within 90 days.     I will know I've met my goal when I feel like I can get some of my executive function back (completing tasks).       Objective #A Patient will process experience around romantic relationship, reflect on how it has impacted his view of self, and how he would like to  "move forward.      Patient will work to develop a \"social life\" outside of work, and manage mental health sxs as they arise.              Patient will attend and participate in social or recreational activities    Develop and utilize self-compassion strategies.   identify the time in your life when you began to feel poorly about themselves.  Status: Continued - Date(s): 10/18/24    Intervention(s)  Therapist will assign homework around utilizing self compassion strategies  teach psychoeducation around shame, guilt and healthy relationships.     Objective #B Patient will work to develop coping skills to \"turn brain off\" or switch tracks.    Patient will use thought-stopping strategy daily to reduce intrusive thoughts.  Status: Continued - Date(s):  10/18/24      Intervention(s)  Therapist will assign homework around using thought-stopping strategies and other coping skills  teach emotional regulation skills.       Patient has reviewed and agreed to the above plan.      Leatha Pearce, [facility]  March 14, 2022, reviewed by 6/13/22, reviewed 9/14/22, reviewed 1/9/23, reviewed 5/8/23, reviewed 8/15/23, reviewed 12/1/23, reviewed 3/8/24, reviewed 6/14/24, reviewed 10/18/24    tx plan reviewed and clinical supervision by LONDON Patino [facility] 3/29/2022, 6/13/2022, 9/14/2022, 1/10/2023,5/8/2023, 8/16/2023, 12/4/2023    Answers submitted by the patient for this visit:  Patient Health Questionnaire (Submitted on 10/18/2024)  If you checked off any problems, how difficult have these problems made it for you to do your work, take care of things at home, or get along with other people?: Somewhat difficult  PHQ9 TOTAL SCORE: 6  Patient Health Questionnaire (G7) (Submitted on 10/18/2024)  KASSIE 7 TOTAL SCORE: 12    "

## 2024-11-16 DIAGNOSIS — F33.1 MODERATE EPISODE OF RECURRENT MAJOR DEPRESSIVE DISORDER (H): ICD-10-CM

## 2024-11-16 DIAGNOSIS — F41.9 ANXIETY: ICD-10-CM

## 2024-11-18 RX ORDER — SERTRALINE HYDROCHLORIDE 100 MG/1
200 TABLET, FILM COATED ORAL DAILY
Qty: 120 TABLET | Refills: 0 | Status: SHIPPED | OUTPATIENT
Start: 2024-11-18

## 2025-01-03 ENCOUNTER — MYC REFILL (OUTPATIENT)
Dept: FAMILY MEDICINE | Facility: CLINIC | Age: 35
End: 2025-01-03
Payer: COMMERCIAL

## 2025-01-03 DIAGNOSIS — F33.1 MODERATE EPISODE OF RECURRENT MAJOR DEPRESSIVE DISORDER (H): ICD-10-CM

## 2025-01-03 DIAGNOSIS — F41.9 ANXIETY: ICD-10-CM

## 2025-01-06 RX ORDER — SERTRALINE HYDROCHLORIDE 100 MG/1
200 TABLET, FILM COATED ORAL DAILY
Qty: 120 TABLET | Refills: 0 | Status: SHIPPED | OUTPATIENT
Start: 2025-01-06

## 2025-02-20 ENCOUNTER — ANCILLARY PROCEDURE (OUTPATIENT)
Dept: GENERAL RADIOLOGY | Facility: CLINIC | Age: 35
End: 2025-02-20
Attending: EMERGENCY MEDICINE
Payer: COMMERCIAL

## 2025-02-20 ENCOUNTER — OFFICE VISIT (OUTPATIENT)
Dept: URGENT CARE | Facility: URGENT CARE | Age: 35
End: 2025-02-20
Payer: COMMERCIAL

## 2025-02-20 VITALS
HEART RATE: 95 BPM | RESPIRATION RATE: 18 BRPM | BODY MASS INDEX: 38.11 KG/M2 | WEIGHT: 281 LBS | SYSTOLIC BLOOD PRESSURE: 138 MMHG | DIASTOLIC BLOOD PRESSURE: 76 MMHG | OXYGEN SATURATION: 95 % | TEMPERATURE: 97.3 F

## 2025-02-20 DIAGNOSIS — J20.9 ACUTE BRONCHITIS WITH SYMPTOMS > 10 DAYS: Primary | ICD-10-CM

## 2025-02-20 DIAGNOSIS — J20.9 ACUTE BRONCHITIS WITH SYMPTOMS > 10 DAYS: ICD-10-CM

## 2025-02-20 RX ORDER — BENZONATATE 100 MG/1
100 CAPSULE ORAL 3 TIMES DAILY PRN
Qty: 21 CAPSULE | Refills: 0 | Status: SHIPPED | OUTPATIENT
Start: 2025-02-20

## 2025-02-20 RX ORDER — ALBUTEROL SULFATE 90 UG/1
2 INHALANT RESPIRATORY (INHALATION) EVERY 6 HOURS PRN
Qty: 17 G | Refills: 0 | Status: SHIPPED | OUTPATIENT
Start: 2025-02-20

## 2025-02-20 ASSESSMENT — PAIN SCALES - GENERAL: PAINLEVEL_OUTOF10: MILD PAIN (1)

## 2025-02-20 NOTE — PROGRESS NOTES
Assessment & Plan     Diagnosis:    ICD-10-CM    1. Acute bronchitis with symptoms > 10 days  J20.9 XR Chest 2 Views     benzonatate (TESSALON) 100 MG capsule     albuterol (PROAIR HFA/PROVENTIL HFA/VENTOLIN HFA) 108 (90 Base) MCG/ACT inhaler          Medical Decision Making:  Teddy Shah is a 34 year old adult who presents for evaluation of cough x2 weeks.  This is consistent with bronchitis; likely post-viral URI  There is no signs at this point of serious bacterial infection such as OM, RPA, epiglottitis, PTA, strep pharyngitis.    I did a CXR to eval for pneumonia. This shows no acute infiltrate or effusion. . There are no signs of complications of bronchitis at this point such as hypoxia, respiratory failure or compromise. He does have slight wheezing on exam and I did prescribe an Albuterol inhaler to assist with this. Tessalon Perles as needed for bronchospasm.     There are no gastrointestinal symptoms at this point and no signs of dehydration.  Close followup with PCP is indicated.  Go to ED for fever > 102 F, protracted vomiting, worsening shortness of breath, chest pain or other concerns.  Patient verbalized understanding and agreement with the plan was discharged in stable condition.    Shadi Duran PA-C  [facility]    Subjective     Teddy Shah is a 34 year old adult who presents to clinic today for the following health issues:  Chief Complaint   Patient presents with    Cough     Cough - two weeks, cough and chest pain lingering        HPI  Patient with cough for 2 weeks, chest pains with coughing, soreness.  Also feels a bit wheezy and congested.  may have had some asthma as a child, but no issues since.  Patient does not smoke.  No exertional chest pain, shortness of breath at rest or with minimal exertion.  He notes his throat has been sore from coughing, but this is subsided for the most part.  No ear pain, sinus pain or pressure, fevers, nausea, vomiting, diarrhea, leg  swelling, calf pain or other concerns.      Review of Systems    See HPI    Objective      Vitals: /76 (BP Location: Left arm, Patient Position: Sitting, Cuff Size: Adult Large)   Pulse 95   Temp 97.3  F (36.3  C) (Tympanic)   Resp 18   Wt 127.5 kg (281 lb)   SpO2 95%   BMI 38.11 kg/m      Patient Vitals for the past 24 hrs:   BP Temp Temp src Pulse Resp SpO2 Weight   02/20/25 1300 138/76 97.3  F (36.3  C) Tympanic 95 18 95 % 127.5 kg (281 lb)       Vital signs reviewed by: Shadi Duran PA-C    Physical Exam   Constitutional: Patient is alert and cooperative. No acute distress.  Ears: Right TM is normal. Left TM is normal. External ear canals are normal.  Mouth: Mucous membranes are moist. Normal tongue and tonsil. Posterior oropharynx is clear.  Eyes: Conjunctivae and lids are normal.  Cardiovascular: Regular rate and rhythm  Pulmonary/Chest: Scant expiratory wheezes in the upper lung fields.  No rales or rhonchi.  Speaking in full sentences, no respiratory distress.  Skin: No rash noted on visualized skin.  Psychiatric:The patient has a normal mood and affect.     Labs/Imaging:  Results for orders placed or performed in visit on 02/20/25   XR Chest 2 Views     Status: None    Narrative    EXAM: XR CHEST 2 VIEWS  LOCATION: [facility]  DATE: 2/20/2025    INDICATION:  Acute bronchitis with symptoms > 10 days  COMPARISON: None.      Impression    IMPRESSION: Negative chest.     Reading per radiology      Shadi Duran PA-C, February 20, 2025

## 2025-02-26 ENCOUNTER — MYC REFILL (OUTPATIENT)
Dept: FAMILY MEDICINE | Facility: CLINIC | Age: 35
End: 2025-02-26
Payer: COMMERCIAL

## 2025-02-26 DIAGNOSIS — F41.9 ANXIETY: ICD-10-CM

## 2025-02-26 DIAGNOSIS — F33.1 MODERATE EPISODE OF RECURRENT MAJOR DEPRESSIVE DISORDER (H): ICD-10-CM

## 2025-02-27 RX ORDER — SERTRALINE HYDROCHLORIDE 100 MG/1
200 TABLET, FILM COATED ORAL DAILY
Qty: 120 TABLET | Refills: 0 | Status: SHIPPED | OUTPATIENT
Start: 2025-02-27

## 2025-04-12 ENCOUNTER — OFFICE VISIT (OUTPATIENT)
Dept: URGENT CARE | Facility: URGENT CARE | Age: 35
End: 2025-04-12
Payer: COMMERCIAL

## 2025-04-12 VITALS
TEMPERATURE: 97.6 F | WEIGHT: 282 LBS | DIASTOLIC BLOOD PRESSURE: 84 MMHG | HEART RATE: 95 BPM | RESPIRATION RATE: 18 BRPM | HEIGHT: 74 IN | SYSTOLIC BLOOD PRESSURE: 124 MMHG | OXYGEN SATURATION: 95 % | BODY MASS INDEX: 36.19 KG/M2

## 2025-04-12 DIAGNOSIS — R59.1 LYMPHADENOPATHY: Primary | ICD-10-CM

## 2025-04-12 PROCEDURE — 99213 OFFICE O/P EST LOW 20 MIN: CPT | Performed by: NURSE PRACTITIONER

## 2025-04-12 PROCEDURE — 3074F SYST BP LT 130 MM HG: CPT | Performed by: NURSE PRACTITIONER

## 2025-04-12 PROCEDURE — 1125F AMNT PAIN NOTED PAIN PRSNT: CPT | Performed by: NURSE PRACTITIONER

## 2025-04-12 PROCEDURE — 3079F DIAST BP 80-89 MM HG: CPT | Performed by: NURSE PRACTITIONER

## 2025-04-12 RX ORDER — AMOXICILLIN 500 MG/1
500 CAPSULE ORAL 2 TIMES DAILY
Qty: 20 CAPSULE | Refills: 0 | Status: SHIPPED | OUTPATIENT
Start: 2025-04-12 | End: 2025-04-22

## 2025-04-12 ASSESSMENT — PAIN SCALES - GENERAL: PAINLEVEL_OUTOF10: MILD PAIN (1)

## 2025-04-12 NOTE — PROGRESS NOTES
"SUBJECTIVE:  Teddy Shah is a 34 year old adult who presents with lumb to the side of the neck left pain and increased in size for 1 year(s).   Has had ultrasounds done in the past noting to be a inflamed lymph node presents today because it is mildly increased over the last week.  Past Medical History:   Diagnosis Date    Gastro-oesophageal reflux disease     History of anesthesia reaction     Severe N/V    PONV (postoperative nausea and vomiting)        Social History     Tobacco Use    Smoking status: Never    Smokeless tobacco: Never   Vaping Use    Vaping status: Never Used   Substance Use Topics    Alcohol use: No    Drug use: No       REVIEW OF SYSTEMS  ROS:   Review of systems negative except as stated above.        OBJECTIVE:  /84 (BP Location: Left arm, Patient Position: Sitting, Cuff Size: Adult Large)   Pulse 95   Temp 97.6  F (36.4  C) (Tympanic)   Resp 18   Ht 1.87 m (6' 1.62\")   Wt 127.9 kg (282 lb)   SpO2 95%   BMI 36.58 kg/m       GENERAL: no acute distress  EYES: EOMI,  PERRL, conjunctiva clear  NECK: Inflamed cervical lymph node to the left  RESP: lungs clear to auscultation - no rales, rhonchi or wheezes  SKIN: no suspicious lesions or rashes   CV: regular rates and rhythm, normal    ASSESSMENT:    (R59.1) Lymphadenopathy  (primary encounter diagnosis  Plan: amoxicillin (AMOXIL) 500 MG capsule        Chronic history of inflamed cervical lymph node may be infected we will treat with a course of amoxicillin due to.  Current workup last ultrasound the recommended follow-up in 6 months which would been December of last year recommend patient follow back up with his primary care provider for further follow-up and determining of further interventions.  Nothing urgent noted at this visit    Mild  dandruff noted to scalp with mild irritation no signs of infection.  PLAN:  Amoxicillin 500 mg two times per day for 10 days.    May use acetaminophen, ibuprofen prn.  Return for recheck in 2-4 " weeks, sooner if symptoms worsen.      Leelee Herrera, APRN CNP

## 2025-04-12 NOTE — PROGRESS NOTES
"Urgent Care Clinic Visit    Chief Complaint   Patient presents with    Mass     Patient reports lump in left side of neck for \"a long time.\" Within the last week, lump has become painful. Patient states pain is radiating. Patient denies sore throat, cold symptoms, headaches. Patient reports family history of lymphoma.     Derm Problem     Patient reports spot on scalp that is itchy, peels and \"abnormally warm to the touch.\"                4/12/2025    12:22 PM   Additional Questions   Roomed by EVANGELINA Esparza   Accompanied by Self     Isaias Bland LPN    "

## 2025-04-27 DIAGNOSIS — F41.9 ANXIETY: ICD-10-CM

## 2025-04-27 DIAGNOSIS — F33.1 MODERATE EPISODE OF RECURRENT MAJOR DEPRESSIVE DISORDER (H): ICD-10-CM

## 2025-04-28 RX ORDER — SERTRALINE HYDROCHLORIDE 100 MG/1
200 TABLET, FILM COATED ORAL DAILY
Qty: 120 TABLET | Refills: 0 | Status: SHIPPED | OUTPATIENT
Start: 2025-04-28

## 2025-04-28 NOTE — TELEPHONE ENCOUNTER
Called and left a voicemail message to return our call to schedule an establish care appointment for further refills.  Tika Covarrubias MA/  [facility]   Primary Care

## 2025-05-08 DIAGNOSIS — K21.00 GASTROESOPHAGEAL REFLUX DISEASE WITH ESOPHAGITIS, UNSPECIFIED WHETHER HEMORRHAGE: ICD-10-CM

## 2025-05-08 RX ORDER — OMEPRAZOLE 20 MG/1
20 CAPSULE, DELAYED RELEASE ORAL DAILY
Qty: 90 CAPSULE | Refills: 2 | Status: SHIPPED | OUTPATIENT
Start: 2025-05-08

## 2025-06-14 ENCOUNTER — HEALTH MAINTENANCE LETTER (OUTPATIENT)
Age: 35
End: 2025-06-14

## 2025-08-19 ENCOUNTER — MYC REFILL (OUTPATIENT)
Dept: FAMILY MEDICINE | Facility: CLINIC | Age: 35
End: 2025-08-19
Payer: COMMERCIAL

## 2025-08-19 DIAGNOSIS — F41.9 ANXIETY: ICD-10-CM

## 2025-08-19 DIAGNOSIS — F33.1 MODERATE EPISODE OF RECURRENT MAJOR DEPRESSIVE DISORDER (H): ICD-10-CM

## 2025-08-21 RX ORDER — SERTRALINE HYDROCHLORIDE 100 MG/1
200 TABLET, FILM COATED ORAL DAILY
Qty: 180 TABLET | Refills: 3 | Status: SHIPPED | OUTPATIENT
Start: 2025-08-21

## 2025-08-28 ENCOUNTER — OFFICE VISIT (OUTPATIENT)
Dept: URGENT CARE | Facility: URGENT CARE | Age: 35
End: 2025-08-28
Payer: COMMERCIAL

## 2025-08-28 VITALS
HEART RATE: 80 BPM | DIASTOLIC BLOOD PRESSURE: 84 MMHG | RESPIRATION RATE: 20 BRPM | WEIGHT: 297.2 LBS | SYSTOLIC BLOOD PRESSURE: 139 MMHG | BODY MASS INDEX: 40.26 KG/M2 | OXYGEN SATURATION: 96 % | HEIGHT: 72 IN | TEMPERATURE: 97.7 F

## 2025-08-28 DIAGNOSIS — K12.30: Primary | ICD-10-CM

## 2025-08-28 RX ORDER — LIDOCAINE HYDROCHLORIDE 20 MG/ML
15 SOLUTION OROPHARYNGEAL
Qty: 100 ML | Refills: 0 | Status: SHIPPED | OUTPATIENT
Start: 2025-08-28

## 2025-08-28 ASSESSMENT — ENCOUNTER SYMPTOMS
PALPITATIONS: 0
FATIGUE: 0
SINUS PRESSURE: 0
NAUSEA: 0
SINUS PAIN: 0
WHEEZING: 0
SHORTNESS OF BREATH: 0
CHILLS: 0
RHINORRHEA: 0
DIARRHEA: 0
VOMITING: 0
CARDIOVASCULAR NEGATIVE: 1
COUGH: 0
SORE THROAT: 0
FEVER: 0

## 2025-08-28 ASSESSMENT — PAIN SCALES - GENERAL: PAINLEVEL_OUTOF10: MILD PAIN (3)

## 2025-09-01 ENCOUNTER — PATIENT OUTREACH (OUTPATIENT)
Dept: CARE COORDINATION | Facility: CLINIC | Age: 35
End: 2025-09-01
Payer: COMMERCIAL